# Patient Record
Sex: FEMALE | Race: WHITE | NOT HISPANIC OR LATINO | Employment: UNEMPLOYED | URBAN - METROPOLITAN AREA
[De-identification: names, ages, dates, MRNs, and addresses within clinical notes are randomized per-mention and may not be internally consistent; named-entity substitution may affect disease eponyms.]

---

## 2020-05-26 ENCOUNTER — HOSPITAL ENCOUNTER (INPATIENT)
Facility: HOSPITAL | Age: 65
LOS: 3 days | DRG: 481 | End: 2020-05-29
Attending: EMERGENCY MEDICINE | Admitting: INTERNAL MEDICINE
Payer: MEDICARE

## 2020-05-26 ENCOUNTER — APPOINTMENT (EMERGENCY)
Dept: RADIOLOGY | Facility: HOSPITAL | Age: 65
DRG: 481 | End: 2020-05-26
Payer: MEDICARE

## 2020-05-26 DIAGNOSIS — M25.552 LEFT HIP PAIN: ICD-10-CM

## 2020-05-26 DIAGNOSIS — F17.200 SMOKING: ICD-10-CM

## 2020-05-26 DIAGNOSIS — E11.65 TYPE 2 DIABETES MELLITUS WITH HYPERGLYCEMIA, WITHOUT LONG-TERM CURRENT USE OF INSULIN (HCC): ICD-10-CM

## 2020-05-26 DIAGNOSIS — S72.002A FRACTURE OF LEFT HIP (HCC): ICD-10-CM

## 2020-05-26 DIAGNOSIS — S72.042A CLOSED DISPLACED BASICERVICAL FRACTURE OF LEFT FEMUR, INITIAL ENCOUNTER (HCC): ICD-10-CM

## 2020-05-26 DIAGNOSIS — S72.002A FRACTURE OF FEMORAL NECK, LEFT (HCC): Primary | ICD-10-CM
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
## 2020-05-26 LAB
ALBUMIN SERPL BCP-MCNC: 3.6 G/DL (ref 3.5–5)
ALP SERPL-CCNC: 135 U/L (ref 46–116)
ALT SERPL W P-5'-P-CCNC: 15 U/L (ref 12–78)
ANION GAP SERPL CALCULATED.3IONS-SCNC: 12 MMOL/L (ref 4–13)
APTT PPP: 25 SECONDS (ref 23–37)
AST SERPL W P-5'-P-CCNC: 10 U/L (ref 5–45)
ATRIAL RATE: 85 BPM
ATRIAL RATE: 85 BPM
BASOPHILS # BLD AUTO: 0.07 THOUSANDS/ΜL (ref 0–0.1)
BASOPHILS NFR BLD AUTO: 1 % (ref 0–1)
BILIRUB SERPL-MCNC: 0.32 MG/DL (ref 0.2–1)
BUN SERPL-MCNC: 10 MG/DL (ref 5–25)
CALCIUM SERPL-MCNC: 9.3 MG/DL (ref 8.3–10.1)
CHLORIDE SERPL-SCNC: 100 MMOL/L (ref 100–108)
CO2 SERPL-SCNC: 26 MMOL/L (ref 21–32)
CREAT SERPL-MCNC: 0.96 MG/DL (ref 0.6–1.3)
EOSINOPHIL # BLD AUTO: 0.14 THOUSAND/ΜL (ref 0–0.61)
EOSINOPHIL NFR BLD AUTO: 1 % (ref 0–6)
ERYTHROCYTE [DISTWIDTH] IN BLOOD BY AUTOMATED COUNT: 13.8 % (ref 11.6–15.1)
EST. AVERAGE GLUCOSE BLD GHB EST-MCNC: 326 MG/DL
GFR SERPL CREATININE-BSD FRML MDRD: 63 ML/MIN/1.73SQ M
GLUCOSE SERPL-MCNC: 319 MG/DL (ref 65–140)
GLUCOSE SERPL-MCNC: 392 MG/DL (ref 65–140)
HBA1C MFR BLD: 13 %
HCT VFR BLD AUTO: 41.8 % (ref 34.8–46.1)
HGB BLD-MCNC: 13.9 G/DL (ref 11.5–15.4)
IMM GRANULOCYTES # BLD AUTO: 0.07 THOUSAND/UL (ref 0–0.2)
IMM GRANULOCYTES NFR BLD AUTO: 1 % (ref 0–2)
INR PPP: 0.92 (ref 0.84–1.19)
LYMPHOCYTES # BLD AUTO: 3.53 THOUSANDS/ΜL (ref 0.6–4.47)
LYMPHOCYTES NFR BLD AUTO: 25 % (ref 14–44)
MCH RBC QN AUTO: 28.9 PG (ref 26.8–34.3)
MCHC RBC AUTO-ENTMCNC: 33.3 G/DL (ref 31.4–37.4)
MCV RBC AUTO: 87 FL (ref 82–98)
MONOCYTES # BLD AUTO: 0.67 THOUSAND/ΜL (ref 0.17–1.22)
MONOCYTES NFR BLD AUTO: 5 % (ref 4–12)
NEUTROPHILS # BLD AUTO: 9.5 THOUSANDS/ΜL (ref 1.85–7.62)
NEUTS SEG NFR BLD AUTO: 67 % (ref 43–75)
NRBC BLD AUTO-RTO: 0 /100 WBCS
P AXIS: 65 DEGREES
P AXIS: 70 DEGREES
PLATELET # BLD AUTO: 173 THOUSANDS/UL (ref 149–390)
PMV BLD AUTO: 9.1 FL (ref 8.9–12.7)
POTASSIUM SERPL-SCNC: 4.1 MMOL/L (ref 3.5–5.3)
PR INTERVAL: 182 MS
PR INTERVAL: 184 MS
PROT SERPL-MCNC: 7.1 G/DL (ref 6.4–8.2)
PROTHROMBIN TIME: 12.4 SECONDS (ref 11.6–14.5)
QRS AXIS: 61 DEGREES
QRS AXIS: 62 DEGREES
QRSD INTERVAL: 66 MS
QRSD INTERVAL: 68 MS
QT INTERVAL: 370 MS
QT INTERVAL: 382 MS
QTC INTERVAL: 437 MS
QTC INTERVAL: 454 MS
RBC # BLD AUTO: 4.81 MILLION/UL (ref 3.81–5.12)
SODIUM SERPL-SCNC: 138 MMOL/L (ref 136–145)
T WAVE AXIS: 74 DEGREES
T WAVE AXIS: 77 DEGREES
VENTRICULAR RATE: 84 BPM
VENTRICULAR RATE: 85 BPM
WBC # BLD AUTO: 13.98 THOUSAND/UL (ref 4.31–10.16)

## 2020-05-26 PROCEDURE — 73521 X-RAY EXAM HIPS BI 2 VIEWS: CPT

## 2020-05-26 PROCEDURE — 73564 X-RAY EXAM KNEE 4 OR MORE: CPT

## 2020-05-26 PROCEDURE — 99285 EMERGENCY DEPT VISIT HI MDM: CPT | Performed by: EMERGENCY MEDICINE

## 2020-05-26 PROCEDURE — 85610 PROTHROMBIN TIME: CPT | Performed by: INTERNAL MEDICINE

## 2020-05-26 PROCEDURE — 73552 X-RAY EXAM OF FEMUR 2/>: CPT

## 2020-05-26 PROCEDURE — 99285 EMERGENCY DEPT VISIT HI MDM: CPT

## 2020-05-26 PROCEDURE — 85730 THROMBOPLASTIN TIME PARTIAL: CPT | Performed by: INTERNAL MEDICINE

## 2020-05-26 PROCEDURE — 96374 THER/PROPH/DIAG INJ IV PUSH: CPT

## 2020-05-26 PROCEDURE — 96375 TX/PRO/DX INJ NEW DRUG ADDON: CPT

## 2020-05-26 PROCEDURE — 36415 COLL VENOUS BLD VENIPUNCTURE: CPT | Performed by: INTERNAL MEDICINE

## 2020-05-26 PROCEDURE — 82948 REAGENT STRIP/BLOOD GLUCOSE: CPT

## 2020-05-26 PROCEDURE — 71045 X-RAY EXAM CHEST 1 VIEW: CPT

## 2020-05-26 PROCEDURE — 93005 ELECTROCARDIOGRAM TRACING: CPT

## 2020-05-26 PROCEDURE — 1124F ACP DISCUSS-NO DSCNMKR DOCD: CPT | Performed by: PHYSICIAN ASSISTANT

## 2020-05-26 PROCEDURE — 93010 ELECTROCARDIOGRAM REPORT: CPT | Performed by: INTERNAL MEDICINE

## 2020-05-26 PROCEDURE — 85025 COMPLETE CBC W/AUTO DIFF WBC: CPT | Performed by: INTERNAL MEDICINE

## 2020-05-26 PROCEDURE — 80053 COMPREHEN METABOLIC PANEL: CPT | Performed by: INTERNAL MEDICINE

## 2020-05-26 PROCEDURE — 83036 HEMOGLOBIN GLYCOSYLATED A1C: CPT | Performed by: INTERNAL MEDICINE

## 2020-05-26 PROCEDURE — 99223 1ST HOSP IP/OBS HIGH 75: CPT | Performed by: INTERNAL MEDICINE

## 2020-05-26 RX ORDER — MORPHINE SULFATE 4 MG/ML
4 INJECTION, SOLUTION INTRAMUSCULAR; INTRAVENOUS ONCE
Status: COMPLETED | OUTPATIENT
Start: 2020-05-26 | End: 2020-05-26

## 2020-05-26 RX ORDER — METOCLOPRAMIDE HYDROCHLORIDE 5 MG/ML
10 INJECTION INTRAMUSCULAR; INTRAVENOUS EVERY 6 HOURS PRN
Status: DISCONTINUED | OUTPATIENT
Start: 2020-05-26 | End: 2020-05-29 | Stop reason: HOSPADM

## 2020-05-26 RX ORDER — ONDANSETRON 2 MG/ML
INJECTION INTRAMUSCULAR; INTRAVENOUS
Status: COMPLETED
Start: 2020-05-26 | End: 2020-05-26

## 2020-05-26 RX ORDER — ONDANSETRON 2 MG/ML
4 INJECTION INTRAMUSCULAR; INTRAVENOUS ONCE
Status: COMPLETED | OUTPATIENT
Start: 2020-05-26 | End: 2020-05-26

## 2020-05-26 RX ORDER — ONDANSETRON 2 MG/ML
4 INJECTION INTRAMUSCULAR; INTRAVENOUS EVERY 6 HOURS PRN
Status: DISCONTINUED | OUTPATIENT
Start: 2020-05-26 | End: 2020-05-29 | Stop reason: HOSPADM

## 2020-05-26 RX ORDER — NICOTINE 21 MG/24HR
1 PATCH, TRANSDERMAL 24 HOURS TRANSDERMAL DAILY
Status: DISCONTINUED | OUTPATIENT
Start: 2020-05-27 | End: 2020-05-29 | Stop reason: HOSPADM

## 2020-05-26 RX ORDER — OXYCODONE HYDROCHLORIDE 5 MG/1
2.5 TABLET ORAL EVERY 4 HOURS PRN
Status: DISCONTINUED | OUTPATIENT
Start: 2020-05-26 | End: 2020-05-29 | Stop reason: HOSPADM

## 2020-05-26 RX ORDER — HYDROMORPHONE HCL/PF 1 MG/ML
1 SYRINGE (ML) INJECTION
Status: DISCONTINUED | OUTPATIENT
Start: 2020-05-26 | End: 2020-05-26

## 2020-05-26 RX ORDER — MORPHINE SULFATE 4 MG/ML
6 INJECTION, SOLUTION INTRAMUSCULAR; INTRAVENOUS ONCE
Status: COMPLETED | OUTPATIENT
Start: 2020-05-26 | End: 2020-05-26

## 2020-05-26 RX ORDER — ACETAMINOPHEN 325 MG/1
650 TABLET ORAL EVERY 6 HOURS PRN
Status: DISCONTINUED | OUTPATIENT
Start: 2020-05-26 | End: 2020-05-29 | Stop reason: HOSPADM

## 2020-05-26 RX ORDER — ACETAMINOPHEN 325 MG/1
650 TABLET ORAL EVERY 6 HOURS PRN
Status: DISCONTINUED | OUTPATIENT
Start: 2020-05-26 | End: 2020-05-26

## 2020-05-26 RX ORDER — HYDROMORPHONE HCL/PF 1 MG/ML
0.5 SYRINGE (ML) INJECTION ONCE
Status: COMPLETED | OUTPATIENT
Start: 2020-05-26 | End: 2020-05-26

## 2020-05-26 RX ORDER — SODIUM CHLORIDE 9 MG/ML
100 INJECTION, SOLUTION INTRAVENOUS CONTINUOUS
Status: DISCONTINUED | OUTPATIENT
Start: 2020-05-26 | End: 2020-05-29

## 2020-05-26 RX ADMIN — HYDROMORPHONE HYDROCHLORIDE 0.5 MG: 1 INJECTION, SOLUTION INTRAMUSCULAR; INTRAVENOUS; SUBCUTANEOUS at 16:20

## 2020-05-26 RX ADMIN — HYDROMORPHONE HYDROCHLORIDE 1 MG: 1 INJECTION, SOLUTION INTRAMUSCULAR; INTRAVENOUS; SUBCUTANEOUS at 21:10

## 2020-05-26 RX ADMIN — ONDANSETRON 4 MG: 2 INJECTION INTRAMUSCULAR; INTRAVENOUS at 16:19

## 2020-05-26 RX ADMIN — MORPHINE SULFATE 6 MG: 4 INJECTION INTRAVENOUS at 18:02

## 2020-05-26 RX ADMIN — METOCLOPRAMIDE 10 MG: 5 INJECTION, SOLUTION INTRAMUSCULAR; INTRAVENOUS at 23:29

## 2020-05-26 RX ADMIN — HYDROMORPHONE HYDROCHLORIDE 1 MG: 1 INJECTION, SOLUTION INTRAMUSCULAR; INTRAVENOUS; SUBCUTANEOUS at 18:19

## 2020-05-26 RX ADMIN — MORPHINE SULFATE 4 MG: 4 INJECTION INTRAVENOUS at 17:15

## 2020-05-26 RX ADMIN — SODIUM CHLORIDE 100 ML/HR: 0.9 INJECTION, SOLUTION INTRAVENOUS at 21:14

## 2020-05-26 RX ADMIN — ONDANSETRON 4 MG: 2 INJECTION INTRAMUSCULAR; INTRAVENOUS at 21:11

## 2020-05-26 RX ADMIN — INSULIN LISPRO 5 UNITS: 100 INJECTION, SOLUTION INTRAVENOUS; SUBCUTANEOUS at 21:18

## 2020-05-27 ENCOUNTER — ANESTHESIA EVENT (INPATIENT)
Dept: PERIOP | Facility: HOSPITAL | Age: 65
DRG: 481 | End: 2020-05-27
Payer: MEDICARE

## 2020-05-27 ENCOUNTER — ANESTHESIA (INPATIENT)
Dept: PERIOP | Facility: HOSPITAL | Age: 65
DRG: 481 | End: 2020-05-27
Payer: MEDICARE

## 2020-05-27 ENCOUNTER — APPOINTMENT (INPATIENT)
Dept: RADIOLOGY | Facility: HOSPITAL | Age: 65
DRG: 481 | End: 2020-05-27
Payer: MEDICARE

## 2020-05-27 PROBLEM — E11.9 DIABETES (HCC): Status: ACTIVE | Noted: 2020-05-27

## 2020-05-27 PROBLEM — F17.200 SMOKING: Status: ACTIVE | Noted: 2020-05-27

## 2020-05-27 PROBLEM — E11.65 TYPE 2 DIABETES MELLITUS WITH HYPERGLYCEMIA, WITHOUT LONG-TERM CURRENT USE OF INSULIN (HCC): Status: ACTIVE | Noted: 2020-05-27

## 2020-05-27 PROBLEM — IMO0001 SMOKING: Status: ACTIVE | Noted: 2020-05-27

## 2020-05-27 LAB
ABO GROUP BLD: NORMAL
ABO GROUP BLD: NORMAL
BLD GP AB SCN SERPL QL: NEGATIVE
GLUCOSE SERPL-MCNC: 104 MG/DL (ref 65–140)
GLUCOSE SERPL-MCNC: 204 MG/DL (ref 65–140)
GLUCOSE SERPL-MCNC: 229 MG/DL (ref 65–140)
GLUCOSE SERPL-MCNC: 280 MG/DL (ref 65–140)
RH BLD: POSITIVE
RH BLD: POSITIVE
SARS-COV-2 RNA RESP QL NAA+PROBE: NEGATIVE
SPECIMEN EXPIRATION DATE: NORMAL

## 2020-05-27 PROCEDURE — 82948 REAGENT STRIP/BLOOD GLUCOSE: CPT

## 2020-05-27 PROCEDURE — 0QS706Z REPOSITION LEFT UPPER FEMUR WITH INTRAMEDULLARY INTERNAL FIXATION DEVICE, OPEN APPROACH: ICD-10-PCS | Performed by: ORTHOPAEDIC SURGERY

## 2020-05-27 PROCEDURE — 86850 RBC ANTIBODY SCREEN: CPT | Performed by: PHYSICIAN ASSISTANT

## 2020-05-27 PROCEDURE — G9197 ORDER FOR CEPH: HCPCS | Performed by: ORTHOPAEDIC SURGERY

## 2020-05-27 PROCEDURE — C1713 ANCHOR/SCREW BN/BN,TIS/BN: HCPCS | Performed by: ORTHOPAEDIC SURGERY

## 2020-05-27 PROCEDURE — 86900 BLOOD TYPING SEROLOGIC ABO: CPT | Performed by: PHYSICIAN ASSISTANT

## 2020-05-27 PROCEDURE — 27236 TREAT THIGH FRACTURE: CPT | Performed by: ORTHOPAEDIC SURGERY

## 2020-05-27 PROCEDURE — 73552 X-RAY EXAM OF FEMUR 2/>: CPT

## 2020-05-27 PROCEDURE — 99223 1ST HOSP IP/OBS HIGH 75: CPT | Performed by: PHYSICIAN ASSISTANT

## 2020-05-27 PROCEDURE — 86901 BLOOD TYPING SEROLOGIC RH(D): CPT | Performed by: PHYSICIAN ASSISTANT

## 2020-05-27 PROCEDURE — 99232 SBSQ HOSP IP/OBS MODERATE 35: CPT | Performed by: INTERNAL MEDICINE

## 2020-05-27 PROCEDURE — 87635 SARS-COV-2 COVID-19 AMP PRB: CPT | Performed by: PHYSICIAN ASSISTANT

## 2020-05-27 DEVICE — 11.0MM TI TROCH FIXATION NAIL SCREW/100MM - STERILE: Type: IMPLANTABLE DEVICE | Site: TROCHANTER | Status: FUNCTIONAL

## 2020-05-27 DEVICE — 11MM/130 DEG TI CANN TROCH FIXATION NAIL 400MM/LEFT-STER: Type: IMPLANTABLE DEVICE | Site: TROCHANTER | Status: FUNCTIONAL

## 2020-05-27 DEVICE — 5.0MM TI LOCKING SCREW 40MM- FOR IM NAILS-STERILE: Type: IMPLANTABLE DEVICE | Site: TROCHANTER | Status: FUNCTIONAL

## 2020-05-27 RX ORDER — FENTANYL CITRATE/PF 50 MCG/ML
25 SYRINGE (ML) INJECTION
Status: DISCONTINUED | OUTPATIENT
Start: 2020-05-27 | End: 2020-05-27 | Stop reason: HOSPADM

## 2020-05-27 RX ORDER — CEFAZOLIN SODIUM 1 G/50ML
1000 SOLUTION INTRAVENOUS EVERY 8 HOURS
Status: COMPLETED | OUTPATIENT
Start: 2020-05-27 | End: 2020-05-28

## 2020-05-27 RX ORDER — GLYCOPYRROLATE 0.2 MG/ML
INJECTION INTRAMUSCULAR; INTRAVENOUS AS NEEDED
Status: DISCONTINUED | OUTPATIENT
Start: 2020-05-27 | End: 2020-05-27 | Stop reason: SURG

## 2020-05-27 RX ORDER — ONDANSETRON 2 MG/ML
4 INJECTION INTRAMUSCULAR; INTRAVENOUS ONCE AS NEEDED
Status: COMPLETED | OUTPATIENT
Start: 2020-05-27 | End: 2020-05-27

## 2020-05-27 RX ORDER — MIDAZOLAM HYDROCHLORIDE 2 MG/2ML
INJECTION, SOLUTION INTRAMUSCULAR; INTRAVENOUS AS NEEDED
Status: DISCONTINUED | OUTPATIENT
Start: 2020-05-27 | End: 2020-05-27 | Stop reason: SURG

## 2020-05-27 RX ORDER — CEFAZOLIN SODIUM 2 G/50ML
2000 SOLUTION INTRAVENOUS
Status: COMPLETED | OUTPATIENT
Start: 2020-05-27 | End: 2020-05-27

## 2020-05-27 RX ORDER — NEOSTIGMINE METHYLSULFATE 1 MG/ML
INJECTION INTRAVENOUS AS NEEDED
Status: DISCONTINUED | OUTPATIENT
Start: 2020-05-27 | End: 2020-05-27 | Stop reason: SURG

## 2020-05-27 RX ORDER — SODIUM CHLORIDE 9 MG/ML
125 INJECTION, SOLUTION INTRAVENOUS CONTINUOUS
Status: DISCONTINUED | OUTPATIENT
Start: 2020-05-27 | End: 2020-05-29

## 2020-05-27 RX ORDER — FENTANYL CITRATE 50 UG/ML
INJECTION, SOLUTION INTRAMUSCULAR; INTRAVENOUS AS NEEDED
Status: DISCONTINUED | OUTPATIENT
Start: 2020-05-27 | End: 2020-05-27 | Stop reason: SURG

## 2020-05-27 RX ORDER — MAGNESIUM HYDROXIDE 1200 MG/15ML
LIQUID ORAL AS NEEDED
Status: DISCONTINUED | OUTPATIENT
Start: 2020-05-27 | End: 2020-05-27 | Stop reason: HOSPADM

## 2020-05-27 RX ORDER — PROPOFOL 10 MG/ML
INJECTION, EMULSION INTRAVENOUS AS NEEDED
Status: DISCONTINUED | OUTPATIENT
Start: 2020-05-27 | End: 2020-05-27 | Stop reason: SURG

## 2020-05-27 RX ORDER — HYDROMORPHONE HCL/PF 1 MG/ML
0.5 SYRINGE (ML) INJECTION
Status: DISCONTINUED | OUTPATIENT
Start: 2020-05-27 | End: 2020-05-27 | Stop reason: HOSPADM

## 2020-05-27 RX ORDER — LIDOCAINE HYDROCHLORIDE 20 MG/ML
INJECTION, SOLUTION INFILTRATION; PERINEURAL AS NEEDED
Status: DISCONTINUED | OUTPATIENT
Start: 2020-05-27 | End: 2020-05-27 | Stop reason: SURG

## 2020-05-27 RX ORDER — INSULIN GLARGINE 100 [IU]/ML
20 INJECTION, SOLUTION SUBCUTANEOUS EVERY MORNING
Status: DISCONTINUED | OUTPATIENT
Start: 2020-05-27 | End: 2020-05-29 | Stop reason: HOSPADM

## 2020-05-27 RX ORDER — ROCURONIUM BROMIDE 10 MG/ML
INJECTION, SOLUTION INTRAVENOUS AS NEEDED
Status: DISCONTINUED | OUTPATIENT
Start: 2020-05-27 | End: 2020-05-27 | Stop reason: SURG

## 2020-05-27 RX ADMIN — SODIUM CHLORIDE 100 ML/HR: 0.9 INJECTION, SOLUTION INTRAVENOUS at 05:44

## 2020-05-27 RX ADMIN — MORPHINE SULFATE 2 MG: 2 INJECTION, SOLUTION INTRAMUSCULAR; INTRAVENOUS at 05:47

## 2020-05-27 RX ADMIN — ROCURONIUM BROMIDE 30 MG: 10 INJECTION, SOLUTION INTRAVENOUS at 14:38

## 2020-05-27 RX ADMIN — ONDANSETRON 4 MG: 2 INJECTION INTRAMUSCULAR; INTRAVENOUS at 16:31

## 2020-05-27 RX ADMIN — FENTANYL CITRATE 100 MCG: 50 INJECTION, SOLUTION INTRAMUSCULAR; INTRAVENOUS at 14:38

## 2020-05-27 RX ADMIN — PHENYLEPHRINE HYDROCHLORIDE 200 MCG: 10 INJECTION INTRAVENOUS at 14:43

## 2020-05-27 RX ADMIN — FENTANYL CITRATE 25 MCG: 50 INJECTION, SOLUTION INTRAMUSCULAR; INTRAVENOUS at 15:49

## 2020-05-27 RX ADMIN — SODIUM CHLORIDE 100 ML/HR: 0.9 INJECTION, SOLUTION INTRAVENOUS at 14:32

## 2020-05-27 RX ADMIN — MORPHINE SULFATE 2 MG: 2 INJECTION, SOLUTION INTRAMUSCULAR; INTRAVENOUS at 02:10

## 2020-05-27 RX ADMIN — INSULIN LISPRO 2 UNITS: 100 INJECTION, SOLUTION INTRAVENOUS; SUBCUTANEOUS at 21:33

## 2020-05-27 RX ADMIN — GLYCOPYRROLATE 0.4 MG: 0.2 INJECTION, SOLUTION INTRAMUSCULAR; INTRAVENOUS at 15:25

## 2020-05-27 RX ADMIN — FENTANYL CITRATE 25 MCG: 50 INJECTION, SOLUTION INTRAMUSCULAR; INTRAVENOUS at 15:44

## 2020-05-27 RX ADMIN — PROPOFOL 100 MG: 10 INJECTION, EMULSION INTRAVENOUS at 14:38

## 2020-05-27 RX ADMIN — NEOSTIGMINE METHYLSULFATE 5 MG: 1 INJECTION, SOLUTION INTRAVENOUS at 15:25

## 2020-05-27 RX ADMIN — FENTANYL CITRATE 50 MCG: 50 INJECTION, SOLUTION INTRAMUSCULAR; INTRAVENOUS at 15:27

## 2020-05-27 RX ADMIN — SODIUM CHLORIDE: 0.9 INJECTION, SOLUTION INTRAVENOUS at 15:12

## 2020-05-27 RX ADMIN — LIDOCAINE HYDROCHLORIDE 60 ML: 20 INJECTION, SOLUTION INFILTRATION; PERINEURAL at 14:38

## 2020-05-27 RX ADMIN — SODIUM CHLORIDE 100 ML/HR: 0.9 INJECTION, SOLUTION INTRAVENOUS at 16:33

## 2020-05-27 RX ADMIN — MORPHINE SULFATE 2 MG: 2 INJECTION, SOLUTION INTRAMUSCULAR; INTRAVENOUS at 17:31

## 2020-05-27 RX ADMIN — FENTANYL CITRATE 25 MCG: 50 INJECTION, SOLUTION INTRAMUSCULAR; INTRAVENOUS at 15:54

## 2020-05-27 RX ADMIN — MIDAZOLAM 2 MG: 1 INJECTION INTRAMUSCULAR; INTRAVENOUS at 14:38

## 2020-05-27 RX ADMIN — ONDANSETRON 4 MG: 2 INJECTION INTRAMUSCULAR; INTRAVENOUS at 15:25

## 2020-05-27 RX ADMIN — INSULIN LISPRO 4 UNITS: 100 INJECTION, SOLUTION INTRAVENOUS; SUBCUTANEOUS at 12:25

## 2020-05-27 RX ADMIN — INSULIN LISPRO 4 UNITS: 100 INJECTION, SOLUTION INTRAVENOUS; SUBCUTANEOUS at 09:35

## 2020-05-27 RX ADMIN — OXYCODONE HYDROCHLORIDE 2.5 MG: 5 TABLET ORAL at 18:59

## 2020-05-27 RX ADMIN — MORPHINE SULFATE 2 MG: 2 INJECTION, SOLUTION INTRAMUSCULAR; INTRAVENOUS at 21:09

## 2020-05-27 RX ADMIN — SODIUM CHLORIDE 125 ML/HR: 0.9 INJECTION, SOLUTION INTRAVENOUS at 17:42

## 2020-05-27 RX ADMIN — PHENYLEPHRINE HYDROCHLORIDE 200 MCG: 10 INJECTION INTRAVENOUS at 14:59

## 2020-05-27 RX ADMIN — CEFAZOLIN SODIUM 2000 MG: 2 SOLUTION INTRAVENOUS at 14:44

## 2020-05-27 RX ADMIN — INSULIN GLARGINE 20 UNITS: 100 INJECTION, SOLUTION SUBCUTANEOUS at 09:35

## 2020-05-27 RX ADMIN — HYDROMORPHONE HYDROCHLORIDE 0.5 MG: 1 INJECTION, SOLUTION INTRAMUSCULAR; INTRAVENOUS; SUBCUTANEOUS at 16:06

## 2020-05-27 RX ADMIN — MORPHINE SULFATE 2 MG: 2 INJECTION, SOLUTION INTRAMUSCULAR; INTRAVENOUS at 12:57

## 2020-05-27 RX ADMIN — MORPHINE SULFATE 2 MG: 2 INJECTION, SOLUTION INTRAMUSCULAR; INTRAVENOUS at 09:34

## 2020-05-27 RX ADMIN — PHENYLEPHRINE HYDROCHLORIDE 200 MCG: 10 INJECTION INTRAVENOUS at 14:52

## 2020-05-27 RX ADMIN — OXYCODONE HYDROCHLORIDE 2.5 MG: 5 TABLET ORAL at 04:54

## 2020-05-27 RX ADMIN — FENTANYL CITRATE 50 MCG: 50 INJECTION, SOLUTION INTRAMUSCULAR; INTRAVENOUS at 14:56

## 2020-05-28 LAB
ANION GAP SERPL CALCULATED.3IONS-SCNC: 5 MMOL/L (ref 4–13)
BUN SERPL-MCNC: 8 MG/DL (ref 5–25)
CALCIUM SERPL-MCNC: 8 MG/DL (ref 8.3–10.1)
CHLORIDE SERPL-SCNC: 104 MMOL/L (ref 100–108)
CO2 SERPL-SCNC: 28 MMOL/L (ref 21–32)
CREAT SERPL-MCNC: 0.69 MG/DL (ref 0.6–1.3)
ERYTHROCYTE [DISTWIDTH] IN BLOOD BY AUTOMATED COUNT: 14 % (ref 11.6–15.1)
GFR SERPL CREATININE-BSD FRML MDRD: 92 ML/MIN/1.73SQ M
GLUCOSE SERPL-MCNC: 163 MG/DL (ref 65–140)
GLUCOSE SERPL-MCNC: 164 MG/DL (ref 65–140)
GLUCOSE SERPL-MCNC: 173 MG/DL (ref 65–140)
GLUCOSE SERPL-MCNC: 220 MG/DL (ref 65–140)
GLUCOSE SERPL-MCNC: 237 MG/DL (ref 65–140)
HCT VFR BLD AUTO: 31.2 % (ref 34.8–46.1)
HGB BLD-MCNC: 10.1 G/DL (ref 11.5–15.4)
MCH RBC QN AUTO: 29.1 PG (ref 26.8–34.3)
MCHC RBC AUTO-ENTMCNC: 32.4 G/DL (ref 31.4–37.4)
MCV RBC AUTO: 90 FL (ref 82–98)
PLATELET # BLD AUTO: 125 THOUSANDS/UL (ref 149–390)
PMV BLD AUTO: 8.7 FL (ref 8.9–12.7)
POTASSIUM SERPL-SCNC: 3.5 MMOL/L (ref 3.5–5.3)
RBC # BLD AUTO: 3.47 MILLION/UL (ref 3.81–5.12)
SODIUM SERPL-SCNC: 137 MMOL/L (ref 136–145)
WBC # BLD AUTO: 10.02 THOUSAND/UL (ref 4.31–10.16)

## 2020-05-28 PROCEDURE — 97163 PT EVAL HIGH COMPLEX 45 MIN: CPT | Performed by: PHYSICAL THERAPIST

## 2020-05-28 PROCEDURE — 99024 POSTOP FOLLOW-UP VISIT: CPT | Performed by: PHYSICIAN ASSISTANT

## 2020-05-28 PROCEDURE — 82948 REAGENT STRIP/BLOOD GLUCOSE: CPT

## 2020-05-28 PROCEDURE — 80048 BASIC METABOLIC PNL TOTAL CA: CPT | Performed by: INTERNAL MEDICINE

## 2020-05-28 PROCEDURE — 97167 OT EVAL HIGH COMPLEX 60 MIN: CPT

## 2020-05-28 PROCEDURE — 97112 NEUROMUSCULAR REEDUCATION: CPT | Performed by: PHYSICAL THERAPIST

## 2020-05-28 PROCEDURE — 99232 SBSQ HOSP IP/OBS MODERATE 35: CPT | Performed by: INTERNAL MEDICINE

## 2020-05-28 PROCEDURE — 85027 COMPLETE CBC AUTOMATED: CPT | Performed by: INTERNAL MEDICINE

## 2020-05-28 RX ADMIN — MORPHINE SULFATE 2 MG: 2 INJECTION, SOLUTION INTRAMUSCULAR; INTRAVENOUS at 18:50

## 2020-05-28 RX ADMIN — MORPHINE SULFATE 2 MG: 2 INJECTION, SOLUTION INTRAMUSCULAR; INTRAVENOUS at 09:33

## 2020-05-28 RX ADMIN — INSULIN GLARGINE 20 UNITS: 100 INJECTION, SOLUTION SUBCUTANEOUS at 10:00

## 2020-05-28 RX ADMIN — SODIUM CHLORIDE 125 ML/HR: 0.9 INJECTION, SOLUTION INTRAVENOUS at 16:40

## 2020-05-28 RX ADMIN — OXYCODONE HYDROCHLORIDE 2.5 MG: 5 TABLET ORAL at 03:15

## 2020-05-28 RX ADMIN — INSULIN LISPRO 1 UNITS: 100 INJECTION, SOLUTION INTRAVENOUS; SUBCUTANEOUS at 22:49

## 2020-05-28 RX ADMIN — MORPHINE SULFATE 2 MG: 2 INJECTION, SOLUTION INTRAMUSCULAR; INTRAVENOUS at 23:21

## 2020-05-28 RX ADMIN — MORPHINE SULFATE 2 MG: 2 INJECTION, SOLUTION INTRAMUSCULAR; INTRAVENOUS at 01:19

## 2020-05-28 RX ADMIN — INSULIN LISPRO 2 UNITS: 100 INJECTION, SOLUTION INTRAVENOUS; SUBCUTANEOUS at 16:41

## 2020-05-28 RX ADMIN — INSULIN LISPRO 4 UNITS: 100 INJECTION, SOLUTION INTRAVENOUS; SUBCUTANEOUS at 12:35

## 2020-05-28 RX ADMIN — MORPHINE SULFATE 2 MG: 2 INJECTION, SOLUTION INTRAMUSCULAR; INTRAVENOUS at 15:14

## 2020-05-28 RX ADMIN — OXYCODONE HYDROCHLORIDE 2.5 MG: 5 TABLET ORAL at 12:42

## 2020-05-28 RX ADMIN — ENOXAPARIN SODIUM 40 MG: 40 INJECTION SUBCUTANEOUS at 10:00

## 2020-05-28 RX ADMIN — INSULIN LISPRO 2 UNITS: 100 INJECTION, SOLUTION INTRAVENOUS; SUBCUTANEOUS at 10:02

## 2020-05-28 RX ADMIN — CEFAZOLIN SODIUM 1000 MG: 1 SOLUTION INTRAVENOUS at 06:21

## 2020-05-28 RX ADMIN — CEFAZOLIN SODIUM 1000 MG: 1 SOLUTION INTRAVENOUS at 15:14

## 2020-05-28 RX ADMIN — CEFAZOLIN SODIUM 1000 MG: 1 SOLUTION INTRAVENOUS at 00:30

## 2020-05-28 RX ADMIN — SODIUM CHLORIDE 125 ML/HR: 0.9 INJECTION, SOLUTION INTRAVENOUS at 03:16

## 2020-05-29 VITALS
RESPIRATION RATE: 20 BRPM | HEART RATE: 94 BPM | DIASTOLIC BLOOD PRESSURE: 63 MMHG | OXYGEN SATURATION: 95 % | SYSTOLIC BLOOD PRESSURE: 138 MMHG | TEMPERATURE: 99.3 F | WEIGHT: 139.77 LBS

## 2020-05-29 PROBLEM — D50.0 BLOOD LOSS ANEMIA: Status: ACTIVE | Noted: 2020-05-29

## 2020-05-29 LAB
GLUCOSE SERPL-MCNC: 124 MG/DL (ref 65–140)
GLUCOSE SERPL-MCNC: 219 MG/DL (ref 65–140)

## 2020-05-29 PROCEDURE — 97530 THERAPEUTIC ACTIVITIES: CPT

## 2020-05-29 PROCEDURE — 82948 REAGENT STRIP/BLOOD GLUCOSE: CPT

## 2020-05-29 PROCEDURE — 97110 THERAPEUTIC EXERCISES: CPT

## 2020-05-29 PROCEDURE — 99024 POSTOP FOLLOW-UP VISIT: CPT | Performed by: PHYSICIAN ASSISTANT

## 2020-05-29 PROCEDURE — 97535 SELF CARE MNGMENT TRAINING: CPT

## 2020-05-29 PROCEDURE — 97116 GAIT TRAINING THERAPY: CPT

## 2020-05-29 PROCEDURE — 99239 HOSP IP/OBS DSCHRG MGMT >30: CPT | Performed by: INTERNAL MEDICINE

## 2020-05-29 RX ORDER — ACETAMINOPHEN 325 MG/1
650 TABLET ORAL EVERY 6 HOURS PRN
Qty: 30 TABLET | Refills: 0 | Status: SHIPPED | OUTPATIENT
Start: 2020-05-29

## 2020-05-29 RX ORDER — INSULIN GLARGINE 100 [IU]/ML
15 INJECTION, SOLUTION SUBCUTANEOUS EVERY MORNING
Refills: 0
Start: 2020-05-30

## 2020-05-29 RX ORDER — NICOTINE 21 MG/24HR
1 PATCH, TRANSDERMAL 24 HOURS TRANSDERMAL DAILY
Qty: 28 PATCH | Refills: 0 | Status: SHIPPED | OUTPATIENT
Start: 2020-05-30

## 2020-05-29 RX ORDER — OXYCODONE HYDROCHLORIDE 5 MG/1
5 TABLET ORAL EVERY 4 HOURS PRN
Qty: 30 TABLET | Refills: 0
Start: 2020-05-29 | End: 2020-06-08

## 2020-05-29 RX ADMIN — INSULIN GLARGINE 20 UNITS: 100 INJECTION, SOLUTION SUBCUTANEOUS at 09:36

## 2020-05-29 RX ADMIN — ENOXAPARIN SODIUM 40 MG: 40 INJECTION SUBCUTANEOUS at 09:35

## 2020-05-29 RX ADMIN — INSULIN LISPRO 4 UNITS: 100 INJECTION, SOLUTION INTRAVENOUS; SUBCUTANEOUS at 11:57

## 2020-05-29 RX ADMIN — OXYCODONE HYDROCHLORIDE 2.5 MG: 5 TABLET ORAL at 09:36

## 2020-06-22 ENCOUNTER — TELEPHONE (OUTPATIENT)
Dept: OBGYN CLINIC | Facility: HOSPITAL | Age: 65
End: 2020-06-22

## 2020-06-30 ENCOUNTER — TELEMEDICINE (OUTPATIENT)
Dept: OBGYN CLINIC | Facility: MEDICAL CENTER | Age: 65
End: 2020-06-30

## 2020-06-30 DIAGNOSIS — S72.002A CLOSED FRACTURE OF LEFT HIP, INITIAL ENCOUNTER (HCC): Primary | ICD-10-CM

## 2020-06-30 PROCEDURE — 99024 POSTOP FOLLOW-UP VISIT: CPT | Performed by: ORTHOPAEDIC SURGERY

## 2020-08-10 ENCOUNTER — TELEPHONE (OUTPATIENT)
Dept: OBGYN CLINIC | Facility: HOSPITAL | Age: 65
End: 2020-08-10

## 2020-08-10 NOTE — TELEPHONE ENCOUNTER
Please contact patient for follow-up with xrays  She has not been seen since surgery  We do like seeing the patient at this point

## 2020-08-10 NOTE — TELEPHONE ENCOUNTER
Patient sees Dr Nita Washington  Patient is calling to let Dr Nita Washington know that she's been off of her cane for 2 days now and pain medication for weeks and is feeling very good

## 2020-08-17 NOTE — TELEPHONE ENCOUNTER
Patient cancelled PO appt   Per patient, she has no transportation     Patient was advised she needs to see DR Garnet Goldmann per George Valladares note    Patient states she will call us once she figured out transport

## 2020-09-01 ENCOUNTER — OFFICE VISIT (OUTPATIENT)
Dept: OBGYN CLINIC | Facility: MEDICAL CENTER | Age: 65
End: 2020-09-01
Payer: MEDICARE

## 2020-09-01 ENCOUNTER — APPOINTMENT (OUTPATIENT)
Dept: RADIOLOGY | Facility: MEDICAL CENTER | Age: 65
End: 2020-09-01
Payer: MEDICARE

## 2020-09-01 VITALS
SYSTOLIC BLOOD PRESSURE: 143 MMHG | WEIGHT: 133.4 LBS | HEART RATE: 97 BPM | DIASTOLIC BLOOD PRESSURE: 75 MMHG | TEMPERATURE: 98.7 F

## 2020-09-01 DIAGNOSIS — S72.002A CLOSED FRACTURE OF LEFT HIP, INITIAL ENCOUNTER (HCC): Primary | ICD-10-CM

## 2020-09-01 DIAGNOSIS — S72.002A CLOSED FRACTURE OF LEFT HIP, INITIAL ENCOUNTER (HCC): ICD-10-CM

## 2020-09-01 PROCEDURE — 73552 X-RAY EXAM OF FEMUR 2/>: CPT

## 2020-09-01 PROCEDURE — 99213 OFFICE O/P EST LOW 20 MIN: CPT | Performed by: ORTHOPAEDIC SURGERY

## 2020-09-01 NOTE — PROGRESS NOTES
Orthopaedic Surgery - Office Note  Fallon Simeon (35 y o  female)   : 1955   MRN: [id number]  Encounter Date: 2020    Chief Complaint   Patient presents with    Left Thigh - Follow-up       Assessment / Plan  Status post ORIF left hip on 2020    · Continue with activity as tolerated with progression as tolerated  · Ice and analgesics if needed  Return if symptoms worsen or fail to improve  History of Present Illness  Fallon Simeon is a 59 y o  female who presents status post ORIF left hip on 2020  Patient is doing well at this time occasionally she gets some soreness on the lateral aspect of her hip otherwise no pain  She has been walking normally and doing all activity  She continues with her therapy exercises on her own  She denies any distal numbness or tingling  Review of Systems  Pertinent items are noted in HPI  All other systems were reviewed and are negative  Physical Exam  /75   Pulse 97   Temp 98 7 °F (37 1 °C)   Wt 60 5 kg (133 lb 6 4 oz)   Cons: Appears well  No apparent distress  Psych: Alert  Oriented x3  Mood and affect normal   Eyes: PERRLA, EOMI  Resp: Normal effort  No audible wheezing or stridor  CV: Palpable pulse  No discernable arrhythmia  No LE edema  Lymph:  No palpable cervical, axillary, or inguinal lymphadenopathy  Skin: Warm  No palpable masses  No visible lesions  Neuro: Normal muscle tone  Normal and symmetric DTR's  Left Hip Exam  Alignment / Posture:  Normal resting hip posture  Normal knee alignment  Inspection:  No swelling  No erythema  Incisions healed  Palpation:  No tenderness  ROM:  Hip Flexion 120°  Hip ER 60°   Hip IR 40°  Strength:  5/5 hip flexors and abductors  5/5 quadriceps and hamstrings  Stability:  Not tested  Tests:  No pertinent positive or negative tests  Neurovascular:  Sensation intact in DP/SP/Gonzales/Sa/T nerve distributions  Sensation intact in all digital nerve distributions   Toes warm and perfused  Gait:  Normal     Studies Reviewed  I have personally reviewed pertinent films in PACS  XR of left hip - from 9/1/20 shows well healed fracture with good hardware placement  Procedures  No procedures today  Medical, Surgical, Family, and Social History  The patient's medical history, family history, and social history, were reviewed and updated as appropriate  Past Medical History:   Diagnosis Date    Diabetes [facility])        Past Surgical History:   Procedure Laterality Date    APPENDECTOMY      CHOLECYSTECTOMY      TN OPEN RX FEMUR FX+INTRAMED ALEX Left 5/27/2020    Procedure: INSERTION NAIL IM FEMUR ANTEGRADE (TROCHANTERIC); Surgeon: Rachel Solis MD;  Location: [facility];  Service: Orthopedics    TONSILLECTOMY      TUBAL LIGATION         History reviewed  No pertinent family history      Social History     Occupational History    Not on file   Tobacco Use    Smoking status: Current Every Day Smoker     Packs/day: 0 50    Smokeless tobacco: Never Used   Substance and Sexual Activity    Alcohol use: Not Currently    Drug use: Never    Sexual activity: Not on file       Allergies   Allergen Reactions    Penicillins Hives         Current Outpatient Medications:     acetaminophen (TYLENOL) 325 mg tablet, Take 2 tablets (650 mg total) by mouth every 6 (six) hours as needed for mild pain, headaches or fever, Disp: 30 tablet, Rfl: 0    enoxaparin (LOVENOX) 40 mg/0 4 mL, Inject 0 4 mL (40 mg total) under the skin daily, Disp: , Rfl: 0    insulin glargine (LANTUS) 100 units/mL subcutaneous injection, Inject 15 Units under the skin every morning, Disp: , Rfl: 0    insulin lispro (HumaLOG) 100 units/mL injection, Inject 2-12 Units under the skin 3 (three) times a day before meals, Disp: , Rfl: 0    nicotine (NICODERM CQ) 14 mg/24hr TD 24 hr patch, Place 1 patch on the skin daily, Disp: 28 patch, Rfl: 0      Shravan Ann PA-C    Scribe Attestation    I,:    am acting as a scribe while in the presence of the attending physician :        I,:    personally performed the services described in this documentation    as scribed in my presence :

## 2022-04-01 ENCOUNTER — TELEPHONE (OUTPATIENT)
Dept: OBGYN CLINIC | Facility: OTHER | Age: 67
End: 2022-04-01

## 2022-04-01 NOTE — TELEPHONE ENCOUNTER
Patient called in , her pcp wanted her to call suggested her call toy in regards to possible having an allergic reaction to the paul and what not that as put in  Her reactions are weakness in her leg, numbness in her toes & extreme fatigue   Can you please call patient?     C/b # [phone]

## 2022-04-07 NOTE — TELEPHONE ENCOUNTER
I spoke with the patient in the symptoms that she was having was not consistent with allergic reaction  She is continuing to get worked up for generalized weakness that she is experiencing through her PCP  She will contact us if she starts having increased pain especially around hip/femur area for further evaluation

## 2024-06-28 PROBLEM — F41.1 GENERALIZED ANXIETY DISORDER: Status: ACTIVE | Noted: 2022-04-02

## 2024-06-28 PROBLEM — Z72.0 TOBACCO USER: Status: ACTIVE | Noted: 2020-05-27

## 2024-06-28 PROBLEM — L30.9 ECZEMA: Status: ACTIVE | Noted: 2024-06-28

## 2024-06-28 PROBLEM — H52.6 DISORDER OF REFRACTION AND ACCOMMODATION: Status: ACTIVE | Noted: 2024-06-28

## 2024-06-28 PROBLEM — R20.0 NUMBNESS OF TOES: Status: ACTIVE | Noted: 2022-04-02

## 2024-06-28 PROBLEM — K82.9 DISORDER OF GALLBLADDER: Status: ACTIVE | Noted: 2024-06-28

## 2024-06-28 PROBLEM — M25.519 ARTHRALGIA OF SHOULDER: Status: ACTIVE | Noted: 2024-06-28

## 2024-06-28 PROBLEM — F32.9 MAJOR DEPRESSIVE DISORDER: Status: ACTIVE | Noted: 2022-04-02

## 2024-06-28 PROBLEM — S42.211A: Status: ACTIVE | Noted: 2022-04-02

## 2024-06-28 PROBLEM — Z87.19 HISTORY OF GALLBLADDER DISEASE: Status: ACTIVE | Noted: 2022-04-02

## 2024-09-11 ENCOUNTER — TELEPHONE (OUTPATIENT)
Age: 69
End: 2024-09-11

## 2024-09-11 NOTE — TELEPHONE ENCOUNTER
Received xfer and Spoke with Jim from [facility]- care manager.  Concerned about Patient health and well being   Stated she spoke with patient and Patient has not had care for a year and has several health conditions diabetes, htn etc.   Patient states she is doing a holistic approach regarding her health.   Patient is not taking any prescribed medications at this time.    She states patient told her she checks her blood sugars 2x a day and they are stable. Stated she also checks her BP several times a day and most recent pressure was a sys of 200 dys unknown, she did not ask patient if she was symptomatic and didn't note  exactly when the bp was taken. Patient has had several new patient apts that she has missed she is scheduled to be seen tomorrow.     If Patient is a no show please follow up with a phone call to patient.

## 2024-09-11 NOTE — TELEPHONE ENCOUNTER
Vishal Fernandez  called to confirm pt had a new patient appt tomorrow.  States she is concerned about the Pt's health and she has been non compliant with medication.  Pt has HTN, DM and she stopped all medications a year ago.    States the pt told her Systolic BP has been 200.  No diastolic given.  Jim unsure if she should call pt back to have her go to ER.    Pt has cancelled 3 New Patient appts.    Warm transferred to [facility] in triage to assist.

## 2024-09-12 ENCOUNTER — PATIENT OUTREACH (OUTPATIENT)
Dept: CASE MANAGEMENT | Facility: OTHER | Age: 69
End: 2024-09-12

## 2024-09-12 ENCOUNTER — OFFICE VISIT (OUTPATIENT)
Dept: FAMILY MEDICINE CLINIC | Facility: CLINIC | Age: 69
End: 2024-09-12
Payer: COMMERCIAL

## 2024-09-12 VITALS
SYSTOLIC BLOOD PRESSURE: 150 MMHG | WEIGHT: 113 LBS | OXYGEN SATURATION: 98 % | HEIGHT: 69 IN | DIASTOLIC BLOOD PRESSURE: 80 MMHG | HEART RATE: 101 BPM | BODY MASS INDEX: 16.74 KG/M2

## 2024-09-12 DIAGNOSIS — I73.9 CLAUDICATION OF BOTH LOWER EXTREMITIES (HCC): ICD-10-CM

## 2024-09-12 DIAGNOSIS — R26.9 ABNORMAL GAIT: ICD-10-CM

## 2024-09-12 DIAGNOSIS — Z78.0 POST-MENOPAUSE: ICD-10-CM

## 2024-09-12 DIAGNOSIS — R80.9 MICROALBUMINURIA DUE TO TYPE 2 DIABETES MELLITUS  (HCC): ICD-10-CM

## 2024-09-12 DIAGNOSIS — R20.0 NUMBNESS OF TOES: ICD-10-CM

## 2024-09-12 DIAGNOSIS — S72.002D CLOSED FRACTURE OF LEFT HIP WITH ROUTINE HEALING, SUBSEQUENT ENCOUNTER: ICD-10-CM

## 2024-09-12 DIAGNOSIS — E11.65 TYPE 2 DIABETES MELLITUS WITH HYPERGLYCEMIA, WITHOUT LONG-TERM CURRENT USE OF INSULIN (HCC): ICD-10-CM

## 2024-09-12 DIAGNOSIS — Z72.0 TOBACCO USER: ICD-10-CM

## 2024-09-12 DIAGNOSIS — F33.1 MODERATE EPISODE OF RECURRENT MAJOR DEPRESSIVE DISORDER (HCC): ICD-10-CM

## 2024-09-12 DIAGNOSIS — S42.211A CLOSED DISPLACED FRACTURE OF SURGICAL NECK OF RIGHT HUMERUS, UNSPECIFIED FRACTURE MORPHOLOGY, INITIAL ENCOUNTER: ICD-10-CM

## 2024-09-12 DIAGNOSIS — E11.29 MICROALBUMINURIA DUE TO TYPE 2 DIABETES MELLITUS  (HCC): ICD-10-CM

## 2024-09-12 DIAGNOSIS — R63.4 WEIGHT LOSS, UNINTENTIONAL: ICD-10-CM

## 2024-09-12 DIAGNOSIS — I10 PRIMARY HYPERTENSION: ICD-10-CM

## 2024-09-12 DIAGNOSIS — E78.2 MIXED HYPERLIPIDEMIA: Primary | ICD-10-CM

## 2024-09-12 PROBLEM — F32.A DEPRESSION: Status: ACTIVE | Noted: 2022-04-02

## 2024-09-12 LAB
ALBUMIN/CREAT UR: 320.2
CREAT UR-MCNC: 43.1 MG/DL (ref 50–200)
MICROALBUMIN UR-MCNC: 13.8 MG/DL
SL AMB POCT GLUCOSE BLD: 415
SL AMB POCT HEMOGLOBIN AIC: 15 (ref ?–6.5)

## 2024-09-12 PROCEDURE — G0402 INITIAL PREVENTIVE EXAM: HCPCS | Performed by: FAMILY MEDICINE

## 2024-09-12 PROCEDURE — 82948 REAGENT STRIP/BLOOD GLUCOSE: CPT | Performed by: FAMILY MEDICINE

## 2024-09-12 PROCEDURE — 99205 OFFICE O/P NEW HI 60 MIN: CPT | Performed by: FAMILY MEDICINE

## 2024-09-12 PROCEDURE — 83036 HEMOGLOBIN GLYCOSYLATED A1C: CPT | Performed by: FAMILY MEDICINE

## 2024-09-12 NOTE — ASSESSMENT & PLAN NOTE
"Reports she is \"Fine\" now.  No issues.  Just stopped being depressed.  Could start medications in future.  For now, would not.      Orders:    Comprehensive metabolic panel; Future    CBC and differential; Future    TSH, 3rd generation with Free T4 reflex; Future    Comprehensive metabolic panel    CBC and differential    TSH, 3rd generation with Free T4 reflex    Ambulatory Referral to Social Work Care Management Program; Future    "

## 2024-09-12 NOTE — PROGRESS NOTES
OP CM called to pt in regards to social work referral.  Pt resides in a first floor apartment.  Pt does have one big step to enter her home.  Pt states she has Leighann Moreira from the [facility] and housing is looking for a more handicap accessible apartment.  Pt does have a couple of leads and may be able to move in a month or two.  Pt states she is a  and they will pay with first month, last month, and security.  Pt has a rollator.  PCP also just ordered wheelchair.  Pt is aware a scooter is private pay.  Pt does have some leg weakness and states PCP is ordering tests.      Pt cooks her own food and states she will be making garcia tonight.    Pt has Medicare and medicaid.  Pt states she gets $200 a month in food stamps.      Pts A1C is very high.  Offered OP CM RN outreach but pt states she is not interested.  Pt states she is eating clean now and making better choices.     Pt states that she no longer feels depressed.   Pt is not interested in OP MH services.  Pt states she loves to be outside and loves nature.      Pt has [employer] transport for transportation through her insurance.      Pt states all her meds are free.    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      Pt states her friend Leighann is her emergency contact.  Pt does not have a POA.  Pt states that her mother is in [place] and she is 88 and recently had a stroke.      Pt is interested in food delivery.  Referral made to [employer].      OP CM will continue to follow.

## 2024-09-12 NOTE — ASSESSMENT & PLAN NOTE
Old fracture.  Has not had follow up on this since surgery.  Would recommend x-ray to reevaluate.    Orders:    XR hip/pelv 2-3 vws left if performed; Future    Ambulatory Referral to Social Work Care Management Program; Future

## 2024-09-12 NOTE — ASSESSMENT & PLAN NOTE
Lab Results   Component Value Date    HGBA1C 15 (A) 09/12/2024   Patient's A1c is at least 15 if not higher due to the fingerstick machine not being able to register.  Will check labs, and then follow-up afterwards.  Has been on insulin.  Her fingerstick sugar today was significant elevation at 400.  Previously was taking 40 units of Levemir  Again, has not been on that in quite a while now.  Would recommend start Tresiba at 20 units.  Reviewed that it is possible that the high sugars have also caused her weight loss.      Orders:    POCT hemoglobin A1c    POCT blood glucose    Albumin / creatinine urine ratio; Future    insulin degludec (TRESIBA) 100 units/mL injection pen; Inject 20 Units under the skin daily    Comprehensive metabolic panel; Future    Hemoglobin A1C; Future    Comprehensive metabolic panel    Hemoglobin A1C    Ambulatory Referral to Social Work Care Management Program; Future

## 2024-09-12 NOTE — ASSESSMENT & PLAN NOTE
Patient does continue to smoke.  She reports that she is trying to cut back.  She does, however, still continue.  Understands risks, including cancer, worsening of emphysema and breathing, other issues.    Orders:    Ambulatory Referral to Social Work Care Management Program; Future

## 2024-09-12 NOTE — PATIENT INSTRUCTIONS
1. Mixed hyperlipidemia  Assessment & Plan:  Hyperlipidemia noted before.  Not currently on medications.  Check laboratory studies.      Orders:    Comprehensive metabolic panel; Future    Lipid Panel with Direct LDL reflex; Future    Comprehensive metabolic panel    Lipid Panel with Direct LDL reflex    Ambulatory Referral to Social Work Care Management Program; Future    Orders:  -     Comprehensive metabolic panel; Future; Expected date: 09/12/2024  -     Lipid Panel with Direct LDL reflex; Future; Expected date: 09/12/2024  -     Comprehensive metabolic panel  -     Lipid Panel with Direct LDL reflex  -     Ambulatory Referral to Social Work Care Management Program; Future  2. Type 2 diabetes mellitus with hyperglycemia, without long-term current use of insulin ([facility])  Assessment & Plan:    Lab Results   Component Value Date    HGBA1C 15 (A) 09/12/2024   Patient's A1c is at least 15 if not higher due to the fingerstick machine not being able to register.  Will check labs, and then follow-up afterwards.  Has been on insulin.  Her fingerstick sugar today was significant elevation at 400.  Previously was taking 40 units of Levemir  Again, has not been on that in quite a while now.  Would recommend start Tresiba at 20 units.  Reviewed that it is possible that the high sugars have also caused her weight loss.      Orders:    POCT hemoglobin A1c    POCT blood glucose    Albumin / creatinine urine ratio; Future    insulin degludec (TRESIBA) 100 units/mL injection pen; Inject 20 Units under the skin daily    Comprehensive metabolic panel; Future    Hemoglobin A1C; Future    Comprehensive metabolic panel    Hemoglobin A1C    Ambulatory Referral to Social Work Care Management Program; Future    Orders:  -     POCT hemoglobin A1c  -     POCT blood glucose  -     Albumin / creatinine urine ratio; Future; Expected date: 09/12/2024  -     insulin degludec (TRESIBA) 100 units/mL injection pen; Inject 20 Units under the skin  daily  -     Comprehensive metabolic panel; Future; Expected date: 09/12/2024  -     Hemoglobin A1C; Future; Expected date: 09/12/2024  -     Comprehensive metabolic panel  -     Hemoglobin A1C  -     Ambulatory Referral to Social Work Care Management Program; Future  3. Closed displaced fracture of surgical neck of right humerus, unspecified fracture morphology, initial encounter  Assessment & Plan:    Prior fracture of the right humerus.  Reports no surgery on this.  Follow-up with orthopedics if she has continued problems.    Orders:    Ambulatory Referral to Social Work Care Management Program; Future    Orders:  -     Ambulatory Referral to Social Work Care Management Program; Future  4. Closed fracture of left hip with routine healing, subsequent encounter  Assessment & Plan:  Old fracture.  Has not had follow up on this since surgery.  Would recommend x-ray to reevaluate.    Orders:    XR hip/pelv 2-3 vws left if performed; Future    Ambulatory Referral to Social Work Care Management Program; Future    Orders:  -     XR hip/pelv 2-3 vws left if performed; Future; Expected date: 09/12/2024  -     Ambulatory Referral to Social Work Care Management Program; Future  5. Tobacco user  Assessment & Plan:  Patient does continue to smoke.  She reports that she is trying to cut back.  She does, however, still continue.  Understands risks, including cancer, worsening of emphysema and breathing, other issues.    Orders:    Ambulatory Referral to Social Work Care Management Program; Future    Orders:  -     Ambulatory Referral to Social Work Care Management Program; Future  6. Post-menopause  -     DXA bone density spine hip and pelvis; Future; Expected date: 09/12/2024  -     Ambulatory Referral to Social Work Care Management Program; Future  7. Abnormal gait  Assessment & Plan:  She has problems with mobility. Using rollator.  Asking for wheelchair and scooter.  Would recommend PT and OT eval.    Reports cramping and  "burning in legs, then needs to rest.      Orders:    Ambulatory Referral to Social Work Care Management Program; Future    Orders:  -     Ambulatory Referral to Social Work Care Management Program; Future  8. Moderate episode of recurrent major depressive disorder (HCC)  Assessment & Plan:  Reports she is \"Fine\" now.  No issues.  Just stopped being depressed.  Could start medications in future.  For now, would not.      Orders:    Comprehensive metabolic panel; Future    CBC and differential; Future    TSH, 3rd generation with Free T4 reflex; Future    Comprehensive metabolic panel    CBC and differential    TSH, 3rd generation with Free T4 reflex    Ambulatory Referral to Social Work Care Management Program; Future    Orders:  -     Comprehensive metabolic panel; Future; Expected date: 09/12/2024  -     CBC and differential; Future; Expected date: 09/12/2024  -     TSH, 3rd generation with Free T4 reflex; Future; Expected date: 09/12/2024  -     Comprehensive metabolic panel  -     CBC and differential  -     TSH, 3rd generation with Free T4 reflex  -     Ambulatory Referral to Social Work Care Management Program; Future  9. Weight loss, unintentional  Assessment & Plan:  Has had significant amount of weight loss, and smokes.  Would recommend CT chest/abd/pelvis.  Check labs.    Orders:    Comprehensive metabolic panel; Future    Hemoglobin A1C; Future    CBC and differential; Future    TSH, 3rd generation with Free T4 reflex; Future    Comprehensive metabolic panel    Hemoglobin A1C    CBC and differential    TSH, 3rd generation with Free T4 reflex    CT chest abdomen pelvis w contrast; Future    Ambulatory Referral to Social Work Care Management Program; Future    Orders:  -     Comprehensive metabolic panel; Future; Expected date: 09/12/2024  -     Hemoglobin A1C; Future; Expected date: 09/12/2024  -     CBC and differential; Future; Expected date: 09/12/2024  -     TSH, 3rd generation with Free T4 reflex; " Future; Expected date: 09/12/2024  -     Comprehensive metabolic panel  -     Hemoglobin A1C  -     CBC and differential  -     TSH, 3rd generation with Free T4 reflex  -     CT chest abdomen pelvis w contrast; Future; Expected date: 09/12/2024  -     Ambulatory Referral to Social Work Care Management Program; Future  10. Numbness of toes  Assessment & Plan:  Patient reports numbness, but not pain or stabbing issues.  Just numbness.  Could be related to diabetes, versus other issues.  For now, no specific change.  Consider follow-up in the future.    Orders:    Ambulatory Referral to Social Work Care Management Program; Future    Orders:  -     Ambulatory Referral to Social Work Care Management Program; Future  11. Claudication of both lower extremities (HCC)  Assessment & Plan:  Recommend labs and SALO. Question vascular disease as cause.  Could also be related to spine, but no other spine symptoms.      Orders:    VAS SALO and waveform analysis, multiple levels; Future    Ambulatory Referral to Social Work Care Management Program; Future    Orders:  -     VAS SALO and waveform analysis, multiple levels; Future; Expected date: 09/12/2024  -     Ambulatory Referral to Social Work Care Management Program; Future  12. Primary hypertension  -     Comprehensive metabolic panel; Future; Expected date: 09/12/2024  -     CBC and differential; Future; Expected date: 09/12/2024  -     TSH, 3rd generation with Free T4 reflex; Future; Expected date: 09/12/2024  -     Comprehensive metabolic panel  -     CBC and differential  -     TSH, 3rd generation with Free T4 reflex  -     Ambulatory Referral to Social Work Care Management Program; Future      COVID 19 Instructions    Sabiha Garcia was advised to limit contact with others to essential tasks such as getting food, medications, and medical care.    Proper handwashing reviewed, and Hand sanitzer when washing is not available.    If the patient develops symptoms of COVID 19, the  patient should call the office as soon as possible.    It is strongly recommended that Flu Vaccinations be obtained.      Virtual Visits:  Lanette: This works on smart phones (any phone with Internet browsing capability).  You should get a text message when the provider is ready to see you.  Click on the link in the text message, and the call should start.  You will need to type in your name, and allow camera and microphone access.  This is HIPPA compliant, and secure.      If you have not already done so, get immunized to COVID 19.      We are committed to getting you vaccinated as soon as possible and will be closely following CDC and [facility] guidelines as they are released and revised.  Please refer to our COVID-19 vaccine webpage for the most up to date information on the vaccine and our distribution efforts.    This site will also have the most up to date recommendations for COVID booster vaccine.    [email or web address]    Call [phone] ([phone]), option 7    You can also visit https://www.vaccines.gov/ to find vaccines in your area.    OUR LOCATION:    [facility]  [address] [place] [address], [address]  [place], [place], [address] [phone]  Fax: [phone]    Lab services, Rheumatology, and OB/GYN are at this location as well.  Thank you for your inquiry about the RSV vaccine.  As you can see below, the CDC has recommended that you discuss this with your Primary Care provider and decide if the vaccine is right for you.  This discussion can be accomplished at your next office visit.  The vaccine is currently available at your local pharmacy if you choose to get it prior to your next office visit.     Respiratory syncytial (sin-[facility]) virus, or RSV, is a common respiratory virus that usually causes mild, cold-like symptoms. Most people recover in a week or two, but RSV can be serious. Infants and older adults are more likely to develop severe  RSV and need hospitalization. Vaccines are available to protect older adults from severe RSV. Monoclonal antibody products are available to protect infants and young children from severe RSV.    CDC Recommendations  Adults 60 years old and over  Adults 60 years of age and older may receive a single dose of RSV vaccine using shared clinical decision-making.    Infants and young children  1 dose of nirsevimab for all infants younger than 8 months born during or entering their first RSV season.  1 dose of nirsevimab for infants and children 8-19 months old who are at increased risk for severe RSV disease and entering their second RSV season.  Note: A different monoclonal antibody, palivizumab, is limited to children under 24 months of age with certain conditions that place them at high risk for severe RSV disease. It must be given once a month during RSV season. Please see AAP guidelines for palivizumab.    Pregnant people  1 dose of maternal RSV vaccine during weeks 32 through 36 of pregnancy, administered immediately before or during RSV season. Abrysvo is the only RSV vaccine recommended during pregnancy.    If you have any questions about RSV or the products above, talk to your healthcare provider.

## 2024-09-12 NOTE — ASSESSMENT & PLAN NOTE
She has problems with mobility. Using rollator.  Asking for wheelchair and scooter.  Would recommend PT and OT eval.    Reports cramping and burning in legs, then needs to rest.      Orders:    Ambulatory Referral to Social Work Care Management Program; Future

## 2024-09-12 NOTE — PROGRESS NOTES
Ambulatory Visit  Name: Sabiha Garcia      : 1955      MRN: [id number]  Encounter Provider: Alexys Blunt MD  Encounter Date: 2024   Encounter department: [facility]    Assessment & Plan  Mixed hyperlipidemia  Hyperlipidemia noted before.  Not currently on medications.  Check laboratory studies.      Orders:    Comprehensive metabolic panel; Future    Lipid Panel with Direct LDL reflex; Future    Comprehensive metabolic panel    Lipid Panel with Direct LDL reflex    Ambulatory Referral to Social Work Care Management Program; Future    Type 2 diabetes mellitus with hyperglycemia, without long-term current use of insulin ([facility])    Lab Results   Component Value Date    HGBA1C 15 (A) 2024   Patient's A1c is at least 15 if not higher due to the fingerstick machine not being able to register.  Will check labs, and then follow-up afterwards.  Has been on insulin.  Her fingerstick sugar today was significant elevation at 400.  Previously was taking 40 units of Levemir  Again, has not been on that in quite a while now.  Would recommend start Tresiba at 20 units.  Reviewed that it is possible that the high sugars have also caused her weight loss.      Orders:    POCT hemoglobin A1c    POCT blood glucose    Albumin / creatinine urine ratio; Future    insulin degludec (TRESIBA) 100 units/mL injection pen; Inject 20 Units under the skin daily    Comprehensive metabolic panel; Future    Hemoglobin A1C; Future    Comprehensive metabolic panel    Hemoglobin A1C    Ambulatory Referral to Social Work Care Management Program; Future    Closed displaced fracture of surgical neck of right humerus, unspecified fracture morphology, initial encounter    Prior fracture of the right humerus.  Reports no surgery on this.  Follow-up with orthopedics if she has continued problems.    Orders:    Ambulatory Referral to Social Work Care Management Program; Future    Closed fracture of left hip with  "routine healing, subsequent encounter  Old fracture.  Has not had follow up on this since surgery.  Would recommend x-ray to reevaluate.    Orders:    XR hip/pelv 2-3 vws left if performed; Future    Ambulatory Referral to Social Work Care Management Program; Future    Tobacco user  Patient does continue to smoke.  She reports that she is trying to cut back.  She does, however, still continue.  Understands risks, including cancer, worsening of emphysema and breathing, other issues.    Orders:    Ambulatory Referral to Social Work Care Management Program; Future    Post-menopause    Orders:    DXA bone density spine hip and pelvis; Future    Ambulatory Referral to Social Work Care Management Program; Future    Abnormal gait  She has problems with mobility. Using rollator.  Asking for wheelchair and scooter.  Would recommend PT and OT eval.    Reports cramping and burning in legs, then needs to rest.      Orders:    Ambulatory Referral to Social Work Care Management Program; Future    Moderate episode of recurrent major depressive disorder (HCC)  Reports she is \"Fine\" now.  No issues.  Just stopped being depressed.  Could start medications in future.  For now, would not.      Orders:    Comprehensive metabolic panel; Future    CBC and differential; Future    TSH, 3rd generation with Free T4 reflex; Future    Comprehensive metabolic panel    CBC and differential    TSH, 3rd generation with Free T4 reflex    Ambulatory Referral to Social Work Care Management Program; Future    Weight loss, unintentional  Has had significant amount of weight loss, and smokes.  Would recommend CT chest/abd/pelvis.  Check labs.    Orders:    Comprehensive metabolic panel; Future    Hemoglobin A1C; Future    CBC and differential; Future    TSH, 3rd generation with Free T4 reflex; Future    Comprehensive metabolic panel    Hemoglobin A1C    CBC and differential    TSH, 3rd generation with Free T4 reflex    CT chest abdomen pelvis w contrast; " Future    Ambulatory Referral to Social Work Care Management Program; Future    Numbness of toes  Patient reports numbness, but not pain or stabbing issues.  Just numbness.  Could be related to diabetes, versus other issues.  For now, no specific change.  Consider follow-up in the future.    Orders:    Ambulatory Referral to Social Work Care Management Program; Future    Claudication of both lower extremities (HCC)  Recommend labs and SALO. Question vascular disease as cause.  Could also be related to spine, but no other spine symptoms.      Orders:    VAS SALO and waveform analysis, multiple levels; Future    Ambulatory Referral to Social Work Care Management Program; Future    Primary hypertension    Orders:    Comprehensive metabolic panel; Future    CBC and differential; Future    TSH, 3rd generation with Free T4 reflex; Future    Comprehensive metabolic panel    CBC and differential    TSH, 3rd generation with Free T4 reflex    Ambulatory Referral to Social Work Care Management Program; Future       Preventive health issues were discussed with patient, and age appropriate screening tests were ordered as noted in patient's After Visit Summary. Personalized health advice and appropriate referrals for health education or preventive services given if needed, as noted in patient's After Visit Summary.    History of Present Illness     Patient is here to establish care.    She has been seen by prior physicians at [facility].  Reviewed chart from before.    Diabetes: Patient has been off medications for a while now.    She had stopped her medications so that she could try to do things more naturally.  She has done much care lately.    Depression: Patient did have severe depression recently.  Significant withdrawal from other people, stopped taking medications.  Had significant issues that she was trying to deal with at that point.  Currently she reports she is feeling okay.    Stress: Reports increased stress lately.  Mother  was just sent to the hospital with CVA.    Hypertension: Has had issues with this before, but not on medications.    Gait: Has problems she has had lately.  Can't move well around the home.  Would like a scooter to move out of the home.    Reviewed past history.  Patient did have humerus fracture.  Patient also had left hip fracture.  That was repaired previously.      Patient reports she prefers to try to do things naturally for her medical conditions, so has not been taking her medications.  This is the same with her depression and anxiety issues.           Patient Care Team:  Alexys Blunt MD as PCP - General (Family Medicine)    Review of Systems   Constitutional:  Positive for fatigue.   HENT: Negative.     Eyes: Negative.    Respiratory: Negative.     Cardiovascular: Negative.    Gastrointestinal: Negative.    Endocrine: Negative.    Genitourinary: Negative.    Musculoskeletal:  Positive for gait problem.   Skin: Negative.    Allergic/Immunologic: Negative.    Hematological: Negative.    Psychiatric/Behavioral:  Negative for dysphoric mood, sleep disturbance and suicidal ideas. The patient is not nervous/anxious.      Medical History Reviewed by provider this encounter:       Annual Wellness Visit Questionnaire   Sabiha is here for her Initial Wellness visit.     Health Risk Assessment:   Patient rates overall health as poor. Patient feels that their physical health rating is much worse. Patient is satisfied with their life. Eyesight was rated as slightly worse. Hearing was rated as same. Patient feels that their emotional and mental health rating is same. Patients states they are never, rarely angry. Patient states they are never, rarely unusually tired/fatigued. Pain experienced in the last 7 days has been none. Patient states that she has experienced weight loss or gain in last 6 months.     Depression Screening:   PHQ-9 Score: 0      Fall Risk Screening:   In the past year, patient has experienced:  history of falling in past year    Number of falls: 2 or more  Injured during fall?: Yes    Feels unsteady when standing or walking?: Yes    Worried about falling?: Yes      Urinary Incontinence Screening:   Patient has not leaked urine accidently in the last six months.     Home Safety:  Patient does not have trouble with stairs inside or outside of their home. Patient has working smoke alarms and has working carbon monoxide detector. Home safety hazards include: none.     Nutrition:   Current diet is Regular.     Medications:   Patient is not currently taking any over-the-counter supplements. Patient is not able to manage medications.     Activities of Daily Living (ADLs)/Instrumental Activities of Daily Living (IADLs):   Walk and transfer into and out of bed and chair?: Yes  Dress and groom yourself?: Yes    Bathe or shower yourself?: Yes    Feed yourself? Yes  Do your laundry/housekeeping?: Yes  Manage your money, pay your bills and track your expenses?: Yes  Make your own meals?: Yes    Do your own shopping?: Yes    Previous Hospitalizations:   Any hospitalizations or ED visits within the last 12 months?: No      Advance Care Planning:   Living will: No    Durable POA for healthcare: No    Advanced directive: No    Advanced directive counseling given: No    Five wishes given: No    Patient declined ACP directive: No      Cognitive Screening:   Provider or family/friend/caregiver concerned regarding cognition?: No    PREVENTIVE SCREENINGS      Cardiovascular Screening:    General: Screening Not Indicated and History Lipid Disorder      Diabetes Screening:     General: Screening Not Indicated and History Diabetes      Cervical Cancer Screening:    General: Screening Not Indicated      Osteoporosis Screening:      Due for: DXA Axial      Lung Cancer Screening:     General: Screening Not Indicated      Preventive Screening Comments: Deferred discussion of colon cancer screening and mammogram for breast cancer  "screening.  CT chest abdomen and pelvis ordered, which would cover AAA screening.  Will need to discuss hep C at next visit.    Screening, Brief Intervention, and Referral to Treatment (SBIRT)    Screening  Typical number of drinks in a day: 0  Typical number of drinks in a week: 0  Interpretation: Low risk drinking behavior.    Single Item Drug Screening:  How often have you used an illegal drug (including marijuana) or a prescription medication for non-medical reasons in the past year? never    Single Item Drug Screen Score: 0  Interpretation: Negative screen for possible drug use disorder    Social Determinants of Health     Financial Resource Strain: Low Risk  (4/3/2022)    Received from [facility]    Overall Financial Resource Strain (CARDIA)     Difficulty of Paying Living Expenses: Not very hard   Food Insecurity: No Food Insecurity (9/12/2024)    Hunger Vital Sign     Worried About Running Out of Food in the Last Year: Never true     Ran Out of Food in the Last Year: Never true   Transportation Needs: No Transportation Needs (9/12/2024)    PRAPARE - Transportation     Lack of Transportation (Medical): No     Lack of Transportation (Non-Medical): No   Housing Stability: Low Risk  (9/12/2024)    Housing Stability Vital Sign     Unable to Pay for Housing in the Last Year: No     Number of Times Moved in the Last Year: 0     Homeless in the Last Year: No   Utilities: Not At Risk (9/12/2024)    [facility] Utilities     Threatened with loss of utilities: No     No results found.    Objective     /80 (BP Location: Left arm, Patient Position: Sitting, Cuff Size: Standard)   Pulse 101   Ht 5' 9\" (1.753 m)   Wt 51.3 kg (113 lb)   SpO2 98%   BMI 16.69 kg/m²     Physical Exam  Vitals and nursing note reviewed.   Constitutional:       Appearance: Normal appearance. She is well-developed.   HENT:      Head: Normocephalic and atraumatic.   Cardiovascular:      Rate and Rhythm: Normal rate and regular " rhythm.      Pulses:           Carotid pulses are 2+ on the right side and 2+ on the left side.       Posterior tibial pulses are 1+ on the right side and 1+ on the left side.      Heart sounds: Normal heart sounds.   Pulmonary:      Effort: Pulmonary effort is normal.      Breath sounds: Normal breath sounds. No wheezing or rales.   Chest:      Chest wall: No tenderness.   Neurological:      Mental Status: She is alert.     I have spent a total time of 60 minutes in caring for this patient on the day of the visit/encounter including Diagnostic results, Prognosis, Risks and benefits of tx options, Instructions for management, Patient and family education, Importance of tx compliance, Risk factor reductions, Impressions, Counseling / Coordination of care, Documenting in the medical record, Reviewing / ordering tests, medicine, procedures  , and Obtaining or reviewing history  .

## 2024-09-12 NOTE — ASSESSMENT & PLAN NOTE
Has had significant amount of weight loss, and smokes.  Would recommend CT chest/abd/pelvis.  Check labs.    Orders:    Comprehensive metabolic panel; Future    Hemoglobin A1C; Future    CBC and differential; Future    TSH, 3rd generation with Free T4 reflex; Future    Comprehensive metabolic panel    Hemoglobin A1C    CBC and differential    TSH, 3rd generation with Free T4 reflex    CT chest abdomen pelvis w contrast; Future    Ambulatory Referral to Social Work Care Management Program; Future

## 2024-09-12 NOTE — ASSESSMENT & PLAN NOTE
Prior fracture of the right humerus.  Reports no surgery on this.  Follow-up with orthopedics if she has continued problems.    Orders:    Ambulatory Referral to Social Work Care Management Program; Future

## 2024-09-12 NOTE — ASSESSMENT & PLAN NOTE
Patient reports numbness, but not pain or stabbing issues.  Just numbness.  Could be related to diabetes, versus other issues.  For now, no specific change.  Consider follow-up in the future.    Orders:    Ambulatory Referral to Social Work Care Management Program; Future

## 2024-09-12 NOTE — ASSESSMENT & PLAN NOTE
Hyperlipidemia noted before.  Not currently on medications.  Check laboratory studies.      Orders:    Comprehensive metabolic panel; Future    Lipid Panel with Direct LDL reflex; Future    Comprehensive metabolic panel    Lipid Panel with Direct LDL reflex    Ambulatory Referral to Social Work Care Management Program; Future

## 2024-09-12 NOTE — ASSESSMENT & PLAN NOTE
Recommend labs and SALO. Question vascular disease as cause.  Could also be related to spine, but no other spine symptoms.      Orders:    VAS SALO and waveform analysis, multiple levels; Future    Ambulatory Referral to Social Work Care Management Program; Future

## 2024-09-13 ENCOUNTER — TELEPHONE (OUTPATIENT)
Age: 69
End: 2024-09-13

## 2024-09-13 NOTE — TELEPHONE ENCOUNTER
Pt called in and was read providers recommendation verbatim.     Pt stated that a manual wheelchair would do her no good. Pt added that she has to be able to get to her dumpster and her laundry room. Pt stated that she has been house bound for a long time. Pt is tired of just being able to get to her porch.    Pt stated that she understands providers plan, but that is not helping her right now.    Pt requesting to speak with Dr. Blunt at his earliest convenience regarding this.    Please advise - [phone]

## 2024-09-13 NOTE — TELEPHONE ENCOUNTER
I did not feel a motorized wheelchair was appropriate first.  We discussed that at the office.  I would not order a motorized wheelchair for her.  This would only be a regular wheelchair initially.  I discussed with her that she is having progressive weakness and other problems, we needed to figure out exactly what those were before discussing the possibility of a power chair.  Plus, I would want her to follow with physical therapy before any evaluation of power chair would be considered.

## 2024-09-13 NOTE — TELEPHONE ENCOUNTER
Patient called and stated that the referral for the wheelchair needs to be a motorized wheelchair.  Pls adjust.  If we can not adjust pls call her back. [phone]

## 2024-09-13 NOTE — TELEPHONE ENCOUNTER
Again, I was very concerned about her significant amount of weight loss that she has had, which she mentioned herself.  She has had more problems with mobility, leading to the potential for this to be related to vascular problems.  That needs to be addressed before we would even consider a power chair.  And again, I do not order the power chair until we have an evaluation with PT to state that that would make sense for her.

## 2024-09-16 ENCOUNTER — PATIENT MESSAGE (OUTPATIENT)
Dept: FAMILY MEDICINE CLINIC | Facility: CLINIC | Age: 69
End: 2024-09-16

## 2024-09-16 DIAGNOSIS — R26.9 ABNORMAL GAIT: ICD-10-CM

## 2024-09-16 DIAGNOSIS — S42.211A CLOSED DISPLACED FRACTURE OF SURGICAL NECK OF RIGHT HUMERUS, UNSPECIFIED FRACTURE MORPHOLOGY, INITIAL ENCOUNTER: Primary | ICD-10-CM

## 2024-09-16 DIAGNOSIS — S72.002D CLOSED FRACTURE OF LEFT HIP WITH ROUTINE HEALING, SUBSEQUENT ENCOUNTER: ICD-10-CM

## 2024-09-16 NOTE — TELEPHONE ENCOUNTER
Pt called back. Read doctor message. Pt was dissatisfied with doctor message. Encouraged to call back if she has any further concerns

## 2024-09-16 NOTE — RESULT ENCOUNTER NOTE
Tests were not normal, and should follow at  your scheduled Office visit. Please call about this, if [employer] message is not available or not read by patient.

## 2024-09-17 LAB

## 2024-09-18 ENCOUNTER — TELEPHONE (OUTPATIENT)
Age: 69
End: 2024-09-18

## 2024-09-18 ENCOUNTER — TELEPHONE (OUTPATIENT)
Dept: FAMILY MEDICINE CLINIC | Facility: CLINIC | Age: 69
End: 2024-09-18

## 2024-09-18 DIAGNOSIS — E11.65 TYPE 2 DIABETES MELLITUS WITH HYPERGLYCEMIA, WITH LONG-TERM CURRENT USE OF INSULIN (HCC): Primary | ICD-10-CM

## 2024-09-18 DIAGNOSIS — Z79.4 TYPE 2 DIABETES MELLITUS WITH HYPERGLYCEMIA, WITH LONG-TERM CURRENT USE OF INSULIN (HCC): Primary | ICD-10-CM

## 2024-09-18 LAB
ALBUMIN SERPL-MCNC: 4 G/DL (ref 3.5–5.7)
ALP SERPL-CCNC: 120 U/L (ref 35–120)
ALT SERPL-CCNC: 8 U/L
ANION GAP SERPL CALCULATED.3IONS-SCNC: 10 MMOL/L (ref 3–11)
AST SERPL-CCNC: 11 U/L
BASOPHILS # BLD AUTO: 0.1 THOU/CMM (ref 0–0.1)
BASOPHILS NFR BLD AUTO: 1 %
BILIRUB SERPL-MCNC: 0.5 MG/DL (ref 0.2–1)
BUN SERPL-MCNC: 14 MG/DL (ref 7–25)
CALCIUM SERPL-MCNC: 9.6 MG/DL (ref 8.5–10.1)
CHLORIDE SERPL-SCNC: 95 MMOL/L (ref 100–109)
CHOLEST SERPL-MCNC: 240 MG/DL
CHOLEST/HDLC SERPL: 6.2 {RATIO}
CO2 SERPL-SCNC: 33 MMOL/L (ref 21–31)
CREAT SERPL-MCNC: 0.72 MG/DL (ref 0.4–1.1)
CYTOLOGY CMNT CVX/VAG CYTO-IMP: ABNORMAL
DIFFERENTIAL METHOD BLD: ABNORMAL
EOSINOPHIL # BLD AUTO: 0.2 THOU/CMM (ref 0–0.5)
EOSINOPHIL NFR BLD AUTO: 2 %
ERYTHROCYTE [DISTWIDTH] IN BLOOD BY AUTOMATED COUNT: 15.5 % (ref 12–16)
EST. AVERAGE GLUCOSE BLD GHB EST-MCNC: 378 MG/DL
GFR/BSA.PRED SERPLBLD CYS-BASED-ARV: 91 ML/MIN/{1.73_M2}
GLUCOSE SERPL-MCNC: 405 MG/DL (ref 65–99)
HBA1C MFR BLD: 14.8 %
HCT VFR BLD AUTO: 39.8 % (ref 35–43)
HDLC SERPL-MCNC: 39 MG/DL (ref 23–92)
HGB BLD-MCNC: 14.2 G/DL (ref 11.5–14.5)
LDLC SERPL CALC-MCNC: 156 MG/DL
LYMPHOCYTES # BLD AUTO: 3.6 THOU/CMM (ref 1–3)
LYMPHOCYTES NFR BLD AUTO: 40 %
MCH RBC QN AUTO: 33.4 PG (ref 26–34)
MCHC RBC AUTO-ENTMCNC: 35.7 G/DL (ref 32–37)
MCV RBC AUTO: 94 FL (ref 80–100)
MONOCYTES # BLD AUTO: 0.4 THOU/CMM (ref 0.3–1)
MONOCYTES NFR BLD AUTO: 4 %
NEUTROPHILS # BLD AUTO: 4.6 THOU/CMM (ref 1.8–7.8)
NEUTROPHILS NFR BLD AUTO: 53 %
NONHDLC SERPL-MCNC: 201 MG/DL
PLATELET # BLD AUTO: 255 THOU/CMM (ref 140–350)
PMV BLD REES-ECKER: 8.9 FL (ref 7.5–11.3)
POTASSIUM SERPL-SCNC: 4.6 MMOL/L (ref 3.5–5.2)
PROT SERPL-MCNC: 6.6 G/DL (ref 6.3–8.3)
RBC # BLD AUTO: 4.25 MILL/CMM (ref 3.7–4.7)
SODIUM SERPL-SCNC: 138 MMOL/L (ref 135–145)
TRIGL SERPL-MCNC: 226 MG/DL
TSH SERPL-ACNC: 1.39 UIU/ML (ref 0.45–5.33)
WBC # BLD AUTO: 8.9 THOU/CMM (ref 4–10)

## 2024-09-18 NOTE — TELEPHONE ENCOUNTER
Patient called and stated she needs the needles for the insulin degludec (TRESIBA) injection pen sent to the pharmacy below:    [facility]/pharmacy #[id number]   [address] [address]  Phone: [phone]    Fax: [phone]

## 2024-09-18 NOTE — TELEPHONE ENCOUNTER
LMOM FOR PATIENT TO CALL BACK AND RELAY MESSAGE BELOW FROM DR. LAXMI Blunt MD        9/17/24 12:52 PM  Please call patient.  I believe there is a misunderstanding with this.  She needs a physical therapy evaluation for motorized scooter if that is exactly what she is looking for.  Again, I am concerned about what the underlying problems are with her.  She has not had any other lab work done other than the fingersticks that were done in the office.  Given her significant medical issues, as well as significant weight loss, I am concerned about other underlying concerns, not just the decreased mobility that she is fixating on with this.

## 2024-09-19 RX ORDER — PEN NEEDLE, DIABETIC 32GX 5/32"
NEEDLE, DISPOSABLE MISCELLANEOUS
Qty: 100 EACH | Refills: 3 | Status: SHIPPED | OUTPATIENT
Start: 2024-09-19

## 2024-09-19 NOTE — TELEPHONE ENCOUNTER
"Patient returned call. Read message from Dr Blunt that she needs to get blood work done; patient advised she did and I saw that she got them done yesterday. Before I could advise that Dr Blunt requested she schedule an office visit, she stated she will wait for a call from office and said, \"Goodbye\" .  "

## 2024-09-19 NOTE — RESULT ENCOUNTER NOTE
Tests were not normal, and should follow at  your scheduled Office visit. Please call about this, if [employer] message is not available or not read by patient.

## 2024-09-25 ENCOUNTER — PATIENT OUTREACH (OUTPATIENT)
Dept: CASE MANAGEMENT | Facility: OTHER | Age: 69
End: 2024-09-25

## 2024-09-25 NOTE — PROGRESS NOTES
OP CM called to pt to follow up on referral for [employer] for pt to get healthy meals delivered to her home.  Pt did not answer so left message and email sent to pt as well.  OP CM to remain available.      UPDATE:  Pt called back and left message she has not started with [employer].  Called back to pt and gave her the number for [employer] [phone].  Pt will keep in touch with OP CM.

## 2024-10-11 ENCOUNTER — APPOINTMENT (EMERGENCY)
Dept: RADIOLOGY | Facility: HOSPITAL | Age: 69
DRG: 271 | End: 2024-10-11
Payer: COMMERCIAL

## 2024-10-11 ENCOUNTER — APPOINTMENT (INPATIENT)
Dept: NON INVASIVE DIAGNOSTICS | Facility: HOSPITAL | Age: 69
DRG: 271 | End: 2024-10-11
Payer: COMMERCIAL

## 2024-10-11 ENCOUNTER — APPOINTMENT (EMERGENCY)
Dept: RADIOLOGY | Facility: HOSPITAL | Age: 69
DRG: 271 | End: 2024-10-11
Attending: EMERGENCY MEDICINE
Payer: COMMERCIAL

## 2024-10-11 ENCOUNTER — HOSPITAL ENCOUNTER (INPATIENT)
Facility: HOSPITAL | Age: 69
LOS: 18 days | Discharge: NURSING, REHAB OR LONG-TERM CARE | DRG: 271 | End: 2024-10-29
Attending: EMERGENCY MEDICINE | Admitting: STUDENT IN AN ORGANIZED HEALTH CARE EDUCATION/TRAINING PROGRAM
Payer: COMMERCIAL
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
## 2024-10-11 DIAGNOSIS — E55.9 VITAMIN D DEFICIENCY: ICD-10-CM

## 2024-10-11 DIAGNOSIS — I74.09 AORTOILIAC OCCLUSIVE DISEASE (HCC): ICD-10-CM

## 2024-10-11 DIAGNOSIS — I73.9 CLAUDICATION OF BOTH LOWER EXTREMITIES (HCC): ICD-10-CM

## 2024-10-11 DIAGNOSIS — E11.628 DIABETIC FOOT INFECTION  (HCC): ICD-10-CM

## 2024-10-11 DIAGNOSIS — E11.65 TYPE 2 DIABETES MELLITUS WITH HYPERGLYCEMIA, WITH LONG-TERM CURRENT USE OF INSULIN (HCC): ICD-10-CM

## 2024-10-11 DIAGNOSIS — L08.9 DIABETIC FOOT INFECTION  (HCC): ICD-10-CM

## 2024-10-11 DIAGNOSIS — R05.9 COUGH: ICD-10-CM

## 2024-10-11 DIAGNOSIS — E11.10 DKA (DIABETIC KETOACIDOSIS) (HCC): ICD-10-CM

## 2024-10-11 DIAGNOSIS — I96 DRY GANGRENE (HCC): ICD-10-CM

## 2024-10-11 DIAGNOSIS — Z79.4 TYPE 2 DIABETES MELLITUS WITH HYPERGLYCEMIA, WITH LONG-TERM CURRENT USE OF INSULIN (HCC): ICD-10-CM

## 2024-10-11 DIAGNOSIS — I96 GANGRENE (HCC): ICD-10-CM

## 2024-10-11 DIAGNOSIS — I73.9 PAD (PERIPHERAL ARTERY DISEASE) (HCC): Primary | ICD-10-CM

## 2024-10-11 DIAGNOSIS — K59.00 CONSTIPATED: ICD-10-CM

## 2024-10-11 DIAGNOSIS — R64 CACHEXIA (HCC): ICD-10-CM

## 2024-10-11 DIAGNOSIS — E11.65 TYPE 2 DIABETES MELLITUS WITH HYPERGLYCEMIA, WITHOUT LONG-TERM CURRENT USE OF INSULIN (HCC): ICD-10-CM

## 2024-10-11 LAB
25(OH)D3 SERPL-MCNC: 22.2 NG/ML (ref 30–100)
2HR DELTA HS TROPONIN: -14 NG/L
4HR DELTA HS TROPONIN: -21 NG/L
ALBUMIN SERPL BCG-MCNC: 3.1 G/DL (ref 3.5–5)
ALBUMIN SERPL BCG-MCNC: 3.4 G/DL (ref 3.5–5)
ALP SERPL-CCNC: 116 U/L (ref 34–104)
ALP SERPL-CCNC: 98 U/L (ref 34–104)
ALT SERPL W P-5'-P-CCNC: 10 U/L (ref 7–52)
ALT SERPL W P-5'-P-CCNC: 9 U/L (ref 7–52)
ANION GAP SERPL CALCULATED.3IONS-SCNC: 10 MMOL/L (ref 4–13)
ANION GAP SERPL CALCULATED.3IONS-SCNC: 10 MMOL/L (ref 4–13)
ANION GAP SERPL CALCULATED.3IONS-SCNC: 11 MMOL/L (ref 4–13)
ANION GAP SERPL CALCULATED.3IONS-SCNC: 16 MMOL/L (ref 4–13)
ANION GAP SERPL CALCULATED.3IONS-SCNC: 23 MMOL/L (ref 4–13)
ANION GAP SERPL CALCULATED.3IONS-SCNC: 6 MMOL/L (ref 4–13)
ANION GAP SERPL CALCULATED.3IONS-SCNC: 7 MMOL/L (ref 4–13)
APTT PPP: 27 SECONDS (ref 23–34)
AST SERPL W P-5'-P-CCNC: 10 U/L (ref 13–39)
AST SERPL W P-5'-P-CCNC: 8 U/L (ref 13–39)
B-OH-BUTYR SERPL-MCNC: 5.2 MMOL/L (ref 0.02–0.27)
BACTERIA UR QL AUTO: ABNORMAL /HPF
BASE EX.OXY STD BLDV CALC-SCNC: 49.8 % (ref 60–80)
BASE EX.OXY STD BLDV CALC-SCNC: 61.9 % (ref 60–80)
BASE EXCESS BLDV CALC-SCNC: -5.8 MMOL/L
BASE EXCESS BLDV CALC-SCNC: 4.8 MMOL/L
BASOPHILS # BLD MANUAL: 0.18 THOUSAND/UL (ref 0–0.1)
BASOPHILS NFR MAR MANUAL: 1 % (ref 0–1)
BILIRUB DIRECT SERPL-MCNC: 0.03 MG/DL (ref 0–0.2)
BILIRUB SERPL-MCNC: 0.35 MG/DL (ref 0.2–1)
BILIRUB SERPL-MCNC: 0.36 MG/DL (ref 0.2–1)
BILIRUB UR QL STRIP: NEGATIVE
BUN SERPL-MCNC: 31 MG/DL (ref 5–25)
BUN SERPL-MCNC: 36 MG/DL (ref 5–25)
BUN SERPL-MCNC: 47 MG/DL (ref 5–25)
BUN SERPL-MCNC: 49 MG/DL (ref 5–25)
BUN SERPL-MCNC: 54 MG/DL (ref 5–25)
BUN SERPL-MCNC: 59 MG/DL (ref 5–25)
BUN SERPL-MCNC: 71 MG/DL (ref 5–25)
CALCIUM ALBUM COR SERPL-MCNC: 9.5 MG/DL (ref 8.3–10.1)
CALCIUM SERPL-MCNC: 8.1 MG/DL (ref 8.4–10.2)
CALCIUM SERPL-MCNC: 8.5 MG/DL (ref 8.4–10.2)
CALCIUM SERPL-MCNC: 8.6 MG/DL (ref 8.4–10.2)
CALCIUM SERPL-MCNC: 8.7 MG/DL (ref 8.4–10.2)
CALCIUM SERPL-MCNC: 9 MG/DL (ref 8.4–10.2)
CARDIAC TROPONIN I PNL SERPL HS: 110 NG/L
CARDIAC TROPONIN I PNL SERPL HS: 89 NG/L
CARDIAC TROPONIN I PNL SERPL HS: 96 NG/L
CHLORIDE SERPL-SCNC: 100 MMOL/L (ref 96–108)
CHLORIDE SERPL-SCNC: 105 MMOL/L (ref 96–108)
CHLORIDE SERPL-SCNC: 105 MMOL/L (ref 96–108)
CHLORIDE SERPL-SCNC: 106 MMOL/L (ref 96–108)
CHLORIDE SERPL-SCNC: 107 MMOL/L (ref 96–108)
CHLORIDE SERPL-SCNC: 108 MMOL/L (ref 96–108)
CHLORIDE SERPL-SCNC: 109 MMOL/L (ref 96–108)
CK SERPL-CCNC: 81 U/L (ref 26–192)
CLARITY UR: CLEAR
CO2 SERPL-SCNC: 21 MMOL/L (ref 21–32)
CO2 SERPL-SCNC: 22 MMOL/L (ref 21–32)
CO2 SERPL-SCNC: 27 MMOL/L (ref 21–32)
CO2 SERPL-SCNC: 28 MMOL/L (ref 21–32)
CO2 SERPL-SCNC: 31 MMOL/L (ref 21–32)
COLOR UR: ABNORMAL
CREAT SERPL-MCNC: 0.75 MG/DL (ref 0.6–1.3)
CREAT SERPL-MCNC: 0.76 MG/DL (ref 0.6–1.3)
CREAT SERPL-MCNC: 0.87 MG/DL (ref 0.6–1.3)
CREAT SERPL-MCNC: 0.89 MG/DL (ref 0.6–1.3)
CREAT SERPL-MCNC: 1.03 MG/DL (ref 0.6–1.3)
CREAT SERPL-MCNC: 1.08 MG/DL (ref 0.6–1.3)
CREAT SERPL-MCNC: 1.46 MG/DL (ref 0.6–1.3)
EOSINOPHIL # BLD MANUAL: 0.54 THOUSAND/UL (ref 0–0.4)
EOSINOPHIL NFR BLD MANUAL: 3 % (ref 0–6)
ERYTHROCYTE [DISTWIDTH] IN BLOOD BY AUTOMATED COUNT: 15.2 % (ref 11.6–15.1)
FLUAV RNA RESP QL NAA+PROBE: NEGATIVE
FLUBV RNA RESP QL NAA+PROBE: NEGATIVE
GFR SERPL CREATININE-BSD FRML MDRD: 36 ML/MIN/1.73SQ M
GFR SERPL CREATININE-BSD FRML MDRD: 52 ML/MIN/1.73SQ M
GFR SERPL CREATININE-BSD FRML MDRD: 55 ML/MIN/1.73SQ M
GFR SERPL CREATININE-BSD FRML MDRD: 66 ML/MIN/1.73SQ M
GFR SERPL CREATININE-BSD FRML MDRD: 68 ML/MIN/1.73SQ M
GFR SERPL CREATININE-BSD FRML MDRD: 80 ML/MIN/1.73SQ M
GFR SERPL CREATININE-BSD FRML MDRD: 82 ML/MIN/1.73SQ M
GLUCOSE SERPL-MCNC: 124 MG/DL (ref 65–140)
GLUCOSE SERPL-MCNC: 158 MG/DL (ref 65–140)
GLUCOSE SERPL-MCNC: 163 MG/DL (ref 65–140)
GLUCOSE SERPL-MCNC: 176 MG/DL (ref 65–140)
GLUCOSE SERPL-MCNC: 179 MG/DL (ref 65–140)
GLUCOSE SERPL-MCNC: 185 MG/DL (ref 65–140)
GLUCOSE SERPL-MCNC: 190 MG/DL (ref 65–140)
GLUCOSE SERPL-MCNC: 206 MG/DL (ref 65–140)
GLUCOSE SERPL-MCNC: 206 MG/DL (ref 65–140)
GLUCOSE SERPL-MCNC: 207 MG/DL (ref 65–140)
GLUCOSE SERPL-MCNC: 275 MG/DL (ref 65–140)
GLUCOSE SERPL-MCNC: 336 MG/DL (ref 65–140)
GLUCOSE SERPL-MCNC: 358 MG/DL (ref 65–140)
GLUCOSE SERPL-MCNC: 403 MG/DL (ref 65–140)
GLUCOSE SERPL-MCNC: 518 MG/DL (ref 65–140)
GLUCOSE SERPL-MCNC: 56 MG/DL (ref 65–140)
GLUCOSE SERPL-MCNC: 567 MG/DL (ref 65–140)
GLUCOSE SERPL-MCNC: 579 MG/DL (ref 65–140)
GLUCOSE SERPL-MCNC: 597 MG/DL (ref 65–140)
GLUCOSE SERPL-MCNC: 681 MG/DL (ref 65–140)
GLUCOSE SERPL-MCNC: 71 MG/DL (ref 65–140)
GLUCOSE SERPL-MCNC: >600 MG/DL (ref 65–140)
GLUCOSE UR STRIP-MCNC: ABNORMAL MG/DL
HCO3 BLDV-SCNC: 20.6 MMOL/L (ref 24–30)
HCO3 BLDV-SCNC: 30.6 MMOL/L (ref 24–30)
HCT VFR BLD AUTO: 42.2 % (ref 34.8–46.1)
HGB BLD-MCNC: 12.8 G/DL (ref 11.5–15.4)
HGB UR QL STRIP.AUTO: NEGATIVE
HYALINE CASTS #/AREA URNS LPF: ABNORMAL /LPF
INR PPP: 1.08 (ref 0.85–1.19)
KETONES UR STRIP-MCNC: ABNORMAL MG/DL
LACTATE SERPL-SCNC: 1.5 MMOL/L (ref 0.5–2)
LEUKOCYTE ESTERASE UR QL STRIP: ABNORMAL
LYMPHOCYTES # BLD AUTO: 14 % (ref 14–44)
LYMPHOCYTES # BLD AUTO: 3.45 THOUSAND/UL (ref 0.6–4.47)
MAGNESIUM SERPL-MCNC: 1.9 MG/DL (ref 1.9–2.7)
MAGNESIUM SERPL-MCNC: 1.9 MG/DL (ref 1.9–2.7)
MAGNESIUM SERPL-MCNC: 2.1 MG/DL (ref 1.9–2.7)
MAGNESIUM SERPL-MCNC: 2.2 MG/DL (ref 1.9–2.7)
MAGNESIUM SERPL-MCNC: 2.4 MG/DL (ref 1.9–2.7)
MCH RBC QN AUTO: 28 PG (ref 26.8–34.3)
MCHC RBC AUTO-ENTMCNC: 30.3 G/DL (ref 31.4–37.4)
MCV RBC AUTO: 92 FL (ref 82–98)
MONOCYTES # BLD AUTO: 1.09 THOUSAND/UL (ref 0–1.22)
MONOCYTES NFR BLD: 6 % (ref 4–12)
NEUTROPHILS # BLD MANUAL: 12.88 THOUSAND/UL (ref 1.85–7.62)
NEUTS BAND NFR BLD MANUAL: 6 % (ref 0–8)
NEUTS SEG NFR BLD AUTO: 65 % (ref 43–75)
NITRITE UR QL STRIP: NEGATIVE
NON-SQ EPI CELLS URNS QL MICRO: ABNORMAL /HPF
O2 CT BLDV-SCNC: 11.8 ML/DL
O2 CT BLDV-SCNC: 9.2 ML/DL
OSMOLALITY UR/SERPL-RTO: 365 MMOL/KG (ref 282–298)
OSMOLALITY UR: 570 MMOL/KG
PCO2 BLDV: 43.8 MM HG (ref 42–50)
PCO2 BLDV: 50.6 MM HG (ref 42–50)
PH BLDV: 7.29 [PH] (ref 7.3–7.4)
PH BLDV: 7.4 [PH] (ref 7.3–7.4)
PH UR STRIP.AUTO: 5.5 [PH]
PHOSPHATE SERPL-MCNC: 1.9 MG/DL (ref 2.3–4.1)
PHOSPHATE SERPL-MCNC: 2 MG/DL (ref 2.3–4.1)
PHOSPHATE SERPL-MCNC: 2 MG/DL (ref 2.3–4.1)
PHOSPHATE SERPL-MCNC: 2.1 MG/DL (ref 2.3–4.1)
PHOSPHATE SERPL-MCNC: 2.6 MG/DL (ref 2.3–4.1)
PHOSPHATE SERPL-MCNC: 3.2 MG/DL (ref 2.3–4.1)
PLATELET # BLD AUTO: 357 THOUSANDS/UL (ref 149–390)
PLATELET # BLD AUTO: 505 THOUSANDS/UL (ref 149–390)
PLATELET BLD QL SMEAR: ABNORMAL
PMV BLD AUTO: 9 FL (ref 8.9–12.7)
PMV BLD AUTO: 9.1 FL (ref 8.9–12.7)
PO2 BLDV: 26.1 MM HG (ref 35–45)
PO2 BLDV: 35 MM HG (ref 35–45)
POTASSIUM SERPL-SCNC: 3.4 MMOL/L (ref 3.5–5.3)
POTASSIUM SERPL-SCNC: 3.5 MMOL/L (ref 3.5–5.3)
POTASSIUM SERPL-SCNC: 3.5 MMOL/L (ref 3.5–5.3)
POTASSIUM SERPL-SCNC: 3.9 MMOL/L (ref 3.5–5.3)
POTASSIUM SERPL-SCNC: 4 MMOL/L (ref 3.5–5.3)
POTASSIUM SERPL-SCNC: 4.1 MMOL/L (ref 3.5–5.3)
POTASSIUM SERPL-SCNC: 4.3 MMOL/L (ref 3.5–5.3)
PROCALCITONIN SERPL-MCNC: 0.21 NG/ML
PROT SERPL-MCNC: 5.8 G/DL (ref 6.4–8.4)
PROT SERPL-MCNC: 6.6 G/DL (ref 6.4–8.4)
PROT UR STRIP-MCNC: ABNORMAL MG/DL
PROTHROMBIN TIME: 14.3 SECONDS (ref 12.3–15)
RBC # BLD AUTO: 4.57 MILLION/UL (ref 3.81–5.12)
RBC #/AREA URNS AUTO: ABNORMAL /HPF
RSV RNA RESP QL NAA+PROBE: NEGATIVE
SARS-COV-2 RNA RESP QL NAA+PROBE: NEGATIVE
SODIUM 24H UR-SCNC: 14 MOL/L
SODIUM SERPL-SCNC: 138 MMOL/L (ref 135–147)
SODIUM SERPL-SCNC: 142 MMOL/L (ref 135–147)
SODIUM SERPL-SCNC: 144 MMOL/L (ref 135–147)
SODIUM SERPL-SCNC: 144 MMOL/L (ref 135–147)
SODIUM SERPL-SCNC: 145 MMOL/L (ref 135–147)
SODIUM SERPL-SCNC: 146 MMOL/L (ref 135–147)
SODIUM SERPL-SCNC: 149 MMOL/L (ref 135–147)
SP GR UR STRIP.AUTO: 1.02 (ref 1–1.03)
T4 FREE SERPL-MCNC: 0.83 NG/DL (ref 0.61–1.12)
TSH SERPL DL<=0.05 MIU/L-ACNC: 0.42 UIU/ML (ref 0.45–4.5)
UROBILINOGEN UR STRIP-ACNC: <2 MG/DL
VARIANT LYMPHS # BLD AUTO: 5 %
WBC # BLD AUTO: 18.14 THOUSAND/UL (ref 4.31–10.16)
WBC #/AREA URNS AUTO: ABNORMAL /HPF

## 2024-10-11 PROCEDURE — 84443 ASSAY THYROID STIM HORMONE: CPT | Performed by: EMERGENCY MEDICINE

## 2024-10-11 PROCEDURE — 84439 ASSAY OF FREE THYROXINE: CPT | Performed by: EMERGENCY MEDICINE

## 2024-10-11 PROCEDURE — 87040 BLOOD CULTURE FOR BACTERIA: CPT | Performed by: STUDENT IN AN ORGANIZED HEALTH CARE EDUCATION/TRAINING PROGRAM

## 2024-10-11 PROCEDURE — NC001 PR NO CHARGE: Performed by: EMERGENCY MEDICINE

## 2024-10-11 PROCEDURE — 83735 ASSAY OF MAGNESIUM: CPT | Performed by: STUDENT IN AN ORGANIZED HEALTH CARE EDUCATION/TRAINING PROGRAM

## 2024-10-11 PROCEDURE — 80048 BASIC METABOLIC PNL TOTAL CA: CPT

## 2024-10-11 PROCEDURE — 82805 BLOOD GASES W/O2 SATURATION: CPT

## 2024-10-11 PROCEDURE — 84100 ASSAY OF PHOSPHORUS: CPT

## 2024-10-11 PROCEDURE — 83935 ASSAY OF URINE OSMOLALITY: CPT | Performed by: STUDENT IN AN ORGANIZED HEALTH CARE EDUCATION/TRAINING PROGRAM

## 2024-10-11 PROCEDURE — 99223 1ST HOSP IP/OBS HIGH 75: CPT | Performed by: PODIATRIST

## 2024-10-11 PROCEDURE — 82010 KETONE BODYS QUAN: CPT | Performed by: STUDENT IN AN ORGANIZED HEALTH CARE EDUCATION/TRAINING PROGRAM

## 2024-10-11 PROCEDURE — 99285 EMERGENCY DEPT VISIT HI MDM: CPT

## 2024-10-11 PROCEDURE — 82550 ASSAY OF CK (CPK): CPT | Performed by: EMERGENCY MEDICINE

## 2024-10-11 PROCEDURE — 80076 HEPATIC FUNCTION PANEL: CPT

## 2024-10-11 PROCEDURE — 81001 URINALYSIS AUTO W/SCOPE: CPT | Performed by: STUDENT IN AN ORGANIZED HEALTH CARE EDUCATION/TRAINING PROGRAM

## 2024-10-11 PROCEDURE — 70450 CT HEAD/BRAIN W/O DYE: CPT

## 2024-10-11 PROCEDURE — 93005 ELECTROCARDIOGRAM TRACING: CPT

## 2024-10-11 PROCEDURE — 83605 ASSAY OF LACTIC ACID: CPT | Performed by: STUDENT IN AN ORGANIZED HEALTH CARE EDUCATION/TRAINING PROGRAM

## 2024-10-11 PROCEDURE — 82948 REAGENT STRIP/BLOOD GLUCOSE: CPT

## 2024-10-11 PROCEDURE — 80053 COMPREHEN METABOLIC PANEL: CPT | Performed by: EMERGENCY MEDICINE

## 2024-10-11 PROCEDURE — 0241U HB NFCT DS VIR RESP RNA 4 TRGT: CPT

## 2024-10-11 PROCEDURE — 85730 THROMBOPLASTIN TIME PARTIAL: CPT | Performed by: EMERGENCY MEDICINE

## 2024-10-11 PROCEDURE — 85049 AUTOMATED PLATELET COUNT: CPT

## 2024-10-11 PROCEDURE — 83735 ASSAY OF MAGNESIUM: CPT

## 2024-10-11 PROCEDURE — 85007 BL SMEAR W/DIFF WBC COUNT: CPT | Performed by: EMERGENCY MEDICINE

## 2024-10-11 PROCEDURE — 85027 COMPLETE CBC AUTOMATED: CPT | Performed by: EMERGENCY MEDICINE

## 2024-10-11 PROCEDURE — 73630 X-RAY EXAM OF FOOT: CPT

## 2024-10-11 PROCEDURE — 84300 ASSAY OF URINE SODIUM: CPT | Performed by: STUDENT IN AN ORGANIZED HEALTH CARE EDUCATION/TRAINING PROGRAM

## 2024-10-11 PROCEDURE — 82306 VITAMIN D 25 HYDROXY: CPT

## 2024-10-11 PROCEDURE — 99223 1ST HOSP IP/OBS HIGH 75: CPT | Performed by: INTERNAL MEDICINE

## 2024-10-11 PROCEDURE — 71250 CT THORAX DX C-: CPT

## 2024-10-11 PROCEDURE — 85610 PROTHROMBIN TIME: CPT | Performed by: EMERGENCY MEDICINE

## 2024-10-11 PROCEDURE — 36415 COLL VENOUS BLD VENIPUNCTURE: CPT | Performed by: EMERGENCY MEDICINE

## 2024-10-11 PROCEDURE — 83930 ASSAY OF BLOOD OSMOLALITY: CPT | Performed by: STUDENT IN AN ORGANIZED HEALTH CARE EDUCATION/TRAINING PROGRAM

## 2024-10-11 PROCEDURE — 99285 EMERGENCY DEPT VISIT HI MDM: CPT | Performed by: EMERGENCY MEDICINE

## 2024-10-11 PROCEDURE — 84145 PROCALCITONIN (PCT): CPT | Performed by: STUDENT IN AN ORGANIZED HEALTH CARE EDUCATION/TRAINING PROGRAM

## 2024-10-11 PROCEDURE — 84484 ASSAY OF TROPONIN QUANT: CPT

## 2024-10-11 PROCEDURE — 96365 THER/PROPH/DIAG IV INF INIT: CPT

## 2024-10-11 PROCEDURE — 82805 BLOOD GASES W/O2 SATURATION: CPT | Performed by: STUDENT IN AN ORGANIZED HEALTH CARE EDUCATION/TRAINING PROGRAM

## 2024-10-11 RX ORDER — DEXTROSE, SODIUM CHLORIDE, SODIUM LACTATE, POTASSIUM CHLORIDE, AND CALCIUM CHLORIDE 5; .6; .31; .03; .02 G/100ML; G/100ML; G/100ML; G/100ML; G/100ML
250 INJECTION, SOLUTION INTRAVENOUS CONTINUOUS
Status: DISCONTINUED | OUTPATIENT
Start: 2024-10-11 | End: 2024-10-11

## 2024-10-11 RX ORDER — ENOXAPARIN SODIUM 100 MG/ML
40 INJECTION SUBCUTANEOUS DAILY
Status: DISCONTINUED | OUTPATIENT
Start: 2024-10-11 | End: 2024-10-11

## 2024-10-11 RX ORDER — POTASSIUM CHLORIDE 29.8 MG/ML
40 INJECTION INTRAVENOUS ONCE
Status: DISCONTINUED | OUTPATIENT
Start: 2024-10-11 | End: 2024-10-11

## 2024-10-11 RX ORDER — POTASSIUM CHLORIDE 14.9 MG/ML
20 INJECTION INTRAVENOUS
Status: COMPLETED | OUTPATIENT
Start: 2024-10-11 | End: 2024-10-11

## 2024-10-11 RX ORDER — CHLORHEXIDINE GLUCONATE ORAL RINSE 1.2 MG/ML
15 SOLUTION DENTAL EVERY 12 HOURS SCHEDULED
Status: DISCONTINUED | OUTPATIENT
Start: 2024-10-11 | End: 2024-10-29 | Stop reason: HOSPADM

## 2024-10-11 RX ORDER — HEPARIN SODIUM 5000 [USP'U]/ML
5000 INJECTION, SOLUTION INTRAVENOUS; SUBCUTANEOUS EVERY 8 HOURS SCHEDULED
Status: DISCONTINUED | OUTPATIENT
Start: 2024-10-11 | End: 2024-10-19

## 2024-10-11 RX ORDER — HEPARIN SODIUM 5000 [USP'U]/ML
5000 INJECTION, SOLUTION INTRAVENOUS; SUBCUTANEOUS EVERY 8 HOURS SCHEDULED
Status: CANCELLED | OUTPATIENT
Start: 2024-10-11

## 2024-10-11 RX ADMIN — CHLORHEXIDINE GLUCONATE 0.12% ORAL RINSE 15 ML: 1.2 LIQUID ORAL at 09:30

## 2024-10-11 RX ADMIN — CHLORHEXIDINE GLUCONATE 0.12% ORAL RINSE 15 ML: 1.2 LIQUID ORAL at 21:01

## 2024-10-11 RX ADMIN — HEPARIN SODIUM 5000 UNITS: 5000 INJECTION INTRAVENOUS; SUBCUTANEOUS at 21:01

## 2024-10-11 RX ADMIN — INSULIN HUMAN 5 UNITS: 100 INJECTION, SOLUTION PARENTERAL at 09:30

## 2024-10-11 RX ADMIN — POTASSIUM CHLORIDE 20 MEQ: 14.9 INJECTION, SOLUTION INTRAVENOUS at 17:03

## 2024-10-11 RX ADMIN — DEXTROSE, SODIUM CHLORIDE, SODIUM LACTATE, POTASSIUM CHLORIDE, AND CALCIUM CHLORIDE 250 ML/HR: 5; .6; .31; .03; .02 INJECTION, SOLUTION INTRAVENOUS at 14:37

## 2024-10-11 RX ADMIN — POTASSIUM CHLORIDE 20 MEQ: 14.9 INJECTION, SOLUTION INTRAVENOUS at 13:25

## 2024-10-11 RX ADMIN — HEPARIN SODIUM 5000 UNITS: 5000 INJECTION INTRAVENOUS; SUBCUTANEOUS at 14:19

## 2024-10-11 RX ADMIN — SODIUM CHLORIDE, SODIUM LACTATE, POTASSIUM CHLORIDE, AND CALCIUM CHLORIDE 1000 ML: .6; .31; .03; .02 INJECTION, SOLUTION INTRAVENOUS at 09:25

## 2024-10-11 RX ADMIN — SODIUM CHLORIDE, SODIUM LACTATE, POTASSIUM CHLORIDE, AND CALCIUM CHLORIDE 1000 ML: .6; .31; .03; .02 INJECTION, SOLUTION INTRAVENOUS at 12:18

## 2024-10-11 RX ADMIN — SODIUM CHLORIDE 5.1 UNITS/HR: 9 INJECTION, SOLUTION INTRAVENOUS at 09:34

## 2024-10-11 RX ADMIN — POTASSIUM PHOSPHATE, MONOBASIC POTASSIUM PHOSPHATE, DIBASIC 21 MMOL: 224; 236 INJECTION, SOLUTION, CONCENTRATE INTRAVENOUS at 17:02

## 2024-10-11 RX ADMIN — SODIUM CHLORIDE, SODIUM LACTATE, POTASSIUM CHLORIDE, AND CALCIUM CHLORIDE 1000 ML: .6; .31; .03; .02 INJECTION, SOLUTION INTRAVENOUS at 05:53

## 2024-10-11 NOTE — ED PROVIDER NOTES
Emergency Department Sign Out Note        Sign out and transfer of care from [facility]. See Separate Emergency Department note.     The patient, Sabiha Garcia, was evaluated by the previous provider for fall with unclear/unknown cause.    Workup Completed:  CT head/CT chest  X-ray right foot    CK/PTT/INR/CMP  CK not elevated, PTT/INR within normal limits, CMP with evidence of hypernatremia, elevated glucose of 681, anion gap of 23, creatinine 1.46      ED Course / Workup Pending (followup):  Laboratory evaluation including CBC/VBG/beta hydroxybutyrate, TSH, blood cultures, lactic acid, procalcitonin, urinalysis,    Volume resuscitation with 1 L LR, additional fluid as needed                                  ED Course as of 10/11/24 1820   Fri Oct 11, 2024   0702 SO: DM, fall unclear etio, unsteady, prolonged down time?, cough, diminished left side, R toe gangrene, chest wall tender, scanning chest + head, confused, perforated TM, septic workup, no rhabdo, glucose, gap of 23, dka labs pending, 1 L LR ongoing     Procedures  Medical Decision Making  Patient evaluation ongoing for evidence of possible DKA in the setting of fall, additionally patient will require likely inpatient admission for failure to thrive, multiple/chronic complaints    Laboratory evaluation returning, demonstrating CBC with elevation of white blood cell count at 18.14, urinalysis with nitrates negative, leukocytes unclear, urine micro without evidence of bacteria or elevation white blood    TSH 0.425, free T4 within normal limits, blood cultures pending, lactic acid not elevated 1.5, beta hydroxybutyrate elevated at 5.2, serum osm elevated, VBG demonstrates pH 7.29    DKA protocol initiated, critical care consult placed    Further volume resuscitation with LR, insulin 5 units administered prior to initiation of DKA protocol    CT chest:  IMPRESSION:     No acute intrathoracic process.     No acute fracture. Old healed right anterior third  and fourth rib fractures.     Fluid throughout most of the esophagus presumably due to gastroesophageal reflux or esophageal dysmotility.     Mild pulmonary emphysema.     Scattered punctate noncalcified pulmonary nodules with unknown long-term stability and doubtful clinical significance. Based on current Fleischner Society 2017 Guidelines on incidental pulmonary nodule, optional follow-up CT at 12 months can be   considered.     Additional chronic findings and negatives as above.    CT head:  IMPRESSION:     No acute intracranial process.     No skull fracture.     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     Chronic right maxillary sinusitis.     Anterior mandibular dental disease. Nonemergent dental evaluation is suggested.    Patient continues to remain alert, oriented, hemodynamically stable with no new symptom/complaint, she remains without confusion, but does not feel safe at home/has unclear etiology for why she has developed these metabolic derangements, additionally the patient is being treated for evidence of DKA, patient has been admitted to critical care, disposition pending reversal of acidosis and further/repeat laboratory evaluation.      Amount and/or Complexity of Data Reviewed  Labs: ordered.  Radiology: ordered.    Risk  OTC drugs.  Decision regarding hospitalization.            Disposition  Final diagnoses:   Fall, initial encounter   Cachexia (HCC)   Dry gangrene (HCC)   Cough     Time reflects when diagnosis was documented in both MDM as applicable and the Disposition within this note       Time User Action Codes Description Comment    10/11/2024  6:36 AM ZomorodGalina dale Add [W19.XXXA] Fall, initial encounter     10/11/2024  6:36 AM ZomorodGalina dale Add [R64] Cachexia (HCC)     10/11/2024  6:37 AM ZomorodGalina dale Add [I96] Dry gangrene (HCC)     10/11/2024  6:37 AM SobeidamorodGalina dale Add [R05.9] Cough     10/11/2024  9:14 AM Geri Justice Add [E11.10] DKA (diabetic  ketoacidosis) ([facility])     10/11/2024 12:54 PM Kandi Octaviano Haroon Add [E11.65,  Z79.4] Type 2 diabetes mellitus with hyperglycemia, with long-term current use of insulin ([facility])           ED Disposition       ED Disposition   Admit    Condition   Stable    Date/Time   Fri Oct 11, 2024  6:36 AM    Comment   --             Follow-up Information    None       Current Discharge Medication List        CONTINUE these medications which have NOT CHANGED    Details   insulin degludec (TRESIBA) 100 units/mL injection pen Inject 20 Units under the skin daily  Qty: 15 mL, Refills: 0    Comments: May substitute with any brand insulin degludec pen  Associated Diagnoses: Type 2 diabetes mellitus with hyperglycemia, without long-term current use of insulin ([facility])      Insulin Pen Needle (BD Pen Needle Shira U/F) 32G X 4 MM MISC Use daily as directed with insulin pen  Qty: 100 each, Refills: 3    Comments: May substitute with any brand insulin pen needle  Associated Diagnoses: Type 2 diabetes mellitus with hyperglycemia, with long-term current use of insulin ([facility])           No discharge procedures on file.       ED Provider  Electronically Signed by     Michael Méndez DO  10/11/24 7075

## 2024-10-11 NOTE — ED PROVIDER NOTES
Time reflects when diagnosis was documented in both MDM as applicable and the Disposition within this note       Time User Action Codes Description Comment    10/11/2024  6:36 AM Galina Marvin Add [W19.XXXA] Fall, initial encounter     10/11/2024  6:36 AM ZomorodBong dalela Add [R64] Cachexia (HCC)     10/11/2024  6:37 AM Zomorodsuyapa Galina Add [I96] Dry gangrene (HCC)     10/11/2024  6:37 AM ZomoBong apontela Add [R05.9] Cough           ED Disposition       ED Disposition   Admit    Condition   Stable    Date/Time   Fri Oct 11, 2024  6:36 AM    Comment   --             Assessment & Plan       Medical Decision Making  68F with cachexia/failure to thrive/wt loss, v poorly controlled DM, ambulatory dysfunction and multiple recent falls now here with fall and reported 5d downtime in addition to cough and subjective fevers, tachycardic on arrival with leukocytosis, KOURTNEY, gluc ~700. No evidence of traumatic head injury and c/o so far includes R chest wall TTP and shallow DTI over mid back, also has dry gangrene of R little toe following a recent cut injury in setting of severe DM, no other digits c/f wet gangrene or active soft tissue infection. Given cough, report of fevers, WBC elevation could have PNA, though likely chronic cough component 2/2 smoking also present. Given also having chest wall pain and fall would like to eval for rib fx so will get CT chest. Is somewhat confused on exam and given sig frailty and unclear recent hx will get CT head as well, though initial labs show marked hyperglycemia which could also underlie AMS. Given glucose and AG will start IVF and have ordered BHB and VBG to further evaluate acidosis.    CT head   CT chest  Fu labs    Update  CT head without acute findings, chronic lesions as noted  BHB 5.2 and VBG metabolic acidosis and LA WNL and not contributing to acidosis so continue IVF to close gap, will need insulin  Will need admission to hospital  [facility] M[facility]dez who will continue w patient care, see sign out note for further details           V poorly controlled DM, last A1c 15; sig HLD; osteoporosis, 40lb wt loss /1yr.    Amount and/or Complexity of Data Reviewed  Labs: ordered. Decision-making details documented in ED Course.  Radiology: ordered. Decision-making details documented in ED Course.    Risk  OTC drugs.  Decision regarding hospitalization.        ED Course as of 10/11/24 0913   Fri Oct 11, 2024   0642 WBC(!): 18.14   0642 Platelet Count(!): 505   0642 Total CK: 81   0642 Protime-INR  WNL   0643 Fall, cough, fevers, prolonged down time, tachy on arrival, cachectic, scattered ronchi, R TM perforation, decreased muted L breath sounds w rubs, R chest wall TTP, R 5th toe dry gangrene   0649 V poorly controlled DM, last A1c 15; sig HLD; osteoporosis, 40lb wt loss /1yr.    RUE fx s/p repair, L hip ITF s/p ORIF 2020     0651 ANION GAP(!): 23   0651 GLUCOSE(!!): 681  Getting BHB and VBG  AG 23   0651 Creatinine(!): 1.46  KOURTNEY   0651 BUN(!): 71   0738 Sodium(!): 149  Suspect low given gluc 681, corrects to ~160   0746 CT head without contrast  No acute findings. Partially empty sella turcica, sm R posterolateral cerebellar encephalomalacia, old infarction; chronic microangiopathy. BICA calcified.  Mod R max sinus thickening, chronic sinusitis, poss fungal. Dental disease.   0854 LACTIC ACID: 1.5   0854 Beta- Hydroxybutyrate(!): 5.20   0854 pH, Sanchez(!): 7.290   0854 HCO3, Sanchez(!): 20.6   0855 OSMOLALITY, SERUM(!): 365   0855 Pulse: 96       Medications   insulin regular (HumuLIN R,NovoLIN R) injection 5 Units (has no administration in time range)   insulin regular (HumuLIN R,NovoLIN R) 1 Units/mL in sodium chloride 0.9 % 100 mL infusion (has no administration in time range)   lactated ringers bolus 1,000 mL (1,000 mL Intravenous New Bag 10/11/24 0553)       ED Risk Strat Scores                           SBIRT 20yo+      Flowsheet Row Most Recent Value   Initial  Alcohol Screen: US AUDIT-C     1. How often do you have a drink containing alcohol? 0 Filed at: 10/11/2024 0534   2. How many drinks containing alcohol do you have on a typical day you are drinking?  0 Filed at: 10/11/2024 0534   3b. FEMALE Any Age, or MALE 65+: How often do you have 4 or more drinks on one occassion? 0 Filed at: 10/11/2024 0534   Audit-C Score 0 Filed at: 10/11/2024 0534   SANKET: How many times in the past year have you...    Used an illegal drug or used a prescription medication for non-medical reasons? Never Filed at: 10/11/2024 0534                            History of Present Illness       Chief Complaint   Patient presents with    Fall     Pt reports falling 5 days ago and unable to stand up and was crawling around but was knocking on the wall and a neighbor called 911. Pt doesn't feel safe at home. Right pinky toe is black and hard       Past Medical History:   Diagnosis Date    Allergic [id number]    Depression [id number]    Diabetes (HCC)     Diabetes mellitus (HCC) [id number]      Past Surgical History:   Procedure Laterality Date    APPENDECTOMY      CHOLECYSTECTOMY      FRACTURE SURGERY      MN OPTX FEM SHFT FX W/INSJ IMED IMPLT W/WO SCREW Left 05/27/2020    Procedure: INSERTION NAIL IM FEMUR ANTEGRADE (TROCHANTERIC);  Surgeon: Jose Lyons MD;  Location: [facility];  Service: Orthopedics    TONSILLECTOMY      TUBAL LIGATION        Family History   Problem Relation Age of Onset    Stroke Mother     No Known Problems Father         left her at 18 months old.    Cholelithiasis Sister         Vascular disease    No Known Problems Half-Brother       Social History     Tobacco Use    Smoking status: Every Day     Current packs/day: 0.50     Average packs/day: 0.5 packs/day for 25.0 years (12.5 ttl pk-yrs)     Types: Cigarettes    Smokeless tobacco: Never   Vaping Use    Vaping status: Never Used   Substance Use Topics    Alcohol use: Not Currently    Drug use: Never      E-Cigarette/Vaping     "E-Cigarette Use Never User       E-Cigarette/Vaping Substances    Nicotine No     THC No     CBD No     Flavoring No     Other No     Unknown No       I have reviewed and agree with the history as documented.     Pt is a 68F smoker with uncontrolled DM, osteoporosis, HLD, multiple recent falls here after fall and 5 days downtime. Pt says she has been feeling unsteady recently, has to hold onto furniture or rollator to avoid falling. Chronic BL toe/foot numbness, hx c/w neuropathy. Reports fall about 1.5 weeks ago, 1d downtime, then no sig changes until 5d ago when she fell again, and was unable to get up for 5 days. Was able to crawl around the apt and have sips of fluids from bottles laying around but had nothing to eat.  Made noise against the wall until neighbor heard and called EMS this AM. Has been voiding and BM in place, no blood, no diarrhea, abd pain, CP, or SOB but does have cough that became more significant \"since being in the hospital,\" and thinks she might be having fevers. Still smoking. Reports wt loss, per chart comparison, 40lbs in last year or so (152->113). Last A1c was 15. RUE fx s/p repair, L hip ITF s/p ORIF 2020. PMH also notable for gangrenous cholecystitis s/p cholecystectomy and jejunostomy circa 1999.          Review of Systems   Constitutional:  Positive for activity change, fatigue, fever and unexpected weight change.   HENT:  Negative for congestion, facial swelling, postnasal drip, rhinorrhea, sinus pressure, sinus pain and sore throat.    Eyes:  Negative for pain, discharge, redness, itching and visual disturbance.   Respiratory:  Positive for cough. Negative for choking, chest tightness, shortness of breath, wheezing and stridor.    Cardiovascular:  Negative for chest pain, palpitations and leg swelling.   Gastrointestinal:  Positive for vomiting. Negative for abdominal distention, abdominal pain, blood in stool, constipation, diarrhea and nausea.   Genitourinary:  Negative for " difficulty urinating, dysuria, flank pain, frequency, hematuria, pelvic pain and urgency.   Musculoskeletal:  Positive for arthralgias and gait problem. Negative for back pain, joint swelling, myalgias, neck pain and neck stiffness.   Skin:  Positive for color change and wound. Negative for rash.   Neurological:  Positive for weakness and numbness. Negative for dizziness, tremors, seizures, syncope, speech difficulty and headaches.           Objective       ED Triage Vitals [10/11/24 0531]   Temperature Pulse Blood Pressure Respirations SpO2 Patient Position - Orthostatic VS   98.3 °F (36.8 °C) (!) 111 134/67 18 97 % Sitting      Temp Source Heart Rate Source BP Location FiO2 (%) Pain Score    Oral Monitor Left arm -- 5      Vitals      Date and Time Temp Pulse SpO2 Resp BP Pain Score FACES Pain Rating User   10/11/24 0800 -- 96 95 % 19 131/61 No Pain -- MO   10/11/24 0531 98.3 °F (36.8 °C) 111 97 % 18 134/67 5 -- KR            Physical Exam  Constitutional:       General: She is awake.      Appearance: She is cachectic. She is ill-appearing.   HENT:      Head: Hair is abnormal.      Jaw: There is normal jaw occlusion.      Right Ear: No drainage, swelling or tenderness. No middle ear effusion. There is no impacted cerumen. Tympanic membrane is perforated. Tympanic membrane is not injected or erythematous.      Left Ear: Tympanic membrane normal.      Nose: Nose normal.      Mouth/Throat:      Mouth: Mucous membranes are dry.      Dentition: Dental caries and gum lesions present.      Tongue: Tongue does not deviate from midline.      Pharynx: Posterior oropharyngeal erythema present.   Eyes:      General: Vision grossly intact. No visual field deficit.     Extraocular Movements:      Right eye: Normal extraocular motion and no nystagmus.      Left eye: Normal extraocular motion and no nystagmus.      Conjunctiva/sclera: Conjunctivae normal.   Neck:      Trachea: Phonation normal.   Cardiovascular:      Rate and  Rhythm: Regular rhythm. Tachycardia present. Occasional Extrasystoles are present.     Pulses:           Dorsalis pedis pulses are 0 on the right side and 0 on the left side.        Posterior tibial pulses are 0 on the right side and 0 on the left side.      Heart sounds: Murmur heard.      Arteriovenous access: Left arteriovenous access is present.  Pulmonary:      Effort: Pulmonary effort is normal.      Breath sounds: No stridor. Examination of the right-middle field reveals rhonchi. Examination of the left-middle field reveals decreased breath sounds and rhonchi. Examination of the left-lower field reveals decreased breath sounds. Decreased breath sounds and rhonchi present. No wheezing or rales.   Chest:      Chest wall: Tenderness present. No deformity, swelling, crepitus or edema.      Comments: Lower lateral R chest wall TTP  Abdominal:      General: Abdomen is scaphoid. A surgical scar is present.      Palpations: Abdomen is soft. There is no mass.      Tenderness: There is no abdominal tenderness.   Musculoskeletal:      Cervical back: Normal range of motion. Tenderness present. No deformity, bony tenderness or crepitus.      Thoracic back: No deformity, spasms or tenderness. No scoliosis.      Lumbar back: No deformity, spasms, tenderness or bony tenderness.      Right lower leg: No edema.      Left lower leg: No edema.        Feet:    Skin:     Coloration: Skin is not sallow.      Findings: Signs of injury present.      Comments: Shallow DTI thoracic back, no open wounds   Neurological:      Mental Status: She is alert. She is confused.      GCS: GCS eye subscore is 4. GCS verbal subscore is 4. GCS motor subscore is 6.      Cranial Nerves: No cranial nerve deficit, dysarthria or facial asymmetry.      Sensory: Sensory deficit present.      Motor: Weakness present.      Comments: Decreased BLE  Cold distal BLE   Psychiatric:         Attention and Perception: Attention and perception normal.         Mood  "and Affect: Mood and affect normal.         Speech: Speech normal.         Behavior: Behavior is cooperative.         Thought Content: Thought content normal.         Results Reviewed       Procedure Component Value Units Date/Time    Phosphorus [[id number]]     Lab Status: No result Specimen: Blood     Phosphorus [[id number]]     Lab Status: No result Specimen: Blood     Basic metabolic panel [[id number]]     Lab Status: No result Specimen: Blood     Basic metabolic panel [[id number]]     Lab Status: No result Specimen: Blood     Magnesium [[id number]]     Lab Status: No result Specimen: Blood     Magnesium [[id number]]     Lab Status: No result Specimen: Blood     T4, free [[id number]]  (Normal) Collected: 10/11/24 0550    Lab Status: Final result Specimen: Blood from Arm, Left Updated: 10/11/24 0843     Free T4 0.83 ng/dL     Narrative:        \"Therapeutic range for patients medicated with thyroid disorders: 0.61-1.24 ng/dL.\"    TSH, 3rd generation with Free T4 reflex [[id number]]  (Abnormal) Collected: 10/11/24 0550    Lab Status: Final result Specimen: Blood from Arm, Left Updated: 10/11/24 0839     TSH 3RD GENERATON 0.425 uIU/mL     Procalcitonin [[id number]]  (Normal) Collected: 10/11/24 0747    Lab Status: Final result Specimen: Blood from Arm, Right Updated: 10/11/24 0831     Procalcitonin 0.21 ng/ml     Osmolality-\"If this is regarding a toxic alcohol, please STOP and consult medical  for further guidance.\" [[id number]]  (Abnormal) Collected: 10/11/24 0747    Lab Status: Final result Specimen: Blood from Arm, Right Updated: 10/11/24 0823     Osmolality Serum 365 mmol/KG     Magnesium [[id number]]  (Normal) Collected: 10/11/24 0747    Lab Status: Final result Specimen: Blood from Arm, Right Updated: 10/11/24 0822     Magnesium 2.4 mg/dL     Beta Hydroxybutyrate [[id number]]  (Abnormal) Collected: 10/11/24 0747    Lab Status: Final result Specimen: Blood from Arm, Right Updated: 10/11/24 0822     Beta- " Hydroxybutyrate 5.20 mmol/L     Lactic acid, plasma (w/reflex if result > 2.0) [[id number]]  (Normal) Collected: 10/11/24 0747    Lab Status: Final result Specimen: Blood from Arm, Right Updated: 10/11/24 0821     LACTIC ACID 1.5 mmol/L     Narrative:      Result may be elevated if tourniquet was used during collection.    Blood gas, venous [[id number]]  (Abnormal) Collected: 10/11/24 0747    Lab Status: Final result Specimen: Blood from Arm, Right Updated: 10/11/24 0800     pH, Sanchez 7.290     pCO2, Sanchez 43.8 mm Hg      pO2, Sanchez 35.0 mm Hg      HCO3, Sanchez 20.6 mmol/L      Base Excess, Sanchez -5.8 mmol/L      O2 Content, Sanchez 11.8 ml/dL      O2 HGB, VENOUS 61.9 %     Fingerstick Glucose (POCT) [[id number]]  (Abnormal) Collected: 10/11/24 0750    Lab Status: Final result Specimen: Blood Updated: 10/11/24 0751     POC Glucose 597 mg/dl     Osmolality, urine [[id number]]     Lab Status: No result Specimen: Urine     Sodium, urine, random [[id number]]     Lab Status: No result Specimen: Urine     CBC and differential [[id number]]  (Abnormal) Collected: 10/11/24 0550    Lab Status: Final result Specimen: Blood from Arm, Left Updated: 10/11/24 0722     WBC 18.14 Thousand/uL      RBC 4.57 Million/uL      Hemoglobin 12.8 g/dL      Hematocrit 42.2 %      MCV 92 fL      MCH 28.0 pg      MCHC 30.3 g/dL      RDW 15.2 %      MPV 9.1 fL      Platelets 505 Thousands/uL     Narrative:      This is an appended report.  These results have been appended to a previously verified report.    Manual Differential(PHLEBS Do Not Order) [[id number]]  (Abnormal) Collected: 10/11/24 0550    Lab Status: Final result Specimen: Blood from Arm, Left Updated: 10/11/24 0722     Segmented % 65 %      Bands % 6 %      Lymphocytes % 14 %      Monocytes % 6 %      Eosinophils % 3 %      Basophils % 1 %      Atypical Lymphocytes % 5 %      Absolute Neutrophils 12.88 Thousand/uL      Absolute Lymphocytes 3.45 Thousand/uL      Absolute Monocytes 1.09 Thousand/uL       Absolute Eosinophils 0.54 Thousand/uL      Absolute Basophils 0.18 Thousand/uL      Total Counted --     Platelet Estimate Increased    Blood culture #2 [[id number]] Collected: 10/11/24 0707    Lab Status: In process Specimen: Blood from Arm, Right Updated: 10/11/24 0714    Blood culture #1 [[id number]] Collected: 10/11/24 0707    Lab Status: In process Specimen: Blood from Arm, Left Updated: 10/11/24 0714    Comprehensive metabolic panel [[id number]]  (Abnormal) Collected: 10/11/24 0550    Lab Status: Final result Specimen: Blood from Arm, Left Updated: 10/11/24 0649     Sodium 149 mmol/L      Potassium 4.3 mmol/L      Chloride 105 mmol/L      CO2 21 mmol/L      ANION GAP 23 mmol/L      BUN 71 mg/dL      Creatinine 1.46 mg/dL      Glucose 681 mg/dL      Calcium 9.0 mg/dL      Corrected Calcium 9.5 mg/dL      AST 8 U/L      ALT 10 U/L      Alkaline Phosphatase 116 U/L      Total Protein 6.6 g/dL      Albumin 3.4 g/dL      Total Bilirubin 0.36 mg/dL      eGFR 36 ml/min/1.73sq m     Narrative:      National Kidney Disease Foundation guidelines for Chronic Kidney Disease (CKD):     Stage 1 with normal or high GFR (GFR > 90 mL/min/1.73 square meters)    Stage 2 Mild CKD (GFR = 60-89 mL/min/1.73 square meters)    Stage 3A Moderate CKD (GFR = 45-59 mL/min/1.73 square meters)    Stage 3B Moderate CKD (GFR = 30-44 mL/min/1.73 square meters)    Stage 4 Severe CKD (GFR = 15-29 mL/min/1.73 square meters)    Stage 5 End Stage CKD (GFR <15 mL/min/1.73 square meters)  Note: GFR calculation is accurate only with a steady state creatinine    UA w Reflex to Microscopic w Reflex to Culture [[id number]]     Lab Status: No result Specimen: Urine     CK [[id number]]  (Normal) Collected: 10/11/24 0550    Lab Status: Final result Specimen: Blood from Arm, Left Updated: 10/11/24 0641     Total CK 81 U/L     Protime-INR [[id number]]  (Normal) Collected: 10/11/24 0550    Lab Status: Final result Specimen: Blood from Arm, Left Updated: 10/11/24  0611     Protime 14.3 seconds      INR 1.08    Narrative:      INR Therapeutic Range    Indication                                             INR Range      Atrial Fibrillation                                               2.0-3.0  Hypercoagulable State                                    2.0.2.3  Left Ventricular Asist Device                            2.0-3.0  Mechanical Heart Valve                                  -    Aortic(with afib, MI, embolism, HF, LA enlargement,    and/or coagulopathy)                                     2.0-3.0 (2.5-3.5)     Mitral                                                             2.5-3.5  Prosthetic/Bioprosthetic Heart Valve               2.0-3.0  Venous thromboembolism (VTE: VT, PE        2.0-3.0    APTT [[id number]]  (Normal) Collected: 10/11/24 0550    Lab Status: Final result Specimen: Blood from Arm, Left Updated: 10/11/24 0611     PTT 27 seconds             CT chest without contrast   Final Interpretation by Kenyon Gale MD (10/11 5255)      No acute intrathoracic process.      No acute fracture. Old healed right anterior third and fourth rib fractures.      Fluid throughout most of the esophagus presumably due to gastroesophageal reflux or esophageal dysmotility.      Mild pulmonary emphysema.      Scattered punctate noncalcified pulmonary nodules with unknown long-term stability and doubtful clinical significance. Based on current Fleischner Society 2017 Guidelines on incidental pulmonary nodule, optional follow-up CT at 12 months can be    considered.      Additional chronic findings and negatives as above.         Workstation performed: [id number]         CT head without contrast   Final Interpretation by Kenyon Gale MD (10/11 7590)      No acute intracranial process.      No skull fracture.      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      Chronic right maxillary sinusitis.      Anterior mandibular dental disease.  Nonemergent dental evaluation is suggested.               Workstation performed: [id number]         XR foot 3+ views RIGHT    (Results Pending)       Procedures    ED Medication and Procedure Management   Prior to Admission Medications   Prescriptions Last Dose Informant Patient Reported? Taking?   Insulin Pen Needle (BD Pen Needle Shira U/F) 32G X 4 MM MISC   No No   Sig: Use daily as directed with insulin pen   insulin degludec (TRESIBA) 100 units/mL injection pen   No No   Sig: Inject 20 Units under the skin daily      Facility-Administered Medications: None     Patient's Medications   Discharge Prescriptions    No medications on file     No discharge procedures on file.  ED SEPSIS DOCUMENTATION   Time reflects when diagnosis was documented in both MDM as applicable and the Disposition within this note       Time User Action Codes Description Comment    10/11/2024  6:36 AM Galina Marvin [W19.XXXA] Fall, initial encounter     10/11/2024  6:36 AM Galina Marvin [R64] Cachexia ([facility])     10/11/2024  6:37 AM Galina Marvin [I96] Dry gangrene ([facility])     10/11/2024  6:37 AM Galina Marvin [R05.9] Cough                  Galina Marvin MD  10/11/24 0913

## 2024-10-11 NOTE — H&P
Progress Note - Critical Care/ICU   Name: Sabiha Garcia 68 y.o. female I MRN: [id number]  Unit/Bed#: ED 23 I Date of Admission: 10/11/2024   Date of Service: 10/11/2024 I Hospital Day: 0       Assessment & Plan     Diabetic ketoacidosis/ Starvation ketosis   Dry gangrene of right fifth digit with possible osteomyelitis  Multiple falls  Type 2 diabetes on insulin  Leukocytosis  Elevated troponin  Mild emphysema  Right upper lobe non calcified pulmonary nodule   Osteoporosis   S/p ORIf left hip in 5/2020  Depression  Smoker    Neuro:   Diagnosis: Depression.   Currently not on any medication.   Follow up OP with PCP  ICU delirium precautions.     CV:   Diagnosis: Elevated troponin.  Likely non-MI. Patient asymptomatic. Denies CP, SOB, palpitation, lightheadedness/dizziness.  Will obtain EKG.     Pulm:  Diagnosis: Mild emphysema. Not on any inhaler.  Smoker. Encourage smoking cessation.  Follow up OP with Pulmonology.    Diagnosis: RUL nodule  Smoker.   Follow up OP at 12 months with CT    GI:   No active issues    :   Diagnosis: KOURTNEY POA - improving.   Likely prerenal.   S/p IV hydration.  Monitor urine output and renal indices.       F/E/N:   F - D5W  cc/hr  E - Replete as needed per DKA protocol  N - NPO for now.     Heme/Onc:   Diagnosis: Leukocytosis.  Likely reactive in the setting of DKA/starvation ketosis vs dry gangrene with possible osteomyelitis   Denies any systemic symptoms. Afebrile with vitals within normal range.   CT chest no acute findings.   X-ray right foot - possible osteomyelitis of fifth digital proximal phalanx.  See MSK plan    Endo:   Diagnosis: DKA vs Starvation ketoacidosis  Presented with no oral intake for about a week or more because she was not feeling well. Denies n/v, abdominal pain, fever, chills, SOB, chest pain.   POA Glu 681; AG 23; beta-hydroxybutyrate 5.20; VBG 7.29/44/35/21; UA 2+ ketones, glucose  On DKA protocol.   NPO. Resume diet when appropriate.     Diagnosis: Type 2  diabetes on insulin  A1c 14.8 on 9/18/24  At home on Tresiba 20 units daily  On DKA protocol.      ID:   No active issues    MSK/Skin:   Diagnosis: Dry gangrene of right fifth digit with possible osteomyelitis  X-ray right foot - possible osteomyelitis of fifth digital proximal phalanx  Podiatry consulted. Appreciate recs.  Frequent fall. Fall precautions.  PT/OT    Diagnosis: s/p ORIF left hip in 5/2020  Diagnosis: Osteoporosis  Diagnosis: Fall  Dexa on 5/13/2022 - osteoporosis of lumbar spine and total right hip and distal third left forearm  Check Vit D level  Frequent fall. Fall precautions.  PT/OT      Disposition: Stepdown Level 1    ICU Core Measures     A: Assess, Prevent, and Manage Pain Has pain been assessed? Yes  Need for changes to pain regimen? No   B: Both SAT/SAT  N/A   C: Choice of Sedation RASS Goal: 0 Alert and Calm  Need for changes to sedation or analgesia regimen? No   D: Delirium CAM-ICU: Negative   E: Early Mobility  Plan for early mobility? Yes   F: Family Engagement Plan for family engagement today? Yes         Prophylaxis:  VTE VTE covered by:  heparin (porcine), Subcutaneous       Stress Ulcer  not ordered         HPI:    68 year old female with history of poorly controlled Type 2 diabetes on insulin, osteoporosis, s/p ORIF left hip, frequent falls who presents today with generalized weakness and falls. She reports 2 falls without head strike, LOC, any prodrome, likely due to feeling unwell with subjective fever. She mentions she has very minimal oral intake for at least 6-7 days or more. Otherwise denies any other associated systemic symptoms. On arrival in the ED, she was afebrile, tachycardic. Labs suggestive of DKA vs starvation ketosis. X-ray of right foot concerning for osteomyelitis of right fifth digit. CT chest with mild emphysema, 3 mm non calcified RUL nodule. CT head no acute intracranial findings. She is being admitted to MICU under SD1 for further management of DKA vs  starvation ketosis.     Review of Systems: See HPI for Review of Systems    Objective :                   Vitals I/O      Most Recent Min/Max in 24hrs   Temp 98.3 °F (36.8 °C) Temp  Min: 98.3 °F (36.8 °C)  Max: 98.3 °F (36.8 °C)   Pulse 80 Pulse  Min: 80  Max: 111   Resp 17 Resp  Min: 15  Max: 19   /58 BP  Min: 124/58  Max: 134/67   O2 Sat 95 % SpO2  Min: 95 %  Max: 98 %      Intake/Output Summary (Last 24 hours) at 10/11/2024 1248  Last data filed at 10/11/2024 1025  Gross per 24 hour   Intake 2000 ml   Output --   Net 2000 ml       Diet NPO    Invasive Monitoring           Physical Exam   Critical Care Physical Exam Physical Exam  Vitals and nursing note reviewed.   Constitutional:       General: She is not in acute distress.     Appearance: Normal appearance.      Comments: cachectic   HENT:      Head: Normocephalic and atraumatic.   Cardiovascular:      Rate and Rhythm: Normal rate and regular rhythm.      Pulses: Normal pulses.      Heart sounds: Normal heart sounds.   Pulmonary:      Effort: Pulmonary effort is normal.   Abdominal:      General: Bowel sounds are normal.      Palpations: Abdomen is soft.   Neurological:      Mental Status: She is alert and oriented to person, place, and time. Mental status is at baseline.        (fill out SmartBlock)     Diagnostic Studies        Lab Results: I have reviewed the following results:     Medications:  Scheduled PRN   chlorhexidine, 15 mL, Q12H JUAN  heparin (porcine), 5,000 Units, Q8H JUAN  lactated ringers, 1,000 mL, Once  potassium chloride, 40 mEq, Once  potassium phosphate, 21 mmol, Once          Continuous    insulin regular (HumuLIN R,NovoLIN R) 1 Units/mL in sodium chloride 0.9 % 100 mL infusion, 0.1-30 Units/hr, Last Rate: 5.2 Units/hr (10/11/24 1215)         Labs:   CBC    Recent Labs     10/11/24  0550 10/11/24  1205   WBC 18.14*  --    HGB 12.8  --    HCT 42.2  --    * 357   BANDSPCT 6  --      BMP    Recent Labs     10/11/24  1003  10/11/24  1205   SODIUM 138 142   K 4.0 3.5    105   CO2 22 27   AGAP 16* 10   BUN 59* 54*   CREATININE 1.08 1.03   CALCIUM 8.1* 8.1*       Coags    Recent Labs     10/11/24  0550   INR 1.08   PTT 27        Additional Electrolytes  Recent Labs     10/11/24  1003 10/11/24  1205   MG 2.1 2.1   PHOS 3.2 2.6          Blood Gas    No recent results  Recent Labs     10/11/24  0747   PHVEN 7.290*   JEX7TNR 43.8   PO2VEN 35.0   KES7NOY 20.6*   BEVEN -5.8   Q7OCXOO 61.9    LFTs  Recent Labs     10/11/24  0550   ALT 10   AST 8*   ALKPHOS 116*   ALB 3.4*   TBILI 0.36       Infectious  Recent Labs     10/11/24  0747   PROCALCITONI 0.21     Glucose  Recent Labs     10/11/24  0550 10/11/24  1003 10/11/24  1205   GLUC 681* 567* 336*

## 2024-10-11 NOTE — PLAN OF CARE
Problem: Potential for Falls  Goal: Patient will remain free of falls  Description: INTERVENTIONS:  - Educate patient/family on patient safety including physical limitations  - Instruct patient to call for assistance with activity   - Consult OT/PT to assist with strengthening/mobility   - Keep Call bell within reach  - Keep bed low and locked with side rails adjusted as appropriate  - Keep care items and personal belongings within reach  - Initiate and maintain comfort rounds  - Make Fall Risk Sign visible to staff  - Offer Toileting every   Hours, in advance of need  - Initiate/Maintain  alarm  - Obtain necessary fall risk management equipment:            - Apply yellow socks and bracelet for high fall risk patients  - Consider moving patient to room near nurses station  Outcome: Progressing     Problem: CARDIOVASCULAR - ADULT  Goal: Maintains optimal cardiac output and hemodynamic stability  Description: INTERVENTIONS:  - Monitor I/O, vital signs and rhythm  - Monitor for S/S and trends of decreased cardiac output  - Administer and titrate ordered vasoactive medications to optimize hemodynamic stability  - Assess quality of pulses, skin color and temperature  - Assess for signs of decreased coronary artery perfusion  - Instruct patient to report change in severity of symptoms  Outcome: Progressing  Goal: Absence of cardiac dysrhythmias or at baseline rhythm  Description: INTERVENTIONS:  - Continuous cardiac monitoring, vital signs, obtain 12 lead EKG if ordered  - Administer antiarrhythmic and heart rate control medications as ordered  - Monitor electrolytes and administer replacement therapy as ordered  Outcome: Progressing     Problem: METABOLIC, FLUID AND ELECTROLYTES - ADULT  Goal: Electrolytes maintained within normal limits  Description: INTERVENTIONS:  - Monitor labs and assess patient for signs and symptoms of electrolyte imbalances  - Administer electrolyte replacement as ordered  - Monitor response to  electrolyte replacements, including repeat lab results as appropriate  - Instruct patient on fluid and nutrition as appropriate  Outcome: Progressing  Goal: Fluid balance maintained  Description: INTERVENTIONS:  - Monitor labs   - Monitor I/O and WT  - Instruct patient on fluid and nutrition as appropriate  - Assess for signs & symptoms of volume excess or deficit  Outcome: Progressing  Goal: Glucose maintained within target range  Description: INTERVENTIONS:  - Monitor Blood Glucose as ordered  - Assess for signs and symptoms of hyperglycemia and hypoglycemia  - Administer ordered medications to maintain glucose within target range  - Assess nutritional intake and initiate nutrition service referral as needed  Outcome: Progressing

## 2024-10-11 NOTE — ED ATTENDING ATTESTATION
10/11/2024  I, Dawit Diaz MD, saw and evaluated the patient. I have discussed the patient with the resident/non-physician practitioner and agree with the resident's/non-physician practitioner's findings, Plan of Care, and MDM as documented in the resident's/non-physician practitioner's note, except where noted. All available labs and Radiology studies were reviewed.  I was present for key portions of any procedure(s) performed by the resident/non-physician practitioner and I was immediately available to provide assistance.       At this point I agree with the current assessment done in the Emergency Department.  I have conducted an independent evaluation of this patient a history and physical is as follows:      Final Diagnosis:  1. Fall, initial encounter    2. Cachexia (HCC)    3. 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 Cough    5. DKA (diabetic ketoacidosis) ([facility])    6. Type 2 diabetes mellitus with hyperglycemia, with long-term current use of insulin ([facility])      Chief Complaint   Patient presents with    Fall     Pt reports falling 5 days ago and unable to stand up and was crawling around but was knocking on the wall and a neighbor called 911. Pt doesn't feel safe at home. Right pinky toe is black and hard           A:  -60-year-old female presents with multiple falls and weakness.      P:  -CBC to evaluate for evidence of marked leukocytosis or anemia.  -CMP to evaluate renal function and electrolytes.  -CK to evaluate for rhabdomyolysis.  -TSH to evaluate for thyroid abnormality.  -EKG to evaluate for arrhythmia and QRS/QT intervals.  -IV fluids for likely dehydration.  -Lactic acid to evaluate for endorgan ischemia.  -Blood cultures x 2 to evaluate for sepsis.  -Urinalysis to evaluate for UTI.  -CT head, chest to evaluate for traumatic injury.  -X-ray right foot to further evaluate the dry gangrene and to evaluate for fracture.    -Patient will require admission.      H:    68-year-old female who presents with weakness  "and multiple falls.  Last fall occurred approximately 5 days ago.  States that she has been crawling on the ground for the past 5 days trying to get help.  Neighbor heard her banging on the wall and EMS was called.  Admits to weight loss and worsening weakness.  Has been experiencing more frequent falls.      PMH:  Past Medical History:   Diagnosis Date    Allergic [id number]    Depression [id number]    Diabetes (HCC)     Diabetes mellitus (HCC) 268202    Smokes 1974    1/2 ppd smoker       PSH:  Past Surgical History:   Procedure Laterality Date    APPENDECTOMY      CHOLECYSTECTOMY      FRACTURE SURGERY      IA OPTX FEM SHFT FX W/INSJ IMED IMPLT W/WO SCREW Left 05/27/2020    Procedure: INSERTION NAIL IM FEMUR ANTEGRADE (TROCHANTERIC);  Surgeon: Jose Lyons MD;  Location: [facility];  Service: Orthopedics    TONSILLECTOMY      TUBAL LIGATION           PE:   Vitals:    10/11/24 0933 10/11/24 1215 10/11/24 1420 10/11/24 1900   BP:  124/58 133/54 147/60   BP Location:  Left arm Left arm Left arm   Pulse:  80 82 74   Resp:  17 20    Temp:   98.5 °F (36.9 °C) 98.5 °F (36.9 °C)   TempSrc:   Oral Oral   SpO2:  95% 96% 97%   Weight: 51.3 kg (113 lb)      Height: 5' 9\" (1.753 m)            Constitutional: Chronically ill-appearing.  Cachectic.  HENT:   Mouth/Throat: Dry mucous membranes.   Eyes: Pupils are equal, round, and reactive to light. Conjunctivae and EOM are normal.   Neck: Trachea normal. No thyroid mass and no thyromegaly present.   Cardiovascular: Tachycardic, regular rhythm, normal heart sounds.   No murmur heard.  Pulmonary/Chest: Effort normal and breath sounds normal.   Abdominal: Soft. Normal appearance and bowel sounds are normal. There is no tenderness. There is no rebound, no guarding.   MSK: Abrasions to bilateral lower extremities.  Dry gangrene to fifth toe on right foot.  Neurological: She is alert.   Skin: Skin is warm, dry and intact.   Psychiatric: She has a normal mood and affect. Her speech is normal " and behavior is normal. Thought content normal.          - 13 point ROS was performed and all are normal unless stated in the history above.   - Nursing note reviewed. Vitals reviewed.   - Orders placed by myself and/or advanced practitioner / resident.    - Previous chart was reviewed  - No language barrier.   - History obtained from patient.   - There are no limitations to the history obtained. Reasons ROS could not be obtained:  N/A         Medications   chlorhexidine (PERIDEX) 0.12 % oral rinse 15 mL (15 mL Mouth/Throat Given 10/11/24 2101)   heparin (porcine) subcutaneous injection 5,000 Units (5,000 Units Subcutaneous Given 10/11/24 2101)   insulin regular (HumuLIN R,NovoLIN R) 1 Units/mL in sodium chloride 0.9 % 100 mL infusion (0 Units/hr Intravenous Hold 10/11/24 2108)   lactated ringers bolus 1,000 mL (0 mL Intravenous Stopped 10/11/24 0653)   insulin regular (HumuLIN R,NovoLIN R) injection 5 Units (5 Units Intravenous Given 10/11/24 0930)   lactated ringers bolus 1,000 mL (0 mL Intravenous Stopped 10/11/24 1025)   lactated ringers bolus 1,000 mL (0 mL Intravenous Stopped 10/11/24 1938)   potassium phosphates 21 mmol in sodium chloride 0.9 % 250 mL infusion (21 mmol Intravenous New Bag 10/11/24 1702)   potassium chloride 20 mEq IVPB (premix) (0 mEq Intravenous Stopped 10/11/24 2102)     XR foot 3+ views RIGHT   Final Result      Subtle periosteal reaction of the fifth digit proximal phalanx with adjacent soft tissue swelling. Findings raise suspicion for osteomyelitis and further evaluation with MRI could be performed for confirmation as clinically warranted.         Computerized Assisted Algorithm (CAA) may have been used to analyze all applicable images.         Resident: Uriel Campbell I, the attending radiologist, have reviewed the images and agree with the final report above.      Workstation performed: [id number]         CT chest without contrast   Final Result      No acute intrathoracic  "process.      No acute fracture. Old healed right anterior third and fourth rib fractures.      Fluid throughout most of the esophagus presumably due to gastroesophageal reflux or esophageal dysmotility.      Mild pulmonary emphysema.      Scattered punctate noncalcified pulmonary nodules with unknown long-term stability and doubtful clinical significance. Based on current Fleischner Society 2017 Guidelines on incidental pulmonary nodule, optional follow-up CT at 12 months can be    considered.      Additional chronic findings and negatives as above.         Workstation performed: [id number]         CT head without contrast   Final Result      No acute intracranial process.      No skull fracture.      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      Chronic right maxillary sinusitis.      Anterior mandibular dental disease. Nonemergent dental evaluation is suggested.               Workstation performed: [id number]         VAS ARTERIAL DUPLEX- LOWER LIMB BILATERAL    (Results Pending)     Orders Placed This Encounter   Procedures    Blood culture #1    Blood culture #2    COVID/FLU/RSV    CT chest without contrast    CT head without contrast    XR foot 3+ views RIGHT    CBC and differential    Comprehensive metabolic panel    Protime-INR    APTT    TSH, 3rd generation with Free T4 reflex    CK    UA w Reflex to Microscopic w Reflex to Culture    Magnesium    Procalcitonin    Blood gas, venous    Beta Hydroxybutyrate    Lactic acid, plasma (w/reflex if result > 2.0)    Osmolality-\"If this is regarding a toxic alcohol, please STOP and consult medical  for further guidance.\"    Osmolality, urine    Sodium, urine, random    T4, free    Basic metabolic panel    Magnesium    Phosphorus    Platelet count    Urine Microscopic    Blood gas, venous    HS Troponin 0hr (reflex protocol)    HS Troponin I 2hr    HS Troponin I 4hr    Hepatic function panel    Vitamin D 25 hydroxy    CBC and differential "    Diet [place]/CHO Controlled; Consistent Carbohydrate Diet Level 3 (6 carb servings/90 grams CHO/meal)    Insert peripheral IV    Continuous cardiac monitoring    Continuous pulse oximetry    Hypoglycemia Protocol    Height and weight    Order Random Glucose if Accucheck > 500    Fingerstick Glucose (POCT)    Cardio-Pulmonary Monitoring (Critical Care & Step Down Only)    Vital Signs    Out of bed to chair    Nasal Antiseptic Swab    Turn patient    Daily weights    I/O    CAM (ICU) Assessment    Nursing dysphagia Screen    Neuro checks    Insert peripheral IV    Maintain IV access    Apply SCD or Foot pumps    Wound care    Treatment of hypoglycemia( Blood Glucose less than 70 mg/dL):    Insulin Infusion Notify Physician    Hypoglycemia Protocol    Nursing communication Diabetes Type 2    Level 1-Full Code: all life saving measures are indicated    Consult to Case Management    Inpatient consult to Podiatry    ECG 12 lead    ECG 12 lead    ECG 12 lead    Inpatient Admission    Fall precautions     Labs Reviewed   CBC AND DIFFERENTIAL - Abnormal       Result Value Ref Range Status    WBC 18.14 (*) 4.31 - 10.16 Thousand/uL Final    RBC 4.57  3.81 - 5.12 Million/uL Final    Hemoglobin 12.8  11.5 - 15.4 g/dL Final    Hematocrit 42.2  34.8 - 46.1 % Final    MCV 92  82 - 98 fL Final    MCH 28.0  26.8 - 34.3 pg Final    MCHC 30.3 (*) 31.4 - 37.4 g/dL Final    RDW 15.2 (*) 11.6 - 15.1 % Final    MPV 9.1  8.9 - 12.7 fL Final    Platelets 505 (*) 149 - 390 Thousands/uL Final    Narrative:     This is an appended report.  These results have been appended to a previously verified report.   COMPREHENSIVE METABOLIC PANEL - Abnormal    Sodium 149 (*) 135 - 147 mmol/L Final    Potassium 4.3  3.5 - 5.3 mmol/L Final    Chloride 105  96 - 108 mmol/L Final    CO2 21  21 - 32 mmol/L Final    ANION GAP 23 (*) 4 - 13 mmol/L Final    BUN 71 (*) 5 - 25 mg/dL Final    Creatinine 1.46 (*) 0.60 - 1.30 mg/dL Final    Comment: Standardized to  IDMS reference method    Glucose 681 (*) 65 - 140 mg/dL Final    Comment: If the patient is fasting, the ADA then defines impaired fasting glucose as > 100 mg/dL and diabetes as > or equal to 123 mg/dL.    Calcium 9.0  8.4 - 10.2 mg/dL Final    Corrected Calcium 9.5  8.3 - 10.1 mg/dL Final    AST 8 (*) 13 - 39 U/L Final    ALT 10  7 - 52 U/L Final    Comment: Specimen collection should occur prior to Sulfasalazine administration due to the potential for falsely depressed results.     Alkaline Phosphatase 116 (*) 34 - 104 U/L Final    Total Protein 6.6  6.4 - 8.4 g/dL Final    Albumin 3.4 (*) 3.5 - 5.0 g/dL Final    Total Bilirubin 0.36  0.20 - 1.00 mg/dL Final    Comment: Use of this assay is not recommended for patients undergoing treatment with eltrombopag due to the potential for falsely elevated results.  N-acetyl-p-benzoquinone imine (metabolite of Acetaminophen) will generate erroneously low results in samples for patients that have taken an overdose of Acetaminophen.    eGFR 36  ml/min/1.73sq m Final    Narrative:     National Kidney Disease Foundation guidelines for Chronic Kidney Disease (CKD):     Stage 1 with normal or high GFR (GFR > 90 mL/min/1.73 square meters)    Stage 2 Mild CKD (GFR = 60-89 mL/min/1.73 square meters)    Stage 3A Moderate CKD (GFR = 45-59 mL/min/1.73 square meters)    Stage 3B Moderate CKD (GFR = 30-44 mL/min/1.73 square meters)    Stage 4 Severe CKD (GFR = 15-29 mL/min/1.73 square meters)    Stage 5 End Stage CKD (GFR <15 mL/min/1.73 square meters)  Note: GFR calculation is accurate only with a steady state creatinine   TSH, 3RD GENERATION WITH FREE T4 REFLEX - Abnormal    TSH 3RD GENERATON 0.425 (*) 0.450 - 4.500 uIU/mL Final    Comment: The recommended reference ranges for TSH during pregnancy are as follows:   First trimester 0.100 to 2.500 uIU/mL   Second trimester  0.200 to 3.000 uIU/mL   Third trimester 0.300 to 3.000 uIU/m    Note: Normal ranges may not apply to patients who  are transgender, non-binary, or whose legal sex, sex at birth, and gender identity differ.  Adult TSH (3rd generation) reference range follows the recommended guidelines of the American Thyroid Association, January, 2020.   UA W REFLEX TO MICROSCOPIC WITH REFLEX TO CULTURE - Abnormal    Color, UA Light Yellow   Final    Clarity, UA Clear   Final    Specific Gravity, UA 1.021  1.003 - 1.030 Final    Comment: High concentrations of glucose or protein may affect the specific gravity    pH, UA 5.5  4.5, 5.0, 5.5, 6.0, 6.5, 7.0, 7.5, 8.0 Final    Leukocytes, UA   (*) Negative Final    Value: Elevated glucose may cause decreased leukocyte values. See urine microscopic for UWBC result    Nitrite, UA Negative  Negative Final    Protein, UA Trace (*) Negative mg/dl Final    Glucose, UA >=1000 (1%) (*) Negative mg/dl Final    Comment: Elevated glucose may cause false negative leukocyte esterase    Ketones, UA 60 (2+) (*) Negative mg/dl Final    Urobilinogen, UA <2.0  <2.0 mg/dl mg/dl Final    Bilirubin, UA Negative  Negative Final    Occult Blood, UA Negative  Negative Final   BLOOD GAS, VENOUS - Abnormal    pH, Sanchez 7.290 (*) 7.300 - 7.400 Final    pCO2, Sanchez 43.8  42.0 - 50.0 mm Hg Final    pO2, Sanchez 35.0  35.0 - 45.0 mm Hg Final    HCO3, Sanchez 20.6 (*) 24 - 30 mmol/L Final    Base Excess, Sanchez -5.8  mmol/L Final    O2 Content, Sanchez 11.8  ml/dL Final    O2 HGB, VENOUS 61.9  60.0 - 80.0 % Final   BETA HYDROXYBUTYRATE - Abnormal    Beta- Hydroxybutyrate 5.20 (*) 0.02 - 0.27 mmol/L Final   OSMOLALITY - Abnormal    Osmolality Serum 365 (*) 282 - 298 mmol/KG Final   BASIC METABOLIC PANEL - Abnormal    Sodium 138  135 - 147 mmol/L Final    Potassium 4.0  3.5 - 5.3 mmol/L Final    Chloride 100  96 - 108 mmol/L Final    CO2 22  21 - 32 mmol/L Final    ANION GAP 16 (*) 4 - 13 mmol/L Final    BUN 59 (*) 5 - 25 mg/dL Final    Creatinine 1.08  0.60 - 1.30 mg/dL Final    Comment: Standardized to IDMS reference method    Glucose 567 (*) 65 -  140 mg/dL Final    Comment: If the patient is fasting, the ADA then defines impaired fasting glucose as > 100 mg/dL and diabetes as > or equal to 123 mg/dL.    Calcium 8.1 (*) 8.4 - 10.2 mg/dL Final    eGFR 52  ml/min/1.73sq m Final    Narrative:     National Kidney Disease Foundation guidelines for Chronic Kidney Disease (CKD):     Stage 1 with normal or high GFR (GFR > 90 mL/min/1.73 square meters)    Stage 2 Mild CKD (GFR = 60-89 mL/min/1.73 square meters)    Stage 3A Moderate CKD (GFR = 45-59 mL/min/1.73 square meters)    Stage 3B Moderate CKD (GFR = 30-44 mL/min/1.73 square meters)    Stage 4 Severe CKD (GFR = 15-29 mL/min/1.73 square meters)    Stage 5 End Stage CKD (GFR <15 mL/min/1.73 square meters)  Note: GFR calculation is accurate only with a steady state creatinine   BASIC METABOLIC PANEL - Abnormal    Sodium 142  135 - 147 mmol/L Final    Potassium 3.5  3.5 - 5.3 mmol/L Final    Chloride 105  96 - 108 mmol/L Final    CO2 27  21 - 32 mmol/L Final    ANION GAP 10  4 - 13 mmol/L Final    BUN 54 (*) 5 - 25 mg/dL Final    Creatinine 1.03  0.60 - 1.30 mg/dL Final    Comment: Standardized to IDMS reference method    Glucose 336 (*) 65 - 140 mg/dL Final    Comment: If the patient is fasting, the ADA then defines impaired fasting glucose as > 100 mg/dL and diabetes as > or equal to 123 mg/dL.    Calcium 8.1 (*) 8.4 - 10.2 mg/dL Final    eGFR 55  ml/min/1.73sq m Final    Narrative:     National Kidney Disease Foundation guidelines for Chronic Kidney Disease (CKD):     Stage 1 with normal or high GFR (GFR > 90 mL/min/1.73 square meters)    Stage 2 Mild CKD (GFR = 60-89 mL/min/1.73 square meters)    Stage 3A Moderate CKD (GFR = 45-59 mL/min/1.73 square meters)    Stage 3B Moderate CKD (GFR = 30-44 mL/min/1.73 square meters)    Stage 4 Severe CKD (GFR = 15-29 mL/min/1.73 square meters)    Stage 5 End Stage CKD (GFR <15 mL/min/1.73 square meters)  Note: GFR calculation is accurate only with a steady state creatinine    URINE MICROSCOPIC - Abnormal    RBC, UA 2-4 (*) None Seen, 1-2 /hpf Final    WBC, UA 1-2  None Seen, 1-2 /hpf Final    Epithelial Cells Occasional  None Seen, Occasional /hpf Final    Bacteria, UA None Seen  None Seen, Occasional /hpf Final    Hyaline Casts, UA 3-5 (*) None Seen /lpf Final   POCT GLUCOSE - Abnormal    POC Glucose 597 (*) 65 - 140 mg/dl Final    Comment: CRITICAL VALUE NOTED-   POCT GLUCOSE - Abnormal    POC Glucose 579 (*) 65 - 140 mg/dl Final    Comment: CRITICAL VALUE NOTED-   POCT GLUCOSE - Abnormal    POC Glucose >600 (*) 65 - 140 mg/dl Final    Comment: CRITICAL VALUE NOTED-   POCT GLUCOSE - Abnormal    POC Glucose 518 (*) 65 - 140 mg/dl Final    Comment: CRITICAL VALUE NOTED-   POCT GLUCOSE - Abnormal    POC Glucose 403 (*) 65 - 140 mg/dl Final    Comment: CRITICAL VALUE NOTED-   POCT GLUCOSE - Abnormal    POC Glucose 358 (*) 65 - 140 mg/dl Final   POCT GLUCOSE - Abnormal    POC Glucose 275 (*) 65 - 140 mg/dl Final   MANUAL DIFFERENTIAL(PHLEBS DO NOT ORDER) - Abnormal    Segmented % 65  43 - 75 % Final    Bands % 6  0 - 8 % Final    Lymphocytes % 14  14 - 44 % Final    Monocytes % 6  4 - 12 % Final    Eosinophils % 3  0 - 6 % Final    Basophils % 1  0 - 1 % Final    Atypical Lymphocytes % 5 (*) <=0 % Final    Absolute Neutrophils 12.88 (*) 1.85 - 7.62 Thousand/uL Final    Absolute Lymphocytes 3.45  0.60 - 4.47 Thousand/uL Final    Absolute Monocytes 1.09  0.00 - 1.22 Thousand/uL Final    Absolute Eosinophils 0.54 (*) 0.00 - 0.40 Thousand/uL Final    Absolute Basophils 0.18 (*) 0.00 - 0.10 Thousand/uL Final    Total Counted     Final    Platelet Estimate Increased (*) Adequate Final   COVID19, INFLUENZA A/B, RSV PCR, [facility] - Normal    SARS-CoV-2 Negative  Negative Final    Comment:      INFLUENZA A PCR Negative  Negative Final    Comment:      INFLUENZA B PCR Negative  Negative Final    Comment:      RSV PCR Negative  Negative Final    Comment:      Narrative:     This test has been performed  using the CoV-2/Flu/RSV plus assay on the [employer] Gene[employer]pert platform. This test has been validated by the  and verified by the performing laboratory.     This test is designed to amplify and detect the following: nucleocapsid (N), envelope (E), and RNA-dependent RNA polymerase (RdRP) genes of the SARS-CoV-2 genome; matrix (M), basic polymerase (PB2), and acidic protein (PA) segments of the influenza A genome; matrix (M) and non-structural protein (NS) segments of the influenza B genome, and the nucleocapsid genes of RSV A and RSV B.     Positive results are indicative of the presence of Flu A, Flu B, RSV, and/or SARS-CoV-2 RNA. Positive results for SARS-CoV-2 or suspected novel influenza should be reported to state, local, or federal health departments according to local reporting requirements.      All results should be assessed in conjunction with clinical presentation and other laboratory markers for clinical management.     FOR PEDIATRIC PATIENTS - copy/paste COVID Guidelines URL to browser: [email or web address]      PROTIME-INR - Normal    Protime 14.3  12.3 - 15.0 seconds Final    INR 1.08  0.85 - 1.19 Final    Narrative:     INR Therapeutic Range    Indication                                             INR Range      Atrial Fibrillation                                               2.0-3.0  Hypercoagulable State                                    2.0.2.3  Left Ventricular Asist Device                            2.0-3.0  Mechanical Heart Valve                                  -    Aortic(with afib, MI, embolism, HF, LA enlargement,    and/or coagulopathy)                                     2.0-3.0 (2.5-3.5)     Mitral                                                             2.5-3.5  Prosthetic/Bioprosthetic Heart Valve               2.0-3.0  Venous thromboembolism (VTE: VT, PE        2.0-3.0   APTT - Normal    PTT 27  23 - 34 seconds Final     Comment: Therapeutic Heparin Range =  60-90 seconds   CK - Normal    Total CK 81  26 - 192 U/L Final   MAGNESIUM - Normal    Magnesium 2.4  1.9 - 2.7 mg/dL Final   PROCALCITONIN TEST - Normal    Procalcitonin 0.21  <=0.25 ng/ml Final    Comment: Suspected Lower Respiratory Tract Infection (LRTI):  - LESS than or EQUAL to 0.25 ng/mL:   low likelihood for bacterial LRTI; antibiotics DISCOURAGED.  - GREATER than 0.25 ng/mL:   increased likelihood for bacterial LRTI; antibiotics ENCOURAGED.    Suspected Sepsis:  - Strongly consider initiating antibiotics in ALL UNSTABLE patients.  - LESS than or EQUAL to 0.5 ng/mL:   low likelihood for bacterial sepsis; antibiotics DISCOURAGED.  - GREATER than 0.5 ng/mL:   increased likelihood for bacterial sepsis; antibiotics ENCOURAGED.  - GREATER than 2 ng/mL:   high risk for severe sepsis / septic shock; antibiotics strongly ENCOURAGED.    Decisions on antibiotic use should not be based solely on Procalcitonin (PCT) levels. If PCT is low but uncertainty exists with stopping antibiotics, repeat PCT in 6-24 hours to confirm the low level. If antibiotics are administered (regardless if initial PCT was high or low), repeat PCT every 1-2 days to consider early antibiotic cessation (when GREATER than 80% decrease from the peak OR when PCT drops below designated cutoffs, whichever comes first), so long as the infection is NOT one that typically requires prolonged treatment durations (e.g., bone/joint infections, endocarditis, Staph. aureus bacteremia).    Situations of FALSE-POSITIVE Procalcitonin values:  1) Newborns < 72 hours old  2) Massive stress from severe trauma / burns, major surgery, acute pancreatitis, cardiogenic / hemorrhagic shock, sickle cell crisis, or other organ perfusion abnormalities  3) Malaria and some Candidal infections  4) Treatment with agents that stimulate cytokines (e.g., OKT3, anti-lymphocyte globulins, alemtuzumab, IL-2, granulocyte transfusion [NOT  "GCSFs])  5) Chronic renal disease causes elevated baseline levels (consider GREATER than 0.75 ng/mL as an abnormal cut-off); initiating HD/CRRT may cause transient decreases  6) Paraneoplastic syndromes from medullary thyroid or SCLC, some forms of vasculitis, and acute [id number] disease    Situations of FALSE-NEGATIVE Procalcitonin values:  1) Too early in clinical course for PCT to have reached its peak (may repeat in 6-24 hours to confirm low level)  2) Localized infection WITHOUT systemic (SIRS / sepsis) response (e.g., an abscess, osteomyelitis, cystitis)  3) Mycobacteria (e.g., Tuberculosis, MAC)  4) Cystic fibrosis exacerbations     LACTIC ACID, PLASMA (W/REFLEX IF RESULT > 2.0) - Normal    LACTIC ACID 1.5  0.5 - 2.0 mmol/L Final    Narrative:     Result may be elevated if tourniquet was used during collection.   OSMOLALITY, URINE - Normal    Osmolality, Ur 570  250 - 900 mmol/KG Final   T4, FREE - Normal    Free T4 0.83  0.61 - 1.12 ng/dL Final    Comment: Specimens with biotin concentrations > 10 ng/mL can lead to significant (> 10%) positive bias in result.    Narrative:     \"Therapeutic range for patients medicated with thyroid disorders: 0.61-1.24 ng/dL.\"   MAGNESIUM - Normal    Magnesium 2.1  1.9 - 2.7 mg/dL Final   MAGNESIUM - Normal    Magnesium 2.1  1.9 - 2.7 mg/dL Final   PHOSPHORUS - Normal    Phosphorus 3.2  2.3 - 4.1 mg/dL Final   PHOSPHORUS - Normal    Phosphorus 2.6  2.3 - 4.1 mg/dL Final   PLATELET COUNT - Normal    Platelets 357  149 - 390 Thousands/uL Final    MPV 9.0  8.9 - 12.7 fL Final   BLOOD CULTURE    Blood Culture Received in Microbiology Lab. Culture in Progress.   Preliminary   BLOOD CULTURE    Blood Culture Received in Microbiology Lab. Culture in Progress.   Preliminary   SODIUM, URINE, RANDOM    Sodium, Ur 14  Reference range not established. 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[facility])     10/11/2024  6:37 AM Galina Marvin Add [R05.9] Cough     10/11/2024  9:14 AM Geri Justice Add [E11.10] DKA (diabetic ketoacidosis) ([facility])     10/11/2024 12:54 PM Octaviano Chau Add [E11.65,  Z79.4] Type 2 diabetes mellitus with hyperglycemia, with long-term current use of insulin ([facility])           ED Disposition       ED Disposition   Admit    Condition   Stable    Date/Time   Fri Oct 11, 2024  6:36 AM    Comment   --             Follow-up Information    None       Current Discharge Medication List        CONTINUE these medications which have NOT CHANGED    Details   insulin degludec (TRESIBA) 100 units/mL injection pen Inject 20 Units under the skin daily  Qty: 15 mL, Refills: 0    Comments: May substitute with any brand insulin degludec pen  Associated Diagnoses: Type 2 diabetes mellitus with hyperglycemia, without long-term current use of insulin ([facility])      Insulin Pen Needle (BD Pen Needle Shira U/F) 32G X 4 MM MISC Use daily as directed with insulin pen  Qty: 100 each, Refills: 3    Comments: May substitute with any brand insulin pen needle  Associated Diagnoses: Type 2 diabetes mellitus with hyperglycemia, with long-term current use of insulin ([facility])           No discharge procedures on file.  Prior to Admission Medications   Prescriptions Last Dose Informant Patient Reported? Taking?   Insulin Pen Needle (BD Pen Needle Shira U/F) 32G X 4 MM MISC   No No   Sig: Use daily as directed with insulin pen   insulin degludec (TRESIBA) 100 units/mL injection pen   No No   Sig: Inject 20 Units under the skin daily      Facility-Administered Medications: None       Portions of the record may have been created with voice recognition software. Occasional wrong word or  "\"sound a like\" substitutions may have occurred due to the inherent limitations of voice recognition software. Read the chart carefully and recognize, using context, where substitutions have occurred.       ED Course         Critical Care Time  Procedures      "

## 2024-10-11 NOTE — CONSULTS
Podiatry - Consultation    Patient Information:   Sabiha Garcia 68 y.o. female MRN: [id number]  Unit/Bed#: [facility] 209-01 Encounter: [id number]  PCP: Alexys Blunt MD  Date of Admission:  10/11/2024  Date of Consultation: 10/11/24  Requesting Physician: Carlos Elias MD      ASSESSMENT:    Sabiha Garcia is a 68 y.o. female with:    [place] 4 DFU Right fifth digit  Uncontrolled type 2 diabetes mellitus with last A1c of 14.8% September 2024  PAD  Smoking history    PLAN:    Patient seen and evaluated in emergency department with attending present.  Of note is dry, stable gangrene of right fifth digit with no acute clinical signs of infection.  Plan for local wound care consisting of Betadine paint open to air.  LEADs ordered for evaluation of healing potential.  Possible podiatric surgical intervention pending further workup of vascular status  Labs and vitals reviewed.  Patient is afebrile.  Noted leukocytosis of 18 this a.m.  Antibiotics per primary team  Bilateral lower extremity arterial duplex studies ordered for evaluation of healing potential.  Will follow-up results and consult vascular surgery as needed  Follow-up blood cultures  Local wound care consisting of Betadine paint open to air. Wound care instructions placed.  Appreciate nursing assistance with this  Reviewed right foot x-rays: Per my personal read there is mild cortical disruption of 5th distal and middle phalanx synostosis.  Evaluation of bones of fifth digit limited by contracture.  Negative for soft tissue emphysema.  Elevation and offloading on green foam wedges or pillows when non-ambulatory.  Rest of care per primary team.  Plan discussed with Dr. Gonzalez    Weightbearing status: Weightbearing as tolerated in surgical shoe    SUBJECTIVE:    History of Present Illness:    Sabiha Garcia is a 68 y.o. female who is originally admitted 10/11/2024 due to. Patient has a past medical history of  uncontrolled diabetes mellitus, cachexia/failure  to thrive, weight loss, ambulatory dysfunction, multiple recent falls, PAD.  Patient reports she fell 5 days ago and since then had been crawling around on the ground for the past 5 days trying to get help.  Neighbor heard her banging on the wall and called EMS.  Patient arrived to ED with blood glucose of approximately 700 mg/dl. Patient reports recent weight loss and worsening weakness.  Patient reports experiencing more frequent falls.  Patient reports she does not remember when her right fifth digit began to turn black but reports she had a cut there recently.  Patient reports recent episodes of fever, cough.  Patient denies any nausea, vomiting, chills, chest pain, shortness of breath.  Patient is a poor historian.  Patient denies any other podiatric concerns at this time.    We are consulted for right fifth digit gangrene    Review of Systems:    Constitutional: Negative.    HENT: Negative.    Eyes: Negative.    Respiratory: Negative.    Cardiovascular: Negative.    Gastrointestinal: Negative.    Musculoskeletal: Negative  Skin: Right fifth digit dry gangrene  Neurological: Peripheral neuropathy  Psych: Negative.     Past Medical and Surgical History:     Past Medical History:   Diagnosis Date    Allergic [id number]    Depression [id number]    Diabetes (HCC)     Diabetes mellitus (HCC) [id number]       Past Surgical History:   Procedure Laterality Date    APPENDECTOMY      CHOLECYSTECTOMY      FRACTURE SURGERY      SD OPTX FEM SHFT FX W/INSJ IMED IMPLT W/WO SCREW Left 05/27/2020    Procedure: INSERTION NAIL IM FEMUR ANTEGRADE (TROCHANTERIC);  Surgeon: Jose Lyons MD;  Location: [facility] OR;  Service: Orthopedics    TONSILLECTOMY      TUBAL LIGATION         Meds/Allergies:      Medications Prior to Admission:     insulin degludec (TRESIBA) 100 units/mL injection pen    Insulin Pen Needle (BD Pen Needle Shira U/F) 32G X 4 MM MISC    Allergies   Allergen Reactions    Penicillins Hives    Prednisone Other (See Comments)      "personality change       Social History:     Marital Status: Single    Substance Use History:   Social History     Substance and Sexual Activity   Alcohol Use Not Currently     Social History     Tobacco Use   Smoking Status Every Day    Current packs/day: 0.50    Average packs/day: 0.5 packs/day for 25.0 years (12.5 ttl pk-yrs)    Types: Cigarettes   Smokeless Tobacco Never     Social History     Substance and Sexual Activity   Drug Use Never       Family History:    Family History   Problem Relation Age of Onset    Stroke Mother     No Known Problems Father         left her at 18 months old.    Cholelithiasis Sister         Vascular disease    No Known Problems Half-Brother          OBJECTIVE:    Vitals:   Blood Pressure: 124/58 (10/11/24 1215)  Pulse: 80 (10/11/24 1215)  Temperature: 98.3 °F (36.8 °C) (10/11/24 0531)  Temp Source: Oral (10/11/24 0531)  Respirations: 17 (10/11/24 1215)  Height: 5' 9\" (175.3 cm) (10/11/24 0933)  Weight - Scale: 51.3 kg (113 lb) (10/11/24 0933)  SpO2: 95 % (10/11/24 1215)    Physical Exam:    General Appearance: Alert, cooperative, no distress.  HEENT: Head normocephalic, atraumatic, without obvious abnormality.  Heart: Normal rate and rhythm.  Lungs: Non-labored breathing. No respiratory distress.  Abdomen: Without distension.  Psychiatric: AAOx3  Lower Extremity:    Vascular:   DP: Right: Nonpalpable left: Nonpalpable  PT: Right: Nonpalpable left: Nonpalpable  CRT < 3 seconds at the digits. + 0/4 edema noted at bilateral lower extremities.   Pedal hair is absent.   Skin temperature is cool bilaterally.    Musculoskeletal:  MMT is 5/5 in all muscle compartments bilaterally.   ROM at the 1st MPJ and ankle joint are diminished bilaterally with the leg extended.   No pain on palpation of right fifth digit.   No gross deformities noted.     Dermatological:  Dry, stable gangrene of right fifth digit.  Mild localized erythema just proximal to gangrenous changes.  No purulence or drainage " "appreciated.  No ascending erythema, crepitus, fluctuance appreciated.    Neurological:  Gross sensation is intact diminished.   Light touch is diminished.   Protective sensation is diminished.    Clinical Images 10/11/24:            Additional data:     Lab Results: I have personally reviewed pertinent labs including:    Results from last 7 days   Lab Units 10/11/24  1205 10/11/24  0550   WBC Thousand/uL  --  18.14*   HEMOGLOBIN g/dL  --  12.8   HEMATOCRIT %  --  42.2   PLATELETS Thousands/uL 357 505*   LYMPHO PCT %  --  14   MONO PCT %  --  6   EOS PCT %  --  3     Results from last 7 days   Lab Units 10/11/24  1205 10/11/24  1003 10/11/24  0550   POTASSIUM mmol/L 3.5   < > 4.3   CHLORIDE mmol/L 105   < > 105   CO2 mmol/L 27   < > 21   BUN mg/dL 54*   < > 71*   CREATININE mg/dL 1.03   < > 1.46*   CALCIUM mg/dL 8.1*   < > 9.0   ALK PHOS U/L  --   --  116*   ALT U/L  --   --  10   AST U/L  --   --  8*    < > = values in this interval not displayed.     Results from last 7 days   Lab Units 10/11/24  0550   INR  1.08       Cultures: I have personally reviewed pertinent cultures including:    Results from last 7 days   Lab Units 10/11/24  0707   BLOOD CULTURE  Received in Microbiology Lab. Culture in Progress.  Received in Microbiology Lab. Culture in Progress.           Imaging: I have personally reviewed pertinent reports in PACS.  EKG, Pathology, and Other Studies: I have personally reviewed pertinent reports.    Time Spent for Care: 30 minutes.  More than 50% of total time spent on counseling and coordination of care as described above.      ** Please Note: Portions of the record may have been created with voice recognition software. Occasional wrong word or \"sound a like\" substitutions may have occurred due to the inherent limitations of voice recognition software. Read the chart carefully and recognize, using context, where substitutions have occurred. **    "

## 2024-10-12 LAB
ANION GAP SERPL CALCULATED.3IONS-SCNC: 4 MMOL/L (ref 4–13)
ANION GAP SERPL CALCULATED.3IONS-SCNC: 6 MMOL/L (ref 4–13)
ANION GAP SERPL CALCULATED.3IONS-SCNC: 6 MMOL/L (ref 4–13)
BASOPHILS # BLD AUTO: 0.06 THOUSANDS/ΜL (ref 0–0.1)
BASOPHILS NFR BLD AUTO: 0 % (ref 0–1)
BUN SERPL-MCNC: 21 MG/DL (ref 5–25)
BUN SERPL-MCNC: 24 MG/DL (ref 5–25)
BUN SERPL-MCNC: 26 MG/DL (ref 5–25)
CALCIUM SERPL-MCNC: 8 MG/DL (ref 8.4–10.2)
CALCIUM SERPL-MCNC: 8.2 MG/DL (ref 8.4–10.2)
CALCIUM SERPL-MCNC: 8.3 MG/DL (ref 8.4–10.2)
CHLORIDE SERPL-SCNC: 107 MMOL/L (ref 96–108)
CHLORIDE SERPL-SCNC: 108 MMOL/L (ref 96–108)
CHLORIDE SERPL-SCNC: 108 MMOL/L (ref 96–108)
CO2 SERPL-SCNC: 29 MMOL/L (ref 21–32)
CREAT SERPL-MCNC: 0.61 MG/DL (ref 0.6–1.3)
CREAT SERPL-MCNC: 0.63 MG/DL (ref 0.6–1.3)
CREAT SERPL-MCNC: 0.66 MG/DL (ref 0.6–1.3)
EOSINOPHIL # BLD AUTO: 0.23 THOUSAND/ΜL (ref 0–0.61)
EOSINOPHIL NFR BLD AUTO: 2 % (ref 0–6)
ERYTHROCYTE [DISTWIDTH] IN BLOOD BY AUTOMATED COUNT: 14.7 % (ref 11.6–15.1)
GFR SERPL CREATININE-BSD FRML MDRD: 91 ML/MIN/1.73SQ M
GFR SERPL CREATININE-BSD FRML MDRD: 92 ML/MIN/1.73SQ M
GFR SERPL CREATININE-BSD FRML MDRD: 93 ML/MIN/1.73SQ M
GLUCOSE SERPL-MCNC: 141 MG/DL (ref 65–140)
GLUCOSE SERPL-MCNC: 142 MG/DL (ref 65–140)
GLUCOSE SERPL-MCNC: 177 MG/DL (ref 65–140)
GLUCOSE SERPL-MCNC: 179 MG/DL (ref 65–140)
GLUCOSE SERPL-MCNC: 198 MG/DL (ref 65–140)
GLUCOSE SERPL-MCNC: 200 MG/DL (ref 65–140)
GLUCOSE SERPL-MCNC: 225 MG/DL (ref 65–140)
GLUCOSE SERPL-MCNC: 238 MG/DL (ref 65–140)
GLUCOSE SERPL-MCNC: 75 MG/DL (ref 65–140)
GLUCOSE SERPL-MCNC: 92 MG/DL (ref 65–140)
HCT VFR BLD AUTO: 35 % (ref 34.8–46.1)
HGB BLD-MCNC: 11 G/DL (ref 11.5–15.4)
IMM GRANULOCYTES # BLD AUTO: 0.08 THOUSAND/UL (ref 0–0.2)
IMM GRANULOCYTES NFR BLD AUTO: 1 % (ref 0–2)
LYMPHOCYTES # BLD AUTO: 4.71 THOUSANDS/ΜL (ref 0.6–4.47)
LYMPHOCYTES NFR BLD AUTO: 35 % (ref 14–44)
MAGNESIUM SERPL-MCNC: 1.9 MG/DL (ref 1.9–2.7)
MAGNESIUM SERPL-MCNC: 1.9 MG/DL (ref 1.9–2.7)
MAGNESIUM SERPL-MCNC: 2 MG/DL (ref 1.9–2.7)
MCH RBC QN AUTO: 28.4 PG (ref 26.8–34.3)
MCHC RBC AUTO-ENTMCNC: 31.4 G/DL (ref 31.4–37.4)
MCV RBC AUTO: 90 FL (ref 82–98)
MONOCYTES # BLD AUTO: 0.81 THOUSAND/ΜL (ref 0.17–1.22)
MONOCYTES NFR BLD AUTO: 6 % (ref 4–12)
NEUTROPHILS # BLD AUTO: 7.51 THOUSANDS/ΜL (ref 1.85–7.62)
NEUTS SEG NFR BLD AUTO: 56 % (ref 43–75)
NRBC BLD AUTO-RTO: 0 /100 WBCS
PHOSPHATE SERPL-MCNC: 2 MG/DL (ref 2.3–4.1)
PHOSPHATE SERPL-MCNC: 2.2 MG/DL (ref 2.3–4.1)
PHOSPHATE SERPL-MCNC: 2.2 MG/DL (ref 2.3–4.1)
PLATELET # BLD AUTO: 321 THOUSANDS/UL (ref 149–390)
PMV BLD AUTO: 8.7 FL (ref 8.9–12.7)
POTASSIUM SERPL-SCNC: 3.7 MMOL/L (ref 3.5–5.3)
POTASSIUM SERPL-SCNC: 3.8 MMOL/L (ref 3.5–5.3)
POTASSIUM SERPL-SCNC: 4 MMOL/L (ref 3.5–5.3)
RBC # BLD AUTO: 3.88 MILLION/UL (ref 3.81–5.12)
SODIUM SERPL-SCNC: 141 MMOL/L (ref 135–147)
SODIUM SERPL-SCNC: 142 MMOL/L (ref 135–147)
SODIUM SERPL-SCNC: 143 MMOL/L (ref 135–147)
WBC # BLD AUTO: 13.4 THOUSAND/UL (ref 4.31–10.16)

## 2024-10-12 PROCEDURE — 83735 ASSAY OF MAGNESIUM: CPT

## 2024-10-12 PROCEDURE — 80048 BASIC METABOLIC PNL TOTAL CA: CPT

## 2024-10-12 PROCEDURE — 84100 ASSAY OF PHOSPHORUS: CPT

## 2024-10-12 PROCEDURE — NC001 PR NO CHARGE: Performed by: INTERNAL MEDICINE

## 2024-10-12 PROCEDURE — 82948 REAGENT STRIP/BLOOD GLUCOSE: CPT

## 2024-10-12 PROCEDURE — 85025 COMPLETE CBC W/AUTO DIFF WBC: CPT

## 2024-10-12 PROCEDURE — 99232 SBSQ HOSP IP/OBS MODERATE 35: CPT | Performed by: INTERNAL MEDICINE

## 2024-10-12 RX ORDER — VANCOMYCIN HYDROCHLORIDE 750 MG/150ML
15 INJECTION, SOLUTION INTRAVENOUS EVERY 12 HOURS
Status: DISCONTINUED | OUTPATIENT
Start: 2024-10-12 | End: 2024-10-12

## 2024-10-12 RX ORDER — INSULIN GLARGINE 100 [IU]/ML
10 INJECTION, SOLUTION SUBCUTANEOUS EVERY MORNING
Status: DISCONTINUED | OUTPATIENT
Start: 2024-10-12 | End: 2024-10-13

## 2024-10-12 RX ORDER — INSULIN LISPRO 100 [IU]/ML
1-5 INJECTION, SOLUTION INTRAVENOUS; SUBCUTANEOUS
Status: DISCONTINUED | OUTPATIENT
Start: 2024-10-12 | End: 2024-10-16

## 2024-10-12 RX ORDER — INSULIN LISPRO 100 [IU]/ML
5 INJECTION, SOLUTION INTRAVENOUS; SUBCUTANEOUS
Status: DISCONTINUED | OUTPATIENT
Start: 2024-10-12 | End: 2024-10-14

## 2024-10-12 RX ORDER — ACETAMINOPHEN 325 MG/1
975 TABLET ORAL EVERY 8 HOURS PRN
Status: DISCONTINUED | OUTPATIENT
Start: 2024-10-12 | End: 2024-10-23

## 2024-10-12 RX ORDER — INSULIN LISPRO 100 [IU]/ML
5 INJECTION, SOLUTION INTRAVENOUS; SUBCUTANEOUS
Status: DISCONTINUED | OUTPATIENT
Start: 2024-10-12 | End: 2024-10-12

## 2024-10-12 RX ORDER — VANCOMYCIN HYDROCHLORIDE 750 MG/150ML
750 INJECTION, SOLUTION INTRAVENOUS EVERY 12 HOURS
Status: DISCONTINUED | OUTPATIENT
Start: 2024-10-13 | End: 2024-10-13

## 2024-10-12 RX ORDER — POTASSIUM CHLORIDE 1500 MG/1
40 TABLET, EXTENDED RELEASE ORAL ONCE
Status: COMPLETED | OUTPATIENT
Start: 2024-10-12 | End: 2024-10-12

## 2024-10-12 RX ORDER — LIDOCAINE 50 MG/G
1 PATCH TOPICAL DAILY
Status: DISCONTINUED | OUTPATIENT
Start: 2024-10-12 | End: 2024-10-29 | Stop reason: HOSPADM

## 2024-10-12 RX ORDER — NICOTINE 21 MG/24HR
21 PATCH, TRANSDERMAL 24 HOURS TRANSDERMAL DAILY
Status: DISCONTINUED | OUTPATIENT
Start: 2024-10-12 | End: 2024-10-29 | Stop reason: HOSPADM

## 2024-10-12 RX ORDER — INSULIN LISPRO 100 [IU]/ML
5 INJECTION, SOLUTION INTRAVENOUS; SUBCUTANEOUS ONCE
Status: COMPLETED | OUTPATIENT
Start: 2024-10-12 | End: 2024-10-12

## 2024-10-12 RX ORDER — GUAIFENESIN/DEXTROMETHORPHAN 100-10MG/5
10 SYRUP ORAL EVERY 6 HOURS PRN
Status: DISCONTINUED | OUTPATIENT
Start: 2024-10-12 | End: 2024-10-29 | Stop reason: HOSPADM

## 2024-10-12 RX ADMIN — HEPARIN SODIUM 5000 UNITS: 5000 INJECTION INTRAVENOUS; SUBCUTANEOUS at 15:04

## 2024-10-12 RX ADMIN — POTASSIUM CHLORIDE 40 MEQ: 1500 TABLET, EXTENDED RELEASE ORAL at 10:48

## 2024-10-12 RX ADMIN — INSULIN LISPRO 2 UNITS: 100 INJECTION, SOLUTION INTRAVENOUS; SUBCUTANEOUS at 11:39

## 2024-10-12 RX ADMIN — ACETAMINOPHEN 975 MG: 325 TABLET, FILM COATED ORAL at 22:48

## 2024-10-12 RX ADMIN — LIDOCAINE 1 PATCH: 50 PATCH TOPICAL at 08:21

## 2024-10-12 RX ADMIN — HEPARIN SODIUM 5000 UNITS: 5000 INJECTION INTRAVENOUS; SUBCUTANEOUS at 21:17

## 2024-10-12 RX ADMIN — GUAIFENESIN AND DEXTROMETHORPHAN 10 ML: 100; 10 SYRUP ORAL at 00:52

## 2024-10-12 RX ADMIN — CHLORHEXIDINE GLUCONATE 0.12% ORAL RINSE 15 ML: 1.2 LIQUID ORAL at 21:18

## 2024-10-12 RX ADMIN — ACETAMINOPHEN 975 MG: 325 TABLET, FILM COATED ORAL at 15:04

## 2024-10-12 RX ADMIN — POTASSIUM PHOSPHATE, MONOBASIC POTASSIUM PHOSPHATE, DIBASIC 21 MMOL: 224; 236 INJECTION, SOLUTION, CONCENTRATE INTRAVENOUS at 08:21

## 2024-10-12 RX ADMIN — Medication 2000 UNITS: at 15:04

## 2024-10-12 RX ADMIN — NICOTINE 21 MG: 21 PATCH, EXTENDED RELEASE TRANSDERMAL at 15:36

## 2024-10-12 RX ADMIN — INSULIN LISPRO 5 UNITS: 100 INJECTION, SOLUTION INTRAVENOUS; SUBCUTANEOUS at 15:37

## 2024-10-12 RX ADMIN — VANCOMYCIN HYDROCHLORIDE 1250 MG: 10 INJECTION, POWDER, LYOPHILIZED, FOR SOLUTION INTRAVENOUS at 19:22

## 2024-10-12 RX ADMIN — CHLORHEXIDINE GLUCONATE 0.12% ORAL RINSE 15 ML: 1.2 LIQUID ORAL at 08:21

## 2024-10-12 RX ADMIN — INSULIN LISPRO 5 UNITS: 100 INJECTION, SOLUTION INTRAVENOUS; SUBCUTANEOUS at 15:36

## 2024-10-12 RX ADMIN — INSULIN LISPRO 1 UNITS: 100 INJECTION, SOLUTION INTRAVENOUS; SUBCUTANEOUS at 15:36

## 2024-10-12 RX ADMIN — ACETAMINOPHEN 975 MG: 325 TABLET, FILM COATED ORAL at 00:51

## 2024-10-12 RX ADMIN — CEFTRIAXONE SODIUM 2000 MG: 10 INJECTION, POWDER, FOR SOLUTION INTRAVENOUS at 18:03

## 2024-10-12 RX ADMIN — HEPARIN SODIUM 5000 UNITS: 5000 INJECTION INTRAVENOUS; SUBCUTANEOUS at 05:16

## 2024-10-12 RX ADMIN — INSULIN GLARGINE 10 UNITS: 100 INJECTION, SOLUTION SUBCUTANEOUS at 08:20

## 2024-10-12 NOTE — PROGRESS NOTES
Progress Note - Critical Care/ICU   Name: Sabiha Garcia 68 y.o. female I MRN: [id number]  Unit/Bed#: [facility] 209-01 I Date of Admission: 10/11/2024   Date of Service: 10/12/2024 I Hospital Day: 1       Critical Care Interval Transfer Note:    Brief Hospital Summary:     68 year old female with history of poorly controlled Type 2 diabetes on insulin, osteoporosis, s/p ORIF left hip, frequent falls who presents today with generalized weakness and falls. She reports 2 falls without head strike, LOC, any prodrome, likely due to feeling unwell with subjective fever. She mentions she has very minimal oral intake for at least 6-7 days or more. Otherwise denies any other associated systemic symptoms. On arrival in the ED, she was afebrile, tachycardic. Labs suggestive of DKA vs starvation ketosis. X-ray of right foot concerning for osteomyelitis of right fifth digit. CT chest with mild emphysema, 3 mm non calcified RUL nodule. CT head no acute intracranial findings. She was admitted to MICU under SD1 for further management of DKA vs starvation ketosis.     She was treated per DKA protocol with complete resolution. She is tolerating diet. Podiatry is following for dry gangrene of right fifth digit with possible osteomyelitis. She is alert and oriented without any physical complaints. She will require PT/OT evaluation for frequent falls with osteoporosis. She is otherwise hemodynamically stable to be downgraded to medicine floor as Med Surg.     Barriers to discharge:   Dry gangrene of right fifth digit with possible osteomyelitis  PT/OT evaluation for frequent fall     Consults: IP CONSULT TO CASE MANAGEMENT  IP CONSULT TO PODIATRY    Recommended to review admission imaging for incidental findings and document in discharge navigator: Incidental findings have been documented in discharge navigator. Patient and/or family was informed and they expressed understanding.      Discharge Plan: Anticipate discharge in 24-48 hrs to  discharge location to be determined pending rehab evaluations.       Patient seen and evaluated by Critical Care today and deemed to be appropriate for transfer to Med Surg. Spoke to Dr. Spaulding from [facility] to accept transfer. Critical care can be contacted via SecureChat with any questions or concerns.

## 2024-10-12 NOTE — PLAN OF CARE
Problem: Potential for Falls  Goal: Patient will remain free of falls  Description: INTERVENTIONS:  - Educate patient/family on patient safety including physical limitations  - Instruct patient to call for assistance with activity   - Consult OT/PT to assist with strengthening/mobility   - Keep Call bell within reach  - Keep bed low and locked with side rails adjusted as appropriate  - Keep care items and personal belongings within reach  - Initiate and maintain comfort rounds  - Make Fall Risk Sign visible to staff  - Offer Toileting every   Hours, in advance of need  - Initiate/Maintain  alarm  - Obtain necessary fall risk management equipment:     - Apply yellow socks and bracelet for high fall risk patients  - Consider moving patient to room near nurses station  Outcome: Progressing     Problem: CARDIOVASCULAR - ADULT  Goal: Maintains optimal cardiac output and hemodynamic stability  Description: INTERVENTIONS:  - Monitor I/O, vital signs and rhythm  - Monitor for S/S and trends of decreased cardiac output  - Administer and titrate ordered vasoactive medications to optimize hemodynamic stability  - Assess quality of pulses, skin color and temperature  - Assess for signs of decreased coronary artery perfusion  - Instruct patient to report change in severity of symptoms  Outcome: Progressing  Goal: Absence of cardiac dysrhythmias or at baseline rhythm  Description: INTERVENTIONS:  - Continuous cardiac monitoring, vital signs, obtain 12 lead EKG if ordered  - Administer antiarrhythmic and heart rate control medications as ordered  - Monitor electrolytes and administer replacement therapy as ordered  Outcome: Progressing     Problem: METABOLIC, FLUID AND ELECTROLYTES - ADULT  Goal: Electrolytes maintained within normal limits  Description: INTERVENTIONS:  - Monitor labs and assess patient for signs and symptoms of electrolyte imbalances  - Administer electrolyte replacement as ordered  - Monitor response to  electrolyte replacements, including repeat lab results as appropriate  - Instruct patient on fluid and nutrition as appropriate  Outcome: Progressing  Goal: Fluid balance maintained  Description: INTERVENTIONS:  - Monitor labs   - Monitor I/O and WT  - Instruct patient on fluid and nutrition as appropriate  - Assess for signs & symptoms of volume excess or deficit  Outcome: Progressing  Goal: Glucose maintained within target range  Description: INTERVENTIONS:  - Monitor Blood Glucose as ordered  - Assess for signs and symptoms of hyperglycemia and hypoglycemia  - Administer ordered medications to maintain glucose within target range  - Assess nutritional intake and initiate nutrition service referral as needed  Outcome: Progressing     Problem: Prexisting or High Potential for Compromised Skin Integrity  Goal: Skin integrity is maintained or improved  Description: INTERVENTIONS:  - Identify patients at risk for skin breakdown  - Assess and monitor skin integrity  - Assess and monitor nutrition and hydration status  - Monitor labs   - Assess for incontinence   - Turn and reposition patient  - Assist with mobility/ambulation  - Relieve pressure over bony prominences  - Avoid friction and shearing  - Provide appropriate hygiene as needed including keeping skin clean and dry  - Evaluate need for skin moisturizer/barrier cream  - Collaborate with interdisciplinary team   - Patient/family teaching  - Consider wound care consult   Outcome: Progressing

## 2024-10-12 NOTE — PROGRESS NOTES
Progress Note - Critical Care/ICU   Name: Sabiha Garcia 68 y.o. female I MRN: [id number]  Unit/Bed#: [facility] 209-01 I Date of Admission: 10/11/2024   Date of Service: 10/12/2024 I Hospital Day: 1       Assessment & Plan     Diabetic ketoacidosis/ Starvation ketosis - resolved  Dry gangrene of right fifth digit with possible osteomyelitis  Multiple falls  Type 2 diabetes on insulin  Leukocytosis  Elevated troponin  Mild emphysema  Right upper lobe non calcified pulmonary nodule   Osteoporosis   S/p ORIf left hip in 5/2020  Depression  Smoker     Neuro:   Diagnosis: Depression.   Currently not on any medication.   Follow up OP with PCP  ICU delirium precautions.      CV:   Diagnosis: Elevated troponin.  Likely non-MI. Patient asymptomatic. Denies CP, SOB, palpitation, lightheadedness/dizziness.  EKG no ischemic changes     Pulm:  Diagnosis: Mild emphysema. Not on any inhaler.  Smoker. Encourage smoking cessation.  Follow up OP with Pulmonology.     Diagnosis: RUL nodule  Smoker.   Follow up OP at 12 months with CT     GI:   No active issues.      :   Diagnosis: KOURTNEY POA - improving.   Likely prerenal.   S/p IV hydration.  Monitor urine output and renal indices.        F/E/N:   F - D5W  cc/hr  E - Replete as needed per DKA protocol  N - Diabetic diet.      Heme/Onc:   Diagnosis: Leukocytosis - improving  Likely reactive in the setting of DKA/starvation ketosis vs dry gangrene with possible osteomyelitis   Denies any systemic symptoms. Afebrile with vitals within normal range.   CT chest no acute findings.   X-ray right foot - possible osteomyelitis of fifth digital proximal phalanx.  See MSK plan     Endo:   Diagnosis: DKA vs Starvation ketoacidosis - resolved  Presented with no oral intake for about a week or more because she was not feeling well. Denies n/v, abdominal pain, fever, chills, SOB, chest pain.   POA Glu 681; AG 23; beta-hydroxybutyrate 5.20; VBG 7.29/44/35/21; UA 2+ ketones, glucose  On DKA protocol.    NPO. Resume diet when appropriate.      Diagnosis: Type 2 diabetes on insulin  A1c 14.8 on 9/18/24  At home on Tresiba 20 units daily  Transition to SSI. Given Lantus 20 units prior to stopping insulin drip.  Monitor daily BG with goal 140-180 while inpatient.        ID:   No active issues     MSK/Skin:   Diagnosis: Dry gangrene of right fifth digit with possible osteomyelitis  X-ray right foot - possible osteomyelitis of fifth digital proximal phalanx  Podiatry consulted. Appreciate recs.  Frequent fall. Fall precautions.  PT/OT     Diagnosis: s/p ORIF left hip in 5/2020  Diagnosis: Osteoporosis  Diagnosis: Fall  Dexa on 5/13/2022 - osteoporosis of lumbar spine and total right hip and distal third left forearm  Vit D 2000 units daily  Follow up with Rheum or PCP OP for osteoporosis   Frequent fall. Fall precautions.  PT/OT     Disposition: Med Surg    ICU Core Measures     A: Assess, Prevent, and Manage Pain Has pain been assessed? Yes  Need for changes to pain regimen? No   B: Both SAT/SAT  N/A   C: Choice of Sedation RASS Goal: 0 Alert and Calm  Need for changes to sedation or analgesia regimen? No   D: Delirium CAM-ICU: Negative   E: Early Mobility  Plan for early mobility? Yes   F: Family Engagement Plan for family engagement today? Yes         Prophylaxis:  VTE VTE covered by:  heparin (porcine), Subcutaneous, 5,000 Units at 10/12/24 0516       Stress Ulcer  not ordered         24 Hour Events :     No significant events.     Subjective     She reports she feels much better since being in the hospital. She is tolerating diet. Denies any fever, chills, chest pain, SOB, palpitation, abdominal pain, n/v, lightheadedness/dizziness.       Review of Systems: See HPI for Review of Systems    Objective :                   Vitals I/O      Most Recent Min/Max in 24hrs   Temp 98 °F (36.7 °C) Temp  Min: 98 °F (36.7 °C)  Max: 99.8 °F (37.7 °C)   Pulse 68 Pulse  Min: 68  Max: 82   Resp 20 Resp  Min: 17  Max: 20   /68  BP  Min: 107/50  Max: 147/60   O2 Sat 97 % SpO2  Min: 95 %  Max: 97 %    No intake or output data in the 24 hours ending 10/12/24 1205    Diet [place]/CHO Controlled; Consistent Carbohydrate Diet Level 3 (6 carb servings/90 grams CHO/meal)    Invasive Monitoring           Physical Exam   Physical Exam  Vitals and nursing note reviewed.   Cardiovascular:      Rate and Rhythm: Normal rate and regular rhythm.      Pulses: Normal pulses.      Heart sounds: Normal heart sounds.   Abdominal: General: Bowel sounds are normal.      Palpations: Abdomen is soft.   Constitutional:       Comments: Cachectic    Pulmonary:      Effort: Pulmonary effort is normal.      Breath sounds: Normal breath sounds.   Neurological:      Mental Status: She is alert and oriented to person, place and time. Mental status is at baseline. She is calm.          Diagnostic Studies        Lab Results: I have reviewed the following results:     Medications:  Scheduled PRN   chlorhexidine, 15 mL, Q12H JUAN  heparin (porcine), 5,000 Units, Q8H JUAN  insulin glargine, 10 Units, QAM  insulin lispro, 1-5 Units, TID AC  lidocaine, 1 patch, Daily  potassium phosphate, 21 mmol, Once      acetaminophen, 975 mg, Q8H PRN  dextromethorphan-guaiFENesin, 10 mL, Q6H PRN       Continuous    insulin regular (HumuLIN R,NovoLIN R) 1 Units/mL in sodium chloride 0.9 % 100 mL infusion, 0.3-21 Units/hr, Last Rate: Stopped (10/12/24 0829)         Labs:   CBC    Recent Labs     10/11/24  0550 10/11/24  1205 10/12/24  0452   WBC 18.14*  --  13.40*   HGB 12.8  --  11.0*   HCT 42.2  --  35.0   * 357 321   BANDSPCT 6  --   --      BMP    Recent Labs     10/12/24  0452 10/12/24  0847   SODIUM 141 142   K 3.8 3.7    107   CO2 29 29   AGAP 4 6   BUN 24 21   CREATININE 0.63 0.61   CALCIUM 8.0* 8.3*       Coags    Recent Labs     10/11/24  0550   INR 1.08   PTT 27        Additional Electrolytes  Recent Labs     10/12/24  0452 10/12/24  0847   MG 1.9 2.0   PHOS 2.0* 2.2*           Blood Gas    No recent results  Recent Labs     10/11/24  1438   PHVEN 7.400   FOO1EIA 50.6*   PO2VEN 26.1*   XLE4TLI 30.6*   BEVEN 4.8   H6TTMSO 49.8*    LFTs  Recent Labs     10/11/24  0550 10/11/24  1438   ALT 10 9   AST 8* 10*   ALKPHOS 116* 98   ALB 3.4* 3.1*   TBILI 0.36 0.35       Infectious  Recent Labs     10/11/24  0747   PROCALCITONI 0.21     Glucose  Recent Labs     10/11/24  2120 10/12/24  0044 10/12/24  0452 10/12/24  0847   GLUC 56* 177* 141* 92

## 2024-10-12 NOTE — INCIDENTAL FINDINGS
The following findings require follow up:  Radiographic finding   Finding: 3 mm RUL non calcified nodule   Follow up required: Yes   Follow up should be done within 12 month(s)    Please notify the following clinician to assist with the follow up:   PCP    Incidental finding results were discussed with the Patient by Octaviano Chau DO on 10/12/24.   They expressed understanding and all questions answered.

## 2024-10-12 NOTE — PROGRESS NOTES
Sabiha Garcia is a 68 y.o. female who is currently ordered Vancomycin IV with management by the Pharmacy Consult service.  Relevant clinical data and objective / subjective history reviewed.  Vancomycin Assessment:  Indication and Goal AUC/Trough: Bone/joint infection (goal -600, trough >10)  Clinical Status: stable  Micro:   Pending  Renal Function:  SCr: 0.61 mg/dL  CrCl: 676.1 mL/min  Renal replacement: Not on dialysis  Days of Therapy: 1  Current Dose: 1250 mg IV once   Vancomycin Plan:  New Dosin mg IV q12h   Estimated AUC: 485 mcg*hr/mL  Estimated Trough: 12.6 mcg/mL  Next Level: 10/14/2024 @ 0600  Renal Function Monitoring: Daily BMP and UOP  Pharmacy will continue to follow closely for s/sx of nephrotoxicity, infusion reactions and appropriateness of therapy.  BMP and CBC will be ordered per protocol. We will continue to follow the patient’s culture results and clinical progress daily.    Aneesh Tejeda, Pharmacist

## 2024-10-13 PROBLEM — E88.89 KETOSIS (HCC): Status: ACTIVE | Noted: 2024-10-13

## 2024-10-13 PROBLEM — R26.2 AMBULATORY DYSFUNCTION: Status: ACTIVE | Noted: 2024-10-13

## 2024-10-13 PROBLEM — R91.1 LUNG NODULE: Status: ACTIVE | Noted: 2024-10-13

## 2024-10-13 PROBLEM — E55.9 VITAMIN D DEFICIENCY: Status: ACTIVE | Noted: 2024-10-13

## 2024-10-13 PROBLEM — E87.8 ELECTROLYTE ABNORMALITY: Status: ACTIVE | Noted: 2024-10-13

## 2024-10-13 PROBLEM — I96 GANGRENE OF TOE OF RIGHT FOOT (HCC): Status: ACTIVE | Noted: 2024-10-13

## 2024-10-13 LAB
ANION GAP SERPL CALCULATED.3IONS-SCNC: 7 MMOL/L (ref 4–13)
ATRIAL RATE: 84 BPM
BASOPHILS # BLD AUTO: 0.04 THOUSANDS/ΜL (ref 0–0.1)
BASOPHILS NFR BLD AUTO: 0 % (ref 0–1)
BUN SERPL-MCNC: 11 MG/DL (ref 5–25)
CALCIUM SERPL-MCNC: 8.3 MG/DL (ref 8.4–10.2)
CHLORIDE SERPL-SCNC: 101 MMOL/L (ref 96–108)
CO2 SERPL-SCNC: 30 MMOL/L (ref 21–32)
CREAT SERPL-MCNC: 0.52 MG/DL (ref 0.6–1.3)
EOSINOPHIL # BLD AUTO: 0.15 THOUSAND/ΜL (ref 0–0.61)
EOSINOPHIL NFR BLD AUTO: 2 % (ref 0–6)
ERYTHROCYTE [DISTWIDTH] IN BLOOD BY AUTOMATED COUNT: 14.5 % (ref 11.6–15.1)
FLUAV RNA RESP QL NAA+PROBE: NEGATIVE
FLUBV RNA RESP QL NAA+PROBE: NEGATIVE
GFR SERPL CREATININE-BSD FRML MDRD: 98 ML/MIN/1.73SQ M
GLUCOSE SERPL-MCNC: 223 MG/DL (ref 65–140)
GLUCOSE SERPL-MCNC: 226 MG/DL (ref 65–140)
GLUCOSE SERPL-MCNC: 234 MG/DL (ref 65–140)
GLUCOSE SERPL-MCNC: 288 MG/DL (ref 65–140)
GLUCOSE SERPL-MCNC: 368 MG/DL (ref 65–140)
HCT VFR BLD AUTO: 35.5 % (ref 34.8–46.1)
HGB BLD-MCNC: 11.1 G/DL (ref 11.5–15.4)
IMM GRANULOCYTES # BLD AUTO: 0.06 THOUSAND/UL (ref 0–0.2)
IMM GRANULOCYTES NFR BLD AUTO: 1 % (ref 0–2)
LYMPHOCYTES # BLD AUTO: 3.15 THOUSANDS/ΜL (ref 0.6–4.47)
LYMPHOCYTES NFR BLD AUTO: 31 % (ref 14–44)
MAGNESIUM SERPL-MCNC: 1.8 MG/DL (ref 1.9–2.7)
MCH RBC QN AUTO: 27.7 PG (ref 26.8–34.3)
MCHC RBC AUTO-ENTMCNC: 31.3 G/DL (ref 31.4–37.4)
MCV RBC AUTO: 89 FL (ref 82–98)
MONOCYTES # BLD AUTO: 0.61 THOUSAND/ΜL (ref 0.17–1.22)
MONOCYTES NFR BLD AUTO: 6 % (ref 4–12)
NEUTROPHILS # BLD AUTO: 6.29 THOUSANDS/ΜL (ref 1.85–7.62)
NEUTS SEG NFR BLD AUTO: 60 % (ref 43–75)
NRBC BLD AUTO-RTO: 0 /100 WBCS
P AXIS: 81 DEGREES
PHOSPHATE SERPL-MCNC: 2.1 MG/DL (ref 2.3–4.1)
PLATELET # BLD AUTO: 263 THOUSANDS/UL (ref 149–390)
PMV BLD AUTO: 9.6 FL (ref 8.9–12.7)
POTASSIUM SERPL-SCNC: 4 MMOL/L (ref 3.5–5.3)
PR INTERVAL: 142 MS
PROCALCITONIN SERPL-MCNC: 0.79 NG/ML
QRS AXIS: 74 DEGREES
QRSD INTERVAL: 83 MS
QT INTERVAL: 383 MS
QTC INTERVAL: 453 MS
RBC # BLD AUTO: 4.01 MILLION/UL (ref 3.81–5.12)
RSV RNA RESP QL NAA+PROBE: NEGATIVE
SARS-COV-2 RNA RESP QL NAA+PROBE: NEGATIVE
SODIUM SERPL-SCNC: 138 MMOL/L (ref 135–147)
T WAVE AXIS: 89 DEGREES
VENTRICULAR RATE: 84 BPM
WBC # BLD AUTO: 10.3 THOUSAND/UL (ref 4.31–10.16)

## 2024-10-13 PROCEDURE — 0241U HB NFCT DS VIR RESP RNA 4 TRGT: CPT | Performed by: INTERNAL MEDICINE

## 2024-10-13 PROCEDURE — 93010 ELECTROCARDIOGRAM REPORT: CPT | Performed by: INTERNAL MEDICINE

## 2024-10-13 PROCEDURE — 80048 BASIC METABOLIC PNL TOTAL CA: CPT

## 2024-10-13 PROCEDURE — 85025 COMPLETE CBC W/AUTO DIFF WBC: CPT

## 2024-10-13 PROCEDURE — 84145 PROCALCITONIN (PCT): CPT | Performed by: INTERNAL MEDICINE

## 2024-10-13 PROCEDURE — 84100 ASSAY OF PHOSPHORUS: CPT

## 2024-10-13 PROCEDURE — 99232 SBSQ HOSP IP/OBS MODERATE 35: CPT | Performed by: INTERNAL MEDICINE

## 2024-10-13 PROCEDURE — 83735 ASSAY OF MAGNESIUM: CPT

## 2024-10-13 PROCEDURE — 82948 REAGENT STRIP/BLOOD GLUCOSE: CPT

## 2024-10-13 RX ORDER — VANCOMYCIN HYDROCHLORIDE 1 G/200ML
1000 INJECTION, SOLUTION INTRAVENOUS EVERY 12 HOURS
Status: DISCONTINUED | OUTPATIENT
Start: 2024-10-13 | End: 2024-10-14 | Stop reason: DRUGHIGH

## 2024-10-13 RX ORDER — INSULIN GLARGINE 100 [IU]/ML
14 INJECTION, SOLUTION SUBCUTANEOUS EVERY MORNING
Status: DISCONTINUED | OUTPATIENT
Start: 2024-10-14 | End: 2024-10-14

## 2024-10-13 RX ORDER — MAGNESIUM SULFATE HEPTAHYDRATE 40 MG/ML
2 INJECTION, SOLUTION INTRAVENOUS ONCE
Status: COMPLETED | OUTPATIENT
Start: 2024-10-13 | End: 2024-10-13

## 2024-10-13 RX ORDER — IBUPROFEN 400 MG/1
400 TABLET, FILM COATED ORAL ONCE
Status: COMPLETED | OUTPATIENT
Start: 2024-10-13 | End: 2024-10-13

## 2024-10-13 RX ADMIN — DIBASIC SODIUM PHOSPHATE, MONOBASIC POTASSIUM PHOSPHATE AND MONOBASIC SODIUM PHOSPHATE 2 TABLET: 852; 155; 130 TABLET ORAL at 09:18

## 2024-10-13 RX ADMIN — INSULIN LISPRO 5 UNITS: 100 INJECTION, SOLUTION INTRAVENOUS; SUBCUTANEOUS at 11:26

## 2024-10-13 RX ADMIN — CHLORHEXIDINE GLUCONATE 0.12% ORAL RINSE 15 ML: 1.2 LIQUID ORAL at 23:08

## 2024-10-13 RX ADMIN — CHLORHEXIDINE GLUCONATE 0.12% ORAL RINSE 15 ML: 1.2 LIQUID ORAL at 09:19

## 2024-10-13 RX ADMIN — NICOTINE 21 MG: 21 PATCH, EXTENDED RELEASE TRANSDERMAL at 09:18

## 2024-10-13 RX ADMIN — INSULIN LISPRO 1 UNITS: 100 INJECTION, SOLUTION INTRAVENOUS; SUBCUTANEOUS at 09:35

## 2024-10-13 RX ADMIN — HEPARIN SODIUM 5000 UNITS: 5000 INJECTION INTRAVENOUS; SUBCUTANEOUS at 23:08

## 2024-10-13 RX ADMIN — VANCOMYCIN HYDROCHLORIDE 750 MG: 750 INJECTION, SOLUTION INTRAVENOUS at 09:18

## 2024-10-13 RX ADMIN — INSULIN LISPRO 3 UNITS: 100 INJECTION, SOLUTION INTRAVENOUS; SUBCUTANEOUS at 11:25

## 2024-10-13 RX ADMIN — DIBASIC SODIUM PHOSPHATE, MONOBASIC POTASSIUM PHOSPHATE AND MONOBASIC SODIUM PHOSPHATE 2 TABLET: 852; 155; 130 TABLET ORAL at 17:10

## 2024-10-13 RX ADMIN — HEPARIN SODIUM 5000 UNITS: 5000 INJECTION INTRAVENOUS; SUBCUTANEOUS at 05:00

## 2024-10-13 RX ADMIN — INSULIN LISPRO 2 UNITS: 100 INJECTION, SOLUTION INTRAVENOUS; SUBCUTANEOUS at 17:11

## 2024-10-13 RX ADMIN — MAGNESIUM SULFATE HEPTAHYDRATE 2 G: 40 INJECTION, SOLUTION INTRAVENOUS at 11:11

## 2024-10-13 RX ADMIN — Medication 1 SPRAY: at 04:45

## 2024-10-13 RX ADMIN — HEPARIN SODIUM 5000 UNITS: 5000 INJECTION INTRAVENOUS; SUBCUTANEOUS at 14:53

## 2024-10-13 RX ADMIN — VANCOMYCIN HYDROCHLORIDE 1000 MG: 1 INJECTION, SOLUTION INTRAVENOUS at 23:10

## 2024-10-13 RX ADMIN — INSULIN LISPRO 5 UNITS: 100 INJECTION, SOLUTION INTRAVENOUS; SUBCUTANEOUS at 17:11

## 2024-10-13 RX ADMIN — CEFTRIAXONE SODIUM 2000 MG: 10 INJECTION, POWDER, FOR SOLUTION INTRAVENOUS at 17:14

## 2024-10-13 RX ADMIN — Medication 2000 UNITS: at 09:14

## 2024-10-13 RX ADMIN — INSULIN LISPRO 5 UNITS: 100 INJECTION, SOLUTION INTRAVENOUS; SUBCUTANEOUS at 09:35

## 2024-10-13 RX ADMIN — IBUPROFEN 400 MG: 400 TABLET, FILM COATED ORAL at 09:22

## 2024-10-13 RX ADMIN — INSULIN GLARGINE 10 UNITS: 100 INJECTION, SOLUTION SUBCUTANEOUS at 09:20

## 2024-10-13 RX ADMIN — LIDOCAINE 1 PATCH: 50 PATCH TOPICAL at 09:18

## 2024-10-13 NOTE — UTILIZATION REVIEW
Initial Clinical Review    Admission: Date/Time/Statement:   Admission Orders (From admission, onward)       Ordered        10/11/24 0914  Inpatient Admission  Once                          Orders Placed This Encounter   Procedures    Inpatient Admission     Standing Status:   Standing     Number of Occurrences:   1     Order Specific Question:   Level of Care     Answer:   Level 1 Stepdown [13]     Order Specific Question:   Estimated length of stay     Answer:   More than 2 Midnights     Order Specific Question:   Certification     Answer:   I certify that inpatient services are medically necessary for this patient for a duration of greater than two midnights. See H&P and MD Progress Notes for additional information about the patient's course of treatment.     ED Arrival Information       Expected   10/11/2024     Arrival   10/11/2024 05:25    Acuity   Urgent              Means of arrival   Ambulance    Escorted by   [facility]    Service   Hospitalist    Admission type   Emergency              Arrival complaint   Fall             Chief Complaint   Patient presents with    Fall     Pt reports falling 5 days ago and unable to stand up and was crawling around but was knocking on the wall and a neighbor called 911. Pt doesn't feel safe at home. Right pinky toe is black and hard       Initial Presentation: 68 y.o. female presents to the ED via EMS after falling on to the floor 5 days ago, unable to get up and crawling on the floor and a neighbor called 911. Pt reports poor po intake for the past 6-7 days, has had a cough and subjective fevers and tachycardia. On arrival to the ED she is tachycardic, gluc ~700, has DTI over mid back and dry gangrene of right little toe following a recent cut injury, cachexia/failure to thrive and labs suggestive for DKA vs Starvation Ketosis. In the ED pt started on insulin drip, given IVF's bolus of lactated Ringers solution x3 Liters, and IV KCL. X-ray of right foot  concerning for osteomyelitis of right fifth digit .CT chest with mild emphysema, 3 mm non calcified RUL nodule. WBC 18.14, plts 505, total CK 81, BUN 71, Creatinine 1.46, , lactic acid 1.5, Beta- hydroxybutyrate 5.20, PH Sanchez 7.290, HCO3 20.6 . PMH: poorly controlled DM, ambulatory dysfunction and multiple recent falls.osteoporosis, s/p ORIF left hip. Plan : admit to ICU for DKA, insulin drip, Telemetry monitoring, neuro checks, trend labs, IV electrolyte repletion, podiatry consult.       Podiatry Consult: Uncontrolled type 2 diabetes mellitus with last A1c of 14.8% September 2024. She has gangrene on the right 5th toe and questionable LE perfusion. Extensive smoking history. XR shows probale erosion of the distal phalanx 5th toe. Clinically the entire toe is gangrenous. No soft tissue gas or necrotizing infection beyond the 5th toe. Treatment will involved 5th ray amputation however she has poor pedal pulses, delayed CRT. Given the situation, hold amputation until perfusion can be investigated, I suspect her dopplers will show significant arterial disease to the forefoot.     Date: 10/12/24   Day 2: Critical Care/ICU: Patient complains of odynophagia.She reports she feels much better since being in the hospital. She is tolerating diet.  Awake and alert. Elevated troponin. Diabetic ketoacidosis/ Starvation ketosis - resolved, Restart basal and bolus insulin.    BMI 15.85 kg/m² . WBC improved to 13.40. She will require PT/OT evaluation for frequent falls with osteoporosis. IP consult to CM. Rec 3mm RUL non calcified nodule have f/u as outpatient.     Date: 10/13/24  Day 3: Has surpassed a 2nd midnight with active treatments and services.    Hospitalist: In the evening of 10/12/24 noted to have discharge, erythema, foul smell and begun on Vancomycin/Ceftriaxone. ID consult. Continues to have pain in right foot. IV nag sulfate given x 1 dose today for MG 1.8. Follow up MG level tomorrow ordered. BS remain  uncontrolled and elevated with High of 368 today and low 223. Increase Lantus to 14 units in am, ct Humalog 5 TID, sliding scale insulin . Continue accu checks. PT/Ot eval ordered and pending. CM consult placed for assistance with DC planning as pt has had multiple falls at home and states difficulty caring for herself at home, would like possible [facility] referrals, community resources to assist with LTC.            ED Treatment-Medication Administration from 10/11/2024 0525 to 10/11/2024 1404         Date/Time Order Dose Route Action     10/11/2024 0553 lactated ringers bolus 1,000 mL 1,000 mL Intravenous New Bag     10/11/2024 0930 insulin regular (HumuLIN R,NovoLIN R) injection 5 Units 5 Units Intravenous Given     10/11/2024 0934 insulin regular (HumuLIN R,NovoLIN R) 1 Units/mL in sodium chloride 0.9 % 100 mL infusion 5.1 Units/hr Intravenous New Bag     10/11/2024 1114 insulin regular (HumuLIN R,NovoLIN R) 1 Units/mL in sodium chloride 0.9 % 100 mL infusion 2.55 Units/hr Intravenous Rate/Dose Change     10/11/2024 1215 insulin regular (HumuLIN R,NovoLIN R) 1 Units/mL in sodium chloride 0.9 % 100 mL infusion 5.2 Units/hr Intravenous Rate/Dose Change     10/11/2024 0930 chlorhexidine (PERIDEX) 0.12 % oral rinse 15 mL 15 mL Mouth/Throat Given     10/11/2024 0925 lactated ringers bolus 1,000 mL 1,000 mL Intravenous New Bag     10/11/2024 1218 lactated ringers bolus 1,000 mL 1,000 mL Intravenous New Bag     10/11/2024 1325 potassium chloride 20 mEq IVPB (premix) 20 mEq Intravenous New Bag            Scheduled Medications:  cefTRIAXone, 2,000 mg, Intravenous, Q24H  chlorhexidine, 15 mL, Mouth/Throat, Q12H JUAN  cholecalciferol, 2,000 Units, Oral, Daily  heparin (porcine), 5,000 Units, Subcutaneous, Q8H JUAN  [START ON 10/14/2024] insulin glargine, 14 Units, Subcutaneous, QAM  insulin lispro, 1-5 Units, Subcutaneous, TID AC  insulin lispro, 5 Units, Subcutaneous, TID With Meals  lidocaine, 1 patch, Topical, Daily  magnesium  sulfate, 2 g, Intravenous, Once x 1 dose 10/13   nicotine, 21 mg, Transdermal, Daily  potassium-sodium phosphateS, 2 tablet, Oral, BID With Meals  vancomycin, 1,000 mg, Intravenous, Q12H      Continuous IV Infusions:     PRN Meds:  acetaminophen, 975 mg, Oral, Q8H PRN  dextromethorphan-guaiFENesin, 10 mL, Oral, Q6H PRN  phenol, 1 spray, Mouth/Throat, Q2H PRN      ED Triage Vitals [10/11/24 0531]   Temperature Pulse Respirations Blood Pressure SpO2 Pain Score   98.3 °F (36.8 °C) (!) 111 18 134/67 97 % 5     Weight (last 2 days)       Date/Time Weight    10/13/24 0600 50.4 (111.11)    10/12/24 0600 48.7 (107.36)    10/11/24 0933 51.3 (113)            Vital Signs (last 3 days)       Date/Time Temp Pulse Resp BP MAP (mmHg) SpO2 O2 Device Patient Position - Orthostatic VS Jony Coma Scale Score Pain    10/13/24 15:31:04 98.3 °F (36.8 °C) 81 16 128/68 88 97 % -- -- -- --    10/13/24 0922 -- -- -- -- -- 93 % None (Room air) -- 15 8    10/13/24 08:08:58 98.1 °F (36.7 °C) -- 12 156/66 96 -- -- -- -- --    10/13/24 03:24:26 98.3 °F (36.8 °C) 77 -- -- -- 96 % -- -- -- --    10/12/24 2248 -- -- -- -- -- -- -- -- -- 4    10/12/24 22:31:16 98.9 °F (37.2 °C) 75 20 133/62 86 96 % -- -- -- --    10/12/24 1922 -- -- -- -- -- -- -- -- 15 --    10/12/24 1715 -- -- -- -- -- 94 % None (Room air) -- 15 No Pain    10/12/24 17:12:18 98.2 °F (36.8 °C) 78 16 130/63 85 98 % -- -- -- --    10/12/24 1600 -- -- -- -- -- -- -- -- 15 --    10/12/24 1519 97.6 °F (36.4 °C) -- -- -- -- -- -- -- -- --    10/12/24 1319 98.8 °F (37.1 °C) -- 20 -- -- -- Nasal cannula Lying -- --    10/12/24 1200 -- -- -- -- -- -- -- -- 15 --    10/12/24 0816 98 °F (36.7 °C) -- 20 146/68 108 -- None (Room air) Lying -- --    10/12/24 0800 -- -- -- -- -- -- -- -- 15 No Pain    10/12/24 0444 -- -- -- -- -- -- -- -- 15 --    10/12/24 0330 98.2 °F (36.8 °C) 68 -- 107/50 74 97 % -- Lying -- --    10/12/24 0051 -- -- -- -- -- -- -- -- -- 8    10/12/24 0000 -- -- -- -- -- -- --  -- 15 --    10/11/24 2310 99.8 °F (37.7 °C) 80 -- 133/61 91 95 % -- Lying -- --    10/11/24 2000 -- -- -- -- -- -- -- -- 15 No Pain    10/11/24 1900 98.5 °F (36.9 °C) 74 -- 147/60 95 97 % -- Lying -- --    10/11/24 1420 98.5 °F (36.9 °C) 82 20 133/54 89 96 % None (Room air) Lying 15 No Pain    10/11/24 1215 -- 80 17 124/58 84 95 % None (Room air) Lying -- --    10/11/24 0932 -- 90 15 126/78 97 98 % None (Room air) -- -- --    10/11/24 0800 -- 96 19 131/61 88 95 % None (Room air) -- -- No Pain    10/11/24 0608 -- -- -- -- -- -- None (Room air) -- -- --    10/11/24 0531 98.3 °F (36.8 °C) 111 18 134/67 89 97 % None (Room air) Sitting -- 5              Pertinent Labs/Diagnostic Test Results:   Radiology:  XR foot 3+ views RIGHT   Final Interpretation by Bradley Landon Kocher, MD (10/11 9260)      Subtle periosteal reaction of the fifth digit proximal phalanx with adjacent soft tissue swelling. Findings raise suspicion for osteomyelitis and further evaluation with MRI could be performed for confirmation as clinically warranted.         Computerized Assisted Algorithm (CAA) may have been used to analyze all applicable images.         Resident: Uriel Campbell I, the attending radiologist, have reviewed the images and agree with the final report above.      Workstation performed: [id number]         CT chest without contrast   Final Interpretation by Kenyon Gale MD (10/11 4953)      No acute intrathoracic process.      No acute fracture. Old healed right anterior third and fourth rib fractures.      Fluid throughout most of the esophagus presumably due to gastroesophageal reflux or esophageal dysmotility.      Mild pulmonary emphysema.      Scattered punctate noncalcified pulmonary nodules with unknown long-term stability and doubtful clinical significance. Based on current Fleischner Society 2017 Guidelines on incidental pulmonary nodule, optional follow-up CT at 12 months can be    considered.       Additional chronic findings and negatives as above.         Workstation performed: [id number]         CT head without contrast   Final Interpretation by Kenyon Gale MD (10/11 0742)      No acute intracranial process.      No skull fracture.      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      Chronic right maxillary sinusitis.      Anterior mandibular dental disease. Nonemergent dental evaluation is suggested.               Workstation performed: [id number]         VAS ARTERIAL DUPLEX- LOWER LIMB BILATERAL    (Results Pending)       Results from last 7 days   Lab Units 10/13/24  1119 10/11/24  1205   SARS-COV-2  Negative Negative     Results from last 7 days   Lab Units 10/13/24  0436 10/12/24  0452 10/11/24  1205 10/11/24  0550   WBC Thousand/uL 10.30* 13.40*  --  18.14*   HEMOGLOBIN g/dL 11.1* 11.0*  --  12.8   HEMATOCRIT % 35.5 35.0  --  42.2   PLATELETS Thousands/uL 263 321 357 505*   TOTAL NEUT ABS Thousands/µL 6.29 7.51  --   --    BANDS PCT %  --   --   --  6         Results from last 7 days   Lab Units 10/13/24  0436 10/12/24  0847 10/12/24  0452 10/12/24  0044 10/11/24  2120   SODIUM mmol/L 138 142 141 143 145   POTASSIUM mmol/L 4.0 3.7 3.8 4.0 4.1   CHLORIDE mmol/L 101 107 108 108 108   CO2 mmol/L 30 29 29 29 31   ANION GAP mmol/L 7 6 4 6 6   BUN mg/dL 11 21 24 26* 31*   CREATININE mg/dL 0.52* 0.61 0.63 0.66 0.75   EGFR ml/min/1.73sq m 98 93 92 91 82   CALCIUM mg/dL 8.3* 8.3* 8.0* 8.2* 8.5   MAGNESIUM mg/dL 1.8* 2.0 1.9 1.9 1.9   PHOSPHORUS mg/dL 2.1* 2.2* 2.0* 2.2* 2.1*     Results from last 7 days   Lab Units 10/11/24  1438 10/11/24  0550   AST U/L 10* 8*   ALT U/L 9 10   ALK PHOS U/L 98 116*   TOTAL PROTEIN g/dL 5.8* 6.6   ALBUMIN g/dL 3.1* 3.4*   TOTAL BILIRUBIN mg/dL 0.35 0.36   BILIRUBIN DIRECT mg/dL 0.03  --      Results from last 7 days   Lab Units 10/13/24  2049 10/13/24  1613 10/13/24  1108 10/13/24  0734 10/12/24  2046 10/12/24  1531 10/12/24  1108  10/12/24  0919 10/12/24  0815 10/12/24  0454 10/12/24  0207 10/11/24  2337   POC GLUCOSE mg/dl 288* 226* 368* 223* 179* 238* 225* 198* 75 142* 200* 179*     Results from last 7 days   Lab Units 10/13/24  0436 10/12/24  0847 10/12/24  0452 10/12/24  0044 10/11/24  2120 10/11/24  1926 10/11/24  1438 10/11/24  1205 10/11/24  1003 10/11/24  0550   GLUCOSE RANDOM mg/dL 234* 92 141* 177* 56* 124 206*  206* 336* 567* 681*     Results from last 7 days   Lab Units 10/11/24  0747   OSMOLALITY, SERUM mmol/*         Beta- Hydroxybutyrate   Date Value Ref Range Status   10/11/2024 5.20 (H) 0.02 - 0.27 mmol/L Final          Results from last 7 days   Lab Units 10/11/24  1438 10/11/24  0747   PH NAYA  7.400 7.290*   PCO2 NAYA mm Hg 50.6* 43.8   PO2 NAYA mm Hg 26.1* 35.0   HCO3 NAYA mmol/L 30.6* 20.6*   BASE EXC NAYA mmol/L 4.8 -5.8   O2 CONTENT NAYA ml/dL 9.2 11.8   O2 HGB, VENOUS % 49.8* 61.9         Results from last 7 days   Lab Units 10/11/24  0550   CK TOTAL U/L 81     Results from last 7 days   Lab Units 10/11/24  1926 10/11/24  1630 10/11/24  1438   HS TNI 0HR ng/L  --   --  110*   HS TNI 2HR ng/L  --  96*  --    HSTNI D2 ng/L  --  -14  --    HS TNI 4HR ng/L 89*  --   --    HSTNI D4 ng/L -21  --   --          Results from last 7 days   Lab Units 10/11/24  0550   PROTIME seconds 14.3   INR  1.08   PTT seconds 27     Results from last 7 days   Lab Units 10/11/24  0550   TSH 3RD GENERATON uIU/mL 0.425*     Results from last 7 days   Lab Units 10/13/24  0436 10/11/24  0747   PROCALCITONIN ng/ml 0.79* 0.21     Results from last 7 days   Lab Units 10/11/24  0747   LACTIC ACID mmol/L 1.5                 Results from last 7 days   Lab Units 10/11/24  0923 10/11/24  0747   OSMOLALITY, SERUM mmol/KG  --  365*   OSMO UR mmol/  --      Results from last 7 days   Lab Units 10/11/24  0923   CLARITY UA  Clear   COLOR UA  Light Yellow   SPEC GRAV UA  1.021   PH UA  5.5   GLUCOSE UA mg/dl >=1000 (1%)*   KETONES UA mg/dl 60 (2+)*    BLOOD UA  Negative   PROTEIN UA mg/dl Trace*   NITRITE UA  Negative   BILIRUBIN UA  Negative   UROBILINOGEN UA (BE) mg/dl <2.0   LEUKOCYTES UA  Elevated glucose may cause decreased leukocyte values. See urine microscopic for UWBC result*   WBC UA /hpf 1-2   RBC UA /hpf 2-4*   BACTERIA UA /hpf None Seen   EPITHELIAL CELLS WET PREP /hpf Occasional   SODIUM UR  14     Results from last 7 days   Lab Units 10/13/24  1119 10/11/24  1205   INFLUENZA A PCR  Negative Negative   INFLUENZA B PCR  Negative Negative   RSV PCR  Negative Negative                             Results from last 7 days   Lab Units 10/11/24  0707   BLOOD CULTURE  No Growth at 48 hrs.  No Growth at 48 hrs.                   Past Medical History:   Diagnosis Date    Allergic 480666    Depression 668003    Diabetes (HCC)     Diabetes mellitus (HCC) 857125    Smokes 1974 1/2 ppd smoker     Present on Admission:   Type 2 diabetes mellitus (HCC)   Tobacco user      Admitting Diagnosis: Cough [R05.9]  Cachexia (HCC) [R64]  DKA (diabetic ketoacidosis) (HCC) [E11.10]  Dry gangrene (HCC) [I96]  Fall, initial encounter [W19.XXXA]  Type 2 diabetes mellitus with hyperglycemia, with long-term current use of insulin (HCC) [E11.65, Z79.4]  Unspecified multiple injuries, initial encounter [T07.XXXA]  Age/Sex: 68 y.o. female    Network Utilization Review Department  ATTENTION: Please call with any questions or concerns to [phone] and carefully listen to the prompts so that you are directed to the right person. All voicemails are confidential.   For Discharge needs, contact Care Management DC Support Team at [phone] opt. 2  Send all requests for admission clinical reviews, approved or denied determinations and any other requests to dedicated fax number below belonging to the campus where the patient is receiving treatment. List of dedicated fax numbers for the Facilities:  FACILITY NAME UR FAX NUMBER   ADMISSION DENIALS (Administrative/Medical Necessity)  [phone]   DISCHARGE SUPPORT TEAM (NETWORK) [phone]   PARENT CHILD HEALTH (Maternity/NICU/Pediatrics) [phone]   [facility] [phone]   [facility] [phone]   [facility] [phone]   [facility] [phone]   [facility] [phone]   [facility] [phone]   [facility] [phone]   [facility] [phone]   [facility] [phone]   [facility] [phone]   [facility] [phone]   [facility] [phone]

## 2024-10-13 NOTE — PROGRESS NOTES
Progress Note - Hospitalist   Name: Sabiha Garcia 68 y.o. female I MRN: [id number]  Unit/Bed#: [facility] 814-01 I Date of Admission: 10/11/2024   Date of Service: 10/13/2024 I Hospital Day: 2    Assessment & Plan  Ketosis (HCC)  Minimal oral intake for 6-7 days  Starvation ketosis vs DKA on presentation - resolved  Oral intake improved  Insulin as below  Ambulatory dysfunction  Presented with generalized weakness, 2 falls with no head strike  States had been down for prolonged period  CT head - no acute pathology  TSH low with normal free T4, CK normal  PT/OT  Gangrene of toe of right foot (HCC)  Right fifth digit dry gangrene  Xray - Subtle periosteal reaction of the fifth digit proximal phalanx with adjacent soft tissue swelling. Findings raise suspicion for osteomyelitis   Podiatry following - LEADS ordered  Wound care with Betadine paint open to air  In the evening of 10/12/24 noted to have discharge, erythema, foul smell and begun on Vancomycin/Ceftrixaone  ID consult  Type 2 diabetes mellitus ([facility])  Lab Results   Component Value Date    HGBA1C 14.8 (H) 09/18/2024       Recent Labs     10/12/24  2046 10/13/24  0734 10/13/24  1108 10/13/24  1613   POCGLU 179* 223* 368* 226*       Blood Sugar Average: Last 72 hrs:  (P) 245.32    Severely uncontrolled as evidenced by HbA1c  On Levemir at home  Possible DKA on arrival - IV insulin was transitioned to Lantus 10 units, Humalog 5 TID on 10/12  Increase Lantus to 14 units in am, ct Humalog 5 TID, sliding scale insulin  Monitor blood sugars and adjust insulin accordingly  Endocrine consult    Tobacco user  Nicotine patch  Smoking cessation advised  Electrolyte abnormality  Hypomagnesemia, hypophosphatemia replete  Vitamin D deficiency  Continue supplementation  Lung nodule  CT chest with 3 mm right upper lobe noncalcified nodule  CT chest without contrast in 12 months    VTE Pharmacologic Prophylaxis: VTE Score: 3 Moderate Risk (Score 3-4) - Pharmacological DVT Prophylaxis  Ordered: heparin.    Mobility:   Basic Mobility Inpatient Raw Score: 18  JH-HLM Goal: 6: Walk 10 steps or more  JH-HLM Achieved: 3: Sit at edge of bed  JH-HLM Goal NOT achieved. Continue with multidisciplinary rounding and encourage appropriate mobility to improve upon JH-HLM goals.    Patient Centered Rounds: I performed bedside rounds with nursing staff today.   Discussions with Specialists or Other Care Team Provider: Discussed with podiatry    Education and Discussions with Family / Patient: Patient declined call to .     Current Length of Stay: 2 day(s)  Current Patient Status: Inpatient   Certification Statement: The patient will continue to require additional inpatient hospital stay due to as above    Code Status: Level 1 - Full Code    Subjective   Oral intake improved.  No fever.  No pain over right foot.     Objective :  Temp:  [98.1 °F (36.7 °C)-98.9 °F (37.2 °C)] 98.3 °F (36.8 °C)  HR:  [75-81] 81  BP: (128-156)/(62-68) 128/68  Resp:  [12-20] 16  SpO2:  [93 %-98 %] 97 %  O2 Device: None (Room air)    Body mass index is 16.41 kg/m².     Physical Exam  Vitals reviewed.   Constitutional:       Appearance: She is underweight.   HENT:      Nose: Nose normal.      Mouth/Throat:      Mouth: Mucous membranes are moist.   Eyes:      Extraocular Movements: Extraocular movements intact.   Cardiovascular:      Rate and Rhythm: Normal rate and regular rhythm.   Pulmonary:      Effort: Pulmonary effort is normal. No respiratory distress.      Breath sounds: Normal breath sounds. No wheezing.   Abdominal:      General: Bowel sounds are normal. There is no distension.      Palpations: Abdomen is soft.      Tenderness: There is no abdominal tenderness.   Musculoskeletal:         General: No swelling.      Cervical back: Neck supple.   Skin:     Comments: Right fifth digit dry gangrene   Neurological:      General: No focal deficit present.      Mental Status: She is alert and oriented to person, place, and  time.   Psychiatric:         Mood and Affect: Mood normal.         Behavior: Behavior normal.             Lab Results: I have reviewed the following results:   Results from last 7 days   Lab Units 10/13/24  0436 10/11/24  1205 10/11/24  0550   WBC Thousand/uL 10.30*   < > 18.14*   HEMOGLOBIN g/dL 11.1*   < > 12.8   HEMATOCRIT % 35.5   < > 42.2   PLATELETS Thousands/uL 263   < > 505*   BANDS PCT %  --   --  6   SEGS PCT % 60   < >  --    LYMPHO PCT % 31   < > 14   MONO PCT % 6   < > 6   EOS PCT % 2   < > 3    < > = values in this interval not displayed.     Results from last 7 days   Lab Units 10/13/24  0436 10/11/24  1926 10/11/24  1438   SODIUM mmol/L 138   < > 146  144   POTASSIUM mmol/L 4.0   < > 3.5  3.4*   CHLORIDE mmol/L 101   < > 107  106   CO2 mmol/L 30   < > 28  28   BUN mg/dL 11   < > 49*  47*   CREATININE mg/dL 0.52*   < > 0.89  0.87   ANION GAP mmol/L 7   < > 11  10   CALCIUM mg/dL 8.3*   < > 8.6  8.7   ALBUMIN g/dL  --   --  3.1*   TOTAL BILIRUBIN mg/dL  --   --  0.35   ALK PHOS U/L  --   --  98   ALT U/L  --   --  9   AST U/L  --   --  10*   GLUCOSE RANDOM mg/dL 234*   < > 206*  206*    < > = values in this interval not displayed.     Results from last 7 days   Lab Units 10/11/24  0550   INR  1.08     Results from last 7 days   Lab Units 10/13/24  1613 10/13/24  1108 10/13/24  0734 10/12/24  2046 10/12/24  1531 10/12/24  1108 10/12/24  0919 10/12/24  0815 10/12/24  0454 10/12/24  0207 10/11/24  2337 10/11/24  2225   POC GLUCOSE mg/dl 226* 368* 223* 179* 238* 225* 198* 75 142* 200* 179* 163*         Results from last 7 days   Lab Units 10/13/24  0436 10/11/24  0747   LACTIC ACID mmol/L  --  1.5   PROCALCITONIN ng/ml 0.79* 0.21       Recent Cultures (last 7 days):   Results from last 7 days   Lab Units 10/11/24  0707   BLOOD CULTURE  No Growth at 48 hrs.  No Growth at 48 hrs.     Last 24 Hours Medication List:     Current Facility-Administered Medications:     acetaminophen (TYLENOL) tablet 650  mg, Q8H PRN    cefTRIAXone (ROCEPHIN) 2,000 mg in dextrose 5 % 50 mL IVPB, Q24H, Last Rate: 2,000 mg (10/12/24 1803)    chlorhexidine (PERIDEX) 0.12 % oral rinse 15 mL, Q12H JUAN    Cholecalciferol (VITAMIN D3) tablet 2,000 Units, Daily    dextromethorphan-guaiFENesin (ROBITUSSIN DM) oral syrup 10 mL, Q6H PRN    heparin (porcine) subcutaneous injection 5,000 Units, Q8H JUAN    [START ON 10/14/2024] insulin glargine (LANTUS) subcutaneous injection 14 Units 0.14 mL, QAM    insulin lispro (HumALOG/ADMELOG) 100 units/mL subcutaneous injection 1-5 Units, TID AC **AND** Fingerstick Glucose (POCT), TID AC    insulin lispro (HumALOG/ADMELOG) 100 units/mL subcutaneous injection 5 Units, TID With Meals    lidocaine (LIDODERM) 5 % patch 1 patch, Daily    nicotine (NICODERM CQ) 21 mg/24 hr TD 24 hr patch 21 mg, Daily    phenol (CHLORASEPTIC) 1.4 % mucosal liquid 1 spray, Q2H PRN    vancomycin (VANCOCIN) IVPB (premix in dextrose) 1,000 mg 200 mL, Q12H    Administrative Statements   Today, Patient Was Seen By: Arpita Singh MD      **Please Note: This note may have been constructed using a voice recognition system.**

## 2024-10-13 NOTE — CASE MANAGEMENT
Case Management Assessment & Discharge Planning Note    Patient name Sabiha Garcia  Location [facility] 814/[facility] 814-01 MRN [id number]  : 1955 Date 10/13/2024       Current Admission Date: 10/11/2024  Current Admission Diagnosis:Type 2 diabetes mellitus (HCC)   Patient Active Problem List    Diagnosis Date Noted Date Diagnosed    Abnormal gait 2024     Weight loss, unintentional 2024     Claudication of both lower extremities (HCC) 2024     Disorder of refraction and accommodation 2024     Disorder of gallbladder 2024     Arthralgia of shoulder 2024     Eczema 2024     Generalized anxiety disorder 2022     Depression 2022     Fracture of surgical neck of right humerus 2022     History of gallbladder disease 2022     Numbness of toes 2022     Blood loss anemia 2020     Type 2 diabetes mellitus (HCC) 2020     Tobacco user 2020     Fracture of left hip (HCC) 2020     Hyperlipidemia 2013       LOS (days): 2  Geometric Mean LOS (GMLOS) (days):   Days to GMLOS:     OBJECTIVE:    Risk of Unplanned Readmission Score: 9.36         Current admission status: Inpatient       Preferred Pharmacy:   [id number] - [place] - [address]  [address]  [place] [address]  Phone: [phone] Fax: [phone]    Primary Care Provider: Alexys Blunt MD    Primary Insurance: [facility]  Secondary Insurance: [facility]    ASSESSMENT:  Active Health Care Proxies    There are no active Health Care Proxies on file.             Readmission Root Cause  30 Day Readmission: No    Patient Information  Admitted from:: Home  Mental Status: Alert  During Assessment patient was accompanied by: Not accompanied during assessment  Assessment information provided by:: Patient  Primary Caregiver: Self  Support Systems: Friends/neighbors  What city do you live in?: [place]  Home entry access  options. Select all that apply.: Stairs  Number of steps to enter home.: 2  Type of Current Residence: Apartment  Floor Level: 1  Upon entering residence, is there a bedroom on the main floor (no further steps)?: Yes  Upon entering residence, is there a bathroom on the main floor (no further steps)?: Yes  Living Arrangements: Lives Alone  Is patient a ?: Yes (6 years [employer]/)  Is patient active with [facility] ( Affairs)?: No    Activities of Daily Living Prior to Admission  Functional Status: Independent  Completes ADLs independently?: Yes  Ambulates independently?: Yes  Does patient use assisted devices?: Yes  Assisted Devices (DME) used: Straight Cane, Rollator  Does patient have a history of Outpatient Therapy (PT/OT)?: Yes  Does the patient have a history of Short-Term Rehab?: No  Does patient have a history of HHC?: No  Does patient currently have HHC?: No         Patient Information Continued  Income Source: Pension/USP  Does patient have prescription coverage?: Yes  Does patient receive dialysis treatments?: No  Does patient have a history of substance abuse?: No  Does patient have a history of Mental Health Diagnosis?: Yes (Anxiety/depression)  Is patient receiving treatment for mental health?: No. Patient declined treatment information.  Has patient received inpatient treatment related to mental health in the last 2 years?: No         Means of Transportation  Means of Transport to Appts:: Other (Comment) (Vishal Jimenez)      Social Determinants of Health (SDOH)      Flowsheet Row Most Recent Value   Housing Stability    In the last 12 months, was there a time when you were not able to pay the mortgage or rent on time? N   In the past 12 months, how many times have you moved where you were living? 0   At any time in the past 12 months, were you homeless or living in a shelter (including now)? N   Transportation Needs    In the past 12 months, has lack of transportation kept you  from medical appointments or from getting medications? no   In the past 12 months, has lack of transportation kept you from meetings, work, or from getting things needed for daily living? No   Food Insecurity    Within the past 12 months, you worried that your food would run out before you got the money to buy more. Never true   Within the past 12 months, the food you bought just didn't last and you didn't have money to get more. Never true   Utilities    In the past 12 months has the electric, gas, oil, or water company threatened to shut off services in your home? No            DISCHARGE DETAILS:    Discharge planning discussed with:: Patient  Freedom of Choice: Yes  Comments - Freedom of Choice: Discussed FOC  CM contacted family/caregiver?: No- see comments (Declined)         CM reviewed d/c planning process including the following: identifying help at home, patient preference for d/c planning needs, availability of treatment team to discuss questions or concerns patient and/or family may have regarding understanding medications and recognizing signs and symptoms once discharged.  CM also encouraged patient to follow up with all recommended appointments after discharge. Patient advised of importance for patient and family to participate in managing patient’s medical well being.      CM met w/pt at bedside to introduce self & CM role. Pt lives alone in 1st floor apartment w/2 DAVEY.  Pt has no one to help her at home. States she has been IADL's PTH, using a SPC and rollator. Most recently pt stated she has been having difficulty taking care of herself & she is interested in Assisted Living options.  Explained AL is private pay - discussed w/pt sending referral to agency that can assist w/helping pt find an AL. Pt agreeable to referral. Referral sent to [facility] via [employer].  Pt denies any prior STR but would be agreeable to blanket referral for review if recommended.  Per chart review - no PT/OT orders noted. CM  sent message to provider requesting orders.  Therapy orders placed by provider.  Confirmed pt's emergency contact - per chart review - [facility]  Lakshmi Moreira.  Pt being followed by OP CM.  CM to follow for dcp needs pending medical course

## 2024-10-13 NOTE — ASSESSMENT & PLAN NOTE
Right fifth digit dry gangrene  Xray - Subtle periosteal reaction of the fifth digit proximal phalanx with adjacent soft tissue swelling. Findings raise suspicion for osteomyelitis   Podiatry following - LEADS ordered  Wound care with Betadine paint open to air  In the evening of 10/12/24 noted to have discharge, erythema, foul smell and begun on Vancomycin/Ceftrixaone  ID consult

## 2024-10-13 NOTE — ASSESSMENT & PLAN NOTE
Minimal oral intake for 6-7 days  Starvation ketosis vs DKA on presentation - resolved  Oral intake improved  Insulin as below

## 2024-10-13 NOTE — ASSESSMENT & PLAN NOTE
Presented with generalized weakness, 2 falls with no head strike  States had been down for prolonged period  CT head - no acute pathology  TSH low with normal free T4, CK normal  PT/OT

## 2024-10-13 NOTE — ASSESSMENT & PLAN NOTE
Lab Results   Component Value Date    HGBA1C 14.8 (H) 09/18/2024       Recent Labs     10/12/24  2046 10/13/24  0734 10/13/24  1108 10/13/24  1613   POCGLU 179* 223* 368* 226*       Blood Sugar Average: Last 72 hrs:  (P) 245.32    Severely uncontrolled as evidenced by HbA1c  On Levemir at home  Possible DKA on arrival - IV insulin was transitioned to Lantus 10 units, Humalog 5 TID on 10/12  Increase Lantus to 14 units in am, ct Humalog 5 TID, sliding scale insulin  Monitor blood sugars and adjust insulin accordingly  Endocrine consult

## 2024-10-13 NOTE — PROGRESS NOTES
Sabiha Garcia is a 68 y.o. female who is currently ordered Vancomycin IV with management by the Pharmacy Consult service.  Relevant clinical data and objective / subjective history reviewed.  Vancomycin Assessment:  Indication and Goal AUC/Trough: Bone/joint infection (goal -600, trough >10)  Clinical Status: stable  Micro:   COVID/RSV/Influenza negative; Blood culture no growth at 48 hours  Renal Function:  SCr: 0.52 mg/dL  CrCl: 82.4 mL/min  Renal replacement: Not on dialysis  Days of Therapy: 2  Current Dose: 1000 mg IV Q12H  Vancomycin Plan:  New Dosin mg IV q12h  Estimated AUC: 534 mcg*hr/mL  Estimated Trough: 12.8 mcg/mL  Next Level: 10/15/2024 @ 0600  Renal Function Monitoring: Daily BMP and UOP  Pharmacy will continue to follow closely for s/sx of nephrotoxicity, infusion reactions and appropriateness of therapy.  BMP and CBC will be ordered per protocol. We will continue to follow the patient’s culture results and clinical progress daily.

## 2024-10-14 ENCOUNTER — APPOINTMENT (INPATIENT)
Dept: NON INVASIVE DIAGNOSTICS | Facility: HOSPITAL | Age: 69
DRG: 271 | End: 2024-10-14
Payer: COMMERCIAL

## 2024-10-14 PROBLEM — L03.115 CELLULITIS OF RIGHT FOOT: Status: ACTIVE | Noted: 2024-10-14

## 2024-10-14 LAB
ANION GAP SERPL CALCULATED.3IONS-SCNC: 8 MMOL/L (ref 4–13)
BASOPHILS # BLD AUTO: 0.07 THOUSANDS/ΜL (ref 0–0.1)
BASOPHILS NFR BLD AUTO: 1 % (ref 0–1)
BUN SERPL-MCNC: 9 MG/DL (ref 5–25)
CALCIUM SERPL-MCNC: 8.6 MG/DL (ref 8.4–10.2)
CHLORIDE SERPL-SCNC: 100 MMOL/L (ref 96–108)
CO2 SERPL-SCNC: 32 MMOL/L (ref 21–32)
CREAT SERPL-MCNC: 0.61 MG/DL (ref 0.6–1.3)
EOSINOPHIL # BLD AUTO: 0.2 THOUSAND/ΜL (ref 0–0.61)
EOSINOPHIL NFR BLD AUTO: 2 % (ref 0–6)
ERYTHROCYTE [DISTWIDTH] IN BLOOD BY AUTOMATED COUNT: 14.5 % (ref 11.6–15.1)
GFR SERPL CREATININE-BSD FRML MDRD: 93 ML/MIN/1.73SQ M
GLUCOSE SERPL-MCNC: 212 MG/DL (ref 65–140)
GLUCOSE SERPL-MCNC: 231 MG/DL (ref 65–140)
GLUCOSE SERPL-MCNC: 340 MG/DL (ref 65–140)
GLUCOSE SERPL-MCNC: 400 MG/DL (ref 65–140)
GLUCOSE SERPL-MCNC: 409 MG/DL (ref 65–140)
HCT VFR BLD AUTO: 35.6 % (ref 34.8–46.1)
HGB BLD-MCNC: 11.2 G/DL (ref 11.5–15.4)
IMM GRANULOCYTES # BLD AUTO: 0.09 THOUSAND/UL (ref 0–0.2)
IMM GRANULOCYTES NFR BLD AUTO: 1 % (ref 0–2)
LYMPHOCYTES # BLD AUTO: 3.32 THOUSANDS/ΜL (ref 0.6–4.47)
LYMPHOCYTES NFR BLD AUTO: 34 % (ref 14–44)
MAGNESIUM SERPL-MCNC: 2.2 MG/DL (ref 1.9–2.7)
MCH RBC QN AUTO: 27.9 PG (ref 26.8–34.3)
MCHC RBC AUTO-ENTMCNC: 31.5 G/DL (ref 31.4–37.4)
MCV RBC AUTO: 89 FL (ref 82–98)
MONOCYTES # BLD AUTO: 0.61 THOUSAND/ΜL (ref 0.17–1.22)
MONOCYTES NFR BLD AUTO: 6 % (ref 4–12)
NEUTROPHILS # BLD AUTO: 5.48 THOUSANDS/ΜL (ref 1.85–7.62)
NEUTS SEG NFR BLD AUTO: 56 % (ref 43–75)
NRBC BLD AUTO-RTO: 0 /100 WBCS
PHOSPHATE SERPL-MCNC: 3.7 MG/DL (ref 2.3–4.1)
PLATELET # BLD AUTO: 269 THOUSANDS/UL (ref 149–390)
PMV BLD AUTO: 9.4 FL (ref 8.9–12.7)
POTASSIUM SERPL-SCNC: 4.5 MMOL/L (ref 3.5–5.3)
PROCALCITONIN SERPL-MCNC: 4.29 NG/ML
RBC # BLD AUTO: 4.01 MILLION/UL (ref 3.81–5.12)
SODIUM SERPL-SCNC: 140 MMOL/L (ref 135–147)
WBC # BLD AUTO: 9.77 THOUSAND/UL (ref 4.31–10.16)

## 2024-10-14 PROCEDURE — 85025 COMPLETE CBC W/AUTO DIFF WBC: CPT | Performed by: INTERNAL MEDICINE

## 2024-10-14 PROCEDURE — 80048 BASIC METABOLIC PNL TOTAL CA: CPT | Performed by: INTERNAL MEDICINE

## 2024-10-14 PROCEDURE — 93925 LOWER EXTREMITY STUDY: CPT

## 2024-10-14 PROCEDURE — 99222 1ST HOSP IP/OBS MODERATE 55: CPT | Performed by: INTERNAL MEDICINE

## 2024-10-14 PROCEDURE — 82948 REAGENT STRIP/BLOOD GLUCOSE: CPT

## 2024-10-14 PROCEDURE — 83735 ASSAY OF MAGNESIUM: CPT | Performed by: INTERNAL MEDICINE

## 2024-10-14 PROCEDURE — 84100 ASSAY OF PHOSPHORUS: CPT | Performed by: INTERNAL MEDICINE

## 2024-10-14 PROCEDURE — 99223 1ST HOSP IP/OBS HIGH 75: CPT | Performed by: SURGERY

## 2024-10-14 PROCEDURE — 99406 BEHAV CHNG SMOKING 3-10 MIN: CPT | Performed by: SURGERY

## 2024-10-14 PROCEDURE — 99232 SBSQ HOSP IP/OBS MODERATE 35: CPT | Performed by: INTERNAL MEDICINE

## 2024-10-14 PROCEDURE — 93923 UPR/LXTR ART STDY 3+ LVLS: CPT

## 2024-10-14 PROCEDURE — 92610 EVALUATE SWALLOWING FUNCTION: CPT

## 2024-10-14 PROCEDURE — 99223 1ST HOSP IP/OBS HIGH 75: CPT | Performed by: INTERNAL MEDICINE

## 2024-10-14 PROCEDURE — NC001 PR NO CHARGE: Performed by: PODIATRIST

## 2024-10-14 PROCEDURE — 84145 PROCALCITONIN (PCT): CPT | Performed by: INTERNAL MEDICINE

## 2024-10-14 RX ORDER — IBUPROFEN 400 MG/1
400 TABLET, FILM COATED ORAL ONCE
Status: COMPLETED | OUTPATIENT
Start: 2024-10-14 | End: 2024-10-14

## 2024-10-14 RX ORDER — INSULIN GLARGINE 100 [IU]/ML
25 INJECTION, SOLUTION SUBCUTANEOUS EVERY MORNING
Status: DISCONTINUED | OUTPATIENT
Start: 2024-10-15 | End: 2024-10-16

## 2024-10-14 RX ORDER — METRONIDAZOLE 500 MG/1
500 TABLET ORAL EVERY 12 HOURS SCHEDULED
Status: DISCONTINUED | OUTPATIENT
Start: 2024-10-14 | End: 2024-10-28

## 2024-10-14 RX ORDER — INSULIN GLARGINE 100 [IU]/ML
6 INJECTION, SOLUTION SUBCUTANEOUS ONCE
Status: COMPLETED | OUTPATIENT
Start: 2024-10-14 | End: 2024-10-14

## 2024-10-14 RX ORDER — INSULIN GLARGINE 100 [IU]/ML
6 INJECTION, SOLUTION SUBCUTANEOUS ONCE
Status: DISCONTINUED | OUTPATIENT
Start: 2024-10-14 | End: 2024-10-14

## 2024-10-14 RX ORDER — INSULIN LISPRO 100 [IU]/ML
8 INJECTION, SOLUTION INTRAVENOUS; SUBCUTANEOUS
Status: DISCONTINUED | OUTPATIENT
Start: 2024-10-14 | End: 2024-10-14

## 2024-10-14 RX ORDER — IBUPROFEN 400 MG/1
400 TABLET, FILM COATED ORAL ONCE
Status: DISCONTINUED | OUTPATIENT
Start: 2024-10-14 | End: 2024-10-14

## 2024-10-14 RX ORDER — INSULIN LISPRO 100 [IU]/ML
10 INJECTION, SOLUTION INTRAVENOUS; SUBCUTANEOUS
Status: DISCONTINUED | OUTPATIENT
Start: 2024-10-14 | End: 2024-10-16

## 2024-10-14 RX ORDER — VANCOMYCIN HYDROCHLORIDE 750 MG/150ML
750 INJECTION, SOLUTION INTRAVENOUS EVERY 12 HOURS
Status: DISCONTINUED | OUTPATIENT
Start: 2024-10-14 | End: 2024-10-14

## 2024-10-14 RX ORDER — CEFAZOLIN SODIUM 2 G/50ML
2000 SOLUTION INTRAVENOUS EVERY 8 HOURS
Status: DISCONTINUED | OUTPATIENT
Start: 2024-10-14 | End: 2024-10-28

## 2024-10-14 RX ORDER — INSULIN GLARGINE 100 [IU]/ML
20 INJECTION, SOLUTION SUBCUTANEOUS EVERY MORNING
Status: DISCONTINUED | OUTPATIENT
Start: 2024-10-15 | End: 2024-10-14

## 2024-10-14 RX ADMIN — INSULIN GLARGINE 14 UNITS: 100 INJECTION, SOLUTION SUBCUTANEOUS at 08:19

## 2024-10-14 RX ADMIN — CEFAZOLIN SODIUM 2000 MG: 2 SOLUTION INTRAVENOUS at 16:00

## 2024-10-14 RX ADMIN — ACETAMINOPHEN 975 MG: 325 TABLET, FILM COATED ORAL at 22:34

## 2024-10-14 RX ADMIN — VANCOMYCIN HYDROCHLORIDE 750 MG: 750 INJECTION, SOLUTION INTRAVENOUS at 11:03

## 2024-10-14 RX ADMIN — HEPARIN SODIUM 5000 UNITS: 5000 INJECTION INTRAVENOUS; SUBCUTANEOUS at 22:34

## 2024-10-14 RX ADMIN — CHLORHEXIDINE GLUCONATE 0.12% ORAL RINSE 15 ML: 1.2 LIQUID ORAL at 08:21

## 2024-10-14 RX ADMIN — NICOTINE 21 MG: 21 PATCH, EXTENDED RELEASE TRANSDERMAL at 08:23

## 2024-10-14 RX ADMIN — ACETAMINOPHEN 975 MG: 325 TABLET, FILM COATED ORAL at 03:56

## 2024-10-14 RX ADMIN — CEFAZOLIN SODIUM 2000 MG: 2 SOLUTION INTRAVENOUS at 22:38

## 2024-10-14 RX ADMIN — LIDOCAINE 1 PATCH: 50 PATCH TOPICAL at 08:22

## 2024-10-14 RX ADMIN — INSULIN LISPRO 8 UNITS: 100 INJECTION, SOLUTION INTRAVENOUS; SUBCUTANEOUS at 11:57

## 2024-10-14 RX ADMIN — INSULIN LISPRO 1 UNITS: 100 INJECTION, SOLUTION INTRAVENOUS; SUBCUTANEOUS at 17:22

## 2024-10-14 RX ADMIN — INSULIN LISPRO 10 UNITS: 100 INJECTION, SOLUTION INTRAVENOUS; SUBCUTANEOUS at 17:23

## 2024-10-14 RX ADMIN — Medication 2000 UNITS: at 08:21

## 2024-10-14 RX ADMIN — INSULIN LISPRO 5 UNITS: 100 INJECTION, SOLUTION INTRAVENOUS; SUBCUTANEOUS at 08:13

## 2024-10-14 RX ADMIN — INSULIN LISPRO 4 UNITS: 100 INJECTION, SOLUTION INTRAVENOUS; SUBCUTANEOUS at 08:11

## 2024-10-14 RX ADMIN — HEPARIN SODIUM 5000 UNITS: 5000 INJECTION INTRAVENOUS; SUBCUTANEOUS at 14:40

## 2024-10-14 RX ADMIN — IBUPROFEN 400 MG: 400 TABLET, FILM COATED ORAL at 10:21

## 2024-10-14 RX ADMIN — CHLORHEXIDINE GLUCONATE 0.12% ORAL RINSE 15 ML: 1.2 LIQUID ORAL at 22:33

## 2024-10-14 RX ADMIN — INSULIN LISPRO 3 UNITS: 100 INJECTION, SOLUTION INTRAVENOUS; SUBCUTANEOUS at 11:57

## 2024-10-14 RX ADMIN — HEPARIN SODIUM 5000 UNITS: 5000 INJECTION INTRAVENOUS; SUBCUTANEOUS at 05:56

## 2024-10-14 RX ADMIN — METRONIDAZOLE 500 MG: 500 TABLET ORAL at 22:34

## 2024-10-14 RX ADMIN — INSULIN GLARGINE 6 UNITS: 100 INJECTION, SOLUTION SUBCUTANEOUS at 10:23

## 2024-10-14 NOTE — OCCUPATIONAL THERAPY NOTE
Occupational Therapy Cancel Note         Patient Name: Sabiha Garcia  Today's Date: 10/14/2024         10/14/24 1123   OT Last Visit   OT Visit Date 10/14/24   Note Type   Note type Cancelled Session   Cancel Reasons Patient to operating room     OT orders received. Chart reviewed. Per notes, plan for partial 5th ray amputation with podiatry. Will continue to follow and see pt as appropriate and able post-op.    Yovana Quinonez MS, OTR/L

## 2024-10-14 NOTE — PROGRESS NOTES
"[facility] Podiatry - Progress Note  Patient: Sabiha Garcia 68 y.o. female   MRN: [id number]  PCP: Alexys Blunt MD  Unit/Bed#: [facility] 814-01 Encounter: [id number]  Date Of Visit: 10/14/24    ASSESSMENT:    Sabiha Garcia is a 68 y.o. female with:    [place] 4 DFU Right fifth digit  Cellulitis right foot  Uncontrolled type 2 diabetes mellitus with last A1c of 14.8% September 2024  PAD  Smoking history      PLAN:      Patient seen and evaluated in emergency department with attending present.  Of note dry gangrene to distal aspect of right 5th digit with minor wet changes to base of the toe with proximal cellulitis. Plan to continue local wound care consisting of Betadine paint open to air.  LEADs ordered for evaluation of healing potential.  Possible podiatric surgical intervention pending further workup of vascular status.  Labs and vitals reviewed.  Patient is afebrile.  No leukocytosis, continually trending down.  Antibiotics per primary team.  Follow-up blood cultures negative.  Local wound care consisting of Betadine paint open to air. Wound care instructions placed.  Appreciate nursing assistance with this.  Reviewed right foot x-rays: Subtle periosteal reaction of the fifth digit proximal phalanx with adjacent soft tissue swelling. Findings raise suspicion for osteomyelitis. Negative for soft tissue emphysema.  Elevation and offloading on green foam wedges or pillows when non-ambulatory.  Rest of care per primary team.  Plan discussed with Dr. Gonzalez       Weight bearing status: WBAT in surgical shoe      SUBJECTIVE:     The patient was seen, evaluated, and assessed at bedside today. The patient was awake, alert, and in no acute distress. No acute events overnight. The patient reports she is starting to eat and drink much more. No pain to the toe. Patient denies N/V/F/chills/SOB/CP.      OBJECTIVE:     Vitals:   /80   Pulse 80   Temp 98.4 °F (36.9 °C)   Resp 15   Ht 5' 9\" (1.753 m)   Wt " "51.4 kg (113 lb 3.7 oz)   SpO2 97%   BMI 16.72 kg/m²     Temp (24hrs), Av.5 °F (36.9 °C), Min:98.3 °F (36.8 °C), Max:98.7 °F (37.1 °C)      Physical Exam:     General:  Alert, cooperative, and in no distress.  Lower extremity exam:  Cardiovascular status at baseline.  Neurological status at baseline.  Musculoskeletal status at baseline. No calf tenderness noted.     Lower extremity wound(s) as noted below:  Wound #: 1  Location: 5th fifth digit  Circumferential wet changes to the base of the 5th digit proximal to the dry gangrene  Deepest Tissue Noted in Base: Dermis  Probe to Bone: No  Peripheral Skin Description: Gangrenous distally  Granulation: 0% Fibrotic Tissue: 0% Necrotic Tissue: 100%   Drainage Amount: minimal  Signs of Infection: Yes, cellulitis    Clinical Images 10/14/24:      Additional Data:     Labs:    Results from last 7 days   Lab Units 10/14/24  0546   WBC Thousand/uL 9.77   HEMOGLOBIN g/dL 11.2*   HEMATOCRIT % 35.6   PLATELETS Thousands/uL 269   SEGS PCT % 56   LYMPHO PCT % 34   MONO PCT % 6   EOS PCT % 2     Results from last 7 days   Lab Units 10/14/24  0546 10/11/24  1926 10/11/24  1438   POTASSIUM mmol/L 4.5   < > 3.5  3.4*   CHLORIDE mmol/L 100   < > 107  106   CO2 mmol/L 32   < > 28  28   BUN mg/dL 9   < > 49*  47*   CREATININE mg/dL 0.61   < > 0.89  0.87   CALCIUM mg/dL 8.6   < > 8.6  8.7   ALK PHOS U/L  --   --  98   ALT U/L  --   --  9   AST U/L  --   --  10*    < > = values in this interval not displayed.     Results from last 7 days   Lab Units 10/11/24  0550   INR  1.08       * I Have Reviewed All Lab Data Listed Above.    Recent Cultures (last 7 days):     Results from last 7 days   Lab Units 10/11/24  0707   BLOOD CULTURE  No Growth at 48 hrs.  No Growth at 48 hrs.               ** Please Note: Portions of the record may have been created with voice recognition software. Occasional wrong word or \"sound a like\" substitutions may have occurred due to the inherent " limitations of voice recognition software. Read the chart carefully and recognize, using context, where substitutions have occurred. **

## 2024-10-14 NOTE — ASSESSMENT & PLAN NOTE
Lab Results   Component Value Date    HGBA1C 14.8 (H) 09/18/2024       Recent Labs     10/13/24  1613 10/13/24  2049 10/14/24  0753 10/14/24  1110   POCGLU 226* 288* 400* 340*       Blood Sugar Average: Last 72 hrs:  (P) 255.75   -Continue management per primary team

## 2024-10-14 NOTE — ASSESSMENT & PLAN NOTE
Dry gangrene of right fifth digit. Right foot xray positive for periosteal reaction of the 5th digit proximal phalanx with suspicion for osteomyelitis. Podiatry consulted, given gangrenous changes to the entire 5th digit, podiatry to plan for partial 5th ray amputation pending improvement in vascular status. No indication for emergent source control at this time, likely ischemic changes vs. localized cellulitis to the right extremity. Non invasive arterial studies ordered.     -Continue antibiotics as stated below   -Recommend resection of all infected bone pending perfusion optimization     -Follow up non-invasive studies with possible vascular consult    -Continue to trend WBC/vitals   -Wound care per podiatry, appreciate additional recommendations

## 2024-10-14 NOTE — CONSULTS
Consultation - Sabiha Garcia 68 y.o. female MRN: [id number]    [facility] Encounter: [id number]      Assessment & Plan     Assessment:  This is a 68 y.o.-year-old female with diabetes with hyperglycemia, nicotine dependence presenting with presumtpive DKA s/p insulin drip with dry gangrene of right 5th toe.    #1 diabetes Type 2:  -HBA1C 14.8  -Patient takes insulin Tresiba 10-20 units at home.  Has not been taking it for almost weeks.  -She also added she is not able to see the insulin pen because she cannot see from left eye most likely 2/2 cataract and right eye is blurry  -Has not been eating and drinking enough for past couple of weeks.  -Her B sugars at home range between 180-230. One of PCPs note mention as high as 400. Also, her fingertsicks were not giving any reading probably 2/2 high B sugars  -She has never been hypoglycemic in the past.  -She was initially started on insulin drip 2/2 POA glucose 681; beta hydroxy butyrate 5.20  VBG 7.29/44/35/21  UA 2+ ketones, glucose  -Initially on Insulin drip on 10/11 but now off since 10/12 as the anion gap closed.  -States her appetite has improved significantly.  -Currently on Insulin Lantus 20 units and Lispro 8 units TID with meals.  -Will increase Lantus to 25 units and Lispro to 10 units TID with meals.  -Correctional scale    #2 diabetic ketoacidosis presumptive 2/2 DKA  -As above  #3 dry gangrene right fifth toe  #4 nicotine dependence    Plan:  -Increase Lantus insulin to 25 units and Lispro 10 units TID with meals.  -Correctional insulin as needed.      Inpatient consult to Endocrinology     Date/Time  10/14/2024 1:00 PM     Performed by  Daryn Parrish MD   Authorized by  Arpita Singh MD             CC: Fall    History of Present Illness     HPI: Sabiha Garcia is a 68 y.o. year old female with type 2 DME, osteoporosis presenting after Generalized weakness and falls.  Patient reports that she has been feeling weak for weeks and was not able  to get out of the bed.  She is not eating and drinking enough at home because of the inability to get out of her bed.  She also states that she had her right fifth toe and tried to clean it up herself leading to worsening infection of the right.    Patient states she was diagnosed with DM2 6-7 years ago and was initially on metformin, levemir 40 units. Her PCP also tried to add Semaglutide as per chart review in 2023. But, she switched her PCP and was changed to Tresiba as her Hba1c was 15. As per chart review, her fingerticks were quite high  was also mentioned as high as 400s. She was not feeling well recently and has not been very compliant with her insulin at home.    Review of Systems   Constitutional:  Positive for fatigue. Negative for chills.   HENT:  Positive for sore throat.    Eyes:  Positive for visual disturbance.   Respiratory:  Negative for chest tightness and shortness of breath.    Cardiovascular:  Negative for chest pain, palpitations and leg swelling.   Gastrointestinal:  Negative for abdominal pain.   Genitourinary:  Negative for dysuria.   Neurological:  Positive for weakness.       Historical Information   Past Medical History:   Diagnosis Date    Allergic 382153    Depression 517170    Diabetes (HCC)     Diabetes mellitus (HCC) 434572    Smokes 1974    1/2 ppd smoker     Past Surgical History:   Procedure Laterality Date    APPENDECTOMY      CHOLECYSTECTOMY      FRACTURE SURGERY      NC OPTX FEM SHFT FX W/INSJ IMED IMPLT W/WO SCREW Left 05/27/2020    Procedure: INSERTION NAIL IM FEMUR ANTEGRADE (TROCHANTERIC);  Surgeon: Jose Lyons MD;  Location: [facility] OR;  Service: Orthopedics    TONSILLECTOMY      TUBAL LIGATION       Social History   Social History     Substance and Sexual Activity   Alcohol Use Never     Social History     Substance and Sexual Activity   Drug Use Never     Social History     Tobacco Use   Smoking Status Every Day    Current packs/day: 0.50    Average packs/day: 0.5  packs/day for 25.0 years (12.5 ttl pk-yrs)    Types: Cigarettes   Smokeless Tobacco Never     Family History:   Family History   Problem Relation Age of Onset    Stroke Mother     No Known Problems Father         left her at 18 months old.    Cholelithiasis Sister         Vascular disease    No Known Problems Half-Brother        Meds/Allergies   Current Facility-Administered Medications   Medication Dose Route Frequency Provider Last Rate Last Admin    acetaminophen (TYLENOL) tablet 975 mg  975 mg Oral Q8H PRN Nino Dowling MD   975 mg at 10/14/24 0356    ceFAZolin (ANCEF) IVPB (premix in dextrose) 2,000 mg 50 mL  2,000 mg Intravenous Q8H Sander Madera  mL/hr at 10/14/24 1600 2,000 mg at 10/14/24 1600    chlorhexidine (PERIDEX) 0.12 % oral rinse 15 mL  15 mL Mouth/Throat Q12H [facility] Geri Justice MD   15 mL at 10/14/24 0821    Cholecalciferol (VITAMIN D3) tablet 2,000 Units  2,000 Units Oral Daily Octaviano Chau DO   2,000 Units at 10/14/24 0821    dextromethorphan-guaiFENesin (ROBITUSSIN DM) oral syrup 10 mL  10 mL Oral Q6H PRN Nino Dowling MD   10 mL at 10/12/24 0052    heparin (porcine) subcutaneous injection 5,000 Units  5,000 Units Subcutaneous Q8H [facility] Nick Bailey MD   5,000 Units at 10/14/24 1440    [START ON 10/15/2024] insulin glargine (LANTUS) subcutaneous injection 25 Units 0.25 mL  25 Units Subcutaneous QAM Gala Jones MD        insulin lispro (HumALOG/ADMELOG) 100 units/mL subcutaneous injection 1-5 Units  1-5 Units Subcutaneous TID AC Geri Justice MD   3 Units at 10/14/24 1157    insulin lispro (HumALOG/ADMELOG) 100 units/mL subcutaneous injection 10 Units  10 Units Subcutaneous TID With Meals Gala Jones MD        lidocaine (LIDODERM) 5 % patch 1 patch  1 patch Topical Daily Nino Dowling MD   1 patch at 10/14/24 0822    metroNIDAZOLE (FLAGYL) tablet 500 mg  500 mg Oral Q12H [facility] Sander Madera MD        nicotine (NICODERM CQ) 21 mg/24 hr TD 24 hr patch 21 mg  21  "mg Transdermal Daily Octaviano Chau DO   21 mg at 10/14/24 0823    phenol (CHLORASEPTIC) 1.4 % mucosal liquid 1 spray  1 spray Mouth/Throat Q2H PRN Octaviano Chau DO   1 spray at 10/13/24 0445     Allergies   Allergen Reactions    Penicillins Hives    Prednisone Other (See Comments)     personality change       Objective   Vitals: Blood pressure 141/64, pulse 85, temperature 98.2 °F (36.8 °C), resp. rate 19, height 5' 9\" (1.753 m), weight 51.4 kg (113 lb 3.7 oz), SpO2 96%.    Intake/Output Summary (Last 24 hours) at 10/14/2024 1627  Last data filed at 10/14/2024 1559  Gross per 24 hour   Intake 952 ml   Output 1750 ml   Net -798 ml     Invasive Devices       Peripheral Intravenous Line  Duration             Peripheral IV 10/14/24 Left;Ventral (anterior) Forearm <1 day                    Physical Exam  Vitals reviewed.   Constitutional:       Comments: cachectic   HENT:      Mouth/Throat:      Mouth: Mucous membranes are moist.   Cardiovascular:      Rate and Rhythm: Normal rate and regular rhythm.      Pulses: Normal pulses.      Heart sounds: Normal heart sounds.   Pulmonary:      Effort: Pulmonary effort is normal.      Breath sounds: Normal breath sounds.   Abdominal:      Palpations: Abdomen is soft.   Musculoskeletal:      Right lower leg: No edema.      Left lower leg: No edema.   Neurological:      Mental Status: She is alert and oriented to person, place, and time.         The history was obtained from the review of the chart, patient.    Lab Results:       Lab Results   Component Value Date    WBC 9.77 10/14/2024    HGB 11.2 (L) 10/14/2024    HCT 35.6 10/14/2024    MCV 89 10/14/2024     10/14/2024     Lab Results   Component Value Date/Time    BUN 9 10/14/2024 05:46 AM    BUN 14 09/18/2024 09:29 AM    BUN 13 04/06/2022 04:08 PM    K 4.5 10/14/2024 05:46 AM    K 4.6 09/18/2024 09:29 AM    K 4.5 04/06/2022 04:08 PM     10/14/2024 05:46 AM    CL 95 (L) 09/18/2024 09:29 AM    CL 98 (L) " "04/06/2022 04:08 PM    CO2 32 10/14/2024 05:46 AM    CO2 33 (H) 09/18/2024 09:29 AM    CO2 28 04/06/2022 04:08 PM    CREATININE 0.61 10/14/2024 05:46 AM    CREATININE 0.86 04/06/2022 04:08 PM    AST 10 (L) 10/11/2024 02:38 PM    AST 11 09/18/2024 09:29 AM    AST 35 04/06/2022 04:08 PM    ALT 9 10/11/2024 02:38 PM    ALT 8 09/18/2024 09:29 AM    ALT 23 04/06/2022 04:08 PM    TP 5.8 (L) 10/11/2024 02:38 PM    TP 6.6 09/18/2024 09:29 AM    TP 7.0 04/06/2022 04:08 PM    ALB 3.1 (L) 10/11/2024 02:38 PM    ALB 3.5 04/06/2022 04:08 PM     No results for input(s): \"CHOL\", \"HDL\", \"LDL\", \"TRIG\", \"VLDL\" in the last 72 hours.  No results found for: \"MICROALBUR\", \"[id number]\"  POC Glucose (mg/dl)   Date Value   10/14/2024 212 (H)   10/14/2024 340 (H)       Imaging Studies:     Portions of the record may have been created with voice recognition software.    "

## 2024-10-14 NOTE — CONSULTS
Consultation - Infectious Disease   Name: Sabiha Garcia 68 y.o. female I MRN: [id number]  [facility] I Date of Admission: 10/11/2024   Date of Service: 10/14/2024 I Hospital Day: 3   Inpatient consult to Infectious Diseases  Consult performed by: Amy Stanton DPM  Consult ordered by: Arpita Singh MD        Physician Requesting Evaluation: Arpita Singh MD   Reason for Evaluation / Principal Problem: 1. Right 5th digit gangrene      Assessment & Plan  Gangrene of toe of right foot (HCC)  Dry gangrene of right fifth digit. Right foot xray positive for periosteal reaction of the 5th digit proximal phalanx with suspicion for osteomyelitis. Podiatry consulted, given gangrenous changes to the entire 5th digit, podiatry to plan for partial 5th ray amputation pending improvement in vascular status. No indication for emergent source control at this time, likely ischemic changes vs. localized cellulitis to the right extremity. Non invasive arterial studies ordered.     -Continue antibiotics as stated below   -Recommend resection of all infected bone pending perfusion optimization     -Follow up non-invasive studies with possible vascular consult    -Continue to trend WBC/vitals   -Wound care per podiatry, appreciate additional recommendations   Cellulitis of right foot  Gangrene of the right fifth digit with surrounding ischemic changes and concomitant localized cellulitis. Patient started on IV vancomycin/ceftriaxone on 10/12. Blood cultures negative at 72 hours. Patient is afebrile and hemodynamically stably with no leukocytosis noted.   -Ceftriaxone and vancomycin discontinued  -Transitioned patient to IV ancef 2g Q8H and oral flagyl 500mg QH12 for anerobic coverage    -Continue to trend WBC/vitals    -Appreciate additional podiatry recommendations  Ketosis (HCC)  Starvation ketosis vs. DKA on admission that has since resolved.    -Continue management per primary team   Type 2 diabetes mellitus  ([facility])  Lab Results   Component Value Date    HGBA1C 14.8 (H) 09/18/2024       Recent Labs     10/13/24  1613 10/13/24  2049 10/14/24  0753 10/14/24  1110   POCGLU 226* 288* 400* 340*       Blood Sugar Average: Last 72 hrs:  (P) 255.75   -Continue management per primary team   Tobacco user   -Nicotine patch   -Smoking cessation advised   Ambulatory dysfunction  Presented with generalized weakness and endorses frequent falls without loss of consciousness. Head CT negative for acute pathology.    -Continue PT/OT  Electrolyte abnormality   -Repletion per primary team   Vitamin D deficiency   -Continue supplementation per primary team   Lung nodule      Antibiotics:  Vancomycin #3   Ceftriaxone #3     History of Present Illness   Sabiha Garcia is a 68 y.o. year old female who presented to the ED on 10/11 with concern for DKA in the setting of a fall and failure to thrive. Patient has a past medical history of type II diabetes mellitus on insulin, osteoporosis, frequent falls.     Patient reports she fell last week and was crawling on the ground for multiple days trying to get help. Patient states her neighbor heard banging on the wall and EMS was called and she thus presented to the ED. Patient endorses significant weakness and falling often. Upon evaluation, patient is unsure the chronicity of the gangrene to the right 5th digit. She reports she does think there was a cut there at one point but that was all she could recall.     Patient denies nausea, vomiting, fever, chills, shortness of breath, chest pain since being admitted into the hospital.     A complete review of systems is negative other than that noted in the HPI.    I have reviewed the patient's PMH, PSH, Social History, Family History, Meds, and Allergies    Objective :  Temp:  [98.4 °F (36.9 °C)-98.7 °F (37.1 °C)] 98.4 °F (36.9 °C)  HR:  [79-80] 80  BP: (152-160)/(59-80) 160/80  Resp:  [15] 15  SpO2:  [94 %-97 %] 96 %  O2 Device: None (Room air)    General:   No acute distress  Psychiatric:  Awake and alert  Pulmonary:  Normal respiratory excursion without accessory muscle use  Abdomen:  Soft, nontender  Extremities: Right 5th digit gangrene with surrounding ischemic and cellulitic changes. Malodor noted to toe. Right foot temperature noted to be cool.   Skin:  No rashes          Lab Results: I have reviewed the following results:  Results from last 7 days   Lab Units 10/14/24  0546 10/13/24  0436 10/12/24  0452   WBC Thousand/uL 9.77 10.30* 13.40*   HEMOGLOBIN g/dL 11.2* 11.1* 11.0*   PLATELETS Thousands/uL 269 263 321     Results from last 7 days   Lab Units 10/14/24  0546 10/13/24  0436 10/12/24  0847 10/11/24  1926 10/11/24  1438 10/11/24  1003 10/11/24  0550   SODIUM mmol/L 140 138 142   < > 146  144   < > 149*   POTASSIUM mmol/L 4.5 4.0 3.7   < > 3.5  3.4*   < > 4.3   CHLORIDE mmol/L 100 101 107   < > 107  106   < > 105   CO2 mmol/L 32 30 29   < > 28  28   < > 21   BUN mg/dL 9 11 21   < > 49*  47*   < > 71*   CREATININE mg/dL 0.61 0.52* 0.61   < > 0.89  0.87   < > 1.46*   EGFR ml/min/1.73sq m 93 98 93   < > 66  68   < > 36   CALCIUM mg/dL 8.6 8.3* 8.3*   < > 8.6  8.7   < > 9.0   AST U/L  --   --   --   --  10*  --  8*   ALT U/L  --   --   --   --  9  --  10   ALK PHOS U/L  --   --   --   --  98  --  116*   ALBUMIN g/dL  --   --   --   --  3.1*  --  3.4*    < > = values in this interval not displayed.     Results from last 7 days   Lab Units 10/11/24  0707   BLOOD CULTURE  No Growth at 72 hrs.  No Growth at 72 hrs.     Results from last 7 days   Lab Units 10/14/24  0546 10/13/24  0436 10/11/24  0747   PROCALCITONIN ng/ml 4.29* 0.79* 0.21                   Imaging Results Review: I personally reviewed the following image studies/reports in PACS: Right foot xray   Other Study Results Review: Pathology reports reviewed.

## 2024-10-14 NOTE — ASSESSMENT & PLAN NOTE
Gangrene of the right fifth digit with surrounding ischemic changes and concomitant localized cellulitis. Patient started on IV vancomycin/ceftriaxone on 10/12. Blood cultures negative at 72 hours. Patient is afebrile and hemodynamically stably with no leukocytosis noted.   -Ceftriaxone and vancomycin discontinued  -Transitioned patient to IV ancef 2g Q8H and oral flagyl 500mg QH12 for anerobic coverage    -Continue to trend WBC/vitals    -Appreciate additional podiatry recommendations

## 2024-10-14 NOTE — ASSESSMENT & PLAN NOTE
Starvation ketosis vs. DKA on admission that has since resolved.    -Continue management per primary team

## 2024-10-14 NOTE — ASSESSMENT & PLAN NOTE
Presented with generalized weakness and endorses frequent falls without loss of consciousness. Head CT negative for acute pathology.    -Continue PT/OT

## 2024-10-14 NOTE — SPEECH THERAPY NOTE
Speech Language/Pathology  Speech-Language Pathology Bedside Swallow Evaluation      Patient Name: Sabiha Garcia    Today's Date: 10/14/2024     Problem List  Principal Problem:    Ketosis (HCC)  Active Problems:    Type 2 diabetes mellitus (HCC)    Tobacco user    Gangrene of toe of right foot (HCC)    Ambulatory dysfunction    Electrolyte abnormality    Vitamin D deficiency    Lung nodule      Past Medical History  Past Medical History:   Diagnosis Date    Allergic [id number]    Depression [id number]    Diabetes (HCC)     Diabetes mellitus (HCC) 324528    Smokes 1974    1/2 ppd smoker       Past Surgical History  Past Surgical History:   Procedure Laterality Date    APPENDECTOMY      CHOLECYSTECTOMY      FRACTURE SURGERY      AL OPTX FEM SHFT FX W/INSJ IMED IMPLT W/WO SCREW Left 05/27/2020    Procedure: INSERTION NAIL IM FEMUR ANTEGRADE (TROCHANTERIC);  Surgeon: Jose Lyons MD;  Location: [facility] OR;  Service: Orthopedics    TONSILLECTOMY      TUBAL LIGATION         Summary   Pt presented with functional appearing oral and pharyngeal stage swallowing skills with materials administered today.  She reports feeling no appetite at home, and currently feels as if she can eat more.  She states she does not have her dentures here but normally eats softer regular foods.  Diet choices were discussed, the pt states she will remain on regular and choose material she is able to handle.        Recommended Diet: regular diet and thin liquids   Recommended Form of Meds: whole with liquid   Aspiration precautions and swallowing strategies: upright posture, only feed when fully alert, slow rate of feeding, small bites/sips, and alternating bites and sips  Other Recommendations: Continue frequent oral care, no f/u needed at this time.        Current Medical Status  Pt is a 68 y.o. female who presented to [facility] with poorly controlled DM, ambulatory dysfunction and multiple recent falls now here with fall and reported 5d  downtime in addition to cough and subjective fevers, tachycardic on arrival with leukocytosis, KOURTNEY, gluc ~700. No evidence of traumatic head injury and c/o so far includes R chest wall TTP and shallow DTI over mid back, also has dry gangrene of R little toe following a recent cut injury in setting of severe DM, no other digits c/f wet gangrene or active soft tissue infection.       Current Precautions:  Fall  Aspiration  Contact    Allergies:  No known food allergies    Past medical history:  Please see H&P for details    Special Studies:  CT chest-No acute intracranial process.     No skull fracture.     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     Chronic right maxillary sinusitis.     Anterior mandibular dental disease. Nonemergent dental evaluation is suggested.    Social/Education/Vocational Hx:  Pt lives alone    Swallow Information   Current Risks for Dysphagia & Aspiration:  poor intake  Current Symptoms/Concerns:  weight loss, FTT  Current Diet: regular diet and thin liquids   Baseline Diet: regular diet and thin liquids      Baseline Assessment   Behavior/Cognition: alert  Speech/Language Status: able to participate in conversation  Patient Positioning: upright in chair  Pain Status/Interventions/Response to Interventions:   No report of or nonverbal indications of pain.       Swallow Mechanism Exam  Facial: symmetrical  Labial: WFL  Lingual: WFL  Velum: unable to visualize  Mandible: adequate ROM  Dentition: limited dentition and poor oral hygiene-dentures are at home  Vocal quality:clear/adequate   Volitional Cough: strong/productive   Respiratory Status: on RA       Consistencies Assessed and Performance   Consistencies Administered: thin liquids, puree, mechanical soft solids, and hard solids    Oral Stage: WFL  Mastication was slow but adequate with the materials administered today.  Bolus formation and transfer were functional with no significant oral residue noted.  No overt s/s  reduced oral control.    Pharyngeal Stage: WFL  Swallow Mechanics:  Swallowing initiation appeared prompt.  Laryngeal rise was palpated and judged to be within functional limits.  No coughing, throat clearing, change in vocal quality or respiratory status noted today.     Esophageal Concerns: none reported    Strategies and Efficacy: -    Summary and Recommendations (see above)    Results Reviewed with: patient and RN     Treatment Recommended: not at this time

## 2024-10-14 NOTE — PROGRESS NOTES
Sabiha Garcia is a 68 y.o. female who is currently ordered Vancomycin IV with management by the Pharmacy Consult service.  Relevant clinical data and objective / subjective history reviewed.  Vancomycin Assessment:  Indication and Goal AUC/Trough: Bone/joint infection (goal -600, trough >10)  Clinical Status: stable  Micro:   COVID/RSV/Influenza negative; Blood culture no growth at 48 hours  Renal Function:  SCr: 0.61 mg/dl  CrCl: 70.8 mL/min  Renal replacement: Not on dialysis  Days of Therapy: 3  Current Dose: 1000 mg IV Q12H  Vancomycin Plan:  New Dosin mg IV q12h  Estimated AUC: 464 mcg/hr/mL  Estimated Trough: 12 mcg/mL  Next Level: 10/15/2024 @ 0600  Renal Function Monitoring: Daily BMP and UOP  Pharmacy will continue to follow closely for s/sx of nephrotoxicity, infusion reactions and appropriateness of therapy.  BMP and CBC will be ordered per protocol. We will continue to follow the patient’s culture results and clinical progress daily.    Esthela Bustamante, Pharmacist

## 2024-10-15 ENCOUNTER — APPOINTMENT (INPATIENT)
Dept: RADIOLOGY | Facility: HOSPITAL | Age: 69
DRG: 271 | End: 2024-10-15
Payer: COMMERCIAL

## 2024-10-15 ENCOUNTER — APPOINTMENT (INPATIENT)
Dept: NON INVASIVE DIAGNOSTICS | Facility: HOSPITAL | Age: 69
DRG: 271 | End: 2024-10-15
Payer: COMMERCIAL

## 2024-10-15 LAB
ANION GAP SERPL CALCULATED.3IONS-SCNC: 7 MMOL/L (ref 4–13)
BUN SERPL-MCNC: 8 MG/DL (ref 5–25)
CALCIUM SERPL-MCNC: 8.9 MG/DL (ref 8.4–10.2)
CHLORIDE SERPL-SCNC: 103 MMOL/L (ref 96–108)
CO2 SERPL-SCNC: 30 MMOL/L (ref 21–32)
CREAT SERPL-MCNC: 0.51 MG/DL (ref 0.6–1.3)
GFR SERPL CREATININE-BSD FRML MDRD: 99 ML/MIN/1.73SQ M
GLUCOSE SERPL-MCNC: 134 MG/DL (ref 65–140)
GLUCOSE SERPL-MCNC: 270 MG/DL (ref 65–140)
GLUCOSE SERPL-MCNC: 283 MG/DL (ref 65–140)
GLUCOSE SERPL-MCNC: 297 MG/DL (ref 65–140)
POTASSIUM SERPL-SCNC: 3.9 MMOL/L (ref 3.5–5.3)
SODIUM SERPL-SCNC: 140 MMOL/L (ref 135–147)
VANCOMYCIN SERPL-MCNC: 7.1 UG/ML (ref 10–20)

## 2024-10-15 PROCEDURE — 82948 REAGENT STRIP/BLOOD GLUCOSE: CPT

## 2024-10-15 PROCEDURE — 97167 OT EVAL HIGH COMPLEX 60 MIN: CPT

## 2024-10-15 PROCEDURE — 99233 SBSQ HOSP IP/OBS HIGH 50: CPT | Performed by: INTERNAL MEDICINE

## 2024-10-15 PROCEDURE — 80048 BASIC METABOLIC PNL TOTAL CA: CPT | Performed by: FAMILY MEDICINE

## 2024-10-15 PROCEDURE — 93922 UPR/L XTREMITY ART 2 LEVELS: CPT | Performed by: SURGERY

## 2024-10-15 PROCEDURE — 75635 CT ANGIO ABDOMINAL ARTERIES: CPT

## 2024-10-15 PROCEDURE — 80202 ASSAY OF VANCOMYCIN: CPT | Performed by: FAMILY MEDICINE

## 2024-10-15 PROCEDURE — B41D1ZZ FLUOROSCOPY OF AORTA AND BILATERAL LOWER EXTREMITY ARTERIES USING LOW OSMOLAR CONTRAST: ICD-10-PCS | Performed by: STUDENT IN AN ORGANIZED HEALTH CARE EDUCATION/TRAINING PROGRAM

## 2024-10-15 PROCEDURE — 93925 LOWER EXTREMITY STUDY: CPT | Performed by: SURGERY

## 2024-10-15 PROCEDURE — 97163 PT EVAL HIGH COMPLEX 45 MIN: CPT

## 2024-10-15 PROCEDURE — NC001 PR NO CHARGE: Performed by: NURSE PRACTITIONER

## 2024-10-15 PROCEDURE — 99233 SBSQ HOSP IP/OBS HIGH 50: CPT | Performed by: PODIATRIST

## 2024-10-15 RX ORDER — ASPIRIN 81 MG/1
81 TABLET ORAL DAILY
Status: DISCONTINUED | OUTPATIENT
Start: 2024-10-15 | End: 2024-10-29 | Stop reason: HOSPADM

## 2024-10-15 RX ORDER — ATORVASTATIN CALCIUM 10 MG/1
10 TABLET, FILM COATED ORAL
Status: DISCONTINUED | OUTPATIENT
Start: 2024-10-15 | End: 2024-10-16

## 2024-10-15 RX ORDER — ATORVASTATIN CALCIUM 40 MG/1
40 TABLET, FILM COATED ORAL
Status: DISCONTINUED | OUTPATIENT
Start: 2024-10-15 | End: 2024-10-15

## 2024-10-15 RX ADMIN — ATORVASTATIN CALCIUM 10 MG: 10 TABLET, FILM COATED ORAL at 15:30

## 2024-10-15 RX ADMIN — Medication 2000 UNITS: at 08:51

## 2024-10-15 RX ADMIN — Medication 1 SPRAY: at 17:44

## 2024-10-15 RX ADMIN — METRONIDAZOLE 500 MG: 500 TABLET ORAL at 08:51

## 2024-10-15 RX ADMIN — HEPARIN SODIUM 5000 UNITS: 5000 INJECTION INTRAVENOUS; SUBCUTANEOUS at 15:29

## 2024-10-15 RX ADMIN — INSULIN LISPRO 10 UNITS: 100 INJECTION, SOLUTION INTRAVENOUS; SUBCUTANEOUS at 11:58

## 2024-10-15 RX ADMIN — INSULIN LISPRO 2 UNITS: 100 INJECTION, SOLUTION INTRAVENOUS; SUBCUTANEOUS at 11:58

## 2024-10-15 RX ADMIN — NICOTINE 21 MG: 21 PATCH, EXTENDED RELEASE TRANSDERMAL at 08:53

## 2024-10-15 RX ADMIN — INSULIN LISPRO 2 UNITS: 100 INJECTION, SOLUTION INTRAVENOUS; SUBCUTANEOUS at 08:55

## 2024-10-15 RX ADMIN — INSULIN LISPRO 10 UNITS: 100 INJECTION, SOLUTION INTRAVENOUS; SUBCUTANEOUS at 17:48

## 2024-10-15 RX ADMIN — Medication 1 SPRAY: at 08:56

## 2024-10-15 RX ADMIN — CEFAZOLIN SODIUM 2000 MG: 2 SOLUTION INTRAVENOUS at 17:47

## 2024-10-15 RX ADMIN — HEPARIN SODIUM 5000 UNITS: 5000 INJECTION INTRAVENOUS; SUBCUTANEOUS at 05:16

## 2024-10-15 RX ADMIN — INSULIN LISPRO 10 UNITS: 100 INJECTION, SOLUTION INTRAVENOUS; SUBCUTANEOUS at 08:55

## 2024-10-15 RX ADMIN — IOHEXOL 100 ML: 350 INJECTION, SOLUTION INTRAVENOUS at 12:55

## 2024-10-15 RX ADMIN — CHLORHEXIDINE GLUCONATE 0.12% ORAL RINSE 15 ML: 1.2 LIQUID ORAL at 08:51

## 2024-10-15 RX ADMIN — INSULIN GLARGINE 25 UNITS: 100 INJECTION, SOLUTION SUBCUTANEOUS at 08:52

## 2024-10-15 RX ADMIN — HEPARIN SODIUM 5000 UNITS: 5000 INJECTION INTRAVENOUS; SUBCUTANEOUS at 23:20

## 2024-10-15 RX ADMIN — LIDOCAINE 1 PATCH: 50 PATCH TOPICAL at 08:52

## 2024-10-15 RX ADMIN — ACETAMINOPHEN 975 MG: 325 TABLET, FILM COATED ORAL at 17:48

## 2024-10-15 RX ADMIN — ACETAMINOPHEN 975 MG: 325 TABLET, FILM COATED ORAL at 08:50

## 2024-10-15 RX ADMIN — ACETAMINOPHEN 975 MG: 325 TABLET, FILM COATED ORAL at 23:24

## 2024-10-15 RX ADMIN — METRONIDAZOLE 500 MG: 500 TABLET ORAL at 23:20

## 2024-10-15 RX ADMIN — CEFAZOLIN SODIUM 2000 MG: 2 SOLUTION INTRAVENOUS at 09:06

## 2024-10-15 RX ADMIN — ASPIRIN 81 MG: 81 TABLET, COATED ORAL at 08:51

## 2024-10-15 RX ADMIN — CHLORHEXIDINE GLUCONATE 0.12% ORAL RINSE 15 ML: 1.2 LIQUID ORAL at 23:20

## 2024-10-15 NOTE — ASSESSMENT & PLAN NOTE
Dry gangrene of right fifth digit but with surrounding cellulitis. Right foot xray positive for periosteal reaction of the 5th digit proximal phalanx with suspicion for osteomyelitis. Not systemically ill. Some malodorous drainage suggesting the possibility of anaerobes. No history of MRSA.    -Continue intravenous cefazolin and oral Flagyl  -Ongoing vascular workup to optimize flow before intervention  -Anticipate ray amputation for eventual surgical cure  -Close podiatry and vascular follow-up  -Recheck CBC with differential and BMP to make sure no developing toxicities  -Local wound care

## 2024-10-15 NOTE — PROGRESS NOTES
Progress Note - Hospitalist   Name: Sabiha Garcia 68 y.o. female I MRN: [id number]  Unit/Bed#: [facility] 814-01 I Date of Admission: 10/11/2024   Date of Service: 10/15/2024 I Hospital Day: 4     Assessment & Plan  Gangrene of toe of right foot (HCC)  Right fifth digit dry gangrene with surround cellulitis  Xray - Subtle periosteal reaction of the fifth digit proximal phalanx with adjacent soft tissue swelling. Findings raise suspicion for osteomyelitis   LEADS abnormal - vascular consulted  Podiatry following - plan after vascular optimization  Wound care with Betadine paint open to air  Antibiotics changed to Cefazolin and Flagyl by ID  Cellulitis of right foot  Antibiotics as above  Ketosis (HCC)  Minimal oral intake for 6-7 days  Starvation ketosis vs DKA on presentation - resolved  Oral intake improved  Insulin as below  Type 2 diabetes mellitus (HCC)  Lab Results   Component Value Date    HGBA1C 14.8 (H) 09/18/2024       Recent Labs     10/14/24  1110 10/14/24  1602 10/14/24  2120 10/15/24  0734   POCGLU 340* 212* 231* 297*       Blood Sugar Average: Last 72 hrs:  (P) 240.125    Severely uncontrolled as evidenced by HbA1c  On Levemir at home  Possible DKA on arrival   Insulin dose increased to Lantus 25 in am, Humalog 10 TID,   Ct sliding scale insulin  Endocrine input appreciated     Ambulatory dysfunction  Presented with generalized weakness, 2 falls with no head strike  States had been down for prolonged period  CT head - no acute pathology  TSH low with normal free T4, CK normal  PT/OT  Tobacco user  Nicotine patch  Smoking cessation advised  Vitamin D deficiency  Continue supplementation  Lung nodule  CT chest with 3 mm right upper lobe noncalcified nodule  CT chest without contrast in 12 months  VTE Pharmacologic Prophylaxis:   Pharmacologic: Heparin  Mechanical VTE Prophylaxis in Place: No    Patient Centered Rounds: I have performed bedside rounds with nursing staff today.        Time Spent for Care: 1 hour.   More than 50% of total time spent on counseling and coordination of care as described above.    Current Length of Stay: 4 day(s)    Current Patient Status: Inpatient       Code Status: Level 1 - Full Code      Subjective:   Patient resting comfortably.    Objective:     Vitals:   Temp (24hrs), Av.2 °F (36.8 °C), Min:98.1 °F (36.7 °C), Max:98.4 °F (36.9 °C)    Temp:  [98.1 °F (36.7 °C)-98.4 °F (36.9 °C)] 98.2 °F (36.8 °C)  HR:  [83-85] 83  Resp:  [16-19] 17  BP: (138-141)/(63-71) 140/71  SpO2:  [96 %-98 %] 97 %  Body mass index is 16.93 kg/m².     Input and Output Summary (last 24 hours):       Intake/Output Summary (Last 24 hours) at 10/15/2024 0937  Last data filed at 10/15/2024 0900  Gross per 24 hour   Intake 682 ml   Output 1150 ml   Net -468 ml       Physical Exam:     Physical Exam  HENT:      Head: Normocephalic and atraumatic.   Cardiovascular:      Rate and Rhythm: Normal rate and regular rhythm.   Musculoskeletal:         General: No swelling or tenderness.         Additional Data:     Labs:    Results from last 7 days   Lab Units 10/14/24  0546   WBC Thousand/uL 9.77   HEMOGLOBIN g/dL 11.2*   HEMATOCRIT % 35.6   PLATELETS Thousands/uL 269   SEGS PCT % 56   LYMPHO PCT % 34   MONO PCT % 6   EOS PCT % 2     Results from last 7 days   Lab Units 10/15/24  0523 10/11/24  1926 10/11/24  1438   POTASSIUM mmol/L 3.9   < > 3.5  3.4*   CHLORIDE mmol/L 103   < > 107  106   CO2 mmol/L 30   < > 28  28   BUN mg/dL 8   < > 49*  47*   CREATININE mg/dL 0.51*   < > 0.89  0.87   CALCIUM mg/dL 8.9   < > 8.6  8.7   ALK PHOS U/L  --   --  98   ALT U/L  --   --  9   AST U/L  --   --  10*    < > = values in this interval not displayed.     Results from last 7 days   Lab Units 10/11/24  0550   INR  1.08       Recent Cultures (last 7 days):     Results from last 7 days   Lab Units 10/11/24  0707   BLOOD CULTURE  No Growth After 4 Days.  No Growth After 4 Days.       Last 24 Hours Medication List:   Current  Facility-Administered Medications   Medication Dose Route Frequency Provider Last Rate    acetaminophen  975 mg Oral Q8H PRN Nino Dowling MD      aspirin  81 mg Oral Daily Arpita Cisneros PA-C      atorvastatin  10 mg Oral Daily With Dinner Arpita Cisneros PA-C      cefazolin  2,000 mg Intravenous Q8H Sander Madera MD 2,000 mg (10/15/24 0906)    chlorhexidine  15 mL Mouth/Throat Q12H [facility] Geri Justice MD      cholecalciferol  2,000 Units Oral Daily Octaviano Chau DO      dextromethorphan-guaiFENesin  10 mL Oral Q6H PRN Nino Dowling MD      heparin (porcine)  5,000 Units Subcutaneous Q8H [facility] Nick Bailey MD      insulin glargine  25 Units Subcutaneous QAM Gala Jones MD      insulin lispro  1-5 Units Subcutaneous TID  Geri Justice MD      insulin lispro  10 Units Subcutaneous TID With Meals Gala Jones MD      lidocaine  1 patch Topical Daily Nino Dowling MD      metroNIDAZOLE  500 mg Oral Q12H [facility] Sander Madera MD      nicotine  21 mg Transdermal Daily Octaviano Chau DO      phenol  1 spray Mouth/Throat Q2H PRN Octaviano Chau DO          Today, Patient Was Seen By: Guy Elias DO    ** Please Note: Dictation voice to text software may have been used in the creation of this document. **

## 2024-10-15 NOTE — PROGRESS NOTES
Progress Note - Infectious Disease   Name: Sabiha Garcia 68 y.o. female I MRN: [id number]  Unit/Bed#: [facility] 814-01 I Date of Admission: 10/11/2024   Date of Service: 10/15/2024 I Hospital Day: 4    Assessment & Plan  Gangrene of toe of right foot (HCC)  Dry gangrene of right fifth digit but with surrounding cellulitis. Right foot xray positive for periosteal reaction of the 5th digit proximal phalanx with suspicion for osteomyelitis. Not systemically ill. Some malodorous drainage suggesting the possibility of anaerobes. No history of MRSA.    -Continue intravenous cefazolin and oral Flagyl  -Ongoing vascular workup to optimize flow before intervention  -Anticipate ray amputation for eventual surgical cure  -Close podiatry and vascular follow-up  -Recheck CBC with differential and BMP to make sure no developing toxicities  -Local wound care  Cellulitis of right foot  Gangrene of the right fifth digit with surrounding ischemic changes and concomitant localized cellulitis.  Stabilized to decreased erythema with the IV antibiotics  -Antibiotics as above   -Serial exams   -Await vascular and podiatry interventio  Ketosis (HCC)  Starvation ketosis vs. DKA on admission that has since resolved.    -Continue management per primary team   Type 2 diabetes mellitus (HCC)  Lab Results   Component Value Date    HGBA1C 14.8 (H) 09/18/2024       Recent Labs     10/14/24  1110 10/14/24  1602 10/14/24  2120 10/15/24  0734   POCGLU 340* 212* 231* 297*     Blood Sugar Average: Last 72 hrs:  (P) 240.125    With likely diabetic ketoacidosis.  Uncontrolled with hemoglobin A1c of 14.8.  Certainly a risk factor for recurrent infection.  -Tighten diabetic control  Tobacco user  Risk factor for recurrent infection.   -Nicotine patch   -Smoking cessation advised   Ambulatory dysfunction  Presented with generalized weakness and endorses frequent falls without loss of consciousness. Head CT negative for acute pathology.    -Continue PT/OT    I have  discussed with the primary service the above plan to continue the cefazolin and Flagyl.  The primary service agrees with the plan.    Antibiotics:  Cefazolin 2  Flagyl 2    Subjective   Patient has no fever, chills, sweats; no nausea, vomiting, diarrhea; no cough, shortness of breath; no pain. No new symptoms.    Objective :  Temp:  [98.1 °F (36.7 °C)-98.4 °F (36.9 °C)] 98.2 °F (36.8 °C)  HR:  [83-85] 83  BP: (138-141)/(63-71) 140/71  Resp:  [16-19] 17  SpO2:  [96 %-98 %] 97 %  O2 Device: None (Room air)    General:  No acute distress  Psychiatric:  Awake and alert  Pulmonary:  Normal respiratory excursion without accessory muscle use  Abdomen:  Soft, nontender  Extremities: Right foot dressed with some malodorous drainage.  Decreased edema and erythema surrounding areas of toe gangrene.  Skin:  No rashes      Lab Results: I have reviewed the following results:  Results from last 7 days   Lab Units 10/14/24  0546 10/13/24  0436 10/12/24  0452   WBC Thousand/uL 9.77 10.30* 13.40*   HEMOGLOBIN g/dL 11.2* 11.1* 11.0*   PLATELETS Thousands/uL 269 263 321     Results from last 7 days   Lab Units 10/15/24  0523 10/14/24  0546 10/13/24  0436 10/11/24  1926 10/11/24  1438 10/11/24  1003 10/11/24  0550   SODIUM mmol/L 140 140 138   < > 146  144   < > 149*   POTASSIUM mmol/L 3.9 4.5 4.0   < > 3.5  3.4*   < > 4.3   CHLORIDE mmol/L 103 100 101   < > 107  106   < > 105   CO2 mmol/L 30 32 30   < > 28  28   < > 21   BUN mg/dL 8 9 11   < > 49*  47*   < > 71*   CREATININE mg/dL 0.51* 0.61 0.52*   < > 0.89  0.87   < > 1.46*   EGFR ml/min/1.73sq m 99 93 98   < > 66  68   < > 36   CALCIUM mg/dL 8.9 8.6 8.3*   < > 8.6  8.7   < > 9.0   AST U/L  --   --   --   --  10*  --  8*   ALT U/L  --   --   --   --  9  --  10   ALK PHOS U/L  --   --   --   --  98  --  116*   ALBUMIN g/dL  --   --   --   --  3.1*  --  3.4*    < > = values in this interval not displayed.     Results from last 7 days   Lab Units 10/11/24  0707   BLOOD CULTURE   No Growth After 4 Days.  No Growth After 4 Days.     Results from last 7 days   Lab Units 10/14/24  0546 10/13/24  0436 10/11/24  0747   PROCALCITONIN ng/ml 4.29* 0.79* 0.21

## 2024-10-15 NOTE — ASSESSMENT & PLAN NOTE
Right fifth digit dry gangrene with surround cellulitis  Xray - Subtle periosteal reaction of the fifth digit proximal phalanx with adjacent soft tissue swelling. Findings raise suspicion for osteomyelitis   LEADS abnormal - vascular consulted  Podiatry following - plan after vascular optimization  Wound care with Betadine paint open to air  Antibiotics changed to Cefazolin and Flagyl by ID

## 2024-10-15 NOTE — PLAN OF CARE
Problem: Potential for Falls  Goal: Patient will remain free of falls  Description: INTERVENTIONS:  - Educate patient/family on patient safety including physical limitations  - Instruct patient to call for assistance with activity   - Consult OT/PT to assist with strengthening/mobility   - Keep Call bell within reach  - Keep bed low and locked with side rails adjusted as appropriate  - Keep care items and personal belongings within reach  - Initiate and maintain comfort rounds  - Make Fall Risk Sign visible to staff  - Apply yellow socks and bracelet for high fall risk patients  - Consider moving patient to room near nurses station  Outcome: Progressing     Problem: CARDIOVASCULAR - ADULT  Goal: Maintains optimal cardiac output and hemodynamic stability  Description: INTERVENTIONS:  - Monitor I/O, vital signs and rhythm  - Monitor for S/S and trends of decreased cardiac output  - Administer and titrate ordered vasoactive medications to optimize hemodynamic stability  - Assess quality of pulses, skin color and temperature  - Assess for signs of decreased coronary artery perfusion  - Instruct patient to report change in severity of symptoms  Outcome: Progressing  Goal: Absence of cardiac dysrhythmias or at baseline rhythm  Description: INTERVENTIONS:  - Continuous cardiac monitoring, vital signs, obtain 12 lead EKG if ordered  - Administer antiarrhythmic and heart rate control medications as ordered  - Monitor electrolytes and administer replacement therapy as ordered  Outcome: Progressing     Problem: METABOLIC, FLUID AND ELECTROLYTES - ADULT  Goal: Electrolytes maintained within normal limits  Description: INTERVENTIONS:  - Monitor labs and assess patient for signs and symptoms of electrolyte imbalances  - Administer electrolyte replacement as ordered  - Monitor response to electrolyte replacements, including repeat lab results as appropriate  - Instruct patient on fluid and nutrition as appropriate  Outcome:  Progressing  Goal: Fluid balance maintained  Description: INTERVENTIONS:  - Monitor labs   - Monitor I/O and WT  - Instruct patient on fluid and nutrition as appropriate  - Assess for signs & symptoms of volume excess or deficit  Outcome: Progressing  Goal: Glucose maintained within target range  Description: INTERVENTIONS:  - Monitor Blood Glucose as ordered  - Assess for signs and symptoms of hyperglycemia and hypoglycemia  - Administer ordered medications to maintain glucose within target range  - Assess nutritional intake and initiate nutrition service referral as needed  Outcome: Progressing     Problem: Prexisting or High Potential for Compromised Skin Integrity  Goal: Skin integrity is maintained or improved  Description: INTERVENTIONS:  - Identify patients at risk for skin breakdown  - Assess and monitor skin integrity  - Assess and monitor nutrition and hydration status  - Monitor labs   - Assess for incontinence   - Turn and reposition patient  - Assist with mobility/ambulation  - Relieve pressure over bony prominences  - Avoid friction and shearing  - Provide appropriate hygiene as needed including keeping skin clean and dry  - Evaluate need for skin moisturizer/barrier cream  - Collaborate with interdisciplinary team   - Patient/family teaching  - Consider wound care consult   Outcome: Progressing     Problem: Nutrition/Hydration-ADULT  Goal: Nutrient/Hydration intake appropriate for improving, restoring or maintaining nutritional needs  Description: Monitor and assess patient's nutrition/hydration status for malnutrition. Collaborate with interdisciplinary team and initiate plan and interventions as ordered.  Monitor patient's weight and dietary intake as ordered or per policy. Utilize nutrition screening tool and intervene as necessary. Determine patient's food preferences and provide high-protein, high-caloric foods as appropriate.     INTERVENTIONS:  - Monitor oral intake, urinary output, labs, and  treatment plans  - Assess nutrition and hydration status and recommend course of action  - Evaluate amount of meals eaten  - Assist patient with eating if necessary   - Allow adequate time for meals  - Recommend/ encourage appropriate diets, oral nutritional supplements, and vitamin/mineral supplements  - Order, calculate, and assess calorie counts as needed  - Recommend, monitor, and adjust tube feedings and TPN/PPN based on assessed needs  - Assess need for intravenous fluids  - Provide specific nutrition/hydration education as appropriate  - Include patient/family/caregiver in decisions related to nutrition  Outcome: Progressing

## 2024-10-15 NOTE — PLAN OF CARE
Problem: PHYSICAL THERAPY ADULT  Goal: Performs mobility at highest level of function for planned discharge setting.  See evaluation for individualized goals.  Description: Treatment/Interventions: Functional transfer training, Therapeutic exercise, Gait training, Bed mobility, Spoke to nursing, Spoke to case management, OT  Equipment Recommended: Walker       See flowsheet documentation for full assessment, interventions and recommendations.  Outcome: Progressing  Note: Prognosis: Fair  Problem List: Decreased strength, Decreased endurance, Impaired balance, Decreased mobility, Decreased safety awareness, Impaired judgement, Decreased cognition, Pain, Decreased skin integrity  Assessment: Pt is a 68 y.o. female seen for PT evaluation s/p admit to [facility] on 10/11/2024. Pt was admitted with a primary dx of: Ketosis, Cellulitis of R foot, Lung nodule, Vitamin deficiency, Ambulatory dysfunction, Gangrene of Right toe. Pt has a past medical history of Allergic (481990), Depression (526323), Diabetes (HCC), Diabetes mellitus (HCC) (603585), and Smokes (1974).  PT now consulted for assessment of mobility and d/c needs. Pt with OOB to chair orders.  Pts current clinical presentation is Unstable/ Unpredictable (high complexity) due to Ongoing medical management for primary dx, Increased reliance on more restrictive AD compared to baseline, Decreased activity tolerance compared to baseline, Fall risk, Increased assistance needed from caregiver at current time, Cog status. Prior to admission, pt was Ind for mobility and ADLs, was using Rollator walker for most mobility .Pt lives alone, and reports multiple falls recently.  Upon evaluation, pt currently is requiring supervision for bed mobility , transfers and ambulates with Min AX 1 and RW, unsteady gait.  Pt presents at PT eval functioning below baseline and currently w/ overall mobility deficits 2* to: impaired balance, decreased endurance, gait deviations,  pain, decreased activity tolerance compared to baseline, decreased functional mobility tolerance compared to baseline, fall risk. Pt currently at a fall risk 2* to impairments listed above.  Pt will continue to benefit from skilled acute PT interventions to address stated impairments; to maximize functional mobility; for ongoing pt/ family training; and DME needs. At conclusion of PT session pt returned back in chair, chair alarm engaged, all needs in reach, and RN notified of session findings/recommendations with phone and call bell within reach. Pt denies any further questions at this time. The patient's AM-PAC Basic Mobility Inpatient Short Form Raw Score is 15. A Raw score of less than or equal to 16 suggests the patient may benefit from discharge to post-acute rehabilitation services. Please also refer to the recommendation of the Physical Therapist for safe discharge planning. Recommend Level 2 upon hospital D/C.  Barriers to Discharge: Decreased caregiver support     Rehab Resource Intensity Level, PT: II (Moderate Resource Intensity)    See flowsheet documentation for full assessment.

## 2024-10-15 NOTE — MALNUTRITION/BMI
This medical record reflects one or more clinical indicators suggestive of malnutrition and/or morbid obesity.    Malnutrition Findings:   Adult Malnutrition type: Chronic illness  Adult Degree of Malnutrition: Malnutrition of moderate degree  Malnutrition Characteristics: Fat loss, Muscle loss, Inadequate energy                360 Statement: Moderate malnutrition r/t inadequate intake as evidenced by fat/musle wasting of orbital/temple areas, BMI 16.9, consuming < 75% energy intake compared to estimated energy needs > 1 month. Treated with DM diet and Glucerna bid    BMI Findings:  Adult BMI Classifications: Underweight < 18.5        Body mass index is 16.93 kg/m².     See Nutrition note dated 10/15/24 for additional details.  Completed nutrition assessment is viewable in the nutrition documentation.

## 2024-10-15 NOTE — PLAN OF CARE
Problem: OCCUPATIONAL THERAPY ADULT  Goal: Performs self-care activities at highest level of function for planned discharge setting.  See evaluation for individualized goals.  Description: Treatment Interventions: ADL retraining, Functional transfer training, UE strengthening/ROM, Endurance training, Patient/family training, Equipment evaluation/education, Compensatory technique education, Continued evaluation, Energy conservation, Activityengagement          See flowsheet documentation for full assessment, interventions and recommendations.   Note: Limitation: Decreased ADL status, Decreased Safe judgement during ADL, Decreased endurance, Decreased self-care trans, Decreased high-level ADLs  Prognosis: Fair  Assessment: Pt is a 69 yo female who presented to [facility] with generalized weakness, falls, and gangrene of toe of right foot. Per podiatry, pt is WBAT in RLE in surgical shoe. Pt  has a past medical history of Allergic (797264), Depression (102125), Diabetes (HCC), Diabetes mellitus (HCC) (099095), and Smokes (1974). Pt with active OT orders in which OT consulted to assess pt's functional status and occupational performance to determine safe d/c needs. Pt seen with PT to increase safety, decrease fall risk, and maximize functional/occupational performance 2* medical complexity which is a regression from pt's functional baseline. Pt lives alone in a 1 story apartment with 2 DAVEY. PTA, pt was independent in ADLs, has some assistance with IADLs, and uses rollator vs SPC for functional mobility. (-) driving. Currently, pt performing bed mobility w/ supervision, functional transfers/mobility w/ Min A x1 w/ RW, UB ADLs w/ Min A, and LB ADLs w/ Mod A. Pt demonstrates the following limitations/impairments which impact the pt's ability to engage in valued occupations: balance, endurance/activity tolerance, standing tolerance, functional reach, postural/trunk control, strength, safety awareness, and pain. From an OT  standpoint, recommend discharge to post-acute rehab once medically stable. The patient's raw score on the AM-PAC Daily Activity Inpatient Short Form is 16. A raw score of less than 19 suggests the patient may benefit from discharge to post-acute rehabilitation services. Please refer to the recommendation of the Occupational Therapist for safe discharge planning.  Pt would benefit from skilled OT services 2-3x/wk to address acute care needs and underlying performance skills to promote safety, decrease fall risk, and enhance occupational performance to return to OF. Goals to be met within the next 10-14 days.     Rehab Resource Intensity Level, OT: II (Moderate Resource Intensity)

## 2024-10-15 NOTE — CASE MANAGEMENT
Case Management Discharge Planning Note    Patient name Sabiha Garcia  Location [facility] 814/[facility] 814-01 MRN [id number]  : 1955 Date 10/15/2024       Current Admission Date: 10/11/2024  Current Admission Diagnosis:Ketosis (HCC)   Patient Active Problem List    Diagnosis Date Noted Date Diagnosed    Cellulitis of right foot 10/14/2024     Gangrene of toe of right foot (HCC) 10/13/2024     Ambulatory dysfunction 10/13/2024     Ketosis (HCC) 10/13/2024     Vitamin D deficiency 10/13/2024     Lung nodule 10/13/2024     Abnormal gait 2024     Weight loss, unintentional 2024     Claudication of both lower extremities (HCC) 2024     Disorder of refraction and accommodation 2024     Disorder of gallbladder 2024     Arthralgia of shoulder 2024     Eczema 2024     Generalized anxiety disorder 2022     Depression 2022     Fracture of surgical neck of right humerus 2022     History of gallbladder disease 2022     Numbness of toes 2022     Blood loss anemia 2020     Type 2 diabetes mellitus (HCC) 2020     Tobacco user 2020     Hyperlipidemia 2013       LOS (days): 4  Geometric Mean LOS (GMLOS) (days):   Days to GMLOS:     OBJECTIVE:  Risk of Unplanned Readmission Score: 8.09         Current admission status: Inpatient   Preferred Pharmacy:   [id number] - [place] - [address]  [address]  [place] [address]  Phone: [phone] Fax: [phone]    Primary Care Provider: Alexys Blnut MD    Primary Insurance: [facility]  Secondary Insurance: [facility]    DISCHARGE DETAILS:    Discharge planning discussed with:: pt at bedside  [place] of Choice: Yes     CM contacted family/caregiver?: Yes    Contacts  Patient Contacts: Leighann Moreira [facility]   Relationship to Patient:: Other (Comment) ()  Contact Method: Phone  Phone Number:  [phone]  Reason/Outcome: Continuity of Care, Discharge Planning, Emergency Contact    Requested Home Health Care         Is the patient interested in HHC at discharge?: No    DME Referral Provided  Referral made for DME?: No    Additional Comments: CM met with pt to discuss d/c plan.  Pt states she does not feel that she will need rehab at d/c.  Pt states that she cannot return to her past living arrangements due to not feeling safe living alone in her apartment.  Pt states that she was interested in an retirement but cannot afford it.  CM reviewed different resources with pt including [facility] and [facility].  Pt is interested in CM placing a referral with [facility], CM provided brochure to pt.  CM sent message to Yas Lozano with referral information.  CM following.  CM left a VM for Leighann Moreira ([facility] ) per pt's request.

## 2024-10-15 NOTE — ASSESSMENT & PLAN NOTE
Lab Results   Component Value Date    HGBA1C 14.8 (H) 09/18/2024       Recent Labs     10/14/24  1110 10/14/24  1602 10/14/24  2120 10/15/24  0734   POCGLU 340* 212* 231* 297*     Blood Sugar Average: Last 72 hrs:  (P) 240.125    With likely diabetic ketoacidosis.  Uncontrolled with hemoglobin A1c of 14.8.  Certainly a risk factor for recurrent infection.  -Tighten diabetic control

## 2024-10-15 NOTE — CONSULTS
e-Consult (IPC)  - Interventional Radiology  Sabiha Garcia 68 y.o. female MRN: [id number]  Unit/Bed#: [facility] 814-01 Encounter: [id number]          Interventional Radiology has been consulted to evaluate Sabiha Garcia    We were consulted by Vascular Surgery concerning this patient with non-healing right 5th toe gangrene.    Inpatient Consult to IR  Consult performed by: DANY Stokes  Consult ordered by: DANY Trejo        10/15/24    Assessment/Recommendation:   67 yo female with poorly controlled DM type 2, HLD, tobacco use, recurrent falls s/p ORIF left hip and PAD who presented to ED on 10/11/24 after a fall, found to hyperosmolar hyperglycemia with associated metabolic acidosis.     Patient noted to have non-healing Right 5th toe gangrenous wound.     LEADs demonstrating:  -RLE: high grade stenosis vs occlusion of the proximal SFA with reconstitution of mid SFA, monophasic distal EIA suggestive of more proximal disease SALO 0.34  -LLE: with monophasic distal EIA waveforms, SALO 0.35.    Vascular surgery following. Given non-healing right foot wound, IR consulted for evaluation and possible RLE arteriogram with intervention.    Discussed case with Dr Haider and Dr Brown  -Plan for IR RLE arteriogram with possible intervention tentatively Thursday pending IR schedule  -NPO at mdnight prior to procedure  -Hold am heparin  -remainder of care per Primary team     21-30 minutes, >50% of the total time devoted to medical consultative verbal/EMR discussion between providers. Written report will be generated in the EMR.     Thank you for allowing Interventional Radiology to participate in the care of Sabiha Garcia. Please don't hesitate to call or Message us with any questions.     DANY Stokes

## 2024-10-15 NOTE — PHYSICAL THERAPY NOTE
Physical Therapy Evaluation     Patient's Name: Sabiha Garcia    Admitting Diagnosis  Cough [R05.9]  Cachexia (HCC) [R64]  DKA (diabetic ketoacidosis) (Formerly [facility]) [E11.10]  Dry gangrene (Formerly [facility]) [I96]  Fall, initial encounter [W19.XXXA]  Type 2 diabetes mellitus with hyperglycemia, with long-term current use of insulin (HCC) [E11.65, Z79.4]  Unspecified multiple injuries, initial encounter [T07.XXXA]    Problem List  Patient Active Problem List   Diagnosis    Type 2 diabetes mellitus (HCC)    Tobacco user    Blood loss anemia    Generalized anxiety disorder    Disorder of refraction and accommodation    Disorder of gallbladder    Arthralgia of shoulder    Eczema    Depression    Fracture of surgical neck of right humerus    Hyperlipidemia    History of gallbladder disease    Numbness of toes    Abnormal gait    Weight loss, unintentional    Claudication of both lower extremities (HCC)    Gangrene of toe of right foot (HCC)    Ambulatory dysfunction    Ketosis (HCC)    Vitamin D deficiency    Lung nodule    Cellulitis of right foot       Past Medical History  Past Medical History:   Diagnosis Date    Allergic [id number]    Depression [id number]    Diabetes (HCC)     Diabetes mellitus (HCC) 123930    Smokes 1974    1/2 ppd smoker       Past Surgical History  Past Surgical History:   Procedure Laterality Date    APPENDECTOMY      CHOLECYSTECTOMY      FRACTURE SURGERY      [place] OPTX FEM SHFT FX W/INSJ IMED IMPLT W/WO SCREW Left 05/27/2020    Procedure: INSERTION NAIL IM FEMUR ANTEGRADE (TROCHANTERIC);  Surgeon: Jose Lyons MD;  Location: [facility];  Service: Orthopedics    TONSILLECTOMY      TUBAL LIGATION            10/15/24 1025   PT Last Visit   PT Visit Date 10/15/24   Note Type   Note type Evaluation   Pain Assessment   Pain Assessment Tool 0-10   Pain Score 7   Pain Location/Orientation Location: Generalized  (rIGHT LEG)   Pain Onset/Description Onset: Ongoing   Effect of Pain on Daily Activities Limited activity tolerance    Patient's Stated Pain Goal No pain   Hospital Pain Intervention(s) Repositioned;Ambulation/increased activity   Restrictions/Precautions   Weight Bearing Precautions Per Order Yes   RLE Weight Bearing Per Order WBAT  (Per Podiatry)   LLE Weight Bearing Per Order WBAT   Braces or Orthoses (S)  Other (Comment)  (Sx shoe R LE)   Other Precautions Cognitive;Chair Alarm;Bed Alarm   Home Living   Type of Home Apartment  (1 [address]asvir apartment with 2 DAVEY)   Home Layout One level;Performs ADLs on one level;Able to live on main level with bedroom/bathroom;Access   Bathroom Shower/Tub Tub/shower unit   Bathroom Toilet Raised   Bathroom Equipment Grab bars in shower;Toilet raiser  (Shower stool)   Bathroom Accessibility Accessible   Home Equipment Cane;Walker;Wheelchair-manual  (Rollator walker)   Additional Comments Pt lives alone in apartment, states neighbors assist prn . Uses Rollator walker for mobility but SPC for completing standing tasks. Does not like to use W/C   Prior Function   Level of [place] Independent with ADLs;Independent with functional mobility;Independent with IADLS;Needs assistance with IADLS   Lives With (S)  Alone   Receives Help From Neighbor   IADLs Independent with meal prep;Independent with medication management;Family/Friend/Other provides transportation   Falls in the last 6 months (S)  1 to 4  (Reports 3-4 recent falls, states was on the floor for extended time ~ 1 week, needed to bang on walls for help)   Vocational Retired   Comments does not drive, has groceries delivered, and insurance covered rrides for medical appointments   Cognition   Overall Cognitive Status Impaired   Arousal/Participation Responsive   Attention Attends with cues to redirect   Orientation Level Oriented X4   Following Commands Follows one step commands without difficulty   Comments Pt cooperative and pleasant. VCs for safety with decreased insight into deficits   Subjective   Subjective Agreeable to mobilize   RLE  Assessment   RLE Assessment   (grossly 3+-4/5)   LLE Assessment   LLE Assessment   (grossly 3+-4/5)   Bed Mobility   Supine to Sit 5  Supervision   Additional items HOB elevated;Increased time required;Verbal cues   Sit to Supine Unable to assess   Additional Comments Pt in bed upon arrival Gets to EOB with supervision .   Transfers   Sit to Stand 4  Minimal assistance   Additional items Assist x 1;Increased time required;Verbal cues   Stand to Sit 4  Minimal assistance   Additional items Assist x 1;Increased time required;Verbal cues;Armrests   Additional Comments w/RW- VCs for hand placement during  transfers   Ambulation/Elevation   Gait pattern Forward Flexion;Step to;Short stride;Excessively slow   Gait Assistance 4  Minimal assist   Additional items Assist x 1;Verbal cues;Tactile cues   Assistive Device Rolling walker   Distance 10 ft   Ambulation/Elevation Additional Comments Limited ambulation 2* fatigue and weakness, required assist from therapist for balance , TC for posture cirrection and use of AD   Balance   Static Sitting Fair   Dynamic Sitting Fair -   Static Standing Fair -   Dynamic Standing Poor +   Ambulatory Poor +   Endurance Deficit   Endurance Deficit Yes   Activity Tolerance   Activity Tolerance Patient limited by fatigue;Patient limited by pain   Medical Staff Made Aware Co-eval with OT 2* medical complexity and multiple co morbidities, [facility]   Nurse Made Aware RN cleared   Assessment   Prognosis Fair   Problem List Decreased strength;Decreased endurance;Impaired balance;Decreased mobility;Decreased safety awareness;Impaired judgement;Decreased cognition;Pain;Decreased skin integrity   Assessment Pt is a 68 y.o. female seen for PT evaluation s/p admit to [facility] on 10/11/2024. Pt was admitted with a primary dx of: Ketosis, Cellulitis of R foot, Lung nodule, Vitamin deficiency, Ambulatory dysfunction, Gangrene of Right toe. Pt has a past medical history of Allergic ([id number]),  Depression (846893), Diabetes (HCC), Diabetes mellitus (HCC) (239488), and Smokes (1974).  PT now consulted for assessment of mobility and d/c needs. Pt with OOB to chair orders.  Pts current clinical presentation is Unstable/ Unpredictable (high complexity) due to Ongoing medical management for primary dx, Increased reliance on more restrictive AD compared to baseline, Decreased activity tolerance compared to baseline, Fall risk, Increased assistance needed from caregiver at current time, Cog status. Prior to admission, pt was Ind for mobility and ADLs, was using Rollator walker for most mobility .Pt lives alone, and reports multiple falls recently.  Upon evaluation, pt currently is requiring supervision for bed mobility , transfers and ambulates with Min AX 1 and RW, unsteady gait.  Pt presents at PT eval functioning below baseline and currently w/ overall mobility deficits 2* to: impaired balance, decreased endurance, gait deviations, pain, decreased activity tolerance compared to baseline, decreased functional mobility tolerance compared to baseline, fall risk. Pt currently at a fall risk 2* to impairments listed above.  Pt will continue to benefit from skilled acute PT interventions to address stated impairments; to maximize functional mobility; for ongoing pt/ family training; and DME needs. At conclusion of PT session pt returned back in chair, chair alarm engaged, all needs in reach, and RN notified of session findings/recommendations with phone and call bell within reach. Pt denies any further questions at this time. The patient's AM-PAC Basic Mobility Inpatient Short Form Raw Score is 15. A Raw score of less than or equal to 16 suggests the patient may benefit from discharge to post-acute rehabilitation services. Please also refer to the recommendation of the Physical Therapist for safe discharge planning. Recommend Level 2 upon hospital D/C.   Barriers to Discharge Decreased caregiver support   Goals    Patient Goals to get stronger   STG Expiration Date 10/29/24   Short Term Goal #1 STG 1. Pt will be able to perform bed mobility tasks with Ind in order to improve overall functional mobility and assist in safe d/c. STG 2. Pt with sit EOB for at least 25 minutes at Sup level in order to strengthen abdominal musculature and assist in future transfers/ ambulation. STG 3. Pt will be able to perform functional transfer with Sup in order to improve overall functional mobility and assist in safe d/c. STG 4. Pt will be able to ambulate at least 100 feet with least restrictive device with sup A in order to improve overall functional mobility and assist in safe d/c. STG 5. Pt will improve sitting/standing static/dynamic balance 1/2 grade in order to improve functional mobility and assist in safe d/c. STG 6. Pt will improve LE strength by 1/2 grade in order to improve functional mobility and assist in safe d/c.   PT Treatment Day 0   Plan   Treatment/Interventions Functional transfer training;Therapeutic exercise;Gait training;Bed mobility;Spoke to nursing;Spoke to case management;OT   PT Frequency 2-3x/wk   Discharge Recommendation   Rehab Resource Intensity Level, PT II (Moderate Resource Intensity)   Equipment Recommended Walker   AM-PAC Basic Mobility Inpatient   Turning in Flat Bed Without Bedrails 3   Lying on Back to Sitting on Edge of Flat Bed Without Bedrails 2   Moving Bed to Chair 3   Standing Up From Chair Using Arms 3   Walk in Room 3   Climb 3-5 Stairs With Railing 1   Basic Mobility Inpatient Raw Score 15   Basic Mobility Standardized Score 36.97   [facility] Highest Level Of Mobility   -[facility] Goal 4: Move to chair/commode   -[facility] Achieved 6: Walk 10 steps or more   Modified Veena Scale   Modified Morgan Scale 4   End of Consult   Patient Position at End of Consult All needs within reach;Bed/Chair alarm activated;Bedside chair         Savanna Mcbride, PT DPT

## 2024-10-15 NOTE — OCCUPATIONAL THERAPY NOTE
Occupational Therapy Evaluation     Patient Name: Sabiha Garcia  Today's Date: 10/15/2024  Problem List  Principal Problem:    Ketosis (HCC)  Active Problems:    Type 2 diabetes mellitus (HCC)    Tobacco user    Gangrene of toe of right foot (HCC)    Ambulatory dysfunction    Vitamin D deficiency    Lung nodule    Cellulitis of right foot    Past Medical History  Past Medical History:   Diagnosis Date    Allergic [id number]    Depression [id number]    Diabetes (HCC)     Diabetes mellitus (HCC) 354977    Smokes 1974    1/2 ppd smoker     Past Surgical History  Past Surgical History:   Procedure Laterality Date    APPENDECTOMY      CHOLECYSTECTOMY      FRACTURE SURGERY      [place] OPTX FEM SHFT FX W/INSJ IMED IMPLT W/WO SCREW Left 05/27/2020    Procedure: INSERTION NAIL IM FEMUR ANTEGRADE (TROCHANTERIC);  Surgeon: Jose Lyons MD;  Location: [facility];  Service: Orthopedics    TONSILLECTOMY      TUBAL LIGATION             10/15/24 1026   OT Last Visit   OT Visit Date 10/15/24   Note Type   Note type Evaluation   Additional Comments Pt seen w/ PT to increase safety, decrease fall risk, and maximize functional/occuaptional performance 2* medical complexity which is a regression from pt's functional baseline.   Pain Assessment   Pain Assessment Tool 0-10   Pain Score 7   Pain Location/Orientation Location: Generalized;Orientation: Right;Location: Leg;Location: Foot   Effect of Pain on Daily Activities Impacts ability to engage in valued occupations   Hospital Pain Intervention(s) Repositioned;Ambulation/increased activity;Emotional support   Restrictions/Precautions   Weight Bearing Precautions Per Order Yes   RLE Weight Bearing Per Order WBAT   LLE Weight Bearing Per Order WBAT   Braces or Orthoses (S)  Other (Comment)  (Surgical shoe RLE)   Other Precautions Cognitive;Chair Alarm;Bed Alarm;WBS;Multiple lines;Telemetry;Fall Risk;Pain   Home Living   Type of Home Apartment  (1 story apartment with 2 DAVEY)   Home Layout One  "level;Performs ADLs on one level;Able to live on main level with bedroom/bathroom;Access   Bathroom Shower/Tub Tub/shower unit   Bathroom Toilet Raised   Bathroom Equipment Grab bars in shower;Toilet raiser  (Shower stool)   Bathroom Accessibility Accessible   Home Equipment Cane;Wheelchair-manual;Other (Comment)  (Rollator)   Additional Comments Pt reports living alone in a 1 story apartment with 2 DAVEY, reporting that she has neighbors that can assist with functional needs prn; reports that she typically uses rollator for functional mobility however she uses SPC for steadying/support when completing standing tasks/@ sink; reports that she does not like to use w/c   Prior Function   Level of [place] Independent with ADLs;Independent with functional mobility;Independent with IADLS;Needs assistance with IADLS   Lives With (S)  Alone   Receives Help From Neighbor   IADLs Independent with meal prep;Independent with medication management;Family/Friend/Other provides transportation   Falls in the last 6 months (S)  1 to 4  (Pt reporting 3-4 recent falls in which she has been on the floor for extended period of time, reporting she has to bang on walls for her neighbor to hear)   Vocational Retired   Comments (-) driving, reporting that through her insurance, she is able to have transportation covered to medical appointsments; reports that she has her groceries delivered from [employer]   [employer] PTA, pt was independent in ADLs, has some assistance with IADLs, and uses rollator vs SPC for functional mobility; (-) driving   Reciprocal Relationships Lives alone; neighbors can assist with functional needs prn   Service to Others Retired; reports previously working in the [employer] and doing other side jobs   Intrinsic Gratification Enjoys painting, gardening, and [employer]   Subjective   Subjective \"I was on the floor for awhile, I got so weak.\"   ADL   Where Assessed Chair   Eating Assistance 5  Supervision/Setup "   Grooming Assistance 4  Minimal Assistance   UB Bathing Assistance 4  Minimal Assistance   LB Bathing Assistance 3  Moderate Assistance   UB Dressing Assistance 4  Minimal Assistance   LB Dressing Assistance 3  Moderate Assistance   Toileting Assistance  3  Moderate Assistance   Functional Assistance 3  Moderate Assistance   Bed Mobility   Supine to Sit 5  Supervision   Additional items HOB elevated;Bedrails;Increased time required;Verbal cues   Sit to Supine Unable to assess   Additional Comments At end of session, pt left sitting upright in recliner with all functional needs in reach with chair alarm activated   Transfers   Sit to Stand 4  Minimal assistance   Additional items Assist x 1;Increased time required;Verbal cues   Stand to Sit 4  Minimal assistance   Additional items Assist x 1;Increased time required;Verbal cues   Additional Comments w/ RW for safety and support   Functional Mobility   Functional Mobility 4  Minimal assistance   Additional Comments Pt completed short household functional mobility within room with Min A x1 w/ RW for safety and support; increased time with verbal cues for safety and RW management   Additional items Rolling walker   Balance   Static Sitting Fair   Dynamic Sitting Fair -   Static Standing Fair -   Dynamic Standing Poor +   Ambulatory Poor +   Activity Tolerance   Activity Tolerance Patient tolerated treatment well;Patient limited by fatigue;Patient limited by pain   Medical Staff Made Aware MARILUZ Corrales; JUANITO Good   Nurse Made Aware RN cleared   RUE Assessment   RUE Assessment WFL   LUE Assessment   LUE Assessment WFL   Hand Function   Gross Motor Coordination Functional   Fine Motor Coordination Functional   Cognition   Arousal/Participation Alert;Responsive;Arousable;Cooperative   Attention Attends with cues to redirect   Orientation Level Oriented X4   Memory Decreased recall of precautions   Following Commands Follows one step commands without difficulty   Comments Pt  pleasant and cooperative; decreased safety awareness/insight, requires verbal cues for safety   Assessment   Limitation Decreased ADL status;Decreased Safe judgement during ADL;Decreased endurance;Decreased self-care trans;Decreased high-level ADLs   Prognosis Fair   Assessment Pt is a 67 yo female who presented to [facility] with generalized weakness, falls, and gangrene of toe of right foot. Per podiatry, pt is WBAT in RLE in surgical shoe. Pt  has a past medical history of Allergic ([id number]), Depression (326973), Diabetes (HCC), Diabetes mellitus (HCC) (649522), and Smokes (1974). Pt with active OT orders in which OT consulted to assess pt's functional status and occupational performance to determine safe d/c needs. Pt seen with PT to increase safety, decrease fall risk, and maximize functional/occupational performance 2* medical complexity which is a regression from pt's functional baseline. Pt lives alone in a 1 story apartment with 2 DAVEY. PTA, pt was independent in ADLs, has some assistance with IADLs, and uses rollator vs SPC for functional mobility. (-) driving. Currently, pt performing bed mobility w/ supervision, functional transfers/mobility w/ Min A x1 w/ RW, UB ADLs w/ Min A, and LB ADLs w/ Mod A. Pt demonstrates the following limitations/impairments which impact the pt's ability to engage in valued occupations: balance, endurance/activity tolerance, standing tolerance, functional reach, postural/trunk control, strength, safety awareness, and pain. From an OT standpoint, recommend discharge to post-acute rehab once medically stable. The patient's raw score on the -PAC Daily Activity Inpatient Short Form is 16. A raw score of less than 19 suggests the patient may benefit from discharge to post-acute rehabilitation services. Please refer to the recommendation of the Occupational Therapist for safe discharge planning.  Pt would benefit from skilled OT services 2-3x/wk to address acute care needs and underlying  performance skills to promote safety, decrease fall risk, and enhance occupational performance to return to PLOF. Goals to be met within the next 10-14 days.   Goals   Patient Goals To get stronger   LTG Time Frame 10-14   Long Term Goal #1 See OT goals listed below   Plan   Treatment Interventions ADL retraining;Functional transfer training;UE strengthening/ROM;Endurance training;Patient/family training;Equipment evaluation/education;Compensatory technique education;Continued evaluation;Energy conservation;Activityengagement   Goal Expiration Date 10/29/24   OT Frequency 2-3x/wk   Discharge Recommendation   Rehab Resource Intensity Level, OT II (Moderate Resource Intensity)   AM-PAC Daily Activity Inpatient   Lower Body Dressing 2   Bathing 2   Toileting 2   Upper Body Dressing 3   Grooming 3   Eating 4   Daily Activity Raw Score 16   Daily Activity Standardized Score (Calc for Raw Score >=11) 35.96   AM-PAC Applied Cognition Inpatient   Following a Speech/Presentation 3   Understanding Ordinary Conversation 4   Taking Medications 3   Remembering Where Things Are Placed or Put Away 3   Remembering List of 4-5 Errands 3   Taking Care of Complicated Tasks 3   Applied Cognition Raw Score 19   Applied Cognition Standardized Score 39.77   End of Consult   Education Provided Yes   Patient Position at End of Consult Bedside chair;Bed/Chair alarm activated;All needs within reach   Nurse Communication Nurse aware of consult     OT GOALS:    Pt will improve functional mobility during ADL/IADL/leisure tasks with Mod I using AE/DME prn.    Pt will improve activity tolerance/functional endurance during ADL/IADL/leisure tasks for at least 20 minutes to improve occupational performance and engagement in valued occupations using AE/DME prn.    Pt will engage in ongoing functional/formal cognitive assessments to assist with safe d/c planning and increase safety during functional tasks.    Pt will participate in simulated IADL  management task w/ Mod I to increase independence and engagement in valued occupations w/ good balance/safety.    Pt will improve dynamic standing balance for at least 15 minutes with Mod I during functional tasks to decrease fall risk and improve independence and engagement in ADL/IADL/leisure activities.    Pt will follow 100% of multi-step commands in ADL/IADL/leisure activities to improve functional cognition used in functional daily routines.    Pt will complete functional transfers on and off all surfaces used in daily routines with Mod I for safety to maximize functional/occupational performance.    Pt will complete all bed mobility tasks with Mod I to serve as a prerequisite for EOB/OOB ADL/IADL/leisure tasks, optimize positioning/comfort, and increase functional independence.    Pt will independently demonstrate good carryover of safety precautions, WB status, and education/training during ADL/IADL/leisure tasks with energy conservation techniques s/p skilled instruction without verbal cues.    Pt will complete UB ADL tasks with Mod I using AE/DME prn to increase functional independence in ADL/IADL/leisure tasks.    Pt will complete LB ADL tasks with Mod I using AE/DME prn to increase functional independence in ADL/IADL/leisure tasks.     Pt will complete toileting tasks with Mod I and good hygiene/thoroughness using AE/DME prn to increase functional independence.    Pt will independently identify and utilize 2-3 positive coping strategies to enhance overall wellbeing and engagement in valued occupations.    Juanita Zambrano MS, OTR/L     7

## 2024-10-15 NOTE — CONSULTS
Consultation - Vascular Surgery   Sabiha Garcia 68 y.o. female MRN: [id number]  Unit/Bed#: [facility] 814-01 Encounter: [id number]    Assessment:   68yoF with history of uncontrolled DM (A1C 14.8 Sept '24), HLD, tobacco use (1PPD), history of recurrent falls with history of L femoral shaft fracture s/p repair '20 presented 10/11 with generalized weakness s/p falls, admitted for management of hyperosmolar hyperglycemia with associated metabolic acidosis with concern for DKA vs starvation ketosis. Patient with noted non-healing R 5th digit gangrenous wound, vascular surgery consulted for abnormal ABIs concern for contributory PAD.     Diagnostic Imaging  Preliminary 10/14/24 LE Arterial Duplex  RLE: High grade stenosis v occl. Prox SFA with reconstitution of midSFA; monophasic distal EIA suggestive of more proximal disease. SALO 0.34/-/-  LLE: Monophasic distal EIA waveforms, SALO 0.35/MTP 56/GTP 44    10/11/24 R Foot XR  Subtle periosteal reaction of 5th proximal phalanx with adjacent soft tissue swelling. Findings raise suspicion for OM.    Labs  WBC 9.77 (10.30)  Hgb 11.2 (11.1)  Plt 269 (263)    sCr 0.61 (0.52 from 0.61 from 0.63)  eGFR 93 (98 from 93)  Glucose 409    10/11 Blood Cx NG x72    Recommendations:  - Patient with uncontrolled diabetes, non healing R 5th toe gangrene, XR concerning for OM, also with multiple nonhealing excoriations on bilateral LE, ABIs indicative peripheral arterial disease. Patient would likely benefit from revascularization in setting of the above.   - With diminished femoral pulses and duplex suggestive of more proximal disease, patient would likely benefit from CTA to better evaluate perfusion with noted KOURTNEY on admission resolved. Will discuss with team in AM.   - Patient would benefit from ASA/Statin for best medical management, especially with history of hyperlipidemia.   - Patient advised on smoking cessation, is motivated to quit, maintained on NRT this admission  - Also discussed  importance of adequate blood sugar control for wound healing, patient understands importance of compliance with home diabetic therapy; endocrinology following  - Podiatry following- continue local wound care  - ABX per ID - Cefazolin/Flagyl  - Remainder of care per primary medicine team  - Contact BE Vascular Surgery Resident/AP role with further questions/concerns  - Will discuss with Dr. Jacinto.     Consulting Service: SLIM    Chief Complaint: R 5th toe wound    HPI: Sabiha Garcia is a 68 y.o. female smoker (1PPD) with uncontrolled diabetes, hyperlipidemia who presents with with generalized weakness and has notable history of recurrent falls who was found to have uncontrolled hyperglycemia with associated metabolic acidosis and admitted for management of DKA vs starvation ketosis.     Patient states that 2 weeks ago she sustained falls without head strike or loss of consciousness, had been globally weak, had not had any PO intake for at least a week secondary to generalized weakness, had been noncompliant with home medications. She lives independently and was brought in by EMS when neighbor heard her asking for help.     Patient with noted R 5th toe gangrene on admission, patient states that wound has been present for at least 2-3 weeks, wound to 5th toe 2/2 mechanical trauma from previous fall, had subsequently worsened in the last few weeks with color change after she pulled off overlying scab. Patient also noted to have multiple other small excoriations/wounds on bilateral LE she states have been present for months.     Patient ambulates with rolling walker for the past 3-4 months, in the last 2 months has noted short distance bilateral thigh claudication, denying significant calf or buttock claudication, denies symptoms consistent with ischemic rest pain. Denies significant pain to bilateral LE when seen and examined. On exam with diminished sensation to bilateral feet, though able to localize multiple areas  of light touch, motor intact; denies new paresthesias/motor weakness. Denies maintenance on antiplatelet or cholesterol lowering medication. Denies previous vascular surgery intervention, denies personal history of CVA, CAD, MI, malignancy, bleeding/clotting disorders, VTE. Denies contrast/shellfish allergy, unk reaction to possible penicillin allergy. Motivated to quit smoking.    Tobacco use is a significant patient-modifiable risk factor for this patient’s vascular disease with multiple vascular comorbidities, and a significant risk factor for failure of and complications from any endovascular or surgical interventions.    I explained to the patient the effects of smoking including peripheral artery disease, coronary artery disease, cerebrovascular disease as well as cancer. I asked the patient to stop smoking immediately. It is never too late to quit, and many studies show significant health benefits as well as economical savings after smoking cessation. Patient is maintained on nicotine replacement therapy and would benefit from referral to the smoking cessation program and access to the quit line 7-278-ORCAWAR or ambulatory referral to our network smoking cessation program.  Based on our conversation, this patient appears motivated to quit  And plans to use nicotine replacement to help quit    The patient did not set a quit date. I will continue to follow up on this issue. I spent approximately 5 minutes on tobacco cessation counseling with this patient.    Review of Systems:  General: negative for - chills   Cardiovascular: negative for - chest pain  Respiratory: negative for - shortness of breath  Gastrointestinal: negative for - abdominal pain  Genitourinary ROS: negative  Musculoskeletal ROS: positive for - gait disturbance  Neurological ROS: positive for - diminished sensation to bilateral feet  Hematological and Lymphatic ROS: negative  Dermatological ROS:  R 5th to dry gangrenous changes, bilateral LE  with other small wounds/excoriations  Psychological ROS: negative  Ophthalmic ROS: negative  ENT ROS: negative    Past Medical History:  Past Medical History:   Diagnosis Date    Allergic 320503    Depression 655331    Diabetes (HCC)     Diabetes mellitus (HCC) 421686    Smokes 1974    1/2 ppd smoker       Past Surgical History:  Past Surgical History:   Procedure Laterality Date    APPENDECTOMY      CHOLECYSTECTOMY      FRACTURE SURGERY      RI OPTX FEM SHFT FX W/INSJ IMED IMPLT W/WO SCREW Left 05/27/2020    Procedure: INSERTION NAIL IM FEMUR ANTEGRADE (TROCHANTERIC);  Surgeon: Jose Lyons MD;  Location: [facility] OR;  Service: Orthopedics    TONSILLECTOMY      TUBAL LIGATION         Social History:  Social History     Substance and Sexual Activity   Alcohol Use Never     Social History     Substance and Sexual Activity   Drug Use Never     Social History     Tobacco Use   Smoking Status Every Day    Current packs/day: 0.50    Average packs/day: 0.5 packs/day for 25.0 years (12.5 ttl pk-yrs)    Types: Cigarettes   Smokeless Tobacco Never       Family History:  Family History   Problem Relation Age of Onset    Stroke Mother     No Known Problems Father         left her at 18 months old.    Cholelithiasis Sister         Vascular disease    No Known Problems Half-Brother        Allergies:  Allergies   Allergen Reactions    Penicillins Hives    Prednisone Other (See Comments)     personality change       Medications:    Current Facility-Administered Medications:     acetaminophen (TYLENOL) tablet 975 mg, Q8H PRN    ceFAZolin (ANCEF) IVPB (premix in dextrose) 2,000 mg 50 mL, Q8H, Last Rate: 2,000 mg (10/14/24 2548)    chlorhexidine (PERIDEX) 0.12 % oral rinse 15 mL, Q12H JUAN    Cholecalciferol (VITAMIN D3) tablet 2,000 Units, Daily    dextromethorphan-guaiFENesin (ROBITUSSIN DM) oral syrup 10 mL, Q6H PRN    heparin (porcine) subcutaneous injection 5,000 Units, Q8H JUAN    [START ON 10/15/2024] insulin glargine (LANTUS)  "subcutaneous injection 25 Units 0.25 mL, QAM    insulin lispro (HumALOG/ADMELOG) 100 units/mL subcutaneous injection 1-5 Units, TID AC **AND** Fingerstick Glucose (POCT), TID AC    insulin lispro (HumALOG/ADMELOG) 100 units/mL subcutaneous injection 10 Units, TID With Meals    lidocaine (LIDODERM) 5 % patch 1 patch, Daily    metroNIDAZOLE (FLAGYL) tablet 500 mg, Q12H JUAN    nicotine (NICODERM CQ) 21 mg/24 hr TD 24 hr patch 21 mg, Daily    phenol (CHLORASEPTIC) 1.4 % mucosal liquid 1 spray, Q2H PRN    Vitals:  /69   Pulse 84   Temp 98.1 °F (36.7 °C)   Resp 16   Ht 5' 9\" (1.753 m)   Wt 51.4 kg (113 lb 3.7 oz)   SpO2 98%   BMI 16.72 kg/m²     I/Os:  I/O last 24 hours:  In: 952 [P.O.:472; IV Piggyback:480]  Out: 1250 [Urine:1250]    Lab Results and Cultures:   Lab Results   Component Value Date    WBC 9.77 10/14/2024    HGB 11.2 (L) 10/14/2024    HCT 35.6 10/14/2024    MCV 89 10/14/2024     10/14/2024     Lab Results   Component Value Date    CALCIUM 8.6 10/14/2024    K 4.5 10/14/2024    CO2 32 10/14/2024     10/14/2024    BUN 9 10/14/2024    CREATININE 0.61 10/14/2024     Lab Results   Component Value Date    INR 1.08 10/11/2024    INR 0.92 05/26/2020    INR 0.9 06/16/2018    PROTIME 14.3 10/11/2024    PROTIME 12.4 05/26/2020       Lipid Panel: No results found for: \"CHOL\",     Blood Culture:   Lab Results   Component Value Date    BLOODCX No Growth at 72 hrs. 10/11/2024    BLOODCX No Growth at 72 hrs. 10/11/2024   ,   Urinalysis:   Lab Results   Component Value Date    COLORU Light Yellow 10/11/2024    CLARITYU Clear 10/11/2024    SPECGRAV 1.021 10/11/2024    PHUR 5.5 10/11/2024    LEUKOCYTESUR (A) 10/11/2024     Elevated glucose may cause decreased leukocyte values. See urine microscopic for UWBC result    NITRITE Negative 10/11/2024    GLUCOSEU >=1000 (1%) (A) 10/11/2024    KETONESU 60 (2+) (A) 10/11/2024    BILIRUBINUR Negative 10/11/2024    BLOODU Negative 10/11/2024   ,   Urine Culture: " "No results found for: \"URINECX\",   Wound Culure: No results found for: \"WOUNDCULT\"    Imaging:  Reviewed as above.     10/11 CT Head - No acute intracranial process.No skull fracture. 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    Physical Exam:  General appearance: alert and oriented, in no acute distress. Thin.  Skin:  Grossly warm and dry throughout though bilateral feet cool. R foot with clean and dry dressing in place. Noted R 5th toe gangrene with betadine paint. Noted bilateral LE with multiple wounds/excoriations.   Neurologic: Pleasant and cooperative. Grossly neurologically intact with some bilateral foot diminished sensation though able to localize multiple areas of light touch. Intact movement of toes, bilateral dorsi/plantarflexion, hip flexion  Head: Normocephalic    Eyes:  EOMI bilaterally  Neck: supple, symmetrical, trachea midline  Lungs:  Normal work of breathing without accessory muscle use  Heart:  Regular rate and rhythm  Abdomen:  Soft, nontender abdomen  Extremities:  Bilateral LE grossly M/S intact as above.    Pulse exam:  Radial: Right: 2+ Left:: 2+  Femoral: Right: 1+ Left: 1+  AT: Right: doppler signal Left: doppler signal  PT: Right: doppler signal Left: doppler signal    Arpita Cisneros PA-C  10/14/2024    "

## 2024-10-15 NOTE — ASSESSMENT & PLAN NOTE
Gangrene of the right fifth digit with surrounding ischemic changes and concomitant localized cellulitis.  Stabilized to decreased erythema with the IV antibiotics  -Antibiotics as above   -Serial exams   -Await vascular and podiatry interventio

## 2024-10-15 NOTE — QUICK NOTE
Plan:  -We will continue the same regimen of her Insulin as her insulin was increased within last 24 hours.  -Continue monitoring B glucose with meals.      Daryn Parrish MD  [facility] Internal Medicine Residency  PGY-2

## 2024-10-15 NOTE — PROGRESS NOTES
Progress Note - Hospitalist   Name: Sabiha Garcia 68 y.o. female I MRN: [id number]  Unit/Bed#: [facility] 814-01 I Date of Admission: 10/11/2024   Date of Service: 10/14/2024 I Hospital Day: 3    Assessment & Plan  Gangrene of toe of right foot (HCC)  Right fifth digit dry gangrene with surround cellulitis  Xray - Subtle periosteal reaction of the fifth digit proximal phalanx with adjacent soft tissue swelling. Findings raise suspicion for osteomyelitis   LEADS abnormal - vascular consulted  Podiatry following - plan after vascular optimization  Wound care with Betadine paint open to air  Antibiotics changed to Cefazolin and Flagyl by ID  Cellulitis of right foot  Antibiotics as above  Ketosis (HCC)  Minimal oral intake for 6-7 days  Starvation ketosis vs DKA on presentation - resolved  Oral intake improved  Insulin as below  Type 2 diabetes mellitus (HCC)  Lab Results   Component Value Date    HGBA1C 14.8 (H) 09/18/2024       Recent Labs     10/14/24  0753 10/14/24  1110 10/14/24  1602 10/14/24  2120   POCGLU 400* 340* 212* 231*       Blood Sugar Average: Last 72 hrs:  (P) [id number]    Severely uncontrolled as evidenced by HbA1c  On Levemir at home  Possible DKA on arrival   Insulin dose increased to Lantus 25 in am, Humalog 10 TID,   Ct sliding scale insulin  Endocrine input appreciated     Ambulatory dysfunction  Presented with generalized weakness, 2 falls with no head strike  States had been down for prolonged period  CT head - no acute pathology  TSH low with normal free T4, CK normal  PT/OT  Tobacco user  Nicotine patch  Smoking cessation advised  Vitamin D deficiency  Continue supplementation  Lung nodule  CT chest with 3 mm right upper lobe noncalcified nodule  CT chest without contrast in 12 months    VTE Pharmacologic Prophylaxis: VTE Score: 3 Moderate Risk (Score 3-4) - Pharmacological DVT Prophylaxis Ordered: heparin.    Mobility:   Basic Mobility Inpatient Raw Score: 19  JH-HLM Goal: 6: Walk 10  steps or more  JH-HLM Achieved: 6: Walk 10 steps or more  JH-HLM Goal achieved. Continue to encourage appropriate mobility.    Patient Centered Rounds: Discussed with RN     Education and Discussions with Family / Patient: Patient declined call to .     Current Length of Stay: 3 day(s)  Current Patient Status: Inpatient   Certification Statement: The patient will continue to require additional inpatient hospital stay due to as above    Code Status: Level 1 - Full Code    Subjective   Had a headache this morning. No fever. No nausea, vomiting or diarrhea.    Objective :  Temp:  [98.2 °F (36.8 °C)-98.7 °F (37.1 °C)] 98.2 °F (36.8 °C)  HR:  [79-85] 85  BP: (141-160)/(59-80) 141/64  Resp:  [15-19] 19  SpO2:  [94 %-97 %] 96 %  O2 Device: None (Room air)    Body mass index is 16.72 kg/m².     Physical Exam  Vitals reviewed.   HENT:      Head: Normocephalic.      Nose: Nose normal.      Mouth/Throat:      Mouth: Mucous membranes are moist.   Eyes:      Extraocular Movements: Extraocular movements intact.   Cardiovascular:      Rate and Rhythm: Normal rate and regular rhythm.   Pulmonary:      Effort: Pulmonary effort is normal. No respiratory distress.      Breath sounds: Normal breath sounds. No wheezing.   Abdominal:      General: Bowel sounds are normal. There is no distension.      Palpations: Abdomen is soft.      Tenderness: There is no abdominal tenderness.   Musculoskeletal:         General: No swelling.      Cervical back: Neck supple.   Skin:     Comments: Right fifth toe dry gangrene    Neurological:      General: No focal deficit present.      Mental Status: She is alert.   Psychiatric:         Mood and Affect: Mood normal.         Behavior: Behavior normal.           Lab Results: I have reviewed the following results:   Results from last 7 days   Lab Units 10/14/24  0546 10/11/24  1205 10/11/24  0550   WBC Thousand/uL 9.77   < > 18.14*   HEMOGLOBIN g/dL 11.2*   < > 12.8   HEMATOCRIT % 35.6   < >  42.2   PLATELETS Thousands/uL 269   < > 505*   BANDS PCT %  --   --  6   SEGS PCT % 56   < >  --    LYMPHO PCT % 34   < > 14   MONO PCT % 6   < > 6   EOS PCT % 2   < > 3    < > = values in this interval not displayed.     Results from last 7 days   Lab Units 10/14/24  0546 10/11/24  1926 10/11/24  1438   SODIUM mmol/L 140   < > 146  144   POTASSIUM mmol/L 4.5   < > 3.5  3.4*   CHLORIDE mmol/L 100   < > 107  106   CO2 mmol/L 32   < > 28  28   BUN mg/dL 9   < > 49*  47*   CREATININE mg/dL 0.61   < > 0.89  0.87   ANION GAP mmol/L 8   < > 11  10   CALCIUM mg/dL 8.6   < > 8.6  8.7   ALBUMIN g/dL  --   --  3.1*   TOTAL BILIRUBIN mg/dL  --   --  0.35   ALK PHOS U/L  --   --  98   ALT U/L  --   --  9   AST U/L  --   --  10*   GLUCOSE RANDOM mg/dL 409*   < > 206*  206*    < > = values in this interval not displayed.     Results from last 7 days   Lab Units 10/11/24  0550   INR  1.08     Results from last 7 days   Lab Units 10/14/24  2120 10/14/24  1602 10/14/24  1110 10/14/24  0753 10/13/24  2049 10/13/24  1613 10/13/24  1108 10/13/24  0734 10/12/24  2046 10/12/24  1531 10/12/24  1108 10/12/24  0919   POC GLUCOSE mg/dl 231* 212* 340* 400* 288* 226* 368* 223* 179* 238* 225* 198*         Results from last 7 days   Lab Units 10/14/24  0546 10/13/24  0436 10/11/24  0747   LACTIC ACID mmol/L  --   --  1.5   PROCALCITONIN ng/ml 4.29* 0.79* 0.21       Recent Cultures (last 7 days):   Results from last 7 days   Lab Units 10/11/24  0707   BLOOD CULTURE  No Growth at 72 hrs.  No Growth at 72 hrs.       Last 24 Hours Medication List:     Current Facility-Administered Medications:     acetaminophen (TYLENOL) tablet 975 mg, Q8H PRN    ceFAZolin (ANCEF) IVPB (premix in dextrose) 2,000 mg 50 mL, Q8H, Last Rate: Stopped (10/14/24 1700)    chlorhexidine (PERIDEX) 0.12 % oral rinse 15 mL, Q12H JUAN    Cholecalciferol (VITAMIN D3) tablet 2,000 Units, Daily    dextromethorphan-guaiFENesin (ROBITUSSIN DM) oral syrup 10 mL, Q6H PRN     heparin (porcine) subcutaneous injection 5,000 Units, Q8H JUAN    [START ON 10/15/2024] insulin glargine (LANTUS) subcutaneous injection 25 Units 0.25 mL, QAM    insulin lispro (HumALOG/ADMELOG) 100 units/mL subcutaneous injection 1-5 Units, TID AC **AND** Fingerstick Glucose (POCT), TID AC    insulin lispro (HumALOG/ADMELOG) 100 units/mL subcutaneous injection 10 Units, TID With Meals    lidocaine (LIDODERM) 5 % patch 1 patch, Daily    metroNIDAZOLE (FLAGYL) tablet 500 mg, Q12H JUAN    nicotine (NICODERM CQ) 21 mg/24 hr TD 24 hr patch 21 mg, Daily    phenol (CHLORASEPTIC) 1.4 % mucosal liquid 1 spray, Q2H PRN    Administrative Statements   Today, Patient Was Seen By: Arpita Singh MD      **Please Note: This note may have been constructed using a voice recognition system.**

## 2024-10-15 NOTE — CONSULTS
The patient's vancomycin therapy has been completed/discontinued. Thank you for this consult. Pharmacy will sign off now.   Andie Moscoso, Pharmacist

## 2024-10-15 NOTE — PROGRESS NOTES
[facility] Podiatry - Progress Note  Patient: Sabiha Garcia 68 y.o. female   MRN: [id number]  PCP: Alexys Blunt MD  [facility] Encounter: [id number]  Date Of Visit: 10/15/24    ASSESSMENT:    Sabiha Garcia is a 68 y.o. female with:    [place] 4 DFU Right fifth digit  Cellulitis right foot  Uncontrolled type 2 diabetes mellitus with last A1c of 14.8% September 2024  PAD  Smoking history      PLAN:    Patient seen and evaluated at bedside. Right fifth digit dry gangrene remained stable at this time with improvement noted to forefoot redness. Rubor to right foot is gravity dependent which suggests vascular component vs infective.   Early development of pressure wound right lateral forefoot. Ordered prevalon boots to wear while in bed for offloading.  Patient will require podiatric surgical intervention on this admission consisting of a partial fifth ray amputation pending vascular optimization.  No indication for source control at this time.  Will continue to evaluate.   CTA abdominal with runoff reviewed:  Right lower extremity: Severe calcific narrowing of the proximal right common iliac artery. Occlusion of the right superficial femoral artery.  Left lower extremity: Areas of moderate multifocal narrowing.  Vascular and interventional radiology notes reviewed, plan for tentative right lower extremity angiogram with possible intervention on Thursday 10/17 pending availability.  Continue IV Ancef/oral Flagyl per infectious disease.   Labs and vitals reviewed. Patient is afebrile with no leukocytosis noted. Will continue to monitor and trend.   Continue local wound care, Betadine DSD to right fifth digit - appreciate nursing assistance with dressing changes.  Elevation on green foam wedges or pillows when non-ambulatory.  Rest of care per primary team.    Weight bearing status: WBAT       SUBJECTIVE:     The patient was seen, evaluated, and assessed at bedside today. The patient was awake, alert, and  "in no acute distress. No acute events overnight. The patient reports no pain to the right foot. Patient denies N/V/F/chills/SOB/CP.      OBJECTIVE:     Vitals:   /69   Pulse 81   Temp 98.1 °F (36.7 °C)   Resp 18   Ht 5' 9\" (1.753 m)   Wt 52 kg (114 lb 10.2 oz)   SpO2 96%   BMI 16.93 kg/m²     Temp (24hrs), Av.1 °F (36.7 °C), Min:98.1 °F (36.7 °C), Max:98.2 °F (36.8 °C)      Physical Exam:     General:  Alert, cooperative, and in no distress.  Lower extremity exam:  Cardiovascular status at baseline.  Neurological status at baseline.  Musculoskeletal status at baseline. No calf tenderness noted.     Lower extremity wound(s) as noted below:  Wound #: 1  Location: Right 5th digit   Deepest Tissue Noted in Base: Dry gangrene   Probe to Bone: No  Peripheral Skin Description: Attached  Granulation: 0% Fibrotic Tissue: 0% Necrotic Tissue: 100%   Drainage Amount: minimal  Signs of Infection: No - improvement of cellulitis     Right forefoot rubor was noted to improve with elevation of the limb.    Right lower extremity: Improvement noted to erythema and edema. Right fifth digit remains dry and stable. Early development of pressure wound to right lateral forefoot consistent with pressure.    Clinical Images 10/15/24:      Additional Data:     Labs:    Results from last 7 days   Lab Units 10/14/24  0546   WBC Thousand/uL 9.77   HEMOGLOBIN g/dL 11.2*   HEMATOCRIT % 35.6   PLATELETS Thousands/uL 269   SEGS PCT % 56   LYMPHO PCT % 34   MONO PCT % 6   EOS PCT % 2     Results from last 7 days   Lab Units 10/15/24  0523 10/11/24  1926 10/11/24  1438   POTASSIUM mmol/L 3.9   < > 3.5  3.4*   CHLORIDE mmol/L 103   < > 107  106   CO2 mmol/L 30   < > 28  28   BUN mg/dL 8   < > 49*  47*   CREATININE mg/dL 0.51*   < > 0.89  0.87   CALCIUM mg/dL 8.9   < > 8.6  8.7   ALK PHOS U/L  --   --  98   ALT U/L  --   --  9   AST U/L  --   --  10*    < > = values in this interval not displayed.     Results from last 7 days " "  Lab Units 10/11/24  0550   INR  1.08       * I Have Reviewed All Lab Data Listed Above.    Recent Cultures (last 7 days):     Results from last 7 days   Lab Units 10/11/24  0707   BLOOD CULTURE  No Growth After 4 Days.  No Growth After 4 Days.           Imaging: I have personally reviewed pertinent films in PACS  EKG, Pathology, and Other Studies: I have personally reviewed pertinent reports.      ** Please Note: Portions of the record may have been created with voice recognition software. Occasional wrong word or \"sound a like\" substitutions may have occurred due to the inherent limitations of voice recognition software. Read the chart carefully and recognize, using context, where substitutions have occurred. **      "

## 2024-10-15 NOTE — ASSESSMENT & PLAN NOTE
Lab Results   Component Value Date    HGBA1C 14.8 (H) 09/18/2024       Recent Labs     10/14/24  0753 10/14/24  1110 10/14/24  1602 10/14/24  2120   POCGLU 400* 340* 212* 231*       Blood Sugar Average: Last 72 hrs:  (P) [id number]    Severely uncontrolled as evidenced by HbA1c  On Levemir at home  Possible DKA on arrival   Insulin dose increased to Lantus 25 in am, Humalog 10 TID,   Ct sliding scale insulin  Endocrine input appreciated

## 2024-10-15 NOTE — ASSESSMENT & PLAN NOTE
Lab Results   Component Value Date    HGBA1C 14.8 (H) 09/18/2024       Recent Labs     10/14/24  1110 10/14/24  1602 10/14/24  2120 10/15/24  0734   POCGLU 340* 212* 231* 297*       Blood Sugar Average: Last 72 hrs:  (P) 240.125    Severely uncontrolled as evidenced by HbA1c  On Levemir at home  Possible DKA on arrival   Insulin dose increased to Lantus 25 in am, Humalog 10 TID,   Ct sliding scale insulin  Endocrine input appreciated

## 2024-10-15 NOTE — PLAN OF CARE
Problem: Potential for Falls  Goal: Patient will remain free of falls  Description: INTERVENTIONS:  - Educate patient/family on patient safety including physical limitations  - Instruct patient to call for assistance with activity   - Consult OT/PT to assist with strengthening/mobility   - Keep Call bell within reach  - Keep bed low and locked with side rails adjusted as appropriate  - Keep care items and personal belongings within reach  - Initiate and maintain comfort rounds  - Make Fall Risk Sign visible to staff  - Offer Toileting every 2 Hours, in advance of need  - Initiate/Maintain alarm  - Obtain necessary fall risk management equipment:   - Apply yellow socks and bracelet for high fall risk patients  - Consider moving patient to room near nurses station  Outcome: Progressing     Problem: CARDIOVASCULAR - ADULT  Goal: Maintains optimal cardiac output and hemodynamic stability  Description: INTERVENTIONS:  - Monitor I/O, vital signs and rhythm  - Monitor for S/S and trends of decreased cardiac output  - Administer and titrate ordered vasoactive medications to optimize hemodynamic stability  - Assess quality of pulses, skin color and temperature  - Assess for signs of decreased coronary artery perfusion  - Instruct patient to report change in severity of symptoms  Outcome: Progressing  Goal: Absence of cardiac dysrhythmias or at baseline rhythm  Description: INTERVENTIONS:  - Continuous cardiac monitoring, vital signs, obtain 12 lead EKG if ordered  - Administer antiarrhythmic and heart rate control medications as ordered  - Monitor electrolytes and administer replacement therapy as ordered  Outcome: Progressing     Problem: METABOLIC, FLUID AND ELECTROLYTES - ADULT  Goal: Electrolytes maintained within normal limits  Description: INTERVENTIONS:  - Monitor labs and assess patient for signs and symptoms of electrolyte imbalances  - Administer electrolyte replacement as ordered  - Monitor response to  electrolyte replacements, including repeat lab results as appropriate  - Instruct patient on fluid and nutrition as appropriate  Outcome: Progressing  Goal: Fluid balance maintained  Description: INTERVENTIONS:  - Monitor labs   - Monitor I/O and WT  - Instruct patient on fluid and nutrition as appropriate  - Assess for signs & symptoms of volume excess or deficit  Outcome: Progressing  Goal: Glucose maintained within target range  Description: INTERVENTIONS:  - Monitor Blood Glucose as ordered  - Assess for signs and symptoms of hyperglycemia and hypoglycemia  - Administer ordered medications to maintain glucose within target range  - Assess nutritional intake and initiate nutrition service referral as needed  Outcome: Progressing     Problem: Prexisting or High Potential for Compromised Skin Integrity  Goal: Skin integrity is maintained or improved  Description: INTERVENTIONS:  - Identify patients at risk for skin breakdown  - Assess and monitor skin integrity  - Assess and monitor nutrition and hydration status  - Monitor labs   - Assess for incontinence   - Turn and reposition patient  - Assist with mobility/ambulation  - Relieve pressure over bony prominences  - Avoid friction and shearing  - Provide appropriate hygiene as needed including keeping skin clean and dry  - Evaluate need for skin moisturizer/barrier cream  - Collaborate with interdisciplinary team   - Patient/family teaching  - Consider wound care consult   Outcome: Progressing

## 2024-10-16 ENCOUNTER — APPOINTMENT (INPATIENT)
Dept: NON INVASIVE DIAGNOSTICS | Facility: HOSPITAL | Age: 69
DRG: 271 | End: 2024-10-16
Payer: COMMERCIAL

## 2024-10-16 ENCOUNTER — TELEPHONE (OUTPATIENT)
Dept: VASCULAR SURGERY | Facility: CLINIC | Age: 69
End: 2024-10-16

## 2024-10-16 PROBLEM — I74.09 AORTOILIAC OCCLUSIVE DISEASE (HCC): Status: ACTIVE | Noted: 2024-10-16

## 2024-10-16 PROBLEM — G44.89 OTHER HEADACHE SYNDROME: Status: ACTIVE | Noted: 2024-10-16

## 2024-10-16 PROBLEM — Z01.810 PRE-OPERATIVE CARDIOVASCULAR EXAMINATION: Status: ACTIVE | Noted: 2024-10-16

## 2024-10-16 LAB
ANION GAP SERPL CALCULATED.3IONS-SCNC: 6 MMOL/L (ref 4–13)
AORTIC ROOT: 3 CM
APICAL FOUR CHAMBER EJECTION FRACTION: 57 %
ATRIAL RATE: 83 BPM
AV LVOT MEAN GRADIENT: 4 MMHG
AV LVOT PEAK GRADIENT: 8 MMHG
BACTERIA BLD CULT: NORMAL
BACTERIA BLD CULT: NORMAL
BSA FOR ECHO PROCEDURE: 1.63 M2
BUN SERPL-MCNC: 10 MG/DL (ref 5–25)
CALCIUM SERPL-MCNC: 8.9 MG/DL (ref 8.4–10.2)
CHLORIDE SERPL-SCNC: 101 MMOL/L (ref 96–108)
CO2 SERPL-SCNC: 31 MMOL/L (ref 21–32)
CREAT SERPL-MCNC: 0.53 MG/DL (ref 0.6–1.3)
DOP CALC LVOT PEAK VEL VTI: 31.11 CM
DOP CALC LVOT PEAK VEL: 1.44 M/S
E WAVE DECELERATION TIME: 212 MS
E/A RATIO: 0.96
ERYTHROCYTE [DISTWIDTH] IN BLOOD BY AUTOMATED COUNT: 14.7 % (ref 11.6–15.1)
FRACTIONAL SHORTENING: 26 (ref 28–44)
GFR SERPL CREATININE-BSD FRML MDRD: 97 ML/MIN/1.73SQ M
GLUCOSE SERPL-MCNC: 174 MG/DL (ref 65–140)
GLUCOSE SERPL-MCNC: 241 MG/DL (ref 65–140)
GLUCOSE SERPL-MCNC: 305 MG/DL (ref 65–140)
GLUCOSE SERPL-MCNC: 305 MG/DL (ref 65–140)
GLUCOSE SERPL-MCNC: 319 MG/DL (ref 65–140)
HCT VFR BLD AUTO: 33.6 % (ref 34.8–46.1)
HGB BLD-MCNC: 10.6 G/DL (ref 11.5–15.4)
INTERVENTRICULAR SEPTUM IN DIASTOLE (PARASTERNAL SHORT AXIS VIEW): 1 CM
INTERVENTRICULAR SEPTUM: 1 CM (ref 0.6–1.1)
LAAS-AP2: 17.1 CM2
LAAS-AP4: 12.7 CM2
LEFT ATRIUM SIZE: 3.4 CM
LEFT ATRIUM VOLUME (MOD BIPLANE): 39 ML
LEFT ATRIUM VOLUME INDEX (MOD BIPLANE): 23.9 ML/M2
LEFT INTERNAL DIMENSION IN SYSTOLE: 2.8 CM (ref 2.1–4)
LEFT VENTRICULAR INTERNAL DIMENSION IN DIASTOLE: 3.8 CM (ref 3.5–6)
LEFT VENTRICULAR POSTERIOR WALL IN END DIASTOLE: 0.8 CM
LEFT VENTRICULAR STROKE VOLUME: 30 ML
LVSV (TEICH): 30 ML
MCH RBC QN AUTO: 28 PG (ref 26.8–34.3)
MCHC RBC AUTO-ENTMCNC: 31.5 G/DL (ref 31.4–37.4)
MCV RBC AUTO: 89 FL (ref 82–98)
MV E'TISSUE VEL-SEP: 7 CM/S
MV PEAK A VEL: 0.9 M/S
MV PEAK E VEL: 86 CM/S
MV STENOSIS PRESSURE HALF TIME: 62 MS
MV VALVE AREA P 1/2 METHOD: 3.5
P AXIS: 73 DEGREES
PLATELET # BLD AUTO: 323 THOUSANDS/UL (ref 149–390)
PMV BLD AUTO: 8.8 FL (ref 8.9–12.7)
POTASSIUM SERPL-SCNC: 4.2 MMOL/L (ref 3.5–5.3)
PR INTERVAL: 158 MS
QRS AXIS: 54 DEGREES
QRSD INTERVAL: 68 MS
QT INTERVAL: 362 MS
QTC INTERVAL: 425 MS
RBC # BLD AUTO: 3.78 MILLION/UL (ref 3.81–5.12)
RIGHT ATRIUM AREA SYSTOLE A4C: 13.1 CM2
RIGHT VENTRICLE ID DIMENSION: 3.1 CM
SL CV LEFT ATRIUM LENGTH A2C: 4.4 CM
SL CV LV EF: 60
SL CV PED ECHO LEFT VENTRICLE DIASTOLIC VOLUME (MOD BIPLANE) 2D: 60 ML
SL CV PED ECHO LEFT VENTRICLE SYSTOLIC VOLUME (MOD BIPLANE) 2D: 30 ML
SODIUM SERPL-SCNC: 138 MMOL/L (ref 135–147)
T WAVE AXIS: 77 DEGREES
TR MAX PG: 23 MMHG
TR PEAK VELOCITY: 2.4 M/S
TRICUSPID ANNULAR PLANE SYSTOLIC EXCURSION: 2 CM
TRICUSPID VALVE PEAK REGURGITATION VELOCITY: 2.41 M/S
VENTRICULAR RATE: 83 BPM
WBC # BLD AUTO: 10.75 THOUSAND/UL (ref 4.31–10.16)

## 2024-10-16 PROCEDURE — 93010 ELECTROCARDIOGRAM REPORT: CPT | Performed by: INTERNAL MEDICINE

## 2024-10-16 PROCEDURE — 85027 COMPLETE CBC AUTOMATED: CPT | Performed by: PHYSICIAN ASSISTANT

## 2024-10-16 PROCEDURE — 93306 TTE W/DOPPLER COMPLETE: CPT

## 2024-10-16 PROCEDURE — 80048 BASIC METABOLIC PNL TOTAL CA: CPT | Performed by: PHYSICIAN ASSISTANT

## 2024-10-16 PROCEDURE — 99222 1ST HOSP IP/OBS MODERATE 55: CPT | Performed by: STUDENT IN AN ORGANIZED HEALTH CARE EDUCATION/TRAINING PROGRAM

## 2024-10-16 PROCEDURE — 93005 ELECTROCARDIOGRAM TRACING: CPT

## 2024-10-16 PROCEDURE — 99232 SBSQ HOSP IP/OBS MODERATE 35: CPT | Performed by: SURGERY

## 2024-10-16 PROCEDURE — 99233 SBSQ HOSP IP/OBS HIGH 50: CPT | Performed by: INTERNAL MEDICINE

## 2024-10-16 PROCEDURE — 99232 SBSQ HOSP IP/OBS MODERATE 35: CPT | Performed by: INTERNAL MEDICINE

## 2024-10-16 PROCEDURE — 82948 REAGENT STRIP/BLOOD GLUCOSE: CPT

## 2024-10-16 PROCEDURE — 93306 TTE W/DOPPLER COMPLETE: CPT | Performed by: INTERNAL MEDICINE

## 2024-10-16 RX ORDER — POLYETHYLENE GLYCOL 3350 17 G/17G
17 POWDER, FOR SOLUTION ORAL ONCE
Status: COMPLETED | OUTPATIENT
Start: 2024-10-16 | End: 2024-10-16

## 2024-10-16 RX ORDER — POLYETHYLENE GLYCOL 3350 17 G/17G
17 POWDER, FOR SOLUTION ORAL DAILY PRN
Status: DISCONTINUED | OUTPATIENT
Start: 2024-10-16 | End: 2024-10-27

## 2024-10-16 RX ORDER — DOCUSATE SODIUM 100 MG/1
100 CAPSULE, LIQUID FILLED ORAL 2 TIMES DAILY
Status: DISCONTINUED | OUTPATIENT
Start: 2024-10-16 | End: 2024-10-29 | Stop reason: HOSPADM

## 2024-10-16 RX ORDER — INSULIN LISPRO 100 [IU]/ML
1-5 INJECTION, SOLUTION INTRAVENOUS; SUBCUTANEOUS
Status: DISCONTINUED | OUTPATIENT
Start: 2024-10-16 | End: 2024-10-28

## 2024-10-16 RX ORDER — INSULIN LISPRO 100 [IU]/ML
12 INJECTION, SOLUTION INTRAVENOUS; SUBCUTANEOUS
Status: DISCONTINUED | OUTPATIENT
Start: 2024-10-16 | End: 2024-10-18

## 2024-10-16 RX ORDER — INSULIN LISPRO 100 [IU]/ML
12 INJECTION, SOLUTION INTRAVENOUS; SUBCUTANEOUS
Status: DISCONTINUED | OUTPATIENT
Start: 2024-10-17 | End: 2024-10-18

## 2024-10-16 RX ORDER — CALCIUM CARBONATE 500 MG/1
1000 TABLET, CHEWABLE ORAL 3 TIMES DAILY PRN
Status: DISCONTINUED | OUTPATIENT
Start: 2024-10-16 | End: 2024-10-29 | Stop reason: HOSPADM

## 2024-10-16 RX ORDER — INSULIN GLARGINE 100 [IU]/ML
30 INJECTION, SOLUTION SUBCUTANEOUS EVERY MORNING
Status: DISCONTINUED | OUTPATIENT
Start: 2024-10-17 | End: 2024-10-17

## 2024-10-16 RX ORDER — INSULIN LISPRO 100 [IU]/ML
10 INJECTION, SOLUTION INTRAVENOUS; SUBCUTANEOUS
Status: DISCONTINUED | OUTPATIENT
Start: 2024-10-17 | End: 2024-10-28

## 2024-10-16 RX ORDER — ATORVASTATIN CALCIUM 80 MG/1
80 TABLET, FILM COATED ORAL
Status: DISCONTINUED | OUTPATIENT
Start: 2024-10-16 | End: 2024-10-29 | Stop reason: HOSPADM

## 2024-10-16 RX ORDER — KETOROLAC TROMETHAMINE 30 MG/ML
15 INJECTION, SOLUTION INTRAMUSCULAR; INTRAVENOUS ONCE
Status: COMPLETED | OUTPATIENT
Start: 2024-10-16 | End: 2024-10-16

## 2024-10-16 RX ORDER — DIAZEPAM 5 MG/1
5 TABLET ORAL ONCE
Status: COMPLETED | OUTPATIENT
Start: 2024-10-16 | End: 2024-10-16

## 2024-10-16 RX ADMIN — INSULIN LISPRO 1 UNITS: 100 INJECTION, SOLUTION INTRAVENOUS; SUBCUTANEOUS at 16:36

## 2024-10-16 RX ADMIN — INSULIN LISPRO 12 UNITS: 100 INJECTION, SOLUTION INTRAVENOUS; SUBCUTANEOUS at 16:36

## 2024-10-16 RX ADMIN — METRONIDAZOLE 500 MG: 500 TABLET ORAL at 21:01

## 2024-10-16 RX ADMIN — POLYETHYLENE GLYCOL 3350 17 G: 17 POWDER, FOR SOLUTION ORAL at 10:17

## 2024-10-16 RX ADMIN — CEFAZOLIN SODIUM 2000 MG: 2 SOLUTION INTRAVENOUS at 16:40

## 2024-10-16 RX ADMIN — CHLORHEXIDINE GLUCONATE 0.12% ORAL RINSE 15 ML: 1.2 LIQUID ORAL at 08:11

## 2024-10-16 RX ADMIN — DOCUSATE SODIUM 100 MG: 100 CAPSULE, LIQUID FILLED ORAL at 16:40

## 2024-10-16 RX ADMIN — NICOTINE 21 MG: 21 PATCH, EXTENDED RELEASE TRANSDERMAL at 08:13

## 2024-10-16 RX ADMIN — KETOROLAC TROMETHAMINE 15 MG: 30 INJECTION, SOLUTION INTRAMUSCULAR at 10:59

## 2024-10-16 RX ADMIN — INSULIN LISPRO 10 UNITS: 100 INJECTION, SOLUTION INTRAVENOUS; SUBCUTANEOUS at 11:59

## 2024-10-16 RX ADMIN — Medication 2000 UNITS: at 08:11

## 2024-10-16 RX ADMIN — GUAIFENESIN AND DEXTROMETHORPHAN 10 ML: 100; 10 SYRUP ORAL at 21:03

## 2024-10-16 RX ADMIN — CEFAZOLIN SODIUM 2000 MG: 2 SOLUTION INTRAVENOUS at 01:30

## 2024-10-16 RX ADMIN — ACETAMINOPHEN 975 MG: 325 TABLET, FILM COATED ORAL at 08:06

## 2024-10-16 RX ADMIN — INSULIN LISPRO 3 UNITS: 100 INJECTION, SOLUTION INTRAVENOUS; SUBCUTANEOUS at 07:59

## 2024-10-16 RX ADMIN — ACETAMINOPHEN 975 MG: 325 TABLET, FILM COATED ORAL at 21:03

## 2024-10-16 RX ADMIN — INSULIN LISPRO 2 UNITS: 100 INJECTION, SOLUTION INTRAVENOUS; SUBCUTANEOUS at 21:12

## 2024-10-16 RX ADMIN — DOCUSATE SODIUM 100 MG: 100 CAPSULE, LIQUID FILLED ORAL at 10:17

## 2024-10-16 RX ADMIN — HEPARIN SODIUM 5000 UNITS: 5000 INJECTION INTRAVENOUS; SUBCUTANEOUS at 06:28

## 2024-10-16 RX ADMIN — CHLORHEXIDINE GLUCONATE 0.12% ORAL RINSE 15 ML: 1.2 LIQUID ORAL at 21:01

## 2024-10-16 RX ADMIN — HEPARIN SODIUM 5000 UNITS: 5000 INJECTION INTRAVENOUS; SUBCUTANEOUS at 21:02

## 2024-10-16 RX ADMIN — ASPIRIN 81 MG: 81 TABLET, COATED ORAL at 08:10

## 2024-10-16 RX ADMIN — DIAZEPAM 5 MG: 5 TABLET ORAL at 10:59

## 2024-10-16 RX ADMIN — HEPARIN SODIUM 5000 UNITS: 5000 INJECTION INTRAVENOUS; SUBCUTANEOUS at 15:55

## 2024-10-16 RX ADMIN — ATORVASTATIN CALCIUM 80 MG: 80 TABLET, FILM COATED ORAL at 15:56

## 2024-10-16 RX ADMIN — INSULIN LISPRO 10 UNITS: 100 INJECTION, SOLUTION INTRAVENOUS; SUBCUTANEOUS at 07:59

## 2024-10-16 RX ADMIN — CEFAZOLIN SODIUM 2000 MG: 2 SOLUTION INTRAVENOUS at 08:21

## 2024-10-16 RX ADMIN — GUAIFENESIN AND DEXTROMETHORPHAN 10 ML: 100; 10 SYRUP ORAL at 08:07

## 2024-10-16 RX ADMIN — METRONIDAZOLE 500 MG: 500 TABLET ORAL at 08:12

## 2024-10-16 RX ADMIN — INSULIN GLARGINE 25 UNITS: 100 INJECTION, SOLUTION SUBCUTANEOUS at 08:11

## 2024-10-16 RX ADMIN — INSULIN LISPRO 3 UNITS: 100 INJECTION, SOLUTION INTRAVENOUS; SUBCUTANEOUS at 11:59

## 2024-10-16 NOTE — PROGRESS NOTES
Progress Note - Hospitalist   Name: Sabiha Garcia 68 y.o. female I MRN: [id number]  Unit/Bed#: [facility] 814-01 I Date of Admission: 10/11/2024   Date of Service: 10/16/2024 I Hospital Day: 5     Assessment & Plan  Gangrene of toe of right foot (HCC)  Right fifth digit dry gangrene with surround cellulitis  Xray - Subtle periosteal reaction of the fifth digit proximal phalanx with adjacent soft tissue swelling. Findings raise suspicion for osteomyelitis   LEADS abnormal - vascular consulted  Podiatry following - plan after vascular optimization  Discussed with vascular surgery via secure chat.  Plan for surgical intervention noted.  Timing to be determined  Wound care with Betadine paint open to air  Antibiotics changed to Cefazolin and Flagyl by ID  Cellulitis of right foot  Antibiotics as above  Ketosis (HCC)  Minimal oral intake for 6-7 days  Starvation ketosis vs DKA on presentation - resolved  Oral intake improved  Insulin as below  Type 2 diabetes mellitus (HCC)  Lab Results   Component Value Date    HGBA1C 14.8 (H) 09/18/2024       Recent Labs     10/15/24  0734 10/15/24  1132 10/15/24  1611 10/16/24  0742   POCGLU 297* 270* 134 305*       Blood Sugar Average: Last 72 hrs:  (P) 274.5    Severely uncontrolled as evidenced by HbA1c  On Levemir at home  Possible DKA on arrival   Insulin dose increased to Lantus 25 in am, Humalog 10 TID,   Ct sliding scale insulin  Endocrine input appreciated     Ambulatory dysfunction  Presented with generalized weakness, 2 falls with no head strike  States had been down for prolonged period  CT head - no acute pathology  TSH low with normal free T4, CK normal  PT/OT  Tobacco user  Nicotine patch  Smoking cessation advised  Vitamin D deficiency  Continue supplementation  Lung nodule  CT chest with 3 mm right upper lobe noncalcified nodule  CT chest without contrast in 12 months  Aortoiliac occlusive disease (HCC)    VTE Pharmacologic Prophylaxis:   Pharmacologic: Heparin  Mechanical  VTE Prophylaxis in Place: No    Patient Centered Rounds: I have performed bedside rounds with nursing staff today.      Time Spent for Care: 1 hour.  More than 50% of total time spent on counseling and coordination of care as described above.    Current Length of Stay: 5 day(s)    Current Patient Status: Inpatient       Code Status: Level 1 - Full Code      Subjective:   nad    Objective:     Vitals:   Temp (24hrs), Av.5 °F (36.9 °C), Min:98.1 °F (36.7 °C), Max:99.2 °F (37.3 °C)    Temp:  [98.1 °F (36.7 °C)-99.2 °F (37.3 °C)] 99.2 °F (37.3 °C)  HR:  [81-87] 87  Resp:  [16-18] 18  BP: (123-143)/(57-95) 123/70  SpO2:  [94 %-97 %] 94 %  Body mass index is 16.93 kg/m².     Input and Output Summary (last 24 hours):       Intake/Output Summary (Last 24 hours) at 10/16/2024 0920  Last data filed at 10/16/2024 0853  Gross per 24 hour   Intake 816 ml   Output 2080 ml   Net -1264 ml       Physical Exam:     Physical Exam  Constitutional:       Appearance: Normal appearance.   HENT:      Head: Normocephalic and atraumatic.   Cardiovascular:      Rate and Rhythm: Normal rate and regular rhythm.   Pulmonary:      Effort: Pulmonary effort is normal.      Breath sounds: Normal breath sounds.   Abdominal:      General: Abdomen is flat.      Palpations: Abdomen is soft.   Neurological:      General: No focal deficit present.      Mental Status: She is oriented to person, place, and time.   Psychiatric:         Mood and Affect: Mood normal.         Additional Data:     Labs:    Results from last 7 days   Lab Units 10/16/24  0607 10/14/24  0546   WBC Thousand/uL 10.75* 9.77   HEMOGLOBIN g/dL 10.6* 11.2*   HEMATOCRIT % 33.6* 35.6   PLATELETS Thousands/uL 323 269   SEGS PCT %  --  56   LYMPHO PCT %  --  34   MONO PCT %  --  6   EOS PCT %  --  2     Results from last 7 days   Lab Units 10/16/24  0607 10/11/24  1926 10/11/24  1438   POTASSIUM mmol/L 4.2   < > 3.5  3.4*   CHLORIDE mmol/L 101   < > 107  106   CO2 mmol/L 31   < > 28   28   BUN mg/dL 10   < > 49*  47*   CREATININE mg/dL 0.53*   < > 0.89  0.87   CALCIUM mg/dL 8.9   < > 8.6  8.7   ALK PHOS U/L  --   --  98   ALT U/L  --   --  9   AST U/L  --   --  10*    < > = values in this interval not displayed.     Results from last 7 days   Lab Units 10/11/24  0550   INR  1.08       Recent Cultures (last 7 days):     Results from last 7 days   Lab Units 10/11/24  0707   BLOOD CULTURE  No Growth After 5 Days.  No Growth After 5 Days.       Last 24 Hours Medication List:   Current Facility-Administered Medications   Medication Dose Route Frequency Provider Last Rate    acetaminophen  975 mg Oral Q8H PRN Geri Justice MD      aspirin  81 mg Oral Daily Jeana Muñoz PA-C      atorvastatin  10 mg Oral Daily With Dinner Jeana Muñoz PA-C      cefazolin  2,000 mg Intravenous Q8H Jeana Muñoz PA-C 2,000 mg (10/16/24 0821)    chlorhexidine  15 mL Mouth/Throat Q12H JUAN Justice MD      cholecalciferol  2,000 Units Oral Daily Geri Justice MD      dextromethorphan-guaiFENesin  10 mL Oral Q6H PRN Geri Justice MD      heparin (porcine)  5,000 Units Subcutaneous Q8H JUAN Justice MD      insulin glargine  25 Units Subcutaneous QAM Jeana Muñoz PA-C      insulin lispro  1-5 Units Subcutaneous TID  Geri Justice MD      insulin lispro  10 Units Subcutaneous TID With Meals Jeana Muñoz PA-C      lidocaine  1 patch Topical Daily Geri Justice MD      metroNIDAZOLE  500 mg Oral Q12H Pending sale to [facility] Jeana Muñoz PA-C      nicotine  21 mg Transdermal Daily Octaviano Chau DO      phenol  1 spray Mouth/Throat Q2H PRN Geri Justice MD          Today, Patient Was Seen By: Guy Elias DO    ** Please Note: Dictation voice to text software may have been used in the creation of this document. **

## 2024-10-16 NOTE — ASSESSMENT & PLAN NOTE
I spent 5 minutes with the patient discussing tobacco cessation.  Harmful effects of cigarette smoking were discussed.  Patient expressed understanding.

## 2024-10-16 NOTE — PROGRESS NOTES
Progress Note - Hospitalist   Name: Sabiha Garcia 68 y.o. female I MRN: [id number]  Unit/Bed#: [facility] 814-01 I Date of Admission: 10/11/2024   Date of Service: 10/16/2024 I Hospital Day: 5     Assessment & Plan  Gangrene of toe of right foot (HCC)  Right fifth digit dry gangrene with surround cellulitis  Xray - Subtle periosteal reaction of the fifth digit proximal phalanx with adjacent soft tissue swelling. Findings raise suspicion for osteomyelitis   LEADS abnormal - vascular consulted  Podiatry following - plan after vascular optimization  Wound care with Betadine paint open to air  In the box as per infectious disease.  Continue with supportive care.  Cellulitis of right foot  Antibiotics as above  Ketosis (HCC)  Minimal oral intake for 6-7 days  Starvation ketosis vs DKA on presentation - resolved  Oral intake improved  Insulin as below  Type 2 diabetes mellitus (HCC)  Lab Results   Component Value Date    HGBA1C 14.8 (H) 09/18/2024       Recent Labs     10/15/24  0734 10/15/24  1132 10/15/24  1611 10/16/24  0742   POCGLU 297* 270* 134 305*       Blood Sugar Average: Last 72 hrs:  (P) 274.5    Severely uncontrolled as evidenced by HbA1c  On Levemir at home  Possible DKA on arrival   Insulin dose increased to Lantus 25 in am, Humalog 10 TID,   Ct sliding scale insulin  Endocrine input appreciated     Ambulatory dysfunction  Presented with generalized weakness, 2 falls with no head strike  States had been down for prolonged period  CT head - no acute pathology  TSH low with normal free T4, CK normal  PT/OT  Tobacco user  Nicotine patch  Smoking cessation advised  Vitamin D deficiency  Continue supplementation  Lung nodule  CT chest with 3 mm right upper lobe noncalcified nodule  CT chest without contrast in 12 months  Aortoiliac occlusive disease (HCC)    Other headache syndrome    Pre-operative cardiovascular examination    VTE Pharmacologic Prophylaxis:   Pharmacologic: Heparin  Mechanical VTE Prophylaxis in Place:  No    Patient Centered Rounds: I have performed bedside rounds with nursing staff today.    Time Spent for Care: 1 hour.  More than 50% of total time spent on counseling and coordination of care as described above.    Current Length of Stay: 5 day(s)    Current Patient Status: Inpatient       Code Status: Level 1 - Full Code      Subjective:   nad    Objective:     Vitals:   Temp (24hrs), Av.5 °F (36.9 °C), Min:98.1 °F (36.7 °C), Max:99.2 °F (37.3 °C)    Temp:  [98.1 °F (36.7 °C)-99.2 °F (37.3 °C)] 99.2 °F (37.3 °C)  HR:  [81-88] 88  Resp:  [16-18] 18  BP: (116-143)/(57-95) 138/60  SpO2:  [94 %-97 %] 94 %  Body mass index is 16.93 kg/m².     Input and Output Summary (last 24 hours):       Intake/Output Summary (Last 24 hours) at 10/16/2024 1048  Last data filed at 10/16/2024 0853  Gross per 24 hour   Intake 816 ml   Output 2080 ml   Net -1264 ml       Physical Exam:     Physical Exam  Constitutional:       Appearance: Normal appearance.   HENT:      Head: Normocephalic and atraumatic.      Mouth/Throat:      Mouth: Mucous membranes are dry.   Cardiovascular:      Rate and Rhythm: Normal rate and regular rhythm.   Pulmonary:      Effort: Pulmonary effort is normal.      Breath sounds: Normal breath sounds.   Abdominal:      General: Abdomen is flat. There is no distension.      Palpations: Abdomen is soft.   Musculoskeletal:         General: No swelling.   Skin:     General: Skin is warm and dry.   Neurological:      General: No focal deficit present.      Mental Status: She is alert and oriented to person, place, and time.   Psychiatric:         Mood and Affect: Mood normal.         Additional Data:     Labs:    Results from last 7 days   Lab Units 10/16/24  0607 10/14/24  0546   WBC Thousand/uL 10.75* 9.77   HEMOGLOBIN g/dL 10.6* 11.2*   HEMATOCRIT % 33.6* 35.6   PLATELETS Thousands/uL 323 269   SEGS PCT %  --  56   LYMPHO PCT %  --  34   MONO PCT %  --  6   EOS PCT %  --  2     Results from last 7 days   Lab  Units 10/16/24  0607 10/11/24  1926 10/11/24  1438   POTASSIUM mmol/L 4.2   < > 3.5  3.4*   CHLORIDE mmol/L 101   < > 107  106   CO2 mmol/L 31   < > 28  28   BUN mg/dL 10   < > 49*  47*   CREATININE mg/dL 0.53*   < > 0.89  0.87   CALCIUM mg/dL 8.9   < > 8.6  8.7   ALK PHOS U/L  --   --  98   ALT U/L  --   --  9   AST U/L  --   --  10*    < > = values in this interval not displayed.     Results from last 7 days   Lab Units 10/11/24  0550   INR  1.08         Recent Cultures (last 7 days):     Results from last 7 days   Lab Units 10/11/24  0707   BLOOD CULTURE  No Growth After 5 Days.  No Growth After 5 Days.       Last 24 Hours Medication List:   Current Facility-Administered Medications   Medication Dose Route Frequency Provider Last Rate    acetaminophen  975 mg Oral Q8H PRN Geri Justice MD      aspirin  81 mg Oral Daily Jeana Muñoz PA-C      atorvastatin  80 mg Oral Daily With Dinner Joellen Chong MD      cefazolin  2,000 mg Intravenous Q8H Jeana Muñoz PA-C 2,000 mg (10/16/24 0821)    chlorhexidine  15 mL Mouth/Throat Q12H [facility] Geri Justice MD      cholecalciferol  2,000 Units Oral Daily Geri Justice MD      dextromethorphan-guaiFENesin  10 mL Oral Q6H PRN Geri Justice MD      diazepam  5 mg Oral Once Hetul Elias, DO      docusate sodium  100 mg Oral BID Jeana Muñoz PA-C      heparin (porcine)  5,000 Units Subcutaneous Q8H [facility] Geri Justice MD      insulin glargine  25 Units Subcutaneous QAM Jeana Muñoz PA-C      insulin lispro  1-5 Units Subcutaneous TID  Geri Justice MD      insulin lispro  10 Units Subcutaneous TID With Meals Jeana Muñoz PA-C      ketorolac  15 mg Intravenous Once Hetul Elias, DO      lidocaine  1 patch Topical Daily Geri Justice MD      metroNIDAZOLE  500 mg Oral Q12H [facility] Jeana Strockoz-Scaff, PA-C      nicotine  21 mg Transdermal Daily Octaviano Chau,       phenol  1 spray Mouth/Throat Q2H PRN  Geri Justice MD      polyethylene glycol  17 g Oral Daily PRN Jeana Muñoz PA-C          Today, Patient Was Seen By: Guy Elias DO    ** Please Note: Dictation voice to text software may have been used in the creation of this document. **

## 2024-10-16 NOTE — ASSESSMENT & PLAN NOTE
Lab Results   Component Value Date    HGBA1C 14.8 (H) 09/18/2024       Recent Labs     10/15/24  0734 10/15/24  1132 10/15/24  1611 10/16/24  0742   POCGLU 297* 270* 134 305*     Blood Sugar Average: Last 72 hrs:  (P) 274.5    With likely diabetic ketoacidosis.  Uncontrolled with hemoglobin A1c of 14.8.  Certainly a risk factor for recurrent infection.  -Tighten diabetic control

## 2024-10-16 NOTE — ASSESSMENT & PLAN NOTE
Right fifth digit dry gangrene with surround cellulitis  Xray - Subtle periosteal reaction of the fifth digit proximal phalanx with adjacent soft tissue swelling. Findings raise suspicion for osteomyelitis   LEADS abnormal - vascular consulted  Podiatry following - plan after vascular optimization  Wound care with Betadine paint open to air  In the box as per infectious disease.  Continue with supportive care.

## 2024-10-16 NOTE — PROGRESS NOTES
Progress Note - Vascular Surgery   Name: Sabiha Garcia 68 y.o. female I MRN: [id number]  Unit/Bed#: [facility] 814-01 I Date of Admission: 10/11/2024   Date of Service: 10/16/2024 I Hospital Day: 5    Assessment & Plan  Aortoiliac occlusive disease (HCC)  68-year-old female current smoker with history of uncontrolled T2 DM (HgA1c 14.8), HLD, tobacco use, lung nodule, hx of recurrent falls and hx of L femoral shaft fx s/p repair in 2020 arrived in the ED 10/11 with generalized weakness s/p falls, admitted for management of hyperosmolar hyperglycemia with associated metabolic acidosis with concern for DKA vs starvation ketosis. Patient with noted non-healing R 5th digit gangrenous wound, vascular surgery consulted for abnormal ABIs concern for contributory PAD.     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   - Cardiology consult for risk assessment  - Discussed importance of adequate blood sugar control for wound healing, patient understands importance of compliance with home diabetic therapy; endocrinology following  - ABX per ID - Cefazolin/Flagyl  - Podiatry following- plan for 5th toe amp vs partial 5th ray resection  - Remainder of care per primary medicine team  - Patient was see and evaluated by  and discussed options for revascularization.      Subjective : Pt was seen and evaluated by the vascular team at the bedside. All questions answered.     Objective :  Temp:  [98.1 °F (36.7 °C)-99.2 °F (37.3 °C)] 99.2 °F (37.3 °C)  HR:  [81-87] 87  BP: (123-143)/(57-95) 123/70  Resp:  [16-18] 18  SpO2:  [94 %-97 %] 94 %     I/O         10/14  0701  10/15 0700 10/15 0701  10/16 0700 10/16 0701  10/17 0700    P.O. 472 400 596    IV Piggyback 280      Total Intake(mL/kg) 752 (14.5) 400 (7.7) 596 (11.5)    Urine (mL/kg/hr) 1300 (1) 1655 (1.3) 425 (3.4)    Stool 0 0     Total Output 1300 1655 425    Net -548 -1255 +171           Unmeasured Stool Occurrence 0 x 0 x             Physical Exam  Vitals reviewed.   Constitutional:       General: She is not in acute distress.     Appearance: Normal appearance. She is not ill-appearing.      Comments: BMI 16.93- pt is frail appearing   Cardiovascular:      Pulses:           Radial pulses are 2+ on the right side and 2+ on the left side.        Femoral pulses are 1+ on the right side and 1+ on the left side.       Posterior tibial pulses are detected w/ Doppler on the right side and detected w/ Doppler on the left side.      Comments: R AT- Doppler  L AT- Doppler  Pulmonary:      Effort: Pulmonary effort is normal. No respiratory distress.      Breath sounds: Normal breath sounds.   Abdominal:      General: There is no distension.      Palpations: Abdomen is soft.   Skin:     General: Skin is dry.   Neurological:      General: No focal deficit present.      Mental Status: She is alert.         Lab Results: I have reviewed the following results:  CBC with diff:   Lab Results   Component Value Date    WBC 10.75 (H) 10/16/2024    HGB 10.6 (L) 10/16/2024    HCT 33.6 (L) 10/16/2024    MCV 89 10/16/2024     10/16/2024    RBC 3.78 (L) 10/16/2024    MCH 28.0 10/16/2024    MCHC 31.5 10/16/2024    RDW 14.7 10/16/2024    MPV 8.8 (L) 10/16/2024    NRBC 0 10/14/2024   ,   BMP/CMP:  Lab Results   Component Value Date    SODIUM 138 10/16/2024    SODIUM 138 09/18/2024    SODIUM 135 04/06/2022    K 4.2 10/16/2024    K 4.6 09/18/2024    K 4.5 04/06/2022     10/16/2024    CL 95 (L) 09/18/2024    CL 98 (L) 04/06/2022    CO2 31 10/16/2024    CO2 33 (H) 09/18/2024    CO2 28 04/06/2022    BUN 10 10/16/2024    BUN 14 09/18/2024  "   BUN 13 04/06/2022    CREATININE 0.53 (L) 10/16/2024    CREATININE 0.86 04/06/2022    CALCIUM 8.9 10/16/2024    CALCIUM 9.6 09/18/2024    CALCIUM 9.7 04/06/2022    AST 10 (L) 10/11/2024    AST 11 09/18/2024    AST 35 04/06/2022    ALT 9 10/11/2024    ALT 8 09/18/2024    ALT 23 04/06/2022    ALKPHOS 98 10/11/2024    ALKPHOS 120 09/18/2024    ALKPHOS 154 (H) 04/06/2022    EGFR 97 10/16/2024    EGFR 74 04/06/2022    EGFR 75 06/16/2018   ,   Lipid Panel: No results found for: \"CHOL\",   Coags:   Lab Results   Component Value Date    PT 12.0 06/16/2018    PTT 27 10/11/2024    INR 1.08 10/11/2024    INR 0.9 06/16/2018   ,   Blood Culture:   Lab Results   Component Value Date    BLOODCX No Growth After 5 Days. 10/11/2024    BLOODCX No Growth After 5 Days. 10/11/2024   ,   Urinalysis:   Lab Results   Component Value Date    COLORU Light Yellow 10/11/2024    CLARITYU Clear 10/11/2024    SPECGRAV 1.021 10/11/2024    PHUR 5.5 10/11/2024    LEUKOCYTESUR (A) 10/11/2024     Elevated glucose may cause decreased leukocyte values. See urine microscopic for UWBC result    NITRITE Negative 10/11/2024    GLUCOSEU >=1000 (1%) (A) 10/11/2024    KETONESU 60 (2+) (A) 10/11/2024    BILIRUBINUR Negative 10/11/2024    BLOODU Negative 10/11/2024   ,   Urine Culture: No results found for: \"URINECX\",   Wound Culure: No results found for: \"WOUNDCULT\"    Imaging Results Review: I reviewed radiology reports from this admission including: CT abdomen/pelvis.      VTE Prophylaxis: Heparin  "

## 2024-10-16 NOTE — ASSESSMENT & PLAN NOTE
Dry gangrene of right fifth digit but with surrounding cellulitis. Right foot xray positive for periosteal reaction of the 5th digit proximal phalanx with suspicion for osteomyelitis. Not systemically ill. Some malodorous drainage suggesting the possibility of anaerobes. No history of MRSA.    -Continue intravenous cefazolin and oral Flagyl  -Patient for angiogram and possible PTA  -Anticipate ray amputation for eventual surgical cure  -Close podiatry and vascular follow-up  -Recheck CBC with differential and BMP to make sure no developing toxicities  -Local wound care

## 2024-10-16 NOTE — ASSESSMENT & PLAN NOTE
Right fifth digit dry gangrene with surround cellulitis  Xray - Subtle periosteal reaction of the fifth digit proximal phalanx with adjacent soft tissue swelling. Findings raise suspicion for osteomyelitis   LEADS abnormal - vascular consulted  Podiatry following - plan after vascular optimization  Discussed with vascular surgery via secure chat.  Plan for surgical intervention noted.  Timing to be determined  Wound care with Betadine paint open to air  Antibiotics changed to Cefazolin and Flagyl by ID

## 2024-10-16 NOTE — ASSESSMENT & PLAN NOTE
Lab Results   Component Value Date    HGBA1C 14.8 (H) 09/18/2024       Recent Labs     10/15/24  1132 10/15/24  1611 10/16/24  0742 10/16/24  1102   POCGLU 270* 134 305* 305*       Blood Sugar Average: Last 72 hrs:  (P) 276.9119846178512677  -HBA1C 14.8  -Patient takes insulin Tresiba 10-20 units at home.  Has not been taking it for almost weeks.  -She also added she is not able to see the insulin pen because she cannot see from left eye most likely 2/2 cataract and right eye is blurry  -Has not been eating and drinking enough for past couple of weeks.  -Her B sugars at home range between 180-230. One of PCPs note mention as high as 400. Also, her fingertsicks were not giving any reading probably 2/2 high B sugars  -She has never been hypoglycemic in the past.  -She was initially started on insulin drip 2/2 POA glucose 681; beta hydroxy butyrate 5.20  VBG 7.29/44/35/21  UA 2+ ketones, glucose  -Initially on Insulin drip on 10/11 but now off since 10/12 as the anion gap closed.  -States her appetite has improved significantly.  -Currently on Insulin Lantus 20 units and Lispro 8 units TID with meals.  -Will increase Lantus to 30 units and Lispro to 12 units with breakfast and dinner and 10 units at lunch.  -Correctional scale

## 2024-10-16 NOTE — ASSESSMENT & PLAN NOTE
68-year-old female current smoker with history of uncontrolled T2 DM (HgA1c 14.8), HLD, tobacco use, lung nodule, hx of recurrent falls and hx of L femoral shaft fx s/p repair in 2020 arrived in the ED 10/11 with generalized weakness s/p falls, admitted for management of hyperosmolar hyperglycemia with associated metabolic acidosis with concern for DKA vs starvation ketosis. Patient with noted non-healing R 5th digit gangrenous wound, vascular surgery consulted for abnormal ABIs concern for contributory PAD.     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   - Cardiology consult for risk assessment  - Discussed importance of adequate blood sugar control for wound healing, patient understands importance of compliance with home diabetic therapy; endocrinology following  - ABX per ID - Cefazolin/Flagyl  - Podiatry following- plan for 5th toe amp vs partial 5th ray resection  - Remainder of care per primary medicine team  - Patient was see and evaluated by  and discussed options for revascularization.

## 2024-10-16 NOTE — ASSESSMENT & PLAN NOTE
Lab Results   Component Value Date    HGBA1C 14.8 (H) 09/18/2024       Recent Labs     10/15/24  0734 10/15/24  1132 10/15/24  1611 10/16/24  0742   POCGLU 297* 270* 134 305*       Blood Sugar Average: Last 72 hrs:  (P) 274.5    Severely uncontrolled as evidenced by HbA1c  On Levemir at home  Possible DKA on arrival   Insulin dose increased to Lantus 25 in am, Humalog 10 TID,   Ct sliding scale insulin  Endocrine input appreciated

## 2024-10-16 NOTE — PROGRESS NOTES
Progress Note - Endocrinology   Name: Sabiha Garcia 68 y.o. female I MRN: [id number]  Unit/Bed#: [facility] 814-01 I Date of Admission: 10/11/2024   Date of Service: 10/16/2024 I Hospital Day: 5     Assessment & Plan  Type 2 diabetes mellitus (HCC)  Lab Results   Component Value Date    HGBA1C 14.8 (H) 09/18/2024       Recent Labs     10/15/24  1132 10/15/24  1611 10/16/24  0742 10/16/24  1102   POCGLU 270* 134 305* 305*       Blood Sugar Average: Last 72 hrs:  (P) 276.3854721123040025  -HBA1C 14.8  -Patient takes insulin Tresiba 10-20 units at home.  Has not been taking it for almost weeks.  -She also added she is not able to see the insulin pen because she cannot see from left eye most likely 2/2 cataract and right eye is blurry  -Has not been eating and drinking enough for past couple of weeks.  -Her B sugars at home range between 180-230. One of PCPs note mention as high as 400. Also, her fingertsicks were not giving any reading probably 2/2 high B sugars  -She has never been hypoglycemic in the past.  -She was initially started on insulin drip 2/2 POA glucose 681; beta hydroxy butyrate 5.20  VBG 7.29/44/35/21  UA 2+ ketones, glucose  -Initially on Insulin drip on 10/11 but now off since 10/12 as the anion gap closed.  -States her appetite has improved significantly.  -Currently on Insulin Lantus 20 units and Lispro 8 units TID with meals.  -Will increase Lantus to 30 units and Lispro to 12 units with breakfast and dinner and 10 units at lunch.  -Correctional scale  Tobacco user    Gangrene of toe of right foot (HCC)    Ambulatory dysfunction    Vitamin D deficiency    Lung nodule    Cellulitis of right foot    Aortoiliac occlusive disease (HCC)    Other headache syndrome    Pre-operative cardiovascular examination      24 Hour Events : No complaints  Subjective : No compalints. Very comfortable    Objective :  Temp:  [98.1 °F (36.7 °C)-99.2 °F (37.3 °C)] 99.2 °F (37.3 °C)  HR:  [81-88] 88  BP: (116-143)/(80-50)  138/60  Resp:  [16-18] 18  SpO2:  [94 %-97 %] 94 %    Physical Exam  Vitals reviewed.   Constitutional:       Appearance: Normal appearance.      Comments: cachetic   Cardiovascular:      Rate and Rhythm: Normal rate and regular rhythm.   Pulmonary:      Effort: Pulmonary effort is normal.   Musculoskeletal:      Right lower leg: No edema.      Left lower leg: No edema.   Neurological:      Mental Status: She is alert.         Lab Results: I have reviewed the following results:CBC/BMP:   .     10/16/24  0607   WBC 10.75*   HGB 10.6*   HCT 33.6*      SODIUM 138   K 4.2      CO2 31   BUN 10   CREATININE 0.53*   GLUC 319*

## 2024-10-16 NOTE — CONSULTS
Progress Note - Cardiology   Name: Sabiha Garcia 68 y.o. female I MRN: [id number]  Unit/Bed#: [facility] 814-01 I Date of Admission: 10/11/2024   Date of Service: 10/16/2024 I Hospital Day: 5     Assessment & Plan  Pre-operative cardiovascular examination  Patient has multiple comorbidities that make her high risk surgical candidate including her frailty, BMI 16.9, uncontrolled diabetes with hemoglobin A1c of 14.8, high cholesterol with LDL of 156 and ASCVD risk score of 30.8, multivessel coronary calcifications.  She likely has severe multivessel coronary artery disease, but reports excellent functional capacity.  Therefore, further stress testing is not indicated preoperatively.    She did have a new systolic murmur on exam, so I have ordered an echocardiogram for further evaluation.    For her elevated ASCVD risk score and cholesterol, I have decreased her statin from atorvastatin 10 mg daily to 80 mg daily.    ----  Risk assessment:    Results for Parrish Perioperative Cardiac Risk    Estimated Risk Probability for Perioperative Myocardial Infarction or Cardiac Arrest: 1.44 %    Answers calculated to formulate result:  1. Age? - 68 Years  2. Creatinine? - <133 µmol/L  3. ASA Class? - Patients with severe systemic disease  4. Preoperative Functional Status? - Total independent  5. Procedure Site? - Aortic    Results for Revised Cardiac Risk Index (Bismark Criteria)     Estimated Risk of Adverse Outcome with Non-cardiac Surgery: Moderate Risk    Estimated Rate of Myocardial Infarction, Pulmonary Edema, Ventricular Fibrillation, Cardiac Arrest, or Complete Heart Block: 6.6 %      Answers calculated to formulate result:    1. High Risk Surgery? - Yes  2. Coronary Artery Disease? - No  3. Congestive Heart Failure? - No  4. Cerebrovascular Disease? - No  5. Diabetes Mellitus on Insulin? - Yes  6. Serum Creatinine >2 mg/dl or >177 umol/L? - No  Aortoiliac occlusive disease (HCC)  See risk assessment above. Recommendations given to  primary team  Gangrene of toe of right foot (HCC)  See risk assessment above  Cellulitis of right foot  Per primary and surgery  Ketosis (HCC)  Per primary  Type 2 diabetes mellitus (HCC)  Last hemoglobin A1c on 9/18/2024 was 14.8%  Tobacco user  I spent 5 minutes with the patient discussing tobacco cessation.  Harmful effects of cigarette smoking were discussed.  Patient expressed understanding.   Ambulatory dysfunction    Lung nodule       Consultation - Cardiology   Sabiha Garcia 68 y.o. female MRN: [id number]  Unit/Bed#: [facility] 814-01 Encounter: [id number]        Inpatient consult to Cardiology  Consult performed by: Joellen Chong MD  Consult ordered by: Jeana Muñoz PA-C      Physician Requesting Consult: Guy Elias DO    Reason for Consult / Principal Problem:   Chief Complaint   Patient presents with    Fall     Pt reports falling 5 days ago and unable to stand up and was crawling around but was knocking on the wall and a neighbor called 911. Pt doesn't feel safe at home. Right pinky toe is black and hard       HPI: Sabiha Garcia is a 68 y.o. year old female who presented after fall and is currently being evaluated by vascular surgery for aortoiliac occlusive disease.  Cardiology was consulted for preoperative risk assessment prior to potential aorto by iliac versus axillary bifemoral bypass.    Patient's past medical history is significant for current smoking, type 2 diabetes with most recent hemoglobin A1c of 14.8, hyperlipidemia, lung nodule, prior left femoral shaft fracture status postrepair in 2020.  She initially presented to the emergency room on 10/11/2024 with generalized weakness and fall.  She was found to be profoundly ketotic and is being treated for ketoacidosis acidosis versus starvation ketosis.  She was noted to have a nonhealing right fifth digit gangrenous wound.  Vascular surgery was consulted.  She had a CTA with runoff that showed occlusion of her infrarenal abdominal aorta,  proximal right common iliac artery severe stenosis, right SFA occlusion, severe left internal iliac artery stenosis with moderate disease throughout.    She was seen and evaluated at bedside this morning.  She reports no known history of cardiovascular disease, however does not follow regularly with a physician.  She denies any history of heart attack, stroke or heart failure.  Denies any known valvular disease.  Has not had issues with blood clotting in the past.  She reports that she before this event that brought her into the hospital, she had a good functional capacity.  She was able to do her activities of daily living independently.  Was able to walk around without shortness of breath.  Was able to walk up 2 flights of stairs without dyspnea.    I reviewed her most recent ECG which was reported as normal sinus rhythm with premature supraventricular beats and possible septal infarct.  Septal infarct pattern seems consistent with lead placement, will repeat.    I personally reviewed her labs.  Significant labs include hemoglobin A1c of 14.8 in September 2024.  Point-of-care glucoses have been elevated throughout her hospitalization.  Creatinine is 0.53 with an estimated GFR of 97.  She does have leukocytosis with white blood cell count of 10.75.  Hemoglobin 10.6.  Blood cultures from 10/11/2024 showed no growth at 5 days.    I personally reviewed the CTA of her chest done on 10/11/2024.  She did have significant calcification within her aorta as well as all 3 major epicardial coronary arteries.    I reviewed her CTA of the aorta with runoff, and agree with results above.    She does smoke cigarettes.      Review of Systems   Constitutional:  Positive for fatigue.   HENT: Negative.     Respiratory:  Negative for chest tightness and shortness of breath.    Cardiovascular:  Negative for chest pain, palpitations and leg swelling.   Gastrointestinal: Negative.    Musculoskeletal:  Positive for arthralgias.    Neurological: Negative.    Psychiatric/Behavioral: Negative.         Historical Information   Past Medical History:   Diagnosis Date    Allergic 797625    Depression [id number]    Diabetes (HCC)     Diabetes mellitus (HCC) 586420    Smokes 1974    1/2 ppd smoker     Past Surgical History:   Procedure Laterality Date    APPENDECTOMY      CHOLECYSTECTOMY      FRACTURE SURGERY      SC OPTX FEM SHFT FX W/INSJ IMED IMPLT W/WO SCREW Left 2020    Procedure: INSERTION NAIL IM FEMUR ANTEGRADE (TROCHANTERIC);  Surgeon: Jose Lyons MD;  Location: [facility];  Service: Orthopedics    TONSILLECTOMY      TUBAL LIGATION       Social History     Substance and Sexual Activity   Alcohol Use Never     Social History     Substance and Sexual Activity   Drug Use Never     Social History     Tobacco Use   Smoking Status Every Day    Current packs/day: 0.50    Average packs/day: 0.5 packs/day for 25.0 years (12.5 ttl pk-yrs)    Types: Cigarettes   Smokeless Tobacco Never     Family History   Problem Relation Age of Onset    Stroke Mother     No Known Problems Father         left her at 18 months old.    Cholelithiasis Sister         Vascular disease    No Known Problems Half-Brother        Allergies:  Allergies   Allergen Reactions    Penicillins Hives    Prednisone Other (See Comments)     personality change       Medications: reviewed    Objective:   Vitals:   Vitals:    10/16/24 0952   BP: 138/60   Pulse: 88   Resp:    Temp:    SpO2: 94%     Body mass index is 16.93 kg/m².  Orthostatic Blood Pressures      Flowsheet Row Most Recent Value   Blood Pressure 138/60 filed at 10/16/2024 0952   Patient Position - Orthostatic VS Lying filed at 10/16/2024 0952          Systolic (24hrs), Av , Min:116 , Max:143     Diastolic (24hrs), Av, Min:57, Max:95      Intake/Output Summary (Last 24 hours) at 10/16/2024 1048  Last data filed at 10/16/2024 0853  Gross per 24 hour   Intake 816 ml   Output 2080 ml   Net -1264 ml     Weight  "(last 2 days)       Date/Time Weight    10/15/24 0541 52 (114.64)    10/14/24 0600 51.4 (113.23)          Invasive Devices       Peripheral Intravenous Line  Duration             Peripheral IV 10/14/24 Left;Ventral (anterior) Forearm 1 day                    Physical Exam  Vitals reviewed.   Constitutional:       General: She is not in acute distress.     Appearance: She is well-developed.   HENT:      Head: Normocephalic and atraumatic.   Eyes:      Conjunctiva/sclera: Conjunctivae normal.   Cardiovascular:      Rate and Rhythm: Normal rate and regular rhythm.      Heart sounds: Murmur heard.      Crescendo decrescendo systolic murmur is present with a grade of 2/6.   Pulmonary:      Effort: Pulmonary effort is normal. No respiratory distress.      Breath sounds: Normal breath sounds.   Musculoskeletal:         General: No swelling.      Cervical back: Neck supple.   Skin:     General: Skin is warm and dry.   Neurological:      Mental Status: She is alert.   Psychiatric:         Mood and Affect: Mood normal.         Labs:     Lab Results   Component Value Date    SODIUM 138 10/16/2024    K 4.2 10/16/2024     10/16/2024    CO2 31 10/16/2024    BUN 10 10/16/2024    CREATININE 0.53 (L) 10/16/2024    GLUC 319 (H) 10/16/2024    CALCIUM 8.9 10/16/2024     Lab Results   Component Value Date    WBC 10.75 (H) 10/16/2024    RBC 3.78 (L) 10/16/2024    HGB 10.6 (L) 10/16/2024    HCT 33.6 (L) 10/16/2024    MCV 89 10/16/2024    MCH 28.0 10/16/2024    RDW 14.7 10/16/2024     10/16/2024     Results from last 7 days   Lab Units 10/11/24  0550   PTT seconds 27   INR  1.08     Lab Results   Component Value Date    LDLCALC 156 (H) 09/18/2024     Lab Results   Component Value Date    PCG0AKRQXZNW 0.425 (L) 10/11/2024    TSH 1.39 09/18/2024     Results from last 7 days   Lab Units 10/11/24  0550   CK TOTAL U/L 81     No results for input(s): \"NTBNP\" in the last 72 hours.       Imaging & Testing   Cardiac testing:     EKG " "and telemetry reviewed personally if available  I reviewed her ECGs in MUSE from 10/11/2024, 5/26/2020.  Her current ECG shows normal sinus rhythm, possible age-indeterminate septal infarct which on review looks like it may be related to lead placement, and premature supraventricular complexes.  Prior ECGs from 5/26/2020 were normal.      Imaging:   I have personally reviewed pertinent imaging studies in PACS.  See above for my interpretations      Joellen Chong MD, PhD  Cardiology  [facility]    Portions of the record may have been created with voice recognition software.  Occasional wrong word or \"sound a like\" substitutions may have occurred due to the inherent limitations of voice recognition software.  Please read the chart carefully and recognize, using context, where substitutions have occurred. Please call the author of the note for any clarifications.   "

## 2024-10-16 NOTE — TELEPHONE ENCOUNTER
From: Jeana Muñoz <[email or web address]>  Sent: Wednesday, October 16, 2024 4:12 PM  To: Vascular Surgeons, PA & NP <[email or web address]>; Vascular Surgery Schedulers <[email or web address]>  Subject: sabiha senior     [facility] [address]    Sabiha JOSEBibi Senior    1955    MRN: [id number]         Needs scheduling for LEFT AxbiFem & RIGHT Fem- AK popl bypass (PTFE)--1 biiiiig  procedure--  this admission         LMK,    --L

## 2024-10-16 NOTE — PROGRESS NOTES
Progress Note - Infectious Disease   Name: Sabiha Garcia 68 y.o. female I MRN: [id number]  Unit/Bed#: [facility] 814-01 I Date of Admission: 10/11/2024   Date of Service: 10/16/2024 I Hospital Day: 5    Assessment & Plan  Gangrene of toe of right foot (HCC)  Dry gangrene of right fifth digit but with surrounding cellulitis. Right foot xray positive for periosteal reaction of the 5th digit proximal phalanx with suspicion for osteomyelitis. Not systemically ill. Some malodorous drainage suggesting the possibility of anaerobes. No history of MRSA.    -Continue intravenous cefazolin and oral Flagyl  -Patient for angiogram and possible PTA  -Anticipate ray amputation for eventual surgical cure  -Close podiatry and vascular follow-up  -Recheck CBC with differential and BMP to make sure no developing toxicities  -Local wound care  Cellulitis of right foot  Gangrene of the right fifth digit with surrounding ischemic changes and concomitant localized cellulitis.  Stabilized to decreased erythema with the IV antibiotics  -Antibiotics as above   -Serial exams   -Await vascular and podiatry interventio  Ketosis (HCC)  Starvation ketosis vs. DKA on admission that has since resolved.    -Continue management per primary team   Type 2 diabetes mellitus (HCC)  Lab Results   Component Value Date    HGBA1C 14.8 (H) 09/18/2024       Recent Labs     10/15/24  0734 10/15/24  1132 10/15/24  1611 10/16/24  0742   POCGLU 297* 270* 134 305*     Blood Sugar Average: Last 72 hrs:  (P) 274.5    With likely diabetic ketoacidosis.  Uncontrolled with hemoglobin A1c of 14.8.  Certainly a risk factor for recurrent infection.  -Tighten diabetic control  Tobacco user  Risk factor for recurrent infection.   -Nicotine patch   -Smoking cessation advised   Ambulatory dysfunction  Presented with generalized weakness and endorses frequent falls without loss of consciousness. Head CT negative for acute pathology.    -Continue PT/OT  Aortoiliac occlusive disease  (HCC)  As seen on CT angiogram.  -Patient for angiogram and possible intervention today.  Other headache syndrome  Unclear etiology but seems to be bilateral and predominate in the frontal region.  No other neurologic findings.  -Workup and treatment as per the primary    I have discussed with the primary service the above plan to continue the cefazolin and Flagyl.  The primary service agrees with the plan.    Antibiotics:  Cefazolin 3  Flagyl 3    Subjective   Patient has no fever, chills, sweats; no nausea, vomiting, diarrhea; no cough, shortness of breath; no pain. No new symptoms.    Objective :  Temp:  [98.1 °F (36.7 °C)-99.2 °F (37.3 °C)] 99.2 °F (37.3 °C)  HR:  [81-87] 87  BP: (123-143)/(57-95) 123/70  Resp:  [16-18] 18  SpO2:  [94 %-97 %] 94 %    General:  No acute distress  Psychiatric:  Awake and alert  Pulmonary:  Normal respiratory excursion without accessory muscle use  Abdomen:  Soft, nontender  Extremities: Right foot with decreased erythema but persistent gangrenous changes involving the fifth toe  Skin:  No rashes      Lab Results: I have reviewed the following results:  Results from last 7 days   Lab Units 10/16/24  0607 10/14/24  0546 10/13/24  0436   WBC Thousand/uL 10.75* 9.77 10.30*   HEMOGLOBIN g/dL 10.6* 11.2* 11.1*   PLATELETS Thousands/uL 323 269 263     Results from last 7 days   Lab Units 10/16/24  0607 10/15/24  0523 10/14/24  0546 10/11/24  1926 10/11/24  1438 10/11/24  1003 10/11/24  0550   SODIUM mmol/L 138 140 140   < > 146  144   < > 149*   POTASSIUM mmol/L 4.2 3.9 4.5   < > 3.5  3.4*   < > 4.3   CHLORIDE mmol/L 101 103 100   < > 107  106   < > 105   CO2 mmol/L 31 30 32   < > 28  28   < > 21   BUN mg/dL 10 8 9   < > 49*  47*   < > 71*   CREATININE mg/dL 0.53* 0.51* 0.61   < > 0.89  0.87   < > 1.46*   EGFR ml/min/1.73sq m 97 99 93   < > 66  68   < > 36   CALCIUM mg/dL 8.9 8.9 8.6   < > 8.6  8.7   < > 9.0   AST U/L  --   --   --   --  10*  --  8*   ALT U/L  --   --   --   --   9  --  10   ALK PHOS U/L  --   --   --   --  98  --  116*   ALBUMIN g/dL  --   --   --   --  3.1*  --  3.4*    < > = values in this interval not displayed.     Results from last 7 days   Lab Units 10/11/24  0707   BLOOD CULTURE  No Growth After 5 Days.  No Growth After 5 Days.     Results from last 7 days   Lab Units 10/14/24  0546 10/13/24  0436 10/11/24  0747   PROCALCITONIN ng/ml 4.29* 0.79* 0.21                 Imaging Results Review: I personally reviewed the following image studies in PACS and associated radiology reports: CT angiogram with runoff. My interpretation of the radiology images/reports is: Narrowing proximal right common iliac artery with occlusion of the right superficial femoral artery.  Left multifocal narrowing.

## 2024-10-16 NOTE — ASSESSMENT & PLAN NOTE
Unclear etiology but seems to be bilateral and predominate in the frontal region.  No other neurologic findings.  -Workup and treatment as per the primary

## 2024-10-16 NOTE — ASSESSMENT & PLAN NOTE
Patient has multiple comorbidities that make her high risk surgical candidate including her frailty, BMI 16.9, uncontrolled diabetes with hemoglobin A1c of 14.8, high cholesterol with LDL of 156 and ASCVD risk score of 30.8, multivessel coronary calcifications.  She likely has severe multivessel coronary artery disease, but reports excellent functional capacity.  Therefore, further stress testing is not indicated preoperatively.    She did have a new systolic murmur on exam, so I have ordered an echocardiogram for further evaluation.    For her elevated ASCVD risk score and cholesterol, I have decreased her statin from atorvastatin 10 mg daily to 80 mg daily.    ----  Risk assessment:    Results for Parrish Perioperative Cardiac Risk    Estimated Risk Probability for Perioperative Myocardial Infarction or Cardiac Arrest: 1.44 %    Answers calculated to formulate result:  1. Age? - 68 Years  2. Creatinine? - <133 µmol/L  3. ASA Class? - Patients with severe systemic disease  4. Preoperative Functional Status? - Total independent  5. Procedure Site? - Aortic    Results for Revised Cardiac Risk Index (Bismark Criteria)     Estimated Risk of Adverse Outcome with Non-cardiac Surgery: Moderate Risk    Estimated Rate of Myocardial Infarction, Pulmonary Edema, Ventricular Fibrillation, Cardiac Arrest, or Complete Heart Block: 6.6 %      Answers calculated to formulate result:    1. High Risk Surgery? - Yes  2. Coronary Artery Disease? - No  3. Congestive Heart Failure? - No  4. Cerebrovascular Disease? - No  5. Diabetes Mellitus on Insulin? - Yes  6. Serum Creatinine >2 mg/dl or >177 umol/L? - No

## 2024-10-17 ENCOUNTER — APPOINTMENT (INPATIENT)
Dept: RADIOLOGY | Facility: HOSPITAL | Age: 69
DRG: 271 | End: 2024-10-17
Payer: COMMERCIAL

## 2024-10-17 PROBLEM — E44.0 MODERATE PROTEIN-CALORIE MALNUTRITION (HCC): Status: ACTIVE | Noted: 2024-10-17

## 2024-10-17 LAB
ANION GAP SERPL CALCULATED.3IONS-SCNC: 5 MMOL/L (ref 4–13)
ANION GAP SERPL CALCULATED.3IONS-SCNC: 8 MMOL/L (ref 4–13)
BASOPHILS # BLD AUTO: 0.06 THOUSANDS/ΜL (ref 0–0.1)
BASOPHILS # BLD AUTO: 0.06 THOUSANDS/ΜL (ref 0–0.1)
BASOPHILS NFR BLD AUTO: 1 % (ref 0–1)
BASOPHILS NFR BLD AUTO: 1 % (ref 0–1)
BUN SERPL-MCNC: 15 MG/DL (ref 5–25)
BUN SERPL-MCNC: 16 MG/DL (ref 5–25)
CALCIUM SERPL-MCNC: 9 MG/DL (ref 8.4–10.2)
CALCIUM SERPL-MCNC: 9.2 MG/DL (ref 8.4–10.2)
CHLORIDE SERPL-SCNC: 101 MMOL/L (ref 96–108)
CHLORIDE SERPL-SCNC: 102 MMOL/L (ref 96–108)
CO2 SERPL-SCNC: 30 MMOL/L (ref 21–32)
CO2 SERPL-SCNC: 32 MMOL/L (ref 21–32)
CREAT SERPL-MCNC: 0.57 MG/DL (ref 0.6–1.3)
CREAT SERPL-MCNC: 0.76 MG/DL (ref 0.6–1.3)
EOSINOPHIL # BLD AUTO: 0.2 THOUSAND/ΜL (ref 0–0.61)
EOSINOPHIL # BLD AUTO: 0.23 THOUSAND/ΜL (ref 0–0.61)
EOSINOPHIL NFR BLD AUTO: 2 % (ref 0–6)
EOSINOPHIL NFR BLD AUTO: 2 % (ref 0–6)
ERYTHROCYTE [DISTWIDTH] IN BLOOD BY AUTOMATED COUNT: 14.8 % (ref 11.6–15.1)
ERYTHROCYTE [DISTWIDTH] IN BLOOD BY AUTOMATED COUNT: 15 % (ref 11.6–15.1)
GFR SERPL CREATININE-BSD FRML MDRD: 80 ML/MIN/1.73SQ M
GFR SERPL CREATININE-BSD FRML MDRD: 95 ML/MIN/1.73SQ M
GLUCOSE SERPL-MCNC: 194 MG/DL (ref 65–140)
GLUCOSE SERPL-MCNC: 203 MG/DL (ref 65–140)
GLUCOSE SERPL-MCNC: 223 MG/DL (ref 65–140)
GLUCOSE SERPL-MCNC: 234 MG/DL (ref 65–140)
GLUCOSE SERPL-MCNC: 262 MG/DL (ref 65–140)
GLUCOSE SERPL-MCNC: 265 MG/DL (ref 65–140)
HCT VFR BLD AUTO: 31.3 % (ref 34.8–46.1)
HCT VFR BLD AUTO: 32.8 % (ref 34.8–46.1)
HGB BLD-MCNC: 10.2 G/DL (ref 11.5–15.4)
HGB BLD-MCNC: 9.8 G/DL (ref 11.5–15.4)
IMM GRANULOCYTES # BLD AUTO: 0.1 THOUSAND/UL (ref 0–0.2)
IMM GRANULOCYTES # BLD AUTO: 0.11 THOUSAND/UL (ref 0–0.2)
IMM GRANULOCYTES NFR BLD AUTO: 1 % (ref 0–2)
IMM GRANULOCYTES NFR BLD AUTO: 1 % (ref 0–2)
LYMPHOCYTES # BLD AUTO: 3.47 THOUSANDS/ΜL (ref 0.6–4.47)
LYMPHOCYTES # BLD AUTO: 3.91 THOUSANDS/ΜL (ref 0.6–4.47)
LYMPHOCYTES NFR BLD AUTO: 29 % (ref 14–44)
LYMPHOCYTES NFR BLD AUTO: 35 % (ref 14–44)
MCH RBC QN AUTO: 28.2 PG (ref 26.8–34.3)
MCH RBC QN AUTO: 28.6 PG (ref 26.8–34.3)
MCHC RBC AUTO-ENTMCNC: 31.1 G/DL (ref 31.4–37.4)
MCHC RBC AUTO-ENTMCNC: 31.3 G/DL (ref 31.4–37.4)
MCV RBC AUTO: 91 FL (ref 82–98)
MCV RBC AUTO: 91 FL (ref 82–98)
MONOCYTES # BLD AUTO: 0.78 THOUSAND/ΜL (ref 0.17–1.22)
MONOCYTES # BLD AUTO: 0.85 THOUSAND/ΜL (ref 0.17–1.22)
MONOCYTES NFR BLD AUTO: 7 % (ref 4–12)
MONOCYTES NFR BLD AUTO: 7 % (ref 4–12)
NEUTROPHILS # BLD AUTO: 6.11 THOUSANDS/ΜL (ref 1.85–7.62)
NEUTROPHILS # BLD AUTO: 7.23 THOUSANDS/ΜL (ref 1.85–7.62)
NEUTS SEG NFR BLD AUTO: 54 % (ref 43–75)
NEUTS SEG NFR BLD AUTO: 60 % (ref 43–75)
NRBC BLD AUTO-RTO: 0 /100 WBCS
NRBC BLD AUTO-RTO: 0 /100 WBCS
PLATELET # BLD AUTO: 331 THOUSANDS/UL (ref 149–390)
PLATELET # BLD AUTO: 366 THOUSANDS/UL (ref 149–390)
PMV BLD AUTO: 8.6 FL (ref 8.9–12.7)
PMV BLD AUTO: 8.9 FL (ref 8.9–12.7)
POTASSIUM SERPL-SCNC: 4 MMOL/L (ref 3.5–5.3)
POTASSIUM SERPL-SCNC: 4.3 MMOL/L (ref 3.5–5.3)
RBC # BLD AUTO: 3.43 MILLION/UL (ref 3.81–5.12)
RBC # BLD AUTO: 3.62 MILLION/UL (ref 3.81–5.12)
SODIUM SERPL-SCNC: 138 MMOL/L (ref 135–147)
SODIUM SERPL-SCNC: 140 MMOL/L (ref 135–147)
WBC # BLD AUTO: 11.16 THOUSAND/UL (ref 4.31–10.16)
WBC # BLD AUTO: 11.95 THOUSAND/UL (ref 4.31–10.16)

## 2024-10-17 PROCEDURE — 99233 SBSQ HOSP IP/OBS HIGH 50: CPT | Performed by: INTERNAL MEDICINE

## 2024-10-17 PROCEDURE — 97112 NEUROMUSCULAR REEDUCATION: CPT

## 2024-10-17 PROCEDURE — 11721 DEBRIDE NAIL 6 OR MORE: CPT | Performed by: PODIATRIST

## 2024-10-17 PROCEDURE — 99231 SBSQ HOSP IP/OBS SF/LOW 25: CPT | Performed by: PODIATRIST

## 2024-10-17 PROCEDURE — 80048 BASIC METABOLIC PNL TOTAL CA: CPT | Performed by: INTERNAL MEDICINE

## 2024-10-17 PROCEDURE — 97530 THERAPEUTIC ACTIVITIES: CPT

## 2024-10-17 PROCEDURE — NC001 PR NO CHARGE: Performed by: PHYSICIAN ASSISTANT

## 2024-10-17 PROCEDURE — NC001 PR NO CHARGE: Performed by: STUDENT IN AN ORGANIZED HEALTH CARE EDUCATION/TRAINING PROGRAM

## 2024-10-17 PROCEDURE — 97116 GAIT TRAINING THERAPY: CPT

## 2024-10-17 PROCEDURE — 82948 REAGENT STRIP/BLOOD GLUCOSE: CPT

## 2024-10-17 PROCEDURE — 85025 COMPLETE CBC W/AUTO DIFF WBC: CPT | Performed by: INTERNAL MEDICINE

## 2024-10-17 PROCEDURE — 70450 CT HEAD/BRAIN W/O DYE: CPT

## 2024-10-17 RX ORDER — INSULIN GLARGINE 100 [IU]/ML
28 INJECTION, SOLUTION SUBCUTANEOUS EVERY MORNING
Status: DISCONTINUED | OUTPATIENT
Start: 2024-10-18 | End: 2024-10-17

## 2024-10-17 RX ORDER — KETOROLAC TROMETHAMINE 30 MG/ML
15 INJECTION, SOLUTION INTRAMUSCULAR; INTRAVENOUS EVERY 6 HOURS PRN
Status: DISPENSED | OUTPATIENT
Start: 2024-10-17 | End: 2024-10-20

## 2024-10-17 RX ORDER — INSULIN GLARGINE 100 [IU]/ML
30 INJECTION, SOLUTION SUBCUTANEOUS EVERY MORNING
Status: DISCONTINUED | OUTPATIENT
Start: 2024-10-18 | End: 2024-10-18

## 2024-10-17 RX ADMIN — ACETAMINOPHEN 975 MG: 325 TABLET, FILM COATED ORAL at 17:44

## 2024-10-17 RX ADMIN — CEFAZOLIN SODIUM 2000 MG: 2 SOLUTION INTRAVENOUS at 00:17

## 2024-10-17 RX ADMIN — CALCIUM CARBONATE (ANTACID) CHEW TAB 500 MG 1000 MG: 500 CHEW TAB at 00:16

## 2024-10-17 RX ADMIN — METRONIDAZOLE 500 MG: 500 TABLET ORAL at 21:48

## 2024-10-17 RX ADMIN — METRONIDAZOLE 500 MG: 500 TABLET ORAL at 08:05

## 2024-10-17 RX ADMIN — CEFAZOLIN SODIUM 2000 MG: 2 SOLUTION INTRAVENOUS at 08:06

## 2024-10-17 RX ADMIN — HEPARIN SODIUM 5000 UNITS: 5000 INJECTION INTRAVENOUS; SUBCUTANEOUS at 21:48

## 2024-10-17 RX ADMIN — LIDOCAINE 1 PATCH: 50 PATCH TOPICAL at 08:05

## 2024-10-17 RX ADMIN — Medication 2000 UNITS: at 08:05

## 2024-10-17 RX ADMIN — INSULIN LISPRO 2 UNITS: 100 INJECTION, SOLUTION INTRAVENOUS; SUBCUTANEOUS at 13:12

## 2024-10-17 RX ADMIN — DOCUSATE SODIUM 100 MG: 100 CAPSULE, LIQUID FILLED ORAL at 08:05

## 2024-10-17 RX ADMIN — NICOTINE 21 MG: 21 PATCH, EXTENDED RELEASE TRANSDERMAL at 08:05

## 2024-10-17 RX ADMIN — CHLORHEXIDINE GLUCONATE 0.12% ORAL RINSE 15 ML: 1.2 LIQUID ORAL at 21:48

## 2024-10-17 RX ADMIN — INSULIN GLARGINE 30 UNITS: 100 INJECTION, SOLUTION SUBCUTANEOUS at 08:08

## 2024-10-17 RX ADMIN — INSULIN LISPRO 1 UNITS: 100 INJECTION, SOLUTION INTRAVENOUS; SUBCUTANEOUS at 21:50

## 2024-10-17 RX ADMIN — CHLORHEXIDINE GLUCONATE 0.12% ORAL RINSE 15 ML: 1.2 LIQUID ORAL at 08:06

## 2024-10-17 RX ADMIN — INSULIN LISPRO 12 UNITS: 100 INJECTION, SOLUTION INTRAVENOUS; SUBCUTANEOUS at 08:07

## 2024-10-17 RX ADMIN — DOCUSATE SODIUM 100 MG: 100 CAPSULE, LIQUID FILLED ORAL at 17:44

## 2024-10-17 RX ADMIN — INSULIN LISPRO 1 UNITS: 100 INJECTION, SOLUTION INTRAVENOUS; SUBCUTANEOUS at 17:45

## 2024-10-17 RX ADMIN — ATORVASTATIN CALCIUM 80 MG: 80 TABLET, FILM COATED ORAL at 17:44

## 2024-10-17 RX ADMIN — INSULIN LISPRO 12 UNITS: 100 INJECTION, SOLUTION INTRAVENOUS; SUBCUTANEOUS at 17:45

## 2024-10-17 RX ADMIN — HEPARIN SODIUM 5000 UNITS: 5000 INJECTION INTRAVENOUS; SUBCUTANEOUS at 13:12

## 2024-10-17 RX ADMIN — ACETAMINOPHEN 975 MG: 325 TABLET, FILM COATED ORAL at 08:05

## 2024-10-17 RX ADMIN — CEFAZOLIN SODIUM 2000 MG: 2 SOLUTION INTRAVENOUS at 17:46

## 2024-10-17 RX ADMIN — INSULIN LISPRO 2 UNITS: 100 INJECTION, SOLUTION INTRAVENOUS; SUBCUTANEOUS at 08:06

## 2024-10-17 RX ADMIN — ASPIRIN 81 MG: 81 TABLET, COATED ORAL at 08:05

## 2024-10-17 RX ADMIN — KETOROLAC TROMETHAMINE 15 MG: 30 INJECTION, SOLUTION INTRAMUSCULAR at 21:48

## 2024-10-17 NOTE — PROGRESS NOTES
Vascular Surgery  Progress Note    L ROJAS Vinson Fem-AK popl bypass planned for Weds, 10/23 w/ Dr. Dukes.    Jeana Clancy-Brandan  10/17/2024

## 2024-10-17 NOTE — PLAN OF CARE
Problem: PHYSICAL THERAPY ADULT  Goal: Performs mobility at highest level of function for planned discharge setting.  See evaluation for individualized goals.  Description: Treatment/Interventions: Functional transfer training, LE strengthening/ROM, Therapeutic exercise, Endurance training, Cognitive reorientation, Patient/family training, Bed mobility, Gait training  Equipment Recommended: Walker       See flowsheet documentation for full assessment, interventions and recommendations.  10/17/2024 1537 by Dane Hewitt PTA  Outcome: Progressing  Note: Prognosis: Good  Problem List: Decreased strength, Decreased endurance, Impaired balance, Decreased mobility, Decreased safety awareness, Impaired judgement, Decreased cognition, Pain, Decreased skin integrity  Assessment: The patient was able to ambualte near household distances with extended standing rest breaks. She was very expressive about her situation, but she was redirectable to task. The patient requires increased time for all ambulation as well. Fatigue does occur with gait that currently limits her. She will benefit from continued therapies in order to maximize her functional mobility.  Barriers to Discharge: Inaccessible home environment, Decreased caregiver support     Rehab Resource Intensity Level, PT: II (Moderate Resource Intensity)    See flowsheet documentation for full assessment.     10/17/2024 1537 by Dane Hewitt PTA  Outcome: Progressing  Note: Prognosis: Good  Problem List: Decreased strength, Decreased endurance, Impaired balance, Decreased mobility, Decreased safety awareness, Impaired judgement, Decreased cognition, Pain, Decreased skin integrity  Assessment: The patient was able to ambualte near household distances with extended standing rest breaks. She was very expressive about her situation, but she was redirectable to task. The patient requires increased time for all ambulation as well. Fatigue does occur with gait that currently  limits her. She will benefit from continued therapies in order to maximize her functional mobility.  Barriers to Discharge: Inaccessible home environment, Decreased caregiver support     Rehab Resource Intensity Level, PT: II (Moderate Resource Intensity)    See flowsheet documentation for full assessment.

## 2024-10-17 NOTE — ASSESSMENT & PLAN NOTE
Dry gangrene of right fifth digit but with surrounding cellulitis. Right foot xray positive for periosteal reaction of the 5th digit proximal phalanx with suspicion for osteomyelitis. Not systemically ill. Some malodorous drainage suggesting the possibility of anaerobes. No history of MRSA.    -Continue intravenous cefazolin and oral Flagyl  -Patient for surgical revascularization with left axillary bifemoral, right fem-AK popliteal bypass planned for 10/23/2024  -Anticipate ray amputation for eventual surgical cure after vascular status optimized  -Close podiatry and vascular follow-up  -Recheck CBC with differential and BMP to make sure no developing toxicities  -Local wound care

## 2024-10-17 NOTE — PROGRESS NOTES
Progress Note - Infectious Disease   Name: Sabiha Garcia 68 y.o. female I MRN: [id number]  Unit/Bed#: [facility] 814-01 I Date of Admission: 10/11/2024   Date of Service: 10/17/2024 I Hospital Day: 6    Assessment & Plan  Gangrene of toe of right foot (HCC)  Dry gangrene of right fifth digit but with surrounding cellulitis. Right foot xray positive for periosteal reaction of the 5th digit proximal phalanx with suspicion for osteomyelitis. Not systemically ill. Some malodorous drainage suggesting the possibility of anaerobes. No history of MRSA.    -Continue intravenous cefazolin and oral Flagyl  -Patient for surgical revascularization with left axillary bifemoral, right fem-AK popliteal bypass planned for 10/23/2024  -Anticipate ray amputation for eventual surgical cure after vascular status optimized  -Close podiatry and vascular follow-up  -Recheck CBC with differential and BMP to make sure no developing toxicities  -Local wound care  Cellulitis of right foot  Gangrene of the right fifth digit with surrounding ischemic changes and concomitant localized cellulitis.  Stabilized to decreased erythema with the IV antibiotics  -Antibiotics as above   -Serial exams   -Await vascular and podiatry interventio  Type 2 diabetes mellitus (HCC)  Lab Results   Component Value Date    HGBA1C 14.8 (H) 09/18/2024       Recent Labs     10/16/24  1102 10/16/24  1635 10/16/24  2042 10/17/24  0749   POCGLU 305* 174* 241* 265*     Blood Sugar Average: Last 72 hrs:  (P) 264.5    With likely diabetic ketoacidosis.  Uncontrolled with hemoglobin A1c of 14.8.  Certainly a risk factor for recurrent infection.  -Tighten diabetic control  Tobacco user  Risk factor for recurrent infection.   -Nicotine patch   -Smoking cessation advised   Ambulatory dysfunction  Presented with generalized weakness and endorses frequent falls without loss of consciousness. Head CT negative for acute pathology.    -Continue PT/OT  Aortoiliac occlusive disease (HCC)  As  seen on CT angiogram.  -Patient for angiogram and possible intervention today.  Other headache syndrome  Unclear etiology but seems to be bilateral and initially predominate in the frontal region but now it is more posterior.  No other neurologic findings.  -Workup and treatment as per the primary    I have discussed with primary service the above plan to continue the cefazolin and Flagyl.  The primary service agrees with the plan.    Antibiotics:  Cefazolin 4  Flagyl 4    Subjective   Patient has no fever, chills, sweats; no nausea, vomiting, diarrhea; no cough, shortness of breath; no pain. No new symptoms.    Objective :  Temp:  [98.1 °F (36.7 °C)-98.4 °F (36.9 °C)] 98.4 °F (36.9 °C)  HR:  [82-86] 83  BP: (116-155)/(60-69) 116/67  Resp:  [16] 16  SpO2:  [96 %-97 %] 96 %    General:  No acute distress  Psychiatric:  Awake and alert  Pulmonary:  Normal respiratory excursion without accessory muscle use  Abdomen:  Soft, nontender  Extremities:  No edema.  Right foot erythema substantially decreased.  Gangrenous fifth toe.  Skin:  No rashes      Lab Results: I have reviewed the following results:  Results from last 7 days   Lab Units 10/17/24  0601 10/16/24  0607 10/14/24  0546   WBC Thousand/uL 11.16* 10.75* 9.77   HEMOGLOBIN g/dL 10.2* 10.6* 11.2*   PLATELETS Thousands/uL 331 323 269     Results from last 7 days   Lab Units 10/17/24  0601 10/16/24  0607 10/15/24  0523 10/11/24  1926 10/11/24  1438 10/11/24  1003 10/11/24  0550   SODIUM mmol/L 140 138 140   < > 146  144   < > 149*   POTASSIUM mmol/L 4.0 4.2 3.9   < > 3.5  3.4*   < > 4.3   CHLORIDE mmol/L 102 101 103   < > 107  106   < > 105   CO2 mmol/L 30 31 30   < > 28  28   < > 21   BUN mg/dL 16 10 8   < > 49*  47*   < > 71*   CREATININE mg/dL 0.57* 0.53* 0.51*   < > 0.89  0.87   < > 1.46*   EGFR ml/min/1.73sq m 95 97 99   < > 66  68   < > 36   CALCIUM mg/dL 9.0 8.9 8.9   < > 8.6  8.7   < > 9.0   AST U/L  --   --   --   --  10*  --  8*   ALT U/L  --   --    --   --  9  --  10   ALK PHOS U/L  --   --   --   --  98  --  116*   ALBUMIN g/dL  --   --   --   --  3.1*  --  3.4*    < > = values in this interval not displayed.     Results from last 7 days   Lab Units 10/11/24  0707   BLOOD CULTURE  No Growth After 5 Days.  No Growth After 5 Days.     Results from last 7 days   Lab Units 10/14/24  0546 10/13/24  0436 10/11/24  0747   PROCALCITONIN ng/ml 4.29* 0.79* 0.21

## 2024-10-17 NOTE — QUICK NOTE
Plan:  -We will continue Lantus 30 units and lispro 12 units with breakfast and dinner and 10 units with lunch.  -SSI  -Monitor B glucose.

## 2024-10-17 NOTE — ASSESSMENT & PLAN NOTE
Lab Results   Component Value Date    HGBA1C 14.8 (H) 09/18/2024       Recent Labs     10/16/24  1102 10/16/24  1635 10/16/24  2042 10/17/24  0749   POCGLU 305* 174* 241* 265*     Blood Sugar Average: Last 72 hrs:  (P) 264.5    With likely diabetic ketoacidosis.  Uncontrolled with hemoglobin A1c of 14.8.  Certainly a risk factor for recurrent infection.  -Tighten diabetic control

## 2024-10-17 NOTE — PLAN OF CARE
Problem: Potential for Falls  Goal: Patient will remain free of falls  Description: INTERVENTIONS:  - Educate patient/family on patient safety including physical limitations  - Instruct patient to call for assistance with activity   - Consult OT/PT to assist with strengthening/mobility   - Keep Call bell within reach  - Keep bed low and locked with side rails adjusted as appropriate  - Keep care items and personal belongings within reach  - Initiate and maintain comfort rounds  - Make Fall Risk Sign visible to staff    Problem: CARDIOVASCULAR - ADULT  Goal: Maintains optimal cardiac output and hemodynamic stability  Description: INTERVENTIONS:  - Monitor I/O, vital signs and rhythm  - Monitor for S/S and trends of decreased cardiac output  - Administer and titrate ordered vasoactive medications to optimize hemodynamic stability  - Assess quality of pulses, skin color and temperature  - Assess for signs of decreased coronary artery perfusion  - Instruct patient to report change in severity of symptoms  10/17/2024 0706 by Hilda Carrasco RN  Outcome: Progressing  10/17/2024 0706 by Hilda Carrasco RN  Outcome: Progressing     Problem: CARDIOVASCULAR - ADULT  Goal: Absence of cardiac dysrhythmias or at baseline rhythm  Description: INTERVENTIONS:  - Continuous cardiac monitoring, vital signs, obtain 12 lead EKG if ordered  - Administer antiarrhythmic and heart rate control medications as ordered  - Monitor electrolytes and administer replacement therapy as ordered  10/17/2024 0706 by Hilda Carrasco RN  Outcome: Progressing  10/17/2024 0706 by Hilda Carrasco RN  Outcome: Progressing     Problem: METABOLIC, FLUID AND ELECTROLYTES - ADULT  Goal: Electrolytes maintained within normal limits  Description: INTERVENTIONS:  - Monitor labs and assess patient for signs and symptoms of electrolyte imbalances  - Administer electrolyte replacement as ordered  - Monitor response to electrolyte replacements, including  repeat lab results as appropriate  - Instruct patient on fluid and nutrition as appropriate  10/17/2024 0706 by Hilda Carrasco RN  Outcome: Progressing  10/17/2024 0706 by Hilda Carrasco RN  Outcome: Progressing     Problem: METABOLIC, FLUID AND ELECTROLYTES - ADULT  Goal: Fluid balance maintained  Description: INTERVENTIONS:  - Monitor labs   - Monitor I/O and WT  - Instruct patient on fluid and nutrition as appropriate  - Assess for signs & symptoms of volume excess or deficit  10/17/2024 0706 by Hilda Carrasco RN  Outcome: Progressing  10/17/2024 0706 by Hilda Carrasco RN  Outcome: Progressing     Problem: Prexisting or High Potential for Compromised Skin Integrity  Goal: Skin integrity is maintained or improved  Description: INTERVENTIONS:  - Identify patients at risk for skin breakdown  - Assess and monitor skin integrity  - Assess and monitor nutrition and hydration status  - Monitor labs   - Assess for incontinence   - Turn and reposition patient  - Assist with mobility/ambulation  - Relieve pressure over bony prominences  - Avoid friction and shearing  - Provide appropriate hygiene as needed including keeping skin clean and dry  - Evaluate need for skin moisturizer/barrier cream  - Collaborate with interdisciplinary team   - Patient/family teaching  - Consider wound care consult   10/17/2024 0706 by Hilda Carrasco RN  Outcome: Progressing  10/17/2024 0706 by Hilda Carrasco RN  Outcome: Progressing     Problem: Nutrition/Hydration-ADULT  Goal: Nutrient/Hydration intake appropriate for improving, restoring or maintaining nutritional needs  Description: Monitor and assess patient's nutrition/hydration status for malnutrition. Collaborate with interdisciplinary team and initiate plan and interventions as ordered.  Monitor patient's weight and dietary intake as ordered or per policy. Utilize nutrition screening tool and intervene as necessary. Determine patient's food preferences and  provide high-protein, high-caloric foods as appropriate.     INTERVENTIONS:  - Monitor oral intake, urinary output, labs, and treatment plans  - Assess nutrition and hydration status and recommend course of action  - Evaluate amount of meals eaten  - Assist patient with eating if necessary   - Allow adequate time for meals  - Recommend/ encourage appropriate diets, oral nutritional supplements, and vitamin/mineral supplements  - Order, calculate, and assess calorie counts as needed  - Recommend, monitor, and adjust tube feedings and TPN/PPN based on assessed needs  - Assess need for intravenous fluids  - Provide specific nutrition/hydration education as appropriate  - Include patient/family/caregiver in decisions related to nutrition  10/17/2024 0706 by Hilda Carrasco RN  Outcome: Progressing  10/17/2024 0706 by Hilda Carrasco RN  Outcome: Progressing     Problem: MUSCULOSKELETAL - ADULT  Goal: Maintain or return mobility to safest level of function  Description: INTERVENTIONS:  - Assess patient's ability to carry out ADLs; assess patient's baseline for ADL function and identify physical deficits which impact ability to perform ADLs (bathing, care of mouth/teeth, toileting, grooming, dressing, etc.)  - Assess/evaluate cause of self-care deficits   - Assess range of motion  - Assess patient's mobility  - Assess patient's need for assistive devices and provide as appropriate  - Encourage maximum independence but intervene and supervise when necessary  - Involve family in performance of ADLs  - Assess for home care needs following discharge   - Consider OT consult to assist with ADL evaluation and planning for discharge  - Provide patient education as appropriate  10/17/2024 0706 by Hilda Carrasco RN  Outcome: Progressing  10/17/2024 0706 by Hilda Carrasco RN  Outcome: Progressing     Problem: MUSCULOSKELETAL - ADULT  Goal: Maintain proper alignment of affected body part  Description: INTERVENTIONS:  -  Support, maintain and protect limb and body alignment  - Provide patient/ family with appropriate education  10/17/2024 0706 by Hilda Carrasco RN  Outcome: Progressing  10/17/2024 0706 by Hilda Carrasco RN  Outcome: Progressing     Problem: PAIN - ADULT  Goal: Verbalizes/displays adequate comfort level or baseline comfort level  Description: Interventions:  - Encourage patient to monitor pain and request assistance  - Assess pain using appropriate pain scale  - Administer analgesics based on type and severity of pain and evaluate response  - Implement non-pharmacological measures as appropriate and evaluate response  - Consider cultural and social influences on pain and pain management  - Notify physician/advanced practitioner if interventions unsuccessful or patient reports new pain  Outcome: Progressing     Problem: INFECTION - ADULT  Goal: Absence or prevention of progression during hospitalization  Description: INTERVENTIONS:  - Assess and monitor for signs and symptoms of infection  - Monitor lab/diagnostic results  - Monitor all insertion sites, i.e. indwelling lines, tubes, and drains  - Monitor endotracheal if appropriate and nasal secretions for changes in amount and color  - [place] appropriate cooling/warming therapies per order  - Administer medications as ordered  - Instruct and encourage patient and family to use good hand hygiene technique  - Identify and instruct in appropriate isolation precautions for identified infection/condition  Outcome: Progressing     Problem: SAFETY ADULT  Goal: Patient will remain free of falls  Description: INTERVENTIONS:  - Educate patient/family on patient safety including physical limitations  - Instruct patient to call for assistance with activity   - Consult OT/PT to assist with strengthening/mobility   - Keep Call bell within reach  - Keep bed low and locked with side rails adjusted as appropriate  - Keep care items and personal belongings within reach  -  Initiate and maintain comfort rounds  - Make Fall Risk Sign visible to staff  Problem: DISCHARGE PLANNING  Goal: Discharge to home or other facility with appropriate resources  Description: INTERVENTIONS:  - Identify barriers to discharge w/patient and caregiver  - Arrange for needed discharge resources and transportation as appropriate  - Identify discharge learning needs (meds, wound care, etc.)  - Arrange for interpretive services to assist at discharge as needed  - Refer to Case Management Department for coordinating discharge planning if the patient needs post-hospital services based on physician/advanced practitioner order or complex needs related to functional status, cognitive ability, or social support system  Outcome: Progressing     Problem: Knowledge Deficit  Goal: Patient/family/caregiver demonstrates understanding of disease process, treatment plan, medications, and discharge instructions  Description: Complete learning assessment and assess knowledge base.  Interventions:  - Provide teaching at level of understanding  - Provide teaching via preferred learning methods  Outcome: Progressing     Problem: DISCHARGE PLANNING  Goal: Discharge to home or other facility with appropriate resources  Description: INTERVENTIONS:  - Identify barriers to discharge w/patient and caregiver  - Arrange for needed discharge resources and transportation as appropriate  - Identify discharge learning needs (meds, wound care, etc.)  - Arrange for interpretive services to assist at discharge as needed  - Refer to Case Management Department for coordinating discharge planning if the patient needs post-hospital services based on physician/advanced practitioner order or complex needs related to functional status, cognitive ability, or social support system  Outcome: Progressing     Problem: Knowledge Deficit  Goal: Patient/family/caregiver demonstrates understanding of disease process, treatment plan, medications, and discharge  instructions  Description: Complete learning assessment and assess knowledge base.  Interventions:  - Provide teaching at level of understanding  - Provide teaching via preferred learning methods  Outcome: Progressing     - Apply yellow socks and bracelet for high fall risk patients  - Consider moving patient to room near nurses station  10/17/2024 0706 by Hilda Carrasco RN  Outcome: Progressing  10/17/2024 0706 by Hilda Carrasco RN  Outcome: Progressing     - Apply yellow socks and bracelet for high fall risk patients  - Consider moving patient to room near nurses station  10/17/2024 0706 by Hilda Carrasco RN  Outcome: Progressing  10/17/2024 0706 by Hilda Carrasco RN  Outcome: Progressing     Problem: CARDIOVASCULAR - ADULT  Goal: Maintains optimal cardiac output and hemodynamic stability  Description: INTERVENTIONS:  - Monitor I/O, vital signs and rhythm  - Monitor for S/S and trends of decreased cardiac output  - Administer and titrate ordered vasoactive medications to optimize hemodynamic stability  - Assess quality of pulses, skin color and temperature  - Assess for signs of decreased coronary artery perfusion  - Instruct patient to report change in severity of symptoms  10/17/2024 0706 by Hilda Carrasco RN  Outcome: Progressing  10/17/2024 0706 by Hilda Carrasco RN  Outcome: Progressing

## 2024-10-17 NOTE — PLAN OF CARE
Problem: Potential for Falls  Goal: Patient will remain free of falls  Description: INTERVENTIONS:  - Educate patient/family on patient safety including physical limitations  - Instruct patient to call for assistance with activity   - Consult OT/PT to assist with strengthening/mobility   - Keep Call bell within reach  - Keep bed low and locked with side rails adjusted as appropriate  - Keep care items and personal belongings within reach  - Initiate and maintain comfort rounds  - Make Fall Risk Sign visible to staff  - Initiate/Maintain alarm  - Obtain necessary fall risk management equipment:   - Apply yellow socks and bracelet for high fall risk patients  - Consider moving patient to room near nurses station  Outcome: Progressing     Problem: CARDIOVASCULAR - ADULT  Goal: Maintains optimal cardiac output and hemodynamic stability  Description: INTERVENTIONS:  - Monitor I/O, vital signs and rhythm  - Monitor for S/S and trends of decreased cardiac output  - Administer and titrate ordered vasoactive medications to optimize hemodynamic stability  - Assess quality of pulses, skin color and temperature  - Assess for signs of decreased coronary artery perfusion  - Instruct patient to report change in severity of symptoms  Outcome: Progressing  Goal: Absence of cardiac dysrhythmias or at baseline rhythm  Description: INTERVENTIONS:  - Continuous cardiac monitoring, vital signs, obtain 12 lead EKG if ordered  - Administer antiarrhythmic and heart rate control medications as ordered  - Monitor electrolytes and administer replacement therapy as ordered  Outcome: Progressing     Problem: METABOLIC, FLUID AND ELECTROLYTES - ADULT  Goal: Electrolytes maintained within normal limits  Description: INTERVENTIONS:  - Monitor labs and assess patient for signs and symptoms of electrolyte imbalances  - Administer electrolyte replacement as ordered  - Monitor response to electrolyte replacements, including repeat lab results as  appropriate  - Instruct patient on fluid and nutrition as appropriate  Outcome: Progressing  Goal: Fluid balance maintained  Description: INTERVENTIONS:  - Monitor labs   - Monitor I/O and WT  - Instruct patient on fluid and nutrition as appropriate  - Assess for signs & symptoms of volume excess or deficit  Outcome: Progressing  Goal: Glucose maintained within target range  Description: INTERVENTIONS:  - Monitor Blood Glucose as ordered  - Assess for signs and symptoms of hyperglycemia and hypoglycemia  - Administer ordered medications to maintain glucose within target range  - Assess nutritional intake and initiate nutrition service referral as needed  Outcome: Progressing     Problem: Prexisting or High Potential for Compromised Skin Integrity  Goal: Skin integrity is maintained or improved  Description: INTERVENTIONS:  - Identify patients at risk for skin breakdown  - Assess and monitor skin integrity  - Assess and monitor nutrition and hydration status  - Monitor labs   - Assess for incontinence   - Turn and reposition patient  - Assist with mobility/ambulation  - Relieve pressure over bony prominences  - Avoid friction and shearing  - Provide appropriate hygiene as needed including keeping skin clean and dry  - Evaluate need for skin moisturizer/barrier cream  - Collaborate with interdisciplinary team   - Patient/family teaching  - Consider wound care consult   Outcome: Progressing

## 2024-10-17 NOTE — ASSESSMENT & PLAN NOTE
Unclear etiology but seems to be bilateral and initially predominate in the frontal region but now it is more posterior.  No other neurologic findings.  -Workup and treatment as per the primary

## 2024-10-17 NOTE — PROGRESS NOTES
Brief cardiology update:    I reviewed the patient's transthoracic echocardiogram done yesterday.  She had normal left ventricular systolic function without regional wall motion abnormalities.  She had mild, grade 1 diastolic dysfunction and normal right ventricular function.  She did not have any significant valvular abnormalities.    Due to her suspected, likely diffuse coronary artery disease, she does remain high risk for any surgical interventions, see my note from yesterday for more details.  However, with her good echo exercise capacity, she does not need stress test evaluation at this time.    Cardiology will sign off. Please page if additional questions arise.     Joellen Chong MD, PhD  Cardiology

## 2024-10-17 NOTE — PHYSICAL THERAPY NOTE
Physical Therapy Progress Note     10/17/24 1155   PT Last Visit   PT Visit Date 10/17/24   Note Type   Note Type Treatment   Pain Assessment   Pain Assessment Tool 0-10   Pain Score 7   Pain Location/Orientation Location: Neck   Hospital Pain Intervention(s) Repositioned;Ambulation/increased activity;Emotional support   Restrictions/Precautions   Other Precautions Pain;Fall Risk;Bed Alarm;Chair Alarm;WBS   Subjective   Subjective The patient states that it feels good to walk.   Bed Mobility   Supine to Sit 5  Supervision   Additional items Increased time required;Verbal cues   Sit to Supine 5  Supervision   Additional items Increased time required   Transfers   Sit to Stand 5  Supervision   Additional items Increased time required   Stand to Sit 5  Supervision   Additional items Increased time required;Verbal cues   Ambulation/Elevation   Gait pattern Excessively slow;Short stride;Inconsistent adonay;Forward Flexion   Gait Assistance 4  Minimal assist   Additional items Assist x 1   Assistive Device Rolling walker   Distance 40 feet, 20 feet x 2.   Balance   Static Sitting Fair +   Dynamic Sitting Fair   Static Standing Fair -   Dynamic Standing Fair -   Ambulatory Poor +   Activity Tolerance   Activity Tolerance Patient tolerated treatment well;Patient limited by fatigue;Patient limited by pain   Nurse Made Aware Yes.   Assessment   Prognosis Good   Problem List Decreased strength;Decreased endurance;Impaired balance;Decreased mobility;Decreased safety awareness;Impaired judgement;Decreased cognition;Pain;Decreased skin integrity   Assessment The patient was able to ambualte near household distances with extended standing rest breaks. She was very expressive about her situation, but she was redirectable to task. The patient requires increased time for all ambulation as well. Fatigue does occur with gait that currently limits her. She will benefit from continued therapies in order to maximize her functional  mobility.   Barriers to Discharge Inaccessible home environment;Decreased caregiver support   Goals   Patient Goals To feel better.   STG Expiration Date 10/29/24   PT Treatment Day 1   Plan   Treatment/Interventions Functional transfer training;LE strengthening/ROM;Therapeutic exercise;Endurance training;Cognitive reorientation;Patient/family training;Bed mobility;Gait training   Progress Progressing toward goals   PT Frequency 2-3x/wk   Discharge Recommendation   Rehab Resource Intensity Level, PT II (Moderate Resource Intensity)   Equipment Recommended Walker   Walker Package Recommended Wheeled walker   AM-PAC Basic Mobility Inpatient   Turning in Flat Bed Without Bedrails 3   Lying on Back to Sitting on Edge of Flat Bed Without Bedrails 3   Moving Bed to Chair 3   Standing Up From Chair Using Arms 3   Walk in Room 3   Climb 3-5 Stairs With Railing 2   Basic Mobility Inpatient Raw Score 17   Basic Mobility Standardized Score 39.67   [facility] Highest Level Of Mobility   -HLM Goal 5: Stand one or more mins   -HLM Achieved 7: Walk 25 feet or more         An AM-PAC Basic Mobility raw score less than 16 suggests the patient may benefit from discharge to post-acute rehab services.    Dane Hewitt, PTA

## 2024-10-17 NOTE — PROGRESS NOTES
"[facility] Podiatry - Progress Note  Patient: Sabiha Garcia 68 y.o. female   MRN: [id number]  PCP: Alexys Blunt MD  Unit/Bed#: [facility] 814-01 Encounter: [id number]  Date Of Visit: 10/17/24    ASSESSMENT:    Sabiha Garcia is a 68 y.o. female with:    [place] 4 DFU Right fifth digit  Cellulitis right foot  Uncontrolled type 2 diabetes mellitus with last A1c of 14.8% 2024  PAD  Smoking history  Onychomycosis    PLAN:    Dressings to right foot were kept intact.  Nails x 10 were debrided in length and width to reduce risk of ulceration/nail trauma especially in the setting of vascular disease. See documentation below  Continue use of prevalon boots  Will follow-up vascular plans, possible podiatric surgery to follow for right 5th digit/ray.  Continue local wound care, appreciate nursing assistance with dressing changes.  Elevation on green foam wedges or pillows when non-ambulatory.  Rest of care per primary team.    Weight bearing status: WBAT    Nail debridement documentation:  Toenails x 9 were excisionally debrided using a large nipper in length and thickness without incident.       SUBJECTIVE:     The patient was seen, evaluated, and assessed at bedside today. The patient was awake, alert, and in no acute distress. No acute events overnight. The patient reports no pain at this time however her nails are elongated and she is worried about them getting caught on bedding/her socks.. Patient denies N/V/F/chills/SOB/CP.      OBJECTIVE:     Vitals:   /67   Pulse 83   Temp 98.4 °F (36.9 °C)   Resp 16   Ht 5' 9\" (1.753 m)   Wt 51.7 kg (114 lb)   SpO2 96%   BMI 16.83 kg/m²     Temp (24hrs), Av.2 °F (36.8 °C), Min:98.1 °F (36.7 °C), Max:98.4 °F (36.9 °C)      Physical Exam:     General:  Alert, cooperative, and in no distress.  Lower extremity exam:    Dressing to right 5th digit kept intact    Nails x 9 were elongated, discolored, thickened with subungual debris.    Additional Data: " "    Labs:    Results from last 7 days   Lab Units 10/17/24  0601   WBC Thousand/uL 11.16*   HEMOGLOBIN g/dL 10.2*   HEMATOCRIT % 32.8*   PLATELETS Thousands/uL 331   SEGS PCT % 54   LYMPHO PCT % 35   MONO PCT % 7   EOS PCT % 2     Results from last 7 days   Lab Units 10/17/24  0601 10/11/24  1926 10/11/24  1438   POTASSIUM mmol/L 4.0   < > 3.5  3.4*   CHLORIDE mmol/L 102   < > 107  106   CO2 mmol/L 30   < > 28  28   BUN mg/dL 16   < > 49*  47*   CREATININE mg/dL 0.57*   < > 0.89  0.87   CALCIUM mg/dL 9.0   < > 8.6  8.7   ALK PHOS U/L  --   --  98   ALT U/L  --   --  9   AST U/L  --   --  10*    < > = values in this interval not displayed.     Results from last 7 days   Lab Units 10/11/24  0550   INR  1.08       * I Have Reviewed All Lab Data Listed Above.    Recent Cultures (last 7 days):     Results from last 7 days   Lab Units 10/11/24  0707   BLOOD CULTURE  No Growth After 5 Days.  No Growth After 5 Days.               ** Please Note: Portions of the record may have been created with voice recognition software. Occasional wrong word or \"sound a like\" substitutions may have occurred due to the inherent limitations of voice recognition software. Read the chart carefully and recognize, using context, where substitutions have occurred. **   "

## 2024-10-17 NOTE — CASE MANAGEMENT
Case Management Progress Note    Patient name Sabiha Garcia  Location [facility] 814/[facility] 814-01 MRN [id number]  : 1955 Date 10/17/2024       LOS (days): 6  Geometric Mean LOS (GMLOS) (days): 3.1  Days to GMLOS:-3.1        OBJECTIVE:        Current admission status: Inpatient  Preferred Pharmacy:   [id number] - [place] - [address]  [address] [place] [address]  Phone: [phone] Fax: [phone]    Primary Care Provider: Alexys Blunt MD    Primary Insurance: [employer]  Secondary Insurance: [facility]    PROGRESS NOTE:    CM sent blanket referrals for SNF for STR to LTC transition.  CM following.

## 2024-10-18 LAB
GLUCOSE SERPL-MCNC: 174 MG/DL (ref 65–140)
GLUCOSE SERPL-MCNC: 191 MG/DL (ref 65–140)
GLUCOSE SERPL-MCNC: 231 MG/DL (ref 65–140)
GLUCOSE SERPL-MCNC: 303 MG/DL (ref 65–140)

## 2024-10-18 PROCEDURE — 99233 SBSQ HOSP IP/OBS HIGH 50: CPT | Performed by: INTERNAL MEDICINE

## 2024-10-18 PROCEDURE — 82948 REAGENT STRIP/BLOOD GLUCOSE: CPT

## 2024-10-18 PROCEDURE — 99232 SBSQ HOSP IP/OBS MODERATE 35: CPT | Performed by: NURSE PRACTITIONER

## 2024-10-18 PROCEDURE — 99232 SBSQ HOSP IP/OBS MODERATE 35: CPT | Performed by: INTERNAL MEDICINE

## 2024-10-18 RX ORDER — INSULIN LISPRO 100 [IU]/ML
15 INJECTION, SOLUTION INTRAVENOUS; SUBCUTANEOUS
Status: DISCONTINUED | OUTPATIENT
Start: 2024-10-19 | End: 2024-10-21

## 2024-10-18 RX ORDER — INSULIN LISPRO 100 [IU]/ML
15 INJECTION, SOLUTION INTRAVENOUS; SUBCUTANEOUS
Status: DISCONTINUED | OUTPATIENT
Start: 2024-10-18 | End: 2024-10-21

## 2024-10-18 RX ORDER — INSULIN GLARGINE 100 [IU]/ML
32 INJECTION, SOLUTION SUBCUTANEOUS EVERY MORNING
Status: DISCONTINUED | OUTPATIENT
Start: 2024-10-19 | End: 2024-10-19

## 2024-10-18 RX ADMIN — KETOROLAC TROMETHAMINE 15 MG: 30 INJECTION, SOLUTION INTRAMUSCULAR at 23:07

## 2024-10-18 RX ADMIN — HEPARIN SODIUM 5000 UNITS: 5000 INJECTION INTRAVENOUS; SUBCUTANEOUS at 05:36

## 2024-10-18 RX ADMIN — ASPIRIN 81 MG: 81 TABLET, COATED ORAL at 08:17

## 2024-10-18 RX ADMIN — DOCUSATE SODIUM 100 MG: 100 CAPSULE, LIQUID FILLED ORAL at 08:17

## 2024-10-18 RX ADMIN — ACETAMINOPHEN 975 MG: 325 TABLET, FILM COATED ORAL at 01:02

## 2024-10-18 RX ADMIN — INSULIN LISPRO 1 UNITS: 100 INJECTION, SOLUTION INTRAVENOUS; SUBCUTANEOUS at 21:24

## 2024-10-18 RX ADMIN — METRONIDAZOLE 500 MG: 500 TABLET ORAL at 08:17

## 2024-10-18 RX ADMIN — INSULIN LISPRO 10 UNITS: 100 INJECTION, SOLUTION INTRAVENOUS; SUBCUTANEOUS at 11:29

## 2024-10-18 RX ADMIN — KETOROLAC TROMETHAMINE 15 MG: 30 INJECTION, SOLUTION INTRAMUSCULAR at 06:59

## 2024-10-18 RX ADMIN — DOCUSATE SODIUM 100 MG: 100 CAPSULE, LIQUID FILLED ORAL at 17:31

## 2024-10-18 RX ADMIN — CHLORHEXIDINE GLUCONATE 0.12% ORAL RINSE 15 ML: 1.2 LIQUID ORAL at 08:17

## 2024-10-18 RX ADMIN — CHLORHEXIDINE GLUCONATE 0.12% ORAL RINSE 15 ML: 1.2 LIQUID ORAL at 21:19

## 2024-10-18 RX ADMIN — ATORVASTATIN CALCIUM 80 MG: 80 TABLET, FILM COATED ORAL at 17:31

## 2024-10-18 RX ADMIN — INSULIN LISPRO 12 UNITS: 100 INJECTION, SOLUTION INTRAVENOUS; SUBCUTANEOUS at 08:19

## 2024-10-18 RX ADMIN — INSULIN LISPRO 2 UNITS: 100 INJECTION, SOLUTION INTRAVENOUS; SUBCUTANEOUS at 08:19

## 2024-10-18 RX ADMIN — INSULIN LISPRO 3 UNITS: 100 INJECTION, SOLUTION INTRAVENOUS; SUBCUTANEOUS at 11:29

## 2024-10-18 RX ADMIN — HEPARIN SODIUM 5000 UNITS: 5000 INJECTION INTRAVENOUS; SUBCUTANEOUS at 21:20

## 2024-10-18 RX ADMIN — Medication 2000 UNITS: at 08:18

## 2024-10-18 RX ADMIN — GUAIFENESIN AND DEXTROMETHORPHAN 10 ML: 100; 10 SYRUP ORAL at 03:41

## 2024-10-18 RX ADMIN — CEFAZOLIN SODIUM 2000 MG: 2 SOLUTION INTRAVENOUS at 08:17

## 2024-10-18 RX ADMIN — METRONIDAZOLE 500 MG: 500 TABLET ORAL at 21:20

## 2024-10-18 RX ADMIN — INSULIN LISPRO 15 UNITS: 100 INJECTION, SOLUTION INTRAVENOUS; SUBCUTANEOUS at 17:32

## 2024-10-18 RX ADMIN — CEFAZOLIN SODIUM 2000 MG: 2 SOLUTION INTRAVENOUS at 17:31

## 2024-10-18 RX ADMIN — INSULIN LISPRO 1 UNITS: 100 INJECTION, SOLUTION INTRAVENOUS; SUBCUTANEOUS at 17:31

## 2024-10-18 RX ADMIN — HEPARIN SODIUM 5000 UNITS: 5000 INJECTION INTRAVENOUS; SUBCUTANEOUS at 13:03

## 2024-10-18 RX ADMIN — NICOTINE 21 MG: 21 PATCH, EXTENDED RELEASE TRANSDERMAL at 08:20

## 2024-10-18 RX ADMIN — CEFAZOLIN SODIUM 2000 MG: 2 SOLUTION INTRAVENOUS at 00:06

## 2024-10-18 RX ADMIN — KETOROLAC TROMETHAMINE 15 MG: 30 INJECTION, SOLUTION INTRAMUSCULAR at 16:06

## 2024-10-18 RX ADMIN — INSULIN GLARGINE 30 UNITS: 100 INJECTION, SOLUTION SUBCUTANEOUS at 08:18

## 2024-10-18 NOTE — PROGRESS NOTES
Progress Note - Vascular Surgery   Name: Sabiha Garcia 68 y.o. female I MRN: [id number]  [facility] I Date of Admission: 10/11/2024   Date of Service: 10/18/2024 I Hospital Day: 7    Assessment & Plan  Aortoiliac occlusive disease (HCC)  68-year-old female current smoker with history of uncontrolled T2 DM (HgA1c 14.8), HLD, tobacco use, lung nodule, hx of recurrent falls and hx of L femoral shaft fx s/p repair in 2020 arrived in the ED 10/11 with generalized weakness s/p falls, admitted for management of hyperosmolar hyperglycemia with associated metabolic acidosis with concern for DKA vs starvation ketosis. Patient with noted non-healing R 5th digit gangrenous wound, vascular surgery consulted for abnormal ABIs concern for contributory PAD.     10/14/24 LEAD  Right: stenosis vs occlusion of p SFA with reconstitution of mSFA  SALO 0.34/-/-  Left: monophasic dis EIA  SALO 0.34/ 56/ 44    10/15/24 CTA w runoff  Focal cleft-like occlusion of infrarenal abdominal aorta; prox R CHARISSA severe stenosis, R SFA occlusion  Severe L IIA stenosis with mild-mod dz throughout    Plan  - Plan for L AxBiFem, R Fem-AK pop bypass planned for Wed 10/23 with . Discussed case with patient in detail, all questions answered.   - NPO at MD prior to surgery  - Cardiology consult- 'high risk', ECHO LVEF 60%, no stress test needed  - Discussed importance of adequate blood sugar control for wound healing, patient understands importance of compliance with home diabetic therapy; endocrinology following. Furthermore discussed importance of adequate nutrition.   - ABX per ID - Cefazolin/Flagyl  - Podiatry following- plan for 5th toe amp vs partial 5th ray resection  - Remainder of care per primary medicine team      Subjective : Pt seen and evaluated by the vascular team. Discussed revascularization procedure in detail. Pt verbalized understanding.     Objective :  Temp:  [97.6 °F (36.4 °C)-98.1 °F (36.7 °C)] 98.1 °F (36.7 °C)  HR:   [78-86] 78  BP: (109-114)/(60-64) 114/60  Resp:  [16] 16  SpO2:  [96 %-98 %] 96 %  O2 Device: None (Room air)     I/O         10/16 0701  10/17 0700 10/17 0701  10/18 0700 10/18 0701  10/19 0700    P.O. 651 720     IV Piggyback  86.7     Total Intake(mL/kg) 651 (12.6) 806.7 (15.4)     Urine (mL/kg/hr) 1075 (0.9) 1600 (1.3)     Stool 0      Total Output 1075 1600     Net -424 -793.3            Unmeasured Urine Occurrence 0 x      Unmeasured Stool Occurrence 0 x              Physical Exam  Vitals and nursing note reviewed.   Constitutional:       General: She is not in acute distress.     Appearance: Normal appearance. She is not ill-appearing.      Comments: BMI 16.24 kg/m   HENT:      Head: Normocephalic and atraumatic.   Pulmonary:      Effort: Pulmonary effort is normal. No respiratory distress.   Abdominal:      General: There is no distension.      Palpations: Abdomen is soft. There is no mass.   Skin:     General: Skin is dry.      Findings: Erythema, signs of injury and wound present.      Comments: R foot 5th toe noted to have black dry eschar with erythremia surrounding the area.    Neurological:      General: No focal deficit present.      Mental Status: She is alert and oriented to person, place, and time.      Sensory: No sensory deficit.   Psychiatric:         Mood and Affect: Mood normal.         Behavior: Behavior normal.           Lab Results: I have reviewed the following results:  CBC with diff:   Lab Results   Component Value Date    WBC 11.95 (H) 10/17/2024    HGB 9.8 (L) 10/17/2024    HCT 31.3 (L) 10/17/2024    MCV 91 10/17/2024     10/17/2024    RBC 3.43 (L) 10/17/2024    MCH 28.6 10/17/2024    MCHC 31.3 (L) 10/17/2024    RDW 15.0 10/17/2024    MPV 8.6 (L) 10/17/2024    NRBC 0 10/17/2024   ,   BMP/CMP:  Lab Results   Component Value Date    SODIUM 138 10/17/2024    SODIUM 138 09/18/2024    SODIUM 135 04/06/2022    K 4.3 10/17/2024    K 4.6 09/18/2024    K 4.5 04/06/2022      "10/17/2024    CL 95 (L) 09/18/2024    CL 98 (L) 04/06/2022    CO2 32 10/17/2024    CO2 33 (H) 09/18/2024    CO2 28 04/06/2022    BUN 15 10/17/2024    BUN 14 09/18/2024    BUN 13 04/06/2022    CREATININE 0.76 10/17/2024    CREATININE 0.86 04/06/2022    CALCIUM 9.2 10/17/2024    CALCIUM 9.6 09/18/2024    CALCIUM 9.7 04/06/2022    AST 10 (L) 10/11/2024    AST 11 09/18/2024    AST 35 04/06/2022    ALT 9 10/11/2024    ALT 8 09/18/2024    ALT 23 04/06/2022    ALKPHOS 98 10/11/2024    ALKPHOS 120 09/18/2024    ALKPHOS 154 (H) 04/06/2022    EGFR 80 10/17/2024    EGFR 74 04/06/2022    EGFR 75 06/16/2018   ,   Lipid Panel: No results found for: \"CHOL\",   Coags:   Lab Results   Component Value Date    PT 12.0 06/16/2018    PTT 27 10/11/2024    INR 1.08 10/11/2024    INR 0.9 06/16/2018   ,   Blood Culture:   Lab Results   Component Value Date    BLOODCX No Growth After 5 Days. 10/11/2024    BLOODCX No Growth After 5 Days. 10/11/2024   ,   Urinalysis:   Lab Results   Component Value Date    COLORU Light Yellow 10/11/2024    CLARITYU Clear 10/11/2024    SPECGRAV 1.021 10/11/2024    PHUR 5.5 10/11/2024    LEUKOCYTESUR (A) 10/11/2024     Elevated glucose may cause decreased leukocyte values. See urine microscopic for UWBC result    NITRITE Negative 10/11/2024    GLUCOSEU >=1000 (1%) (A) 10/11/2024    KETONESU 60 (2+) (A) 10/11/2024    BILIRUBINUR Negative 10/11/2024    BLOODU Negative 10/11/2024   ,   Urine Culture: No results found for: \"URINECX\",   Wound Culure: No results found for: \"WOUNDCULT\"    Imaging Results Review: I reviewed radiology reports from this admission including: CT abdomen/pelvis and Ultrasound(s).      VTE Prophylaxis: Sequential compression device (Venodyne)  and Heparin  "

## 2024-10-18 NOTE — ASSESSMENT & PLAN NOTE
68-year-old female current smoker with history of uncontrolled T2 DM (HgA1c 14.8), HLD, tobacco use, lung nodule, hx of recurrent falls and hx of L femoral shaft fx s/p repair in 2020 arrived in the ED 10/11 with generalized weakness s/p falls, admitted for management of hyperosmolar hyperglycemia with associated metabolic acidosis with concern for DKA vs starvation ketosis. Patient with noted non-healing R 5th digit gangrenous wound, vascular surgery consulted for abnormal ABIs concern for contributory PAD.     10/14/24 LEAD  Right: stenosis vs occlusion of p SFA with reconstitution of mSFA  SALO 0.34/-/-  Left: monophasic dis EIA  SALO 0.34/ 56/ 44    10/15/24 CTA w runoff  Focal cleft-like occlusion of infrarenal abdominal aorta; prox R CHARISSA severe stenosis, R SFA occlusion  Severe L IIA stenosis with mild-mod dz throughout    Plan  - Plan for L AxBiFem, R Fem-AK pop bypass planned for Wed 10/23 with . Discussed case with patient in detail, all questions answered.   - NPO at MD prior to surgery  - Cardiology consult- 'high risk', ECHO LVEF 60%, no stress test needed  - Discussed importance of adequate blood sugar control for wound healing, patient understands importance of compliance with home diabetic therapy; endocrinology following. Furthermore discussed importance of adequate nutrition.   - ABX per ID - Cefazolin/Flagyl  - Podiatry following- plan for 5th toe amp vs partial 5th ray resection  - Remainder of care per primary medicine team

## 2024-10-18 NOTE — ASSESSMENT & PLAN NOTE
Malnutrition Findings:   Adult Malnutrition type: Chronic illness  Adult Degree of Malnutrition: Malnutrition of moderate degree  Malnutrition Characteristics: Fat loss, Muscle loss, Inadequate energy                  360 Statement: Moderate malnutrition r/t inadequate intake as evidenced by fat/musle wasting of orbital/temple areas, BMI 16.9, consuming < 75% energy intake compared to estimated energy needs > 1 month. Treated with DM diet and Glucerna bid    BMI Findings:  Adult BMI Classifications: Underweight < 18.5        Body mass index is 16.24 kg/m².

## 2024-10-18 NOTE — ASSESSMENT & PLAN NOTE
Lab Results   Component Value Date    HGBA1C 14.8 (H) 09/18/2024       Recent Labs     10/17/24  1051 10/17/24  1605 10/17/24  2112 10/18/24  0703   POCGLU 262* 194* 223* 231*     Blood Sugar Average: Last 72 hrs:  (P) 241.75    With likely diabetic ketoacidosis.  Uncontrolled with hemoglobin A1c of 14.8.  Certainly a risk factor for recurrent infection.  -Tighten diabetic control

## 2024-10-18 NOTE — ASSESSMENT & PLAN NOTE
Lab Results   Component Value Date    HGBA1C 14.8 (H) 09/18/2024       Recent Labs     10/17/24  1605 10/17/24  2112 10/18/24  0703 10/18/24  1116   POCGLU 194* 223* 231* 303*       Blood Sugar Average: Last 72 hrs:  (P) 246.7653203898380651  -HBA1C 14.8  -Patient takes insulin Tresiba 10-20 units at home.  Has not been taking it for almost weeks.  -She also added she is not able to see the insulin pen because she cannot see from left eye most likely 2/2 cataract and right eye is blurry  -Has not been eating and drinking enough for past couple of weeks.  -Her B sugars at home range between 180-230. One of PCPs note mention as high as 400. Also, her fingertsicks were not giving any reading probably 2/2 high B sugars  -She has never been hypoglycemic in the past.  -She was initially started on insulin drip 2/2 POA glucose 681; beta hydroxy butyrate 5.20  VBG 7.29/44/35/21  UA 2+ ketones, glucose  -Initially on Insulin drip on 10/11 but now off since 10/12 as the anion gap closed.  -States her appetite has improved significantly.  -Currently on Insulin Lantus 30 units and Lispro 12 units with breakfast and dinner, 10 units at lunch.  -Will increase Lantus to 32 units and Lispro to 15 units with breakfast and dinner.  -Keep lunch lispro 10 units.  -Correctional scale

## 2024-10-18 NOTE — PROGRESS NOTES
Progress Note - Hospitalist   Name: Sabiha aGrcia 68 y.o. female I MRN: [id number]  Unit/Bed#: [facility] 814-01 I Date of Admission: 10/11/2024   Date of Service: 10/18/2024 I Hospital Day: 7     Assessment & Plan  Gangrene of toe of right foot (HCC)  Right fifth digit dry gangrene with surround cellulitis  Xray - Subtle periosteal reaction of the fifth digit proximal phalanx with adjacent soft tissue swelling. Findings raise suspicion for osteomyelitis   LEADS abnormal - vascular consulted  Podiatry following - plan after vascular optimization  Wound care with Betadine paint open to air  In the box as per infectious disease.  Continue with supportive care.  Cellulitis of right foot  Antibiotics as above  Type 2 diabetes mellitus (HCC)  Lab Results   Component Value Date    HGBA1C 14.8 (H) 09/18/2024       Recent Labs     10/17/24  1051 10/17/24  1605 10/17/24  2112 10/18/24  0703   POCGLU 262* 194* 223* 231*       Blood Sugar Average: Last 72 hrs:  (P) 241.75    Severely uncontrolled as evidenced by HbA1c  On Levemir at home  Possible DKA on arrival   Insulin dose increased to Lantus 25 in am, Humalog 10 TID,   Ct sliding scale insulin  Endocrine input appreciated     Ambulatory dysfunction  Presented with generalized weakness, 2 falls with no head strike  States had been down for prolonged period  CT head - no acute pathology  TSH low with normal free T4, CK normal  PT/OT  Tobacco user  Nicotine patch  Smoking cessation advised  Vitamin D deficiency  Continue supplementation  Lung nodule  CT chest with 3 mm right upper lobe noncalcified nodule  CT chest without contrast in 12 months  Aortoiliac occlusive disease (HCC)    Other headache syndrome    Pre-operative cardiovascular examination    Moderate protein-calorie malnutrition (HCC)  Malnutrition Findings:   Adult Malnutrition type: Chronic illness  Adult Degree of Malnutrition: Malnutrition of moderate degree  Malnutrition Characteristics: Fat loss, Muscle loss,  Inadequate energy                  360 Statement: Moderate malnutrition r/t inadequate intake as evidenced by fat/musle wasting of orbital/temple areas, BMI 16.9, consuming < 75% energy intake compared to estimated energy needs > 1 month. Treated with DM diet and Glucerna bid    BMI Findings:  Adult BMI Classifications: Underweight < 18.5        Body mass index is 16.24 kg/m².     VTE Pharmacologic Prophylaxis:   Pharmacologic: Heparin  Mechanical VTE Prophylaxis in Place: No    Patient Centered Rounds: I have performed bedside rounds with nursing staff today.    Time Spent for Care: 1 hour.  More than 50% of total time spent on counseling and coordination of care as described above.    Current Length of Stay: 7 day(s)    Current Patient Status: Inpatient     Code Status: Level 1 - Full Code      Subjective:   nad    Objective:     Vitals:   Temp (24hrs), Av.9 °F (36.6 °C), Min:97.6 °F (36.4 °C), Max:98.1 °F (36.7 °C)    Temp:  [97.6 °F (36.4 °C)-98.1 °F (36.7 °C)] 98.1 °F (36.7 °C)  HR:  [78-86] 78  Resp:  [16] 16  BP: (109-114)/(60-64) 114/60  SpO2:  [96 %-98 %] 96 %  Body mass index is 16.24 kg/m².     Input and Output Summary (last 24 hours):       Intake/Output Summary (Last 24 hours) at 10/18/2024 0840  Last data filed at 10/18/2024 0058  Gross per 24 hour   Intake 806.67 ml   Output 1600 ml   Net -793.33 ml       Physical Exam:     Physical Exam  HENT:      Head: Normocephalic and atraumatic.   Cardiovascular:      Rate and Rhythm: Normal rate and regular rhythm.      Heart sounds: No murmur heard.  Pulmonary:      Effort: Pulmonary effort is normal.      Breath sounds: Normal breath sounds.   Abdominal:      General: Abdomen is flat.      Palpations: Abdomen is soft.   Neurological:      General: No focal deficit present.      Mental Status: She is alert and oriented to person, place, and time.   Psychiatric:         Mood and Affect: Mood normal.         Additional Data:     Labs:    Results from last 7  days   Lab Units 10/17/24  1454   WBC Thousand/uL 11.95*   HEMOGLOBIN g/dL 9.8*   HEMATOCRIT % 31.3*   PLATELETS Thousands/uL 366   SEGS PCT % 60   LYMPHO PCT % 29   MONO PCT % 7   EOS PCT % 2     Results from last 7 days   Lab Units 10/17/24  1454 10/11/24  1926 10/11/24  1438   POTASSIUM mmol/L 4.3   < > 3.5  3.4*   CHLORIDE mmol/L 101   < > 107  106   CO2 mmol/L 32   < > 28  28   BUN mg/dL 15   < > 49*  47*   CREATININE mg/dL 0.76   < > 0.89  0.87   CALCIUM mg/dL 9.2   < > 8.6  8.7   ALK PHOS U/L  --   --  98   ALT U/L  --   --  9   AST U/L  --   --  10*    < > = values in this interval not displayed.           * I Have Reviewed All Lab Data Listed Above.  * Additional Pertinent Lab Tests Reviewed: All Labs Within Last 24 Hours Reviewed        Recent Cultures (last 7 days):           Last 24 Hours Medication List:   Current Facility-Administered Medications   Medication Dose Route Frequency Provider Last Rate    acetaminophen  975 mg Oral Q8H PRN Geri Justice MD      aspirin  81 mg Oral Daily Jeana Muñoz PA-C      atorvastatin  80 mg Oral Daily With Dinner Joellen Chong MD      calcium carbonate  1,000 mg Oral TID PRN Devon Mckeon PA-C      cefazolin  2,000 mg Intravenous Q8H Jeana Muñoz PA-C 2,000 mg (10/18/24 0817)    chlorhexidine  15 mL Mouth/Throat Q12H JUAN Justice MD      cholecalciferol  2,000 Units Oral Daily Geri Justice MD      dextromethorphan-guaiFENesin  10 mL Oral Q6H PRN Geri Justice MD      docusate sodium  100 mg Oral BID Jeana Muñoz PA-C      heparin (porcine)  5,000 Units Subcutaneous Q8H JUAN Justice MD      insulin glargine  30 Units Subcutaneous QAM Daryn Parrish MD      insulin lispro  1-5 Units Subcutaneous TID AC Gala Jones MD      insulin lispro  1-5 Units Subcutaneous HS Gala Jones MD      insulin lispro  10 Units Subcutaneous Daily With Lunch Daryn Parrish MD      insulin lispro  12 Units  Subcutaneous Daily With Breakfast Daryn Parrish MD      insulin lispro  12 Units Subcutaneous Daily With Dinner Daryn Parrish MD      ketorolac  15 mg Intravenous Q6H PRN Guy Elias DO      lidocaine  1 patch Topical Daily Geri Justice MD      metroNIDAZOLE  500 mg Oral Q12H JUAN Muñoz PA-C      nicotine  21 mg Transdermal Daily Octaviano Chau DO      phenol  1 spray Mouth/Throat Q2H PRN Geri Justice MD      polyethylene glycol  17 g Oral Daily PRN Jeana Muñoz PA-C          Today, Patient Was Seen By: Guy Elias DO    ** Please Note: Dictation voice to text software may have been used in the creation of this document. **

## 2024-10-18 NOTE — ASSESSMENT & PLAN NOTE
Lab Results   Component Value Date    HGBA1C 14.8 (H) 09/18/2024       Recent Labs     10/17/24  1051 10/17/24  1605 10/17/24  2112 10/18/24  0703   POCGLU 262* 194* 223* 231*       Blood Sugar Average: Last 72 hrs:  (P) 241.75    Severely uncontrolled as evidenced by HbA1c  On Levemir at home  Possible DKA on arrival   Insulin dose increased to Lantus 25 in am, Humalog 10 TID,   Ct sliding scale insulin  Endocrine input appreciated

## 2024-10-18 NOTE — PLAN OF CARE
Problem: Potential for Falls  Goal: Patient will remain free of falls  Description: INTERVENTIONS:  - Educate patient/family on patient safety including physical limitations  - Instruct patient to call for assistance with activity   - Consult OT/PT to assist with strengthening/mobility   - Keep Call bell within reach  - Keep bed low and locked with side rails adjusted as appropriate  - Keep care items and personal belongings within reach  - Initiate and maintain comfort rounds  - Make Fall Risk Sign visible to staf  - Apply yellow socks and bracelet for high fall risk patients  - Consider moving patient to room near nurses station  10/18/2024 0735 by Nelda Mcgraw, RN  Outcome: Progressing  10/18/2024 0734 by Nelda Mcgraw, RN  Outcome: Progressing

## 2024-10-18 NOTE — PROGRESS NOTES
Progress Note - Infectious Disease   Name: Sabiha Garcia 68 y.o. female I MRN: [id number]  Unit/Bed#: [facility] 814-01 I Date of Admission: 10/11/2024   Date of Service: 10/18/2024 I Hospital Day: 7    Assessment & Plan  Gangrene of toe of right foot (HCC)  Dry gangrene of right fifth digit but with surrounding cellulitis. Right foot xray positive for periosteal reaction of the 5th digit proximal phalanx with suspicion for osteomyelitis. Not systemically ill. Some malodorous drainage suggesting the possibility of anaerobes. No history of MRSA.    -Continue intravenous cefazolin and oral Flagyl  -Patient for surgical revascularization with left axillary bifemoral, right fem-AK popliteal bypass planned for 10/23/2024  -Anticipate ray amputation for eventual surgical cure after vascular status optimized  -Close podiatry and vascular follow-up  -Recheck CBC with differential and BMP to make sure no developing toxicities  -Local wound care  Cellulitis of right foot  Gangrene of the right fifth digit with surrounding ischemic changes and concomitant localized cellulitis.  Stabilized to decreased erythema with the IV antibiotics  -Antibiotics as above   -Serial exams   -Await vascular and podiatry interventio  Type 2 diabetes mellitus (HCC)  Lab Results   Component Value Date    HGBA1C 14.8 (H) 09/18/2024       Recent Labs     10/17/24  1051 10/17/24  1605 10/17/24  2112 10/18/24  0703   POCGLU 262* 194* 223* 231*     Blood Sugar Average: Last 72 hrs:  (P) 241.75    With likely diabetic ketoacidosis.  Uncontrolled with hemoglobin A1c of 14.8.  Certainly a risk factor for recurrent infection.  -Tighten diabetic control  Tobacco user  Risk factor for recurrent infection.   -Nicotine patch   -Smoking cessation advised   Ambulatory dysfunction  Presented with generalized weakness and endorses frequent falls without loss of consciousness. Head CT negative for acute pathology.    -Continue PT/OT  Aortoiliac occlusive disease (HCC)  As  seen on CT angiogram.  -Patient for angiogram and possible intervention today.  Other headache syndrome  Unclear etiology but seems to be bilateral and initially predominate in the frontal region but now it is more posterior.  No other neurologic findings.  CT brain nondiagnostic for source.  -Workup and treatment as per the primary    I have discussed with the primary service the above plan to continue the cefazolin and Flagyl.  The primary service agrees with the plan.    Infectious disease service will see the patient again 10/21/2024.  Please message me over the weekend if questions.    Antibiotics:  Cefazolin 5  Flagyl 5    Subjective   Patient has no fever, chills, sweats; no nausea, vomiting, diarrhea; no cough, shortness of breath; no increased pain. No new symptoms.  She is still having a headache that is more occipital now    Objective :  Temp:  [97.6 °F (36.4 °C)-98.1 °F (36.7 °C)] 98.1 °F (36.7 °C)  HR:  [78-86] 78  BP: (109-114)/(60-64) 114/60  Resp:  [16] 16  SpO2:  [96 %-98 %] 96 %  O2 Device: None (Room air)    General:  No acute distress  Psychiatric:  Awake and alert  Pulmonary:  Normal respiratory excursion without accessory muscle use  Abdomen:  Soft, nontender  Extremities:  No edema.  Right foot with resolution of the erythema but still with gangrenous changes around the fifth toe.  Skin:  No rashes      Lab Results: I have reviewed the following results:  Results from last 7 days   Lab Units 10/17/24  1454 10/17/24  0601 10/16/24  0607   WBC Thousand/uL 11.95* 11.16* 10.75*   HEMOGLOBIN g/dL 9.8* 10.2* 10.6*   PLATELETS Thousands/uL 366 331 323     Results from last 7 days   Lab Units 10/17/24  1454 10/17/24  0601 10/16/24  0607 10/11/24  1926 10/11/24  1438   SODIUM mmol/L 138 140 138   < > 146  144   POTASSIUM mmol/L 4.3 4.0 4.2   < > 3.5  3.4*   CHLORIDE mmol/L 101 102 101   < > 107  106   CO2 mmol/L 32 30 31   < > 28  28   BUN mg/dL 15 16 10   < > 49*  47*   CREATININE mg/dL 0.76  0.57* 0.53*   < > 0.89  0.87   EGFR ml/min/1.73sq m 80 95 97   < > 66  68   CALCIUM mg/dL 9.2 9.0 8.9   < > 8.6  8.7   AST U/L  --   --   --   --  10*   ALT U/L  --   --   --   --  9   ALK PHOS U/L  --   --   --   --  98   ALBUMIN g/dL  --   --   --   --  3.1*    < > = values in this interval not displayed.         Results from last 7 days   Lab Units 10/14/24  0546 10/13/24  0436   PROCALCITONIN ng/ml 4.29* 0.79*                 Imaging Results Review: I personally reviewed the following image studies in PACS and associated radiology reports: CT head. My interpretation of the radiology images/reports is: No bleed or mass seen.

## 2024-10-18 NOTE — PROGRESS NOTES
Progress Note - Endocrinology   Name: Sabiha Garcia 68 y.o. female I MRN: [id number]  Unit/Bed#: [facility] 814-01 I Date of Admission: 10/11/2024   Date of Service: 10/18/2024 I Hospital Day: 7     Assessment & Plan  Type 2 diabetes mellitus (HCC)  Lab Results   Component Value Date    HGBA1C 14.8 (H) 09/18/2024       Recent Labs     10/17/24  1605 10/17/24  2112 10/18/24  0703 10/18/24  1116   POCGLU 194* 223* 231* 303*       Blood Sugar Average: Last 72 hrs:  (P) 246.9493435922514501  -HBA1C 14.8  -Patient takes insulin Tresiba 10-20 units at home.  Has not been taking it for almost weeks.  -She also added she is not able to see the insulin pen because she cannot see from left eye most likely 2/2 cataract and right eye is blurry  -Has not been eating and drinking enough for past couple of weeks.  -Her B sugars at home range between 180-230. One of PCPs note mention as high as 400. Also, her fingertsicks were not giving any reading probably 2/2 high B sugars  -She has never been hypoglycemic in the past.  -She was initially started on insulin drip 2/2 POA glucose 681; beta hydroxy butyrate 5.20  VBG 7.29/44/35/21  UA 2+ ketones, glucose  -Initially on Insulin drip on 10/11 but now off since 10/12 as the anion gap closed.  -States her appetite has improved significantly.  -Currently on Insulin Lantus 30 units and Lispro 12 units with breakfast and dinner, 10 units at lunch.  -Will increase Lantus to 32 units and Lispro to 15 units with breakfast and dinner.  -Keep lunch lispro 10 units.  -Correctional scale  Tobacco user    Gangrene of toe of right foot (HCC)    Ambulatory dysfunction    Vitamin D deficiency    Lung nodule    Cellulitis of right foot    Aortoiliac occlusive disease (HCC)    Other headache syndrome    Pre-operative cardiovascular examination    Moderate protein-calorie malnutrition (HCC)  Malnutrition Findings:   Adult Malnutrition type: Chronic illness  Adult Degree of Malnutrition: Malnutrition of  moderate degree  Malnutrition Characteristics: Fat loss, Muscle loss, Inadequate energy                  360 Statement: Moderate malnutrition r/t inadequate intake as evidenced by fat/musle wasting of orbital/temple areas, BMI 16.9, consuming < 75% energy intake compared to estimated energy needs > 1 month. Treated with DM diet and Glucerna bid    BMI Findings:  Adult BMI Classifications: Underweight < 18.5        Body mass index is 16.24 kg/m².       24 Hour Events : None  Subjective : States she slept well because her neck pain has improved. No other complaints.    Objective :  Temp:  [97.6 °F (36.4 °C)-98.1 °F (36.7 °C)] 98.1 °F (36.7 °C)  HR:  [78-87] 87  BP: (109-118)/(60-64) 118/60  Resp:  [16-18] 18  SpO2:  [96 %-98 %] 96 %  O2 Device: None (Room air)    Physical Exam  HENT:      Head: Normocephalic.      Mouth/Throat:      Mouth: Mucous membranes are moist.   Cardiovascular:      Rate and Rhythm: Normal rate and regular rhythm.      Pulses: Normal pulses.      Heart sounds: Normal heart sounds.   Pulmonary:      Effort: Pulmonary effort is normal.   Musculoskeletal:      Right lower leg: No edema.      Left lower leg: No edema.   Neurological:      Mental Status: She is alert.         Lab Results: I have reviewed the following results:CBC/BMP: No new results in last 24 hours. , Creatinine Clearance: Estimated Creatinine Clearance: 55.8 mL/min (by C-G formula based on SCr of 0.76 mg/dL)., LFTs: No new results in last 24 hours.

## 2024-10-18 NOTE — ASSESSMENT & PLAN NOTE
Unclear etiology but seems to be bilateral and initially predominate in the frontal region but now it is more posterior.  No other neurologic findings.  CT brain nondiagnostic for source.  -Workup and treatment as per the primary

## 2024-10-18 NOTE — CASE MANAGEMENT
Case Management Progress Note    Patient name Sabiha Garcia  Location [facility] 814/[facility] 814-01 MRN [id number]  : 1955 Date 10/18/2024       LOS (days): 7  Geometric Mean LOS (GMLOS) (days): 3.1  Days to GMLOS:-4.1        PROGRESS NOTE:    CM provided patient choice list of accepting LTC facilities to pt.  CM read pt the list.  Pt requested that CM email the list to Leighann Moreira ( with [facility]).  CM emailed list to Leighann and will remain available for pt's choice.  Pt is not medically cleared at this time, plan to go to OR on Wednesday for toe amputation.

## 2024-10-19 ENCOUNTER — APPOINTMENT (INPATIENT)
Dept: RADIOLOGY | Facility: HOSPITAL | Age: 69
DRG: 271 | End: 2024-10-19
Payer: COMMERCIAL

## 2024-10-19 LAB
APTT PPP: 33 SECONDS (ref 23–34)
APTT PPP: 44 SECONDS (ref 23–34)
APTT PPP: 45 SECONDS (ref 23–34)
ERYTHROCYTE [DISTWIDTH] IN BLOOD BY AUTOMATED COUNT: 15 % (ref 11.6–15.1)
GLUCOSE SERPL-MCNC: 176 MG/DL (ref 65–140)
GLUCOSE SERPL-MCNC: 179 MG/DL (ref 65–140)
GLUCOSE SERPL-MCNC: 291 MG/DL (ref 65–140)
HCT VFR BLD AUTO: 31 % (ref 34.8–46.1)
HGB BLD-MCNC: 9.7 G/DL (ref 11.5–15.4)
INR PPP: 0.94 (ref 0.85–1.19)
MCH RBC QN AUTO: 28.6 PG (ref 26.8–34.3)
MCHC RBC AUTO-ENTMCNC: 31.3 G/DL (ref 31.4–37.4)
MCV RBC AUTO: 91 FL (ref 82–98)
PLATELET # BLD AUTO: 359 THOUSANDS/UL (ref 149–390)
PMV BLD AUTO: 8.4 FL (ref 8.9–12.7)
PROTHROMBIN TIME: 12.9 SECONDS (ref 12.3–15)
RBC # BLD AUTO: 3.39 MILLION/UL (ref 3.81–5.12)
WBC # BLD AUTO: 11.09 THOUSAND/UL (ref 4.31–10.16)

## 2024-10-19 PROCEDURE — 85730 THROMBOPLASTIN TIME PARTIAL: CPT | Performed by: INTERNAL MEDICINE

## 2024-10-19 PROCEDURE — 99232 SBSQ HOSP IP/OBS MODERATE 35: CPT | Performed by: INTERNAL MEDICINE

## 2024-10-19 PROCEDURE — 85610 PROTHROMBIN TIME: CPT

## 2024-10-19 PROCEDURE — 71045 X-RAY EXAM CHEST 1 VIEW: CPT

## 2024-10-19 PROCEDURE — 82948 REAGENT STRIP/BLOOD GLUCOSE: CPT

## 2024-10-19 PROCEDURE — 85027 COMPLETE CBC AUTOMATED: CPT

## 2024-10-19 PROCEDURE — 99233 SBSQ HOSP IP/OBS HIGH 50: CPT | Performed by: INTERNAL MEDICINE

## 2024-10-19 PROCEDURE — 85730 THROMBOPLASTIN TIME PARTIAL: CPT

## 2024-10-19 RX ORDER — HEPARIN SODIUM 10000 [USP'U]/100ML
3-30 INJECTION, SOLUTION INTRAVENOUS
Status: DISCONTINUED | OUTPATIENT
Start: 2024-10-19 | End: 2024-10-23

## 2024-10-19 RX ORDER — INSULIN GLARGINE 100 [IU]/ML
35 INJECTION, SOLUTION SUBCUTANEOUS EVERY MORNING
Status: DISCONTINUED | OUTPATIENT
Start: 2024-10-20 | End: 2024-10-24

## 2024-10-19 RX ORDER — GUAIFENESIN 600 MG/1
600 TABLET, EXTENDED RELEASE ORAL EVERY 12 HOURS SCHEDULED
Status: DISCONTINUED | OUTPATIENT
Start: 2024-10-19 | End: 2024-10-29 | Stop reason: HOSPADM

## 2024-10-19 RX ADMIN — INSULIN LISPRO 1 UNITS: 100 INJECTION, SOLUTION INTRAVENOUS; SUBCUTANEOUS at 17:38

## 2024-10-19 RX ADMIN — CEFAZOLIN SODIUM 2000 MG: 2 SOLUTION INTRAVENOUS at 01:16

## 2024-10-19 RX ADMIN — ACETAMINOPHEN 975 MG: 325 TABLET, FILM COATED ORAL at 08:31

## 2024-10-19 RX ADMIN — NICOTINE 21 MG: 21 PATCH, EXTENDED RELEASE TRANSDERMAL at 08:06

## 2024-10-19 RX ADMIN — GUAIFENESIN 600 MG: 600 TABLET, EXTENDED RELEASE ORAL at 21:34

## 2024-10-19 RX ADMIN — DOCUSATE SODIUM 100 MG: 100 CAPSULE, LIQUID FILLED ORAL at 17:36

## 2024-10-19 RX ADMIN — ASPIRIN 81 MG: 81 TABLET, COATED ORAL at 08:01

## 2024-10-19 RX ADMIN — INSULIN LISPRO 2 UNITS: 100 INJECTION, SOLUTION INTRAVENOUS; SUBCUTANEOUS at 11:52

## 2024-10-19 RX ADMIN — INSULIN GLARGINE 32 UNITS: 100 INJECTION, SOLUTION SUBCUTANEOUS at 08:01

## 2024-10-19 RX ADMIN — INSULIN LISPRO 15 UNITS: 100 INJECTION, SOLUTION INTRAVENOUS; SUBCUTANEOUS at 08:04

## 2024-10-19 RX ADMIN — ACETAMINOPHEN 975 MG: 325 TABLET, FILM COATED ORAL at 17:36

## 2024-10-19 RX ADMIN — KETOROLAC TROMETHAMINE 15 MG: 30 INJECTION, SOLUTION INTRAMUSCULAR at 11:55

## 2024-10-19 RX ADMIN — CHLORHEXIDINE GLUCONATE 0.12% ORAL RINSE 15 ML: 1.2 LIQUID ORAL at 21:34

## 2024-10-19 RX ADMIN — HEPARIN SODIUM 18 UNITS/KG/HR: 10000 INJECTION, SOLUTION INTRAVENOUS at 09:10

## 2024-10-19 RX ADMIN — ATORVASTATIN CALCIUM 80 MG: 80 TABLET, FILM COATED ORAL at 17:36

## 2024-10-19 RX ADMIN — INSULIN LISPRO 10 UNITS: 100 INJECTION, SOLUTION INTRAVENOUS; SUBCUTANEOUS at 11:52

## 2024-10-19 RX ADMIN — Medication 2000 UNITS: at 08:01

## 2024-10-19 RX ADMIN — METRONIDAZOLE 500 MG: 500 TABLET ORAL at 21:34

## 2024-10-19 RX ADMIN — METRONIDAZOLE 500 MG: 500 TABLET ORAL at 08:01

## 2024-10-19 RX ADMIN — CHLORHEXIDINE GLUCONATE 0.12% ORAL RINSE 15 ML: 1.2 LIQUID ORAL at 08:01

## 2024-10-19 RX ADMIN — INSULIN LISPRO 15 UNITS: 100 INJECTION, SOLUTION INTRAVENOUS; SUBCUTANEOUS at 17:38

## 2024-10-19 RX ADMIN — INSULIN LISPRO 1 UNITS: 100 INJECTION, SOLUTION INTRAVENOUS; SUBCUTANEOUS at 21:34

## 2024-10-19 RX ADMIN — CEFAZOLIN SODIUM 2000 MG: 2 SOLUTION INTRAVENOUS at 17:36

## 2024-10-19 RX ADMIN — CEFAZOLIN SODIUM 2000 MG: 2 SOLUTION INTRAVENOUS at 08:01

## 2024-10-19 RX ADMIN — DOCUSATE SODIUM 100 MG: 100 CAPSULE, LIQUID FILLED ORAL at 08:01

## 2024-10-19 RX ADMIN — HEPARIN SODIUM 5000 UNITS: 5000 INJECTION INTRAVENOUS; SUBCUTANEOUS at 05:08

## 2024-10-19 RX ADMIN — KETOROLAC TROMETHAMINE 15 MG: 30 INJECTION, SOLUTION INTRAMUSCULAR at 05:12

## 2024-10-19 RX ADMIN — INSULIN LISPRO 1 UNITS: 100 INJECTION, SOLUTION INTRAVENOUS; SUBCUTANEOUS at 08:03

## 2024-10-19 NOTE — PROGRESS NOTES
Progress Note - Endocrinology   Name: Sabiha Garcia 68 y.o. female I MRN: [id number]  Unit/Bed#: [facility] 814-01 I Date of Admission: 10/11/2024   Date of Service: 10/19/2024 I Hospital Day: 8    Assessment & Plan  Type 2 diabetes mellitus (HCC)  Lab Results   Component Value Date    HGBA1C 14.8 (H) 09/18/2024     Recent Labs     10/18/24  1601 10/18/24  2123 10/19/24  1143 10/19/24  1610   POCGLU 174* 191* 291* 176*   Blood Sugar Average: Last 72 hrs:  (P) 238.0972968703376884  -HBA1C 14.8 -uncontrolled type II diabetic  -Prescribed insulin Tresiba 10-20 units at home.  Has not been taking it for almost weeks.  -Reports has visual and auditory deficits, limiting her use of insulin pen as well  -Fingerstick glucose above goal    -Increase glargine to 35 units, continue daily  - Continue lispro 15 - 10 - 15 prior to meals  -Correctional scale  -Diabetic diet  - Hypoglycemia protocol  -Goal glucose is 110-180  -Endocrinology will continue to follow and make adjustments  Gangrene of toe of right foot (HCC)  Followed by podiatry  On antibiotic therapy  Aortoiliac occlusive disease (HCC)  Continue statin therapy  Vascular surgery following-surgical intervention planned for next week    24 Hour Events : Fingerstick glucose above goal   Subjective : Patient found in bed, reported feeling well, was in good spirits.  Reported posterior head and neck pain, otherwise denied other concerns.    Objective :  Temp:  [97.7 °F (36.5 °C)-98.7 °F (37.1 °C)] 98.7 °F (37.1 °C)  HR:  [81-88] 81  BP: (114-152)/(63-94) 146/63  Resp:  [18-20] 18  SpO2:  [97 %-98 %] 98 %    Physical Exam  Vitals reviewed.   Constitutional:       Appearance: Normal appearance. She is not ill-appearing or diaphoretic.   HENT:      Head: Normocephalic and atraumatic.   Eyes:      General: No scleral icterus.     Conjunctiva/sclera: Conjunctivae normal.   Cardiovascular:      Rate and Rhythm: Normal rate and regular rhythm.   Pulmonary:      Effort: Pulmonary effort  is normal. No respiratory distress.   Abdominal:      General: Bowel sounds are normal. There is no distension.      Palpations: Abdomen is soft.   Musculoskeletal:         General: Normal range of motion.      Cervical back: Normal range of motion.   Skin:     General: Skin is dry.      Coloration: Skin is not jaundiced or pale.   Neurological:      Mental Status: She is alert and oriented to person, place, and time. Mental status is at baseline.   Psychiatric:         Mood and Affect: Mood normal.         Behavior: Behavior normal.         Lab Results: I have reviewed the following results:CBC/BMP:   .     10/19/24  0901   WBC 11.09*   HGB 9.7*   HCT 31.0*       , LFTs: No new results in last 24 hours.     Imaging Results Review: No pertinent imaging studies reviewed.  Other Study Results Review: No additional pertinent studies reviewed.

## 2024-10-19 NOTE — PLAN OF CARE
Problem: PAIN - ADULT  Goal: Verbalizes/displays adequate comfort level or baseline comfort level  Description: Interventions:  - Encourage patient to monitor pain and request assistance  - Assess pain using appropriate pain scale  - Administer analgesics based on type and severity of pain and evaluate response  - Implement non-pharmacological measures as appropriate and evaluate response  - Consider cultural and social influences on pain and pain management  - Notify physician/advanced practitioner if interventions unsuccessful or patient reports new pain  Outcome: Progressing     Problem: INFECTION - ADULT  Goal: Absence or prevention of progression during hospitalization  Description: INTERVENTIONS:  - Assess and monitor for signs and symptoms of infection  - Monitor lab/diagnostic results  - Monitor all insertion sites, i.e. indwelling lines, tubes, and drains  - Monitor endotracheal if appropriate and nasal secretions for changes in amount and color  - [place] appropriate cooling/warming therapies per order  - Administer medications as ordered  - Instruct and encourage patient and family to use good hand hygiene technique  - Identify and instruct in appropriate isolation precautions for identified infection/condition  Outcome: Progressing

## 2024-10-19 NOTE — ASSESSMENT & PLAN NOTE
Lab Results   Component Value Date    HGBA1C 14.8 (H) 09/18/2024       Recent Labs     10/18/24  0703 10/18/24  1116 10/18/24  1601 10/18/24  2123   POCGLU 231* 303* 174* 191*       Blood Sugar Average: Last 72 hrs:  (P) 239    Severely uncontrolled as evidenced by HbA1c  On Levemir at home  Possible DKA on arrival   Insulin dose increased to Lantus 25 in am, Humalog 10 TID,   Ct sliding scale insulin  Endocrine input appreciated

## 2024-10-19 NOTE — PROGRESS NOTES
Progress Note - Hospitalist   Name: Sabiha Garcia 68 y.o. female I MRN: [id number]  Unit/Bed#: [facility] 814-01 I Date of Admission: 10/11/2024   Date of Service: 10/19/2024 I Hospital Day: 8     Assessment & Plan  Gangrene of toe of right foot (HCC)  Right fifth digit dry gangrene with surround cellulitis  Xray - Subtle periosteal reaction of the fifth digit proximal phalanx with adjacent soft tissue swelling. Findings raise suspicion for osteomyelitis   LEADS abnormal - vascular consulted  Podiatry following - plan after vascular optimization  Wound care with Betadine paint open to air  In the box as per infectious disease.  Continue with supportive care.  Cellulitis of right foot  Antibiotics as above  Type 2 diabetes mellitus (HCC)  Lab Results   Component Value Date    HGBA1C 14.8 (H) 09/18/2024       Recent Labs     10/18/24  0703 10/18/24  1116 10/18/24  1601 10/18/24  2123   POCGLU 231* 303* 174* 191*       Blood Sugar Average: Last 72 hrs:  (P) 239    Severely uncontrolled as evidenced by HbA1c  On Levemir at home  Possible DKA on arrival   Insulin dose increased to Lantus 25 in am, Humalog 10 TID,   Ct sliding scale insulin  Endocrine input appreciated     Ambulatory dysfunction  Presented with generalized weakness, 2 falls with no head strike  States had been down for prolonged period  CT head - no acute pathology  TSH low with normal free T4, CK normal  PT/OT  Tobacco user  Nicotine patch  Smoking cessation advised  Vitamin D deficiency  Continue supplementation  Lung nodule  CT chest with 3 mm right upper lobe noncalcified nodule  CT chest without contrast in 12 months  Aortoiliac occlusive disease (HCC)    Other headache syndrome    Pre-operative cardiovascular examination    Moderate protein-calorie malnutrition (HCC)  Malnutrition Findings:   Adult Malnutrition type: Chronic illness  Adult Degree of Malnutrition: Malnutrition of moderate degree  Malnutrition Characteristics: Fat loss, Muscle loss,  Inadequate energy                  360 Statement: Moderate malnutrition r/t inadequate intake as evidenced by fat/musle wasting of orbital/temple areas, BMI 16.9, consuming < 75% energy intake compared to estimated energy needs > 1 month. Treated with DM diet and Glucerna bid    BMI Findings:  Adult BMI Classifications: Underweight < 18.5        Body mass index is 16.24 kg/m².     VTE Pharmacologic Prophylaxis:   Pharmacologic: Heparin Drip  Mechanical VTE Prophylaxis in Place: No    Patient Centered Rounds: I have performed bedside rounds with nursing staff today.        Time Spent for Care: 1 hour.  More than 50% of total time spent on counseling and coordination of care as described above.    Current Length of Stay: 8 day(s)    Current Patient Status: Inpatient       Code Status: Level 1 - Full Code      Subjective:   nad    Objective:     Vitals:   Temp (24hrs), Av °F (36.7 °C), Min:97.7 °F (36.5 °C), Max:98.1 °F (36.7 °C)    Temp:  [97.7 °F (36.5 °C)-98.1 °F (36.7 °C)] 97.7 °F (36.5 °C)  HR:  [87-88] 88  Resp:  [18-20] 19  BP: (114-152)/(60-94) 152/67  SpO2:  [96 %-98 %] 98 %  Body mass index is 16.24 kg/m².     Input and Output Summary (last 24 hours):       Intake/Output Summary (Last 24 hours) at 10/19/2024 0958  Last data filed at 10/19/2024 0701  Gross per 24 hour   Intake 660 ml   Output 1950 ml   Net -1290 ml       Physical Exam:     Physical Exam  Constitutional:       Appearance: Normal appearance.   HENT:      Head: Normocephalic and atraumatic.   Cardiovascular:      Rate and Rhythm: Normal rate and regular rhythm.      Pulses: Normal pulses.      Heart sounds: Normal heart sounds.   Pulmonary:      Effort: Pulmonary effort is normal.      Breath sounds: Normal breath sounds.   Musculoskeletal:         General: No swelling. Normal range of motion.   Neurological:      General: No focal deficit present.      Mental Status: She is alert and oriented to person, place, and time.   Psychiatric:          Mood and Affect: Mood normal.         Additional Data:     Labs:    Results from last 7 days   Lab Units 10/19/24  0901 10/17/24  1454   WBC Thousand/uL 11.09* 11.95*   HEMOGLOBIN g/dL 9.7* 9.8*   HEMATOCRIT % 31.0* 31.3*   PLATELETS Thousands/uL 359 366   SEGS PCT %  --  60   LYMPHO PCT %  --  29   MONO PCT %  --  7   EOS PCT %  --  2     Results from last 7 days   Lab Units 10/17/24  1454   POTASSIUM mmol/L 4.3   CHLORIDE mmol/L 101   CO2 mmol/L 32   BUN mg/dL 15   CREATININE mg/dL 0.76   CALCIUM mg/dL 9.2     Results from last 7 days   Lab Units 10/19/24  0901   INR  0.94       * I Have Reviewed All Lab Data Listed Above.  * Additional Pertinent Lab Tests Reviewed: All Labs Within Last 24 Hours Reviewed      Recent Cultures (last 7 days):           Last 24 Hours Medication List:   Current Facility-Administered Medications   Medication Dose Route Frequency Provider Last Rate    acetaminophen  975 mg Oral Q8H PRN Geri Justice MD      aspirin  81 mg Oral Daily Jeana Muñoz PA-C      atorvastatin  80 mg Oral Daily With Dinner Joellen Chong MD      calcium carbonate  1,000 mg Oral TID PRN Devon Mckeon PA-C      cefazolin  2,000 mg Intravenous Q8H Jeana Muñoz PA-C 2,000 mg (10/19/24 0801)    chlorhexidine  15 mL Mouth/Throat Q12H JUAN Geri Justice MD      cholecalciferol  2,000 Units Oral Daily Geri Justice MD      dextromethorphan-guaiFENesin  10 mL Oral Q6H PRN Geri Justice MD      docusate sodium  100 mg Oral BID Jeana Muñoz PA-C      heparin (porcine)  3-30 Units/kg/hr (Order-Specific) Intravenous Titrated Mack Booker MD 18 Units/kg/hr (10/19/24 0910)    insulin glargine  32 Units Subcutaneous QAM Daryn Parrish MD      insulin lispro  1-5 Units Subcutaneous TID AC Gala Jones MD      insulin lispro  1-5 Units Subcutaneous HS Gala Jones MD      insulin lispro  10 Units Subcutaneous Daily With Lunch Daryn Parrish MD      insulin lispro  15  Units Subcutaneous Daily With Breakfast Daryn Parrish MD      insulin lispro  15 Units Subcutaneous Daily With Dinner Daryn Parrish MD      ketorolac  15 mg Intravenous Q6H PRN Guy Elias DO      lidocaine  1 patch Topical Daily Geri Justice MD      metroNIDAZOLE  500 mg Oral Q12H JUAN Muñoz PA-C      nicotine  21 mg Transdermal Daily Octaviano Chau DO      phenol  1 spray Mouth/Throat Q2H PRN Geri Justice MD      polyethylene glycol  17 g Oral Daily PRN Jeana Muñoz PA-C          Today, Patient Was Seen By: Guy Elias DO    ** Please Note: Dictation voice to text software may have been used in the creation of this document. **

## 2024-10-19 NOTE — ASSESSMENT & PLAN NOTE
Lab Results   Component Value Date    HGBA1C 14.8 (H) 09/18/2024     Recent Labs     10/18/24  1601 10/18/24  2123 10/19/24  1143 10/19/24  1610   POCGLU 174* 191* 291* 176*   Blood Sugar Average: Last 72 hrs:  (P) 238.2747790129144791  -HBA1C 14.8 -uncontrolled type II diabetic  -Prescribed insulin Tresiba 10-20 units at home.  Has not been taking it for almost weeks.  -Reports has visual and auditory deficits, limiting her use of insulin pen as well  -Fingerstick glucose above goal    -Increase glargine to 35 units, continue daily  - Continue lispro 15 - 10 - 15 prior to meals  -Correctional scale  -Diabetic diet  - Hypoglycemia protocol  -Goal glucose is 110-180  -Endocrinology will continue to follow and make adjustments

## 2024-10-20 LAB
APTT PPP: 52 SECONDS (ref 23–34)
APTT PPP: 55 SECONDS (ref 23–34)
APTT PPP: 59 SECONDS (ref 23–34)
GLUCOSE SERPL-MCNC: 205 MG/DL (ref 65–140)
GLUCOSE SERPL-MCNC: 217 MG/DL (ref 65–140)
GLUCOSE SERPL-MCNC: 252 MG/DL (ref 65–140)
GLUCOSE SERPL-MCNC: 314 MG/DL (ref 65–140)

## 2024-10-20 PROCEDURE — 85730 THROMBOPLASTIN TIME PARTIAL: CPT | Performed by: INTERNAL MEDICINE

## 2024-10-20 PROCEDURE — 94760 N-INVAS EAR/PLS OXIMETRY 1: CPT

## 2024-10-20 PROCEDURE — 99233 SBSQ HOSP IP/OBS HIGH 50: CPT | Performed by: INTERNAL MEDICINE

## 2024-10-20 PROCEDURE — 82948 REAGENT STRIP/BLOOD GLUCOSE: CPT

## 2024-10-20 RX ORDER — BUTALBITAL, ACETAMINOPHEN AND CAFFEINE 50; 325; 40 MG/1; MG/1; MG/1
1 TABLET ORAL ONCE
Status: COMPLETED | OUTPATIENT
Start: 2024-10-20 | End: 2024-10-20

## 2024-10-20 RX ADMIN — ACETAMINOPHEN 975 MG: 325 TABLET, FILM COATED ORAL at 09:59

## 2024-10-20 RX ADMIN — CHLORHEXIDINE GLUCONATE 0.12% ORAL RINSE 15 ML: 1.2 LIQUID ORAL at 08:01

## 2024-10-20 RX ADMIN — DOCUSATE SODIUM 100 MG: 100 CAPSULE, LIQUID FILLED ORAL at 17:14

## 2024-10-20 RX ADMIN — CEFAZOLIN SODIUM 2000 MG: 2 SOLUTION INTRAVENOUS at 01:09

## 2024-10-20 RX ADMIN — METRONIDAZOLE 500 MG: 500 TABLET ORAL at 08:00

## 2024-10-20 RX ADMIN — INSULIN LISPRO 3 UNITS: 100 INJECTION, SOLUTION INTRAVENOUS; SUBCUTANEOUS at 11:22

## 2024-10-20 RX ADMIN — NICOTINE 21 MG: 21 PATCH, EXTENDED RELEASE TRANSDERMAL at 08:01

## 2024-10-20 RX ADMIN — CHLORHEXIDINE GLUCONATE 0.12% ORAL RINSE 15 ML: 1.2 LIQUID ORAL at 21:17

## 2024-10-20 RX ADMIN — HEPARIN SODIUM 24 UNITS/KG/HR: 10000 INJECTION, SOLUTION INTRAVENOUS at 13:01

## 2024-10-20 RX ADMIN — CEFAZOLIN SODIUM 2000 MG: 2 SOLUTION INTRAVENOUS at 17:14

## 2024-10-20 RX ADMIN — INSULIN LISPRO 15 UNITS: 100 INJECTION, SOLUTION INTRAVENOUS; SUBCUTANEOUS at 17:13

## 2024-10-20 RX ADMIN — CEFAZOLIN SODIUM 2000 MG: 2 SOLUTION INTRAVENOUS at 08:01

## 2024-10-20 RX ADMIN — ATORVASTATIN CALCIUM 80 MG: 80 TABLET, FILM COATED ORAL at 17:14

## 2024-10-20 RX ADMIN — INSULIN LISPRO 1 UNITS: 100 INJECTION, SOLUTION INTRAVENOUS; SUBCUTANEOUS at 17:12

## 2024-10-20 RX ADMIN — ASPIRIN 81 MG: 81 TABLET, COATED ORAL at 08:00

## 2024-10-20 RX ADMIN — METRONIDAZOLE 500 MG: 500 TABLET ORAL at 21:16

## 2024-10-20 RX ADMIN — INSULIN LISPRO 2 UNITS: 100 INJECTION, SOLUTION INTRAVENOUS; SUBCUTANEOUS at 21:16

## 2024-10-20 RX ADMIN — Medication 2000 UNITS: at 08:00

## 2024-10-20 RX ADMIN — GUAIFENESIN 600 MG: 600 TABLET, EXTENDED RELEASE ORAL at 08:00

## 2024-10-20 RX ADMIN — DOCUSATE SODIUM 100 MG: 100 CAPSULE, LIQUID FILLED ORAL at 08:00

## 2024-10-20 RX ADMIN — KETOROLAC TROMETHAMINE 15 MG: 30 INJECTION, SOLUTION INTRAMUSCULAR at 01:09

## 2024-10-20 RX ADMIN — INSULIN GLARGINE 35 UNITS: 100 INJECTION, SOLUTION SUBCUTANEOUS at 08:00

## 2024-10-20 RX ADMIN — INSULIN LISPRO 10 UNITS: 100 INJECTION, SOLUTION INTRAVENOUS; SUBCUTANEOUS at 11:22

## 2024-10-20 RX ADMIN — INSULIN LISPRO 1 UNITS: 100 INJECTION, SOLUTION INTRAVENOUS; SUBCUTANEOUS at 07:57

## 2024-10-20 RX ADMIN — INSULIN LISPRO 15 UNITS: 100 INJECTION, SOLUTION INTRAVENOUS; SUBCUTANEOUS at 07:57

## 2024-10-20 RX ADMIN — KETOROLAC TROMETHAMINE 15 MG: 30 INJECTION, SOLUTION INTRAMUSCULAR at 08:05

## 2024-10-20 RX ADMIN — ACETAMINOPHEN 975 MG: 325 TABLET, FILM COATED ORAL at 20:56

## 2024-10-20 RX ADMIN — BUTALBITAL, ACETAMINOPHEN AND CAFFEINE 1 TABLET: 50; 325; 40 TABLET ORAL at 12:58

## 2024-10-20 RX ADMIN — GUAIFENESIN 600 MG: 600 TABLET, EXTENDED RELEASE ORAL at 21:17

## 2024-10-20 NOTE — ASSESSMENT & PLAN NOTE
Malnutrition Findings:   Adult Malnutrition type: Chronic illness  Adult Degree of Malnutrition: Malnutrition of moderate degree  Malnutrition Characteristics: Fat loss, Muscle loss, Inadequate energy                  360 Statement: Moderate malnutrition r/t inadequate intake as evidenced by fat/musle wasting of orbital/temple areas, BMI 16.9, consuming < 75% energy intake compared to estimated energy needs > 1 month. Treated with DM diet and Glucerna bid    BMI Findings:  Adult BMI Classifications: Underweight < 18.5        Body mass index is 16.24 kg/m².   VTE Pharmacologic Prophylaxis:   Pharmacologic: Heparin  Mechanical VTE Prophylaxis in Place: No    Patient Centered Rounds: I have performed bedside rounds with nursing staff today.      Time Spent for Care: 1 hour.  More than 50% of total time spent on counseling and coordination of care as described above.    Current Length of Stay: 9 day(s)    Current Patient Status: Inpatient     Code Status: Level 1 - Full Code      Subjective:   nad    Objective:     Vitals:   Temp (24hrs), Av.2 °F (36.8 °C), Min:97.5 °F (36.4 °C), Max:98.7 °F (37.1 °C)    Temp:  [97.5 °F (36.4 °C)-98.7 °F (37.1 °C)] 97.5 °F (36.4 °C)  HR:  [78-85] 85  Resp:  [16-18] 16  BP: (144-146)/(63-71) 146/71  SpO2:  [94 %-98 %] 94 %  Body mass index is 16.24 kg/m².     Input and Output Summary (last 24 hours):       Intake/Output Summary (Last 24 hours) at 10/20/2024 0959  Last data filed at 10/20/2024 0107  Gross per 24 hour   Intake --   Output 1450 ml   Net -1450 ml       Physical Exam:     Physical Exam    Additional Data:     Labs:    Results from last 7 days   Lab Units 10/19/24  0901 10/17/24  1454   WBC Thousand/uL 11.09* 11.95*   HEMOGLOBIN g/dL 9.7* 9.8*   HEMATOCRIT % 31.0* 31.3*   PLATELETS Thousands/uL 359 366   SEGS PCT %  --  60   LYMPHO PCT %  --  29   MONO PCT %  --  7   EOS PCT %  --  2     Results from last 7 days   Lab Units 10/17/24  1454   POTASSIUM mmol/L 4.3   CHLORIDE  mmol/L 101   CO2 mmol/L 32   BUN mg/dL 15   CREATININE mg/dL 0.76   CALCIUM mg/dL 9.2     Results from last 7 days   Lab Units 10/19/24  0901   INR  0.94       Recent Cultures (last 7 days):           Last 24 Hours Medication List:   Current Facility-Administered Medications   Medication Dose Route Frequency Provider Last Rate    acetaminophen  975 mg Oral Q8H PRN Geri Justice MD      aspirin  81 mg Oral Daily Jeana Muñoz PA-C      atorvastatin  80 mg Oral Daily With Dinner Joellen Chong MD      calcium carbonate  1,000 mg Oral TID PRN Devon Mckeon PA-C      cefazolin  2,000 mg Intravenous Q8H Jeana Muñoz PA-C 2,000 mg (10/20/24 0801)    chlorhexidine  15 mL Mouth/Throat Q12H [facility] Geri Justice MD      cholecalciferol  2,000 Units Oral Daily Geri Justice MD      dextromethorphan-guaiFENesin  10 mL Oral Q6H PRN Geri Justice MD      docusate sodium  100 mg Oral BID Jeana Muñoz PA-C      guaiFENesin  600 mg Oral Q12H [facility] Deep Moreno MD      heparin (porcine)  3-30 Units/kg/hr (Order-Specific) Intravenous Titrated Mack Booker MD 24 Units/kg/hr (10/20/24 0657)    insulin glargine  35 Units Subcutaneous QAM Herminio Caldera MD      insulin lispro  1-5 Units Subcutaneous TID AC Gala Jones MD      insulin lispro  1-5 Units Subcutaneous HS Gala Jones MD      insulin lispro  10 Units Subcutaneous Daily With Lunch Daryn Parrish MD      insulin lispro  15 Units Subcutaneous Daily With Breakfast Daryn Parrish MD      insulin lispro  15 Units Subcutaneous Daily With Dinner Daryn Parrish MD      ketorolac  15 mg Intravenous Q6H PRN Guy Elias DO      lidocaine  1 patch Topical Daily Geri Justice MD      metroNIDAZOLE  500 mg Oral Q12H [facility] Jeana Muñoz PA-C      nicotine  21 mg Transdermal Daily Octaviano Chau DO      phenol  1 spray Mouth/Throat Q2H PRN Geri Justice MD      polyethylene glycol  17 g Oral Daily PRN Jeana  MINA Muñoz          Today, Patient Was Seen By: Guy Elias DO    ** Please Note: Dictation voice to text software may have been used in the creation of this document. **

## 2024-10-20 NOTE — PLAN OF CARE
Problem: PAIN - ADULT  Goal: Verbalizes/displays adequate comfort level or baseline comfort level  Description: Interventions:  - Encourage patient to monitor pain and request assistance  - Assess pain using appropriate pain scale  - Administer analgesics based on type and severity of pain and evaluate response  - Implement non-pharmacological measures as appropriate and evaluate response  - Consider cultural and social influences on pain and pain management  - Notify physician/advanced practitioner if interventions unsuccessful or patient reports new pain  Outcome: Progressing     Problem: INFECTION - ADULT  Goal: Absence or prevention of progression during hospitalization  Description: INTERVENTIONS:  - Assess and monitor for signs and symptoms of infection  - Monitor lab/diagnostic results  - Monitor all insertion sites, i.e. indwelling lines, tubes, and drains  - Monitor endotracheal if appropriate and nasal secretions for changes in amount and color  - [place] appropriate cooling/warming therapies per order  - Administer medications as ordered  - Instruct and encourage patient and family to use good hand hygiene technique  - Identify and instruct in appropriate isolation precautions for identified infection/condition  Outcome: Progressing

## 2024-10-20 NOTE — RESPIRATORY THERAPY NOTE
RT Protocol Note  Sabiha Garcia 68 y.o. female MRN: [id number]  Unit/Bed#: [facility] 814-01 Encounter: [id number]    Assessment    Principal Problem:    Aortoiliac occlusive disease (HCC)  Active Problems:    Type 2 diabetes mellitus (HCC)    Tobacco user    Gangrene of toe of right foot (HCC)    Ambulatory dysfunction    Vitamin D deficiency    Lung nodule    Cellulitis of right foot    Other headache syndrome    Pre-operative cardiovascular examination    Moderate protein-calorie malnutrition (HCC)      Home Pulmonary Medications:  None per Pt. or chart hx.        Past Medical History:   Diagnosis Date    Allergic 010121    Depression 869507    Diabetes (HCC)     Diabetes mellitus (HCC) 477019    Smokes 1974 1/2 ppd smoker     Social History     Socioeconomic History    Marital status: Single     Spouse name: None    Number of children: None    Years of education: None    Highest education level: None   Occupational History    None   Tobacco Use    Smoking status: Every Day     Current packs/day: 0.50     Average packs/day: 0.5 packs/day for 25.0 years (12.5 ttl pk-yrs)     Types: Cigarettes    Smokeless tobacco: Never   Vaping Use    Vaping status: Never Used   Substance and Sexual Activity    Alcohol use: Never    Drug use: Never    Sexual activity: Not Currently     Partners: Male     Birth control/protection: Post-menopausal   Other Topics Concern    None   Social History Narrative    None     Social Determinants of Health     Financial Resource Strain: Low Risk  (4/3/2022)    Received from [facility]    Overall Financial Resource Strain (CARDIA)     Difficulty of Paying Living Expenses: Not very hard   Food Insecurity: No Food Insecurity (10/13/2024)    Hunger Vital Sign     Worried About Running Out of Food in the Last Year: Never true     Ran Out of Food in the Last Year: Never true   Transportation Needs: No Transportation Needs (10/13/2024)    PRAPARE - Transportation     Lack of  "Transportation (Medical): No     Lack of Transportation (Non-Medical): No   Physical Activity: Not on file   Stress: Unknown (4/3/2022)    Received from [facility]    [employer] - Occupational Stress Questionnaire     Feeling of Stress : Patient declined   Social Connections: Unknown (4/3/2022)    Received from [facility]    Social Connection and Isolation Panel [NHANES]     Frequency of Communication with Friends and Family: Not on file     Frequency of Social Gatherings with Friends and Family: Patient declined     Attends Scientologist Services: Patient declined     Active Member of Clubs or Organizations: No     Attends Club or Organization Meetings: Not on file     Marital Status:    Intimate Partner Violence: Unknown (4/3/2022)    Received from [facility]    Humiliation, Afraid, Rape, and Kick questionnaire     Fear of Current or Ex-Partner: Patient declined     Emotionally Abused: Patient declined     Physically Abused: Patient declined     Sexually Abused: Patient declined   Housing Stability: Low Risk  (10/13/2024)    Housing Stability Vital Sign     Unable to Pay for Housing in the Last Year: No     Number of Times Moved in the Last Year: 0     Homeless in the Last Year: No       Subjective         Objective    Physical Exam:   Assessment Type: Assess only  Bilateral Breath Sounds: Clear    Vitals:  Blood pressure 144/63, pulse 78, temperature 98.5 °F (36.9 °C), resp. rate 18, height 5' 9\" (1.753 m), weight 49.9 kg (110 lb), SpO2 97%.          Imaging and other studies:           Plan       Airway Clearance Plan: Incentive Spirometer     Resp Comments: Pt. evaluated for respiratory protocol. BS=clear and well aerated. Pt. in no distress on RMA. SpO2=98%. Pt's. CXR is unremarkable but she C/O a cough with clear secretions. Pt. instructed on use of IS and she was able to perform as instructed. No further respiratory " intervention indicated at this time.

## 2024-10-20 NOTE — ASSESSMENT & PLAN NOTE
Lab Results   Component Value Date    HGBA1C 14.8 (H) 09/18/2024       Recent Labs     10/19/24  1143 10/19/24  1610 10/19/24  2131 10/20/24  0737   POCGLU 291* 176* 179* 217*       Blood Sugar Average: Last 72 hrs:  (P) 225.5    Severely uncontrolled as evidenced by HbA1c  On Levemir at home  Possible DKA on arrival   Insulin dose increased to Lantus 25 in am, Humalog 10 TID,   Ct sliding scale insulin  Endocrine input appreciated

## 2024-10-20 NOTE — QUICK NOTE
Patient chart checked, not seen-  Fingerstick glucose significant for postprandial hyperglycemia.  Received first dose of glargine 35 units this morning which was increased yesterday from 32 units daily.    For now, keeping glargine 35 units and lispro 15/10/15 units 3 times daily prior to meals  Continuing with correctional insulin algorithm 1 AC and at bedtime  Changed diet-was on level 3 carb diet, changed to level 2 carbohydrate diet for no more than 60 g of carbs per meal

## 2024-10-20 NOTE — PLAN OF CARE
Problem: Potential for Falls  Goal: Patient will remain free of falls  Description: INTERVENTIONS:  - Educate patient/family on patient safety including physical limitations  - Instruct patient to call for assistance with activity   - Consult OT/PT to assist with strengthening/mobility   - Keep Call bell within reach  - Keep bed low and locked with side rails adjusted as appropriate  - Keep care items and personal belongings within reach  - Initiate and maintain comfort rounds  - Make Fall Risk Sign visible to staff  - Apply yellow socks and bracelet for high fall risk patients  - Consider moving patient to room near nurses station  10/20/2024 0746 by Nelda Mcgraw, RN  Outcome: Progressing  10/20/2024 0745 by Nelda Mcgraw, RN  Outcome: Progressing

## 2024-10-20 NOTE — PROGRESS NOTES
Progress Note - Hospitalist   Name: Sabiha Garcia 68 y.o. female I MRN: [id number]  Unit/Bed#: [facility] 814-01 I Date of Admission: 10/11/2024   Date of Service: 10/20/2024 I Hospital Day: 9     Assessment & Plan  Gangrene of toe of right foot (HCC)  Right fifth digit dry gangrene with surround cellulitis  Xray - Subtle periosteal reaction of the fifth digit proximal phalanx with adjacent soft tissue swelling. Findings raise suspicion for osteomyelitis   LEADS abnormal - vascular consulted  Podiatry following - plan after vascular optimization  Wound care with Betadine paint open to air  In the box as per infectious disease.  Continue with supportive care.  Cellulitis of right foot  Antibiotics as above  Type 2 diabetes mellitus (HCC)  Lab Results   Component Value Date    HGBA1C 14.8 (H) 09/18/2024       Recent Labs     10/19/24  1143 10/19/24  1610 10/19/24  2131 10/20/24  0737   POCGLU 291* 176* 179* 217*       Blood Sugar Average: Last 72 hrs:  (P) 225.5    Severely uncontrolled as evidenced by HbA1c  On Levemir at home  Possible DKA on arrival   Insulin dose increased to Lantus 25 in am, Humalog 10 TID,   Ct sliding scale insulin  Endocrine input appreciated     Ambulatory dysfunction  Presented with generalized weakness, 2 falls with no head strike  States had been down for prolonged period  CT head - no acute pathology  TSH low with normal free T4, CK normal  PT/OT  Tobacco user  Nicotine patch  Smoking cessation advised  Vitamin D deficiency  Continue supplementation  Lung nodule  CT chest with 3 mm right upper lobe noncalcified nodule  CT chest without contrast in 12 months  Aortoiliac occlusive disease (HCC)    Other headache syndrome    Pre-operative cardiovascular examination    Moderate protein-calorie malnutrition (HCC)  Malnutrition Findings:   Adult Malnutrition type: Chronic illness  Adult Degree of Malnutrition: Malnutrition of moderate degree  Malnutrition Characteristics: Fat loss, Muscle loss,  Inadequate energy                  360 Statement: Moderate malnutrition r/t inadequate intake as evidenced by fat/musle wasting of orbital/temple areas, BMI 16.9, consuming < 75% energy intake compared to estimated energy needs > 1 month. Treated with DM diet and Glucerna bid    BMI Findings:  Adult BMI Classifications: Underweight < 18.5        Body mass index is 16.24 kg/m².   VTE Pharmacologic Prophylaxis:   Pharmacologic: Heparin  Mechanical VTE Prophylaxis in Place: No    Patient Centered Rounds: I have performed bedside rounds with nursing staff today.      Time Spent for Care: 1 hour.  More than 50% of total time spent on counseling and coordination of care as described above.    Current Length of Stay: 9 day(s)    Current Patient Status: Inpatient     Code Status: Level 1 - Full Code      Subjective:   nad    Objective:     Vitals:   Temp (24hrs), Av.2 °F (36.8 °C), Min:97.5 °F (36.4 °C), Max:98.7 °F (37.1 °C)    Temp:  [97.5 °F (36.4 °C)-98.7 °F (37.1 °C)] 97.5 °F (36.4 °C)  HR:  [78-85] 85  Resp:  [16-18] 16  BP: (144-146)/(63-71) 146/71  SpO2:  [94 %-98 %] 94 %  Body mass index is 16.24 kg/m².     Input and Output Summary (last 24 hours):       Intake/Output Summary (Last 24 hours) at 10/20/2024 0959  Last data filed at 10/20/2024 0107  Gross per 24 hour   Intake --   Output 1450 ml   Net -1450 ml       Physical Exam:     Physical Exam    Additional Data:     Labs:    Results from last 7 days   Lab Units 10/19/24  0901 10/17/24  1454   WBC Thousand/uL 11.09* 11.95*   HEMOGLOBIN g/dL 9.7* 9.8*   HEMATOCRIT % 31.0* 31.3*   PLATELETS Thousands/uL 359 366   SEGS PCT %  --  60   LYMPHO PCT %  --  29   MONO PCT %  --  7   EOS PCT %  --  2     Results from last 7 days   Lab Units 10/17/24  1454   POTASSIUM mmol/L 4.3   CHLORIDE mmol/L 101   CO2 mmol/L 32   BUN mg/dL 15   CREATININE mg/dL 0.76   CALCIUM mg/dL 9.2     Results from last 7 days   Lab Units 10/19/24  0901   INR  0.94       Recent Cultures (last 7  days):           Last 24 Hours Medication List:   Current Facility-Administered Medications   Medication Dose Route Frequency Provider Last Rate    acetaminophen  975 mg Oral Q8H PRN Geri Justice MD      aspirin  81 mg Oral Daily Jeana Muñoz PA-C      atorvastatin  80 mg Oral Daily With Dinner Joellen Chong MD      calcium carbonate  1,000 mg Oral TID PRN Devon Mckeon PA-C      cefazolin  2,000 mg Intravenous Q8H Jeana Muñoz PA-C 2,000 mg (10/20/24 0801)    chlorhexidine  15 mL Mouth/Throat Q12H [facility] Geri Justice MD      cholecalciferol  2,000 Units Oral Daily Geri Justice MD      dextromethorphan-guaiFENesin  10 mL Oral Q6H PRN Geri Justice MD      docusate sodium  100 mg Oral BID Jeana Muñoz PA-C      guaiFENesin  600 mg Oral Q12H [facility] Deep Moreno MD      heparin (porcine)  3-30 Units/kg/hr (Order-Specific) Intravenous Titrated Mack Booker MD 24 Units/kg/hr (10/20/24 0657)    insulin glargine  35 Units Subcutaneous QAM Herminio Caldera MD      insulin lispro  1-5 Units Subcutaneous TID AC Gala Jones MD      insulin lispro  1-5 Units Subcutaneous HS Gala Jones MD      insulin lispro  10 Units Subcutaneous Daily With Lunch Dayrn Parrish MD      insulin lispro  15 Units Subcutaneous Daily With Breakfast Daryn Parrish MD      insulin lispro  15 Units Subcutaneous Daily With Dinner Daryn Parrish MD      ketorolac  15 mg Intravenous Q6H PRN Guy Elias DO      lidocaine  1 patch Topical Daily Geri Justice MD      metroNIDAZOLE  500 mg Oral Q12H [facility] Jeana Muñoz PA-C      nicotine  21 mg Transdermal Daily Octaviano Chau DO      phenol  1 spray Mouth/Throat Q2H PRN Geri Justice MD      polyethylene glycol  17 g Oral Daily PRN Jeana Muñoz PA-C          Today, Patient Was Seen By: Guy Elias DO    ** Please Note: Dictation voice to text software may have been used in the creation of this document. **

## 2024-10-21 LAB
APTT PPP: 69 SECONDS (ref 23–34)
APTT PPP: 73 SECONDS (ref 23–34)
GLUCOSE SERPL-MCNC: 146 MG/DL (ref 65–140)
GLUCOSE SERPL-MCNC: 148 MG/DL (ref 65–140)
GLUCOSE SERPL-MCNC: 150 MG/DL (ref 65–140)
GLUCOSE SERPL-MCNC: 197 MG/DL (ref 65–140)

## 2024-10-21 PROCEDURE — 85730 THROMBOPLASTIN TIME PARTIAL: CPT | Performed by: INTERNAL MEDICINE

## 2024-10-21 PROCEDURE — 97530 THERAPEUTIC ACTIVITIES: CPT

## 2024-10-21 PROCEDURE — 82948 REAGENT STRIP/BLOOD GLUCOSE: CPT

## 2024-10-21 PROCEDURE — 99233 SBSQ HOSP IP/OBS HIGH 50: CPT | Performed by: INTERNAL MEDICINE

## 2024-10-21 PROCEDURE — 97535 SELF CARE MNGMENT TRAINING: CPT

## 2024-10-21 PROCEDURE — 99232 SBSQ HOSP IP/OBS MODERATE 35: CPT | Performed by: PODIATRIST

## 2024-10-21 PROCEDURE — NC001 PR NO CHARGE: Performed by: STUDENT IN AN ORGANIZED HEALTH CARE EDUCATION/TRAINING PROGRAM

## 2024-10-21 RX ORDER — INSULIN LISPRO 100 [IU]/ML
18 INJECTION, SOLUTION INTRAVENOUS; SUBCUTANEOUS
Status: DISCONTINUED | OUTPATIENT
Start: 2024-10-21 | End: 2024-10-28

## 2024-10-21 RX ORDER — INSULIN LISPRO 100 [IU]/ML
18 INJECTION, SOLUTION INTRAVENOUS; SUBCUTANEOUS
Status: DISCONTINUED | OUTPATIENT
Start: 2024-10-22 | End: 2024-10-28

## 2024-10-21 RX ORDER — BUTALBITAL, ACETAMINOPHEN AND CAFFEINE 50; 325; 40 MG/1; MG/1; MG/1
1 TABLET ORAL ONCE
Status: COMPLETED | OUTPATIENT
Start: 2024-10-21 | End: 2024-10-21

## 2024-10-21 RX ADMIN — CEFAZOLIN SODIUM 2000 MG: 2 SOLUTION INTRAVENOUS at 08:20

## 2024-10-21 RX ADMIN — GUAIFENESIN 600 MG: 600 TABLET, EXTENDED RELEASE ORAL at 21:17

## 2024-10-21 RX ADMIN — INSULIN LISPRO 10 UNITS: 100 INJECTION, SOLUTION INTRAVENOUS; SUBCUTANEOUS at 11:12

## 2024-10-21 RX ADMIN — Medication 2000 UNITS: at 08:20

## 2024-10-21 RX ADMIN — CEFAZOLIN SODIUM 2000 MG: 2 SOLUTION INTRAVENOUS at 01:17

## 2024-10-21 RX ADMIN — CEFAZOLIN SODIUM 2000 MG: 2 SOLUTION INTRAVENOUS at 16:48

## 2024-10-21 RX ADMIN — ASPIRIN 81 MG: 81 TABLET, COATED ORAL at 08:20

## 2024-10-21 RX ADMIN — CHLORHEXIDINE GLUCONATE 0.12% ORAL RINSE 15 ML: 1.2 LIQUID ORAL at 21:17

## 2024-10-21 RX ADMIN — METRONIDAZOLE 500 MG: 500 TABLET ORAL at 21:17

## 2024-10-21 RX ADMIN — DOCUSATE SODIUM 100 MG: 100 CAPSULE, LIQUID FILLED ORAL at 16:48

## 2024-10-21 RX ADMIN — INSULIN LISPRO 18 UNITS: 100 INJECTION, SOLUTION INTRAVENOUS; SUBCUTANEOUS at 16:49

## 2024-10-21 RX ADMIN — GUAIFENESIN 600 MG: 600 TABLET, EXTENDED RELEASE ORAL at 08:20

## 2024-10-21 RX ADMIN — ATORVASTATIN CALCIUM 80 MG: 80 TABLET, FILM COATED ORAL at 16:48

## 2024-10-21 RX ADMIN — CHLORHEXIDINE GLUCONATE 0.12% ORAL RINSE 15 ML: 1.2 LIQUID ORAL at 08:23

## 2024-10-21 RX ADMIN — BUTALBITAL, ACETAMINOPHEN AND CAFFEINE 1 TABLET: 50; 325; 40 TABLET ORAL at 11:23

## 2024-10-21 RX ADMIN — HEPARIN SODIUM 28 UNITS/KG/HR: 10000 INJECTION, SOLUTION INTRAVENOUS at 11:11

## 2024-10-21 RX ADMIN — INSULIN LISPRO 15 UNITS: 100 INJECTION, SOLUTION INTRAVENOUS; SUBCUTANEOUS at 08:23

## 2024-10-21 RX ADMIN — DOCUSATE SODIUM 100 MG: 100 CAPSULE, LIQUID FILLED ORAL at 08:20

## 2024-10-21 RX ADMIN — INSULIN LISPRO 1 UNITS: 100 INJECTION, SOLUTION INTRAVENOUS; SUBCUTANEOUS at 08:23

## 2024-10-21 RX ADMIN — INSULIN GLARGINE 35 UNITS: 100 INJECTION, SOLUTION SUBCUTANEOUS at 08:20

## 2024-10-21 RX ADMIN — ACETAMINOPHEN 975 MG: 325 TABLET, FILM COATED ORAL at 21:17

## 2024-10-21 RX ADMIN — METRONIDAZOLE 500 MG: 500 TABLET ORAL at 08:20

## 2024-10-21 RX ADMIN — NICOTINE 21 MG: 21 PATCH, EXTENDED RELEASE TRANSDERMAL at 08:21

## 2024-10-21 RX ADMIN — INSULIN LISPRO 1 UNITS: 100 INJECTION, SOLUTION INTRAVENOUS; SUBCUTANEOUS at 11:12

## 2024-10-21 NOTE — PLAN OF CARE
Problem: PAIN - ADULT  Goal: Verbalizes/displays adequate comfort level or baseline comfort level  Description: Interventions:  - Encourage patient to monitor pain and request assistance  - Assess pain using appropriate pain scale  - Administer analgesics based on type and severity of pain and evaluate response  - Implement non-pharmacological measures as appropriate and evaluate response  - Consider cultural and social influences on pain and pain management  - Notify physician/advanced practitioner if interventions unsuccessful or patient reports new pain  Outcome: Progressing     Problem: INFECTION - ADULT  Goal: Absence or prevention of progression during hospitalization  Description: INTERVENTIONS:  - Assess and monitor for signs and symptoms of infection  - Monitor lab/diagnostic results  - Monitor all insertion sites, i.e. indwelling lines, tubes, and drains  - Monitor endotracheal if appropriate and nasal secretions for changes in amount and color  - [place] appropriate cooling/warming therapies per order  - Administer medications as ordered  - Instruct and encourage patient and family to use good hand hygiene technique  - Identify and instruct in appropriate isolation precautions for identified infection/condition  Outcome: Progressing

## 2024-10-21 NOTE — ASSESSMENT & PLAN NOTE
Right fifth digit dry gangrene with surround cellulitis  Xray - Subtle periosteal reaction of the fifth digit proximal phalanx with adjacent soft tissue swelling. Findings raise suspicion for osteomyelitis   LEADS abnormal - vascular consulted  Podiatry following - plan after vascular optimization  Wound care with Betadine paint open to air  Discussed with infectious disease.  Continue with antibiotic therapy.  Await surgical revascularization on Wednesday.

## 2024-10-21 NOTE — ASSESSMENT & PLAN NOTE
Lab Results   Component Value Date    HGBA1C 14.8 (H) 09/18/2024       Recent Labs     10/20/24  1108 10/20/24  1630 10/20/24  2112 10/21/24  0752   POCGLU 314* 205* 252* 150*       Blood Sugar Average: Last 72 hrs:  (P) [id number]    Severely uncontrolled as evidenced by HbA1c  On Levemir at home  Possible DKA on arrival.  Stable.  Continue with insulin regimen.  Lantus 25 in am, Humalog 10 TID,   Ct sliding scale insulin  Endocrine input appreciated

## 2024-10-21 NOTE — PROGRESS NOTES
Progress Note - Vascular Surgery   Name: Sabiha Garcia 68 y.o. female I MRN: [id number]  Unit/Bed#: [facility] 814-01 I Date of Admission: 10/11/2024   Date of Service: 10/21/2024 I Hospital Day: 10     Assessment & Plan  Aortoiliac occlusive disease (HCC)  68-year-old female current smoker with history of uncontrolled T2 DM (HgA1c 14.8), HLD, tobacco use, lung nodule, hx of recurrent falls and hx of L femoral shaft fx s/p repair in 2020 arrived in the ED 10/11 with generalized weakness s/p falls, admitted for management of hyperosmolar hyperglycemia with associated metabolic acidosis with concern for DKA vs starvation ketosis. Patient with noted non-healing R 5th digit gangrenous wound, vascular surgery consulted for abnormal ABIs concern for contributory PAD.     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 Discussed case with patient in detail, all questions answered.   - Preop 10/22  NPO at midnight for OR 10/23  - Preop anisha   -Cardiology consult- 'high risk', ECHO LVEF 60%, no stress test needed  - Strict glucose control   Goal 140 - 180 to promote wound healing  endocrinology onboard, appreciate recommendations   - ABX per ID - Cefazolin/Flagyl  - Podiatry following- plan for 5th toe amp vs partial 5th ray resection  - Remainder of care per primary medicine team    Vascular Surgery service will follow.    Subjective   Patient seen and examined at bedside. Her pain is improving. She expressed no acute concerns.     Objective :  Temp:  [97.5 °F (36.4 °C)-98.7 °F (37.1 °C)] 98.7 °F (37.1 °C)  HR:   [79-85] 80  BP: (131-146)/(52-71) 131/52  Resp:  [16] 16  SpO2:  [94 %-98 %] 97 %  O2 Device: None (Room air)    I/O         10/19 0701  10/20 0700 10/20 0701  10/21 0700 10/21 0701  10/22 0700    P.O. 400 462     Total Intake(mL/kg) 400 (8) 462 (9.3)     Urine (mL/kg/hr) 2150 (1.8) 1650 (1.4)     Stool 0      Total Output 2150 1650     Net -1750 -1188            Unmeasured Stool Occurrence 1 x              Physical Exam  Vitals and nursing note reviewed.   Constitutional:       General: She is not in acute distress.     Appearance: Normal appearance. She is normal weight. She is not ill-appearing or toxic-appearing.   HENT:      Head: Normocephalic and atraumatic.      Nose: Nose normal.   Eyes:      General: No scleral icterus.     Conjunctiva/sclera: Conjunctivae normal.   Cardiovascular:      Rate and Rhythm: Normal rate.   Pulmonary:      Effort: Pulmonary effort is normal.      Comments: Room air   Abdominal:      General: Abdomen is flat. There is no distension.      Palpations: Abdomen is soft.      Tenderness: There is no abdominal tenderness. There is no guarding.   Skin:     General: Skin is warm and dry.      Capillary Refill: Capillary refill takes less than 2 seconds.   Neurological:      Mental Status: She is alert.         Lab Results: I have reviewed the following results:  Recent Labs     10/19/24  0901 10/19/24  1544 10/21/24  0435   WBC 11.09*  --   --    HGB 9.7*  --   --    HCT 31.0*  --   --      --   --    PTT 33   < > 69*   INR 0.94  --   --     < > = values in this interval not displayed.       Imaging Results Review: No pertinent imaging studies reviewed.  Other Study Results Review: No additional pertinent studies reviewed.    VTE Pharmacologic Prophylaxis: VTE covered by:  heparin (porcine), Intravenous, 28 Units/kg/hr at 10/20/24 2220     VTE Mechanical Prophylaxis: sequential compression device

## 2024-10-21 NOTE — PROGRESS NOTES
Progress Note - Hospitalist   Name: Sabiha Garcia 68 y.o. female I MRN: [id number]  [facility] I Date of Admission: 10/11/2024   Date of Service: 10/21/2024 I Hospital Day: 10     Assessment & Plan  Gangrene of toe of right foot (HCC)  Right fifth digit dry gangrene with surround cellulitis  Xray - Subtle periosteal reaction of the fifth digit proximal phalanx with adjacent soft tissue swelling. Findings raise suspicion for osteomyelitis   LEADS abnormal - vascular consulted  Podiatry following - plan after vascular optimization  Wound care with Betadine paint open to air  Discussed with infectious disease.  Continue with antibiotic therapy.  Await surgical revascularization on Wednesday.  Cellulitis of right foot  Antibiotics as above  Type 2 diabetes mellitus (HCC)  Lab Results   Component Value Date    HGBA1C 14.8 (H) 09/18/2024       Recent Labs     10/20/24  1108 10/20/24  1630 10/20/24  2112 10/21/24  0752   POCGLU 314* 205* 252* 150*       Blood Sugar Average: Last 72 hrs:  (P) [id number]    Severely uncontrolled as evidenced by HbA1c  On Levemir at home  Possible DKA on arrival.  Stable.  Continue with insulin regimen.  Lantus 25 in am, Humalog 10 TID,   Ct sliding scale insulin  Endocrine input appreciated     Ambulatory dysfunction  Presented with generalized weakness, 2 falls with no head strike  States had been down for prolonged period  CT head - no acute pathology  TSH low with normal free T4, CK normal  PT/OT  Tobacco user  Nicotine patch  Smoking cessation advised  Vitamin D deficiency  Continue supplementation  Lung nodule  CT chest with 3 mm right upper lobe noncalcified nodule  CT chest without contrast in 12 months  Aortoiliac occlusive disease (HCC)    Other headache syndrome    Pre-operative cardiovascular examination    Moderate protein-calorie malnutrition (HCC)  Malnutrition Findings:   Adult Malnutrition type: Chronic illness  Adult Degree of Malnutrition: Malnutrition  of moderate degree  Malnutrition Characteristics: Fat loss, Muscle loss, Inadequate energy                  360 Statement: Moderate malnutrition r/t inadequate intake as evidenced by fat/musle wasting of orbital/temple areas, BMI 16.9, consuming < 75% energy intake compared to estimated energy needs > 1 month. Treated with DM diet and Glucerna bid    BMI Findings:  Adult BMI Classifications: Underweight < 18.5        Body mass index is 16.25 kg/m².   VTE Pharmacologic Prophylaxis:   Pharmacologic: Heparin  Mechanical VTE Prophylaxis in Place: No    Patient Centered Rounds: I have performed bedside rounds with nursing staff today.      Time Spent for Care: 1 hour.  More than 50% of total time spent on counseling and coordination of care as described above.    Current Length of Stay: 10 day(s)    Current Patient Status: Inpatient     Code Status: Level 1 - Full Code      Subjective:   nad    Objective:     Vitals:   Temp (24hrs), Av.4 °F (36.9 °C), Min:98 °F (36.7 °C), Max:98.7 °F (37.1 °C)    Temp:  [98 °F (36.7 °C)-98.7 °F (37.1 °C)] 98 °F (36.7 °C)  HR:  [79-86] 86  Resp:  [16] 16  BP: (120-135)/(52-59) 120/53  SpO2:  [97 %-98 %] 97 %  Body mass index is 16.25 kg/m².     Input and Output Summary (last 24 hours):       Intake/Output Summary (Last 24 hours) at 10/21/2024 0923  Last data filed at 10/21/2024 0438  Gross per 24 hour   Intake 462 ml   Output 1650 ml   Net -1188 ml       Physical Exam:     Physical Exam    Additional Data:     Labs:    Results from last 7 days   Lab Units 10/19/24  0901 10/17/24  1454   WBC Thousand/uL 11.09* 11.95*   HEMOGLOBIN g/dL 9.7* 9.8*   HEMATOCRIT % 31.0* 31.3*   PLATELETS Thousands/uL 359 366   SEGS PCT %  --  60   LYMPHO PCT %  --  29   MONO PCT %  --  7   EOS PCT %  --  2     Results from last 7 days   Lab Units 10/17/24  1454   POTASSIUM mmol/L 4.3   CHLORIDE mmol/L 101   CO2 mmol/L 32   BUN mg/dL 15   CREATININE mg/dL 0.76   CALCIUM mg/dL 9.2     Results from last 7 days    Lab Units 10/19/24  0901   INR  0.94       Recent Cultures (last 7 days):           Last 24 Hours Medication List:   Current Facility-Administered Medications   Medication Dose Route Frequency Provider Last Rate    acetaminophen  975 mg Oral Q8H PRN Geri Justice MD      aspirin  81 mg Oral Daily Jeana Muñoz PA-C      atorvastatin  80 mg Oral Daily With Dinner Joellen Chong MD      calcium carbonate  1,000 mg Oral TID PRN Devon Mckeon PA-C      cefazolin  2,000 mg Intravenous Q8H Jeana Muñoz PA-C 2,000 mg (10/21/24 0820)    chlorhexidine  15 mL Mouth/Throat Q12H [facility] Geri Justice MD      cholecalciferol  2,000 Units Oral Daily Geri Justice MD      dextromethorphan-guaiFENesin  10 mL Oral Q6H PRN Geri Justice MD      docusate sodium  100 mg Oral BID Jeana Muñoz PA-C      guaiFENesin  600 mg Oral Q12H [facility] Deep Moreno MD      heparin (porcine)  3-30 Units/kg/hr (Order-Specific) Intravenous Titrated Mack Booker MD 28 Units/kg/hr (10/20/24 2220)    insulin glargine  35 Units Subcutaneous QAM Herminio Caldera MD      insulin lispro  1-5 Units Subcutaneous TID AC Gala Jones MD      insulin lispro  1-5 Units Subcutaneous HS Gala Jones MD      insulin lispro  10 Units Subcutaneous Daily With Lunch Daryn Parrish MD      insulin lispro  15 Units Subcutaneous Daily With Breakfast Daryn Parrish MD      insulin lispro  15 Units Subcutaneous Daily With Dinner Daryn Parrish MD      lidocaine  1 patch Topical Daily Geri Justice MD      metroNIDAZOLE  500 mg Oral Q12H [facility] Jeana Muñoz PA-C      nicotine  21 mg Transdermal Daily Octaviano Chau DO      phenol  1 spray Mouth/Throat Q2H PRN Geri Justice MD      polyethylene glycol  17 g Oral Daily PRN Jeana Muñoz PA-C          Today, Patient Was Seen By: Guy Elias DO    ** Please Note: Dictation voice to text software may have been used in the creation of this  document. **       VTE Pharmacologic Prophylaxis:   Pharmacologic: Heparin Drip  Mechanical VTE Prophylaxis in Place: No    Patient Centered Rounds: I have performed bedside rounds with nursing staff today.      Time Spent for Care: 1 hour.  More than 50% of total time spent on counseling and coordination of care as described above.    Current Length of Stay: 10 day(s)    Current Patient Status: Inpatient       Code Status: Level 1 - Full Code      Subjective:   nad.  Patient with mild headache which appears to be persistent.  CT was negative    Objective:     Vitals:   Temp (24hrs), Av.4 °F (36.9 °C), Min:98 °F (36.7 °C), Max:98.7 °F (37.1 °C)    Temp:  [98 °F (36.7 °C)-98.7 °F (37.1 °C)] 98 °F (36.7 °C)  HR:  [79-86] 86  Resp:  [16] 16  BP: (120-135)/(52-59) 120/53  SpO2:  [97 %-98 %] 97 %  Body mass index is 16.25 kg/m².     Input and Output Summary (last 24 hours):       Intake/Output Summary (Last 24 hours) at 10/21/2024 1151  Last data filed at 10/21/2024 0438  Gross per 24 hour   Intake 462 ml   Output 1650 ml   Net -1188 ml       Physical Exam:     Physical Exam  Constitutional:       Appearance: Normal appearance.   HENT:      Head: Normocephalic and atraumatic.   Cardiovascular:      Rate and Rhythm: Normal rate.   Pulmonary:      Effort: Pulmonary effort is normal.      Breath sounds: Normal breath sounds.   Abdominal:      General: Abdomen is flat.   Skin:     General: Skin is warm and dry.   Neurological:      Mental Status: She is alert.         Additional Data:     Labs:    Results from last 7 days   Lab Units 10/19/24  0901 10/17/24  1454   WBC Thousand/uL 11.09* 11.95*   HEMOGLOBIN g/dL 9.7* 9.8*   HEMATOCRIT % 31.0* 31.3*   PLATELETS Thousands/uL 359 366   SEGS PCT %  --  60   LYMPHO PCT %  --  29   MONO PCT %  --  7   EOS PCT %  --  2     Results from last 7 days   Lab Units 10/17/24  1454   POTASSIUM mmol/L 4.3   CHLORIDE mmol/L 101   CO2 mmol/L 32   BUN mg/dL 15   CREATININE mg/dL 0.76    CALCIUM mg/dL 9.2     Results from last 7 days   Lab Units 10/19/24  0901   INR  0.94       *     Recent Cultures (last 7 days):           Last 24 Hours Medication List:   Current Facility-Administered Medications   Medication Dose Route Frequency Provider Last Rate    acetaminophen  975 mg Oral Q8H PRN Geri Justice MD      aspirin  81 mg Oral Daily Jeana Muñoz PA-C      atorvastatin  80 mg Oral Daily With Dinner Joellen Chong MD      calcium carbonate  1,000 mg Oral TID PRN Devon Mckeon PA-C      cefazolin  2,000 mg Intravenous Q8H Jeana Muñoz PA-C 2,000 mg (10/21/24 0820)    chlorhexidine  15 mL Mouth/Throat Q12H [facility] Geri Justice MD      cholecalciferol  2,000 Units Oral Daily Geri Justice MD      dextromethorphan-guaiFENesin  10 mL Oral Q6H PRN Geri Justice MD      docusate sodium  100 mg Oral BID Jeana Muñoz PA-C      guaiFENesin  600 mg Oral Q12H [facility] Deep Moreno MD      heparin (porcine)  3-30 Units/kg/hr (Order-Specific) Intravenous Titrated Mack Booker MD 28 Units/kg/hr (10/21/24 1111)    insulin glargine  35 Units Subcutaneous QAM Herminio Caldera MD      insulin lispro  1-5 Units Subcutaneous TID AC Gala Jones MD      insulin lispro  1-5 Units Subcutaneous HS Gala Jones MD      insulin lispro  10 Units Subcutaneous Daily With Lunch Daryn Parrish MD      insulin lispro  15 Units Subcutaneous Daily With Breakfast Daryn Parrish MD      insulin lispro  15 Units Subcutaneous Daily With Dinner Daryn Parrish MD      lidocaine  1 patch Topical Daily Geri Justice MD      metroNIDAZOLE  500 mg Oral Q12H [facility] Jeana Muñoz PA-C      nicotine  21 mg Transdermal Daily Octaviano Chau DO      phenol  1 spray Mouth/Throat Q2H PRN Geri Justice MD      polyethylene glycol  17 g Oral Daily PRN Jeana Muñoz PA-C          Today, Patient Was Seen By: Guy Elias DO    ** Please Note: Dictation voice to text  software may have been used in the creation of this document. **

## 2024-10-21 NOTE — ASSESSMENT & PLAN NOTE
Malnutrition Findings:   Adult Malnutrition type: Chronic illness  Adult Degree of Malnutrition: Malnutrition of moderate degree  Malnutrition Characteristics: Fat loss, Muscle loss, Inadequate energy                  360 Statement: Moderate malnutrition r/t inadequate intake as evidenced by fat/musle wasting of orbital/temple areas, BMI 16.9, consuming < 75% energy intake compared to estimated energy needs > 1 month. Treated with DM diet and Glucerna bid    BMI Findings:  Adult BMI Classifications: Underweight < 18.5        Body mass index is 16.25 kg/m².   VTE Pharmacologic Prophylaxis:   Pharmacologic: Heparin  Mechanical VTE Prophylaxis in Place: No    Patient Centered Rounds: I have performed bedside rounds with nursing staff today.      Time Spent for Care: 1 hour.  More than 50% of total time spent on counseling and coordination of care as described above.    Current Length of Stay: 10 day(s)    Current Patient Status: Inpatient     Code Status: Level 1 - Full Code      Subjective:   nad    Objective:     Vitals:   Temp (24hrs), Av.4 °F (36.9 °C), Min:98 °F (36.7 °C), Max:98.7 °F (37.1 °C)    Temp:  [98 °F (36.7 °C)-98.7 °F (37.1 °C)] 98 °F (36.7 °C)  HR:  [79-86] 86  Resp:  [16] 16  BP: (120-135)/(52-59) 120/53  SpO2:  [97 %-98 %] 97 %  Body mass index is 16.25 kg/m².     Input and Output Summary (last 24 hours):       Intake/Output Summary (Last 24 hours) at 10/21/2024 0923  Last data filed at 10/21/2024 0438  Gross per 24 hour   Intake 462 ml   Output 1650 ml   Net -1188 ml       Physical Exam:     Physical Exam    Additional Data:     Labs:    Results from last 7 days   Lab Units 10/19/24  0901 10/17/24  1454   WBC Thousand/uL 11.09* 11.95*   HEMOGLOBIN g/dL 9.7* 9.8*   HEMATOCRIT % 31.0* 31.3*   PLATELETS Thousands/uL 359 366   SEGS PCT %  --  60   LYMPHO PCT %  --  29   MONO PCT %  --  7   EOS PCT %  --  2     Results from last 7 days   Lab Units 10/17/24  1454   POTASSIUM mmol/L 4.3   CHLORIDE  mmol/L 101   CO2 mmol/L 32   BUN mg/dL 15   CREATININE mg/dL 0.76   CALCIUM mg/dL 9.2     Results from last 7 days   Lab Units 10/19/24  0901   INR  0.94       Recent Cultures (last 7 days):           Last 24 Hours Medication List:   Current Facility-Administered Medications   Medication Dose Route Frequency Provider Last Rate    acetaminophen  975 mg Oral Q8H PRN Geri Justice MD      aspirin  81 mg Oral Daily Jeana Muñoz PA-C      atorvastatin  80 mg Oral Daily With Dinner Joellen Chong MD      calcium carbonate  1,000 mg Oral TID PRN Devon Mckeon PA-C      cefazolin  2,000 mg Intravenous Q8H Jeana Muñoz PA-C 2,000 mg (10/21/24 0820)    chlorhexidine  15 mL Mouth/Throat Q12H [facility] Geri Justice MD      cholecalciferol  2,000 Units Oral Daily Geri Justice MD      dextromethorphan-guaiFENesin  10 mL Oral Q6H PRN Geri Justice MD      docusate sodium  100 mg Oral BID Jeana Muñoz PA-C      guaiFENesin  600 mg Oral Q12H [facility] Deep Moreno MD      heparin (porcine)  3-30 Units/kg/hr (Order-Specific) Intravenous Titrated Mack Booker MD 28 Units/kg/hr (10/20/24 2220)    insulin glargine  35 Units Subcutaneous QAM Herminio Caldera MD      insulin lispro  1-5 Units Subcutaneous TID AC Gala Jones MD      insulin lispro  1-5 Units Subcutaneous HS Gala Jones MD      insulin lispro  10 Units Subcutaneous Daily With Lunch Daryn Parrish MD      insulin lispro  15 Units Subcutaneous Daily With Breakfast Daryn Parrish MD      insulin lispro  15 Units Subcutaneous Daily With Dinner Daryn Parrish MD      lidocaine  1 patch Topical Daily Geri Justice MD      metroNIDAZOLE  500 mg Oral Q12H [facility] Jeana Muñoz PA-C      nicotine  21 mg Transdermal Daily Octaviano Chau DO      phenol  1 spray Mouth/Throat Q2H PRN Geri Justice MD      polyethylene glycol  17 g Oral Daily PRN Jeana Muñoz PA-C          Today, Patient Was Seen By:  Guy Elias, DO    ** Please Note: Dictation voice to text software may have been used in the creation of this document. **

## 2024-10-21 NOTE — ASSESSMENT & PLAN NOTE
Lab Results   Component Value Date    HGBA1C 14.8 (H) 09/18/2024       Recent Labs     10/20/24  1108 10/20/24  1630 10/20/24  2112 10/21/24  0752   POCGLU 314* 205* 252* 150*     Blood Sugar Average: Last 72 hrs:  (P) [id number]    With likely diabetic ketoacidosis.  Uncontrolled with hemoglobin A1c of 14.8.  Certainly a risk factor for recurrent infection.  Glucose still poorly controlled  -Tighten diabetic control

## 2024-10-21 NOTE — ASSESSMENT & PLAN NOTE
68-year-old female current smoker with history of uncontrolled T2 DM (HgA1c 14.8), HLD, tobacco use, lung nodule, hx of recurrent falls and hx of L femoral shaft fx s/p repair in 2020 arrived in the ED 10/11 with generalized weakness s/p falls, admitted for management of hyperosmolar hyperglycemia with associated metabolic acidosis with concern for DKA vs starvation ketosis. Patient with noted non-healing R 5th digit gangrenous wound, vascular surgery consulted for abnormal ABIs concern for contributory PAD.     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 Discussed case with patient in detail, all questions answered.   - Preop 10/22  NPO at midnight for OR 10/23  - Preop anisha   -Cardiology consult- 'high risk', ECHO LVEF 60%, no stress test needed  - Strict glucose control   Goal 140 - 180 to promote wound healing  endocrinology onboard, appreciate recommendations   - ABX per ID - Cefazolin/Flagyl  - Podiatry following- plan for 5th toe amp vs partial 5th ray resection  - Remainder of care per primary medicine team

## 2024-10-21 NOTE — PROGRESS NOTES
"[facility] Podiatry - Progress Note  Patient: Sabiha Garcia 68 y.o. female   MRN: [id number]  PCP: Alexys Blunt MD  Unit/Bed#: [facility] 814-01 Encounter: [id number]  Date Of Visit: 10/21/24    ASSESSMENT:    Sabiha Garcia is a 68 y.o. female with:    [place] 4 DFU Right fifth digit  Cellulitis right foot  Uncontrolled type 2 diabetes mellitus   A1c of 14.8% (24)  PAD  Smoking history  Onychomycosis      PLAN:    Patient seen at bedside and dressings changed.  Right fifth toe dry gangrene is stable with no acute clinical signs of infection at this time  Patient tentatively scheduled for left AxBiFem and right Fem-AK pop bypass with vascular surgery on 10/23.  Plan to continue local wound care at this time.  Patient will require podiatric surgical intervention once optimized by vascular surgery likely consisting of partial fifth ray amputation.  Continue IV Ancef/oral Flagyl per infectious disease recommendations  Continue strict offloading of bilateral heels and Prevalon boots when nonambulatory to prevent pressure ulceration  Continue local wound care, appreciate nursing assistance with dressing changes.  Elevation on green foam wedges or pillows when non-ambulatory.  Rest of care per primary team.    Weight bearing status: Weightbearing as tolerated      SUBJECTIVE:     The patient was seen, evaluated, and assessed at bedside today. The patient was awake, alert, and in no acute distress. No acute events overnight. The patient reports no pain to the right foot. Patient denies N/V/F/chills/SOB/CP.      OBJECTIVE:     Vitals:   /53   Pulse 86   Temp 98 °F (36.7 °C)   Resp 16   Ht 5' 9\" (1.753 m)   Wt 49.9 kg (110 lb 0.2 oz)   SpO2 97%   BMI 16.25 kg/m²     Temp (24hrs), Av.4 °F (36.9 °C), Min:98 °F (36.7 °C), Max:98.7 °F (37.1 °C)      Physical Exam:     General:  Alert, cooperative, and in no distress.  Lower extremity exam:  Cardiovascular status at baseline.  Neurological status at baseline. " " Musculoskeletal status at baseline. No calf tenderness noted.     Right lower extremity: Dry stable gangrene noted to the entirety of the fifth digit with minimal periwound erythema and without signs of active infection: no purulence, no malodor, no ascending erythema, no crepitus, no fluctuance.    Clinical Images 10/21/24:      Additional Data:     Labs:    Results from last 7 days   Lab Units 10/19/24  0901 10/17/24  1454   WBC Thousand/uL 11.09* 11.95*   HEMOGLOBIN g/dL 9.7* 9.8*   HEMATOCRIT % 31.0* 31.3*   PLATELETS Thousands/uL 359 366   SEGS PCT %  --  60   LYMPHO PCT %  --  29   MONO PCT %  --  7   EOS PCT %  --  2     Results from last 7 days   Lab Units 10/17/24  1454   POTASSIUM mmol/L 4.3   CHLORIDE mmol/L 101   CO2 mmol/L 32   BUN mg/dL 15   CREATININE mg/dL 0.76   CALCIUM mg/dL 9.2     Results from last 7 days   Lab Units 10/19/24  0901   INR  0.94       * I Have Reviewed All Lab Data Listed Above.    Recent Cultures (last 7 days):               Imaging: I have personally reviewed pertinent films in PACS  EKG, Pathology, and Other Studies: I have personally reviewed pertinent reports.      ** Please Note: Portions of the record may have been created with voice recognition software. Occasional wrong word or \"sound a like\" substitutions may have occurred due to the inherent limitations of voice recognition software. Read the chart carefully and recognize, using context, where substitutions have occurred. **      "

## 2024-10-21 NOTE — PLAN OF CARE
Problem: OCCUPATIONAL THERAPY ADULT  Goal: Performs self-care activities at highest level of function for planned discharge setting.  See evaluation for individualized goals.  Description: Treatment Interventions: ADL retraining, Functional transfer training, UE strengthening/ROM, Endurance training, Patient/family training, Equipment evaluation/education, Compensatory technique education, Continued evaluation, Energy conservation, Activityengagement          See flowsheet documentation for full assessment, interventions and recommendations.   Note: Limitation: Decreased ADL status, Decreased Safe judgement during ADL, Decreased endurance, Decreased self-care trans, Decreased high-level ADLs  Prognosis: Fair  Assessment: Patient participated in Skilled OT session this date with interventions consisting of ADL re training with the use of correct body mechnaics, Energy Conservation techniques, safety awareness and fall prevention techniques,  therapeutic activities to: increase activity tolerance, and increase OOB/ sitting tolerance . Patient agreeable to OT treatment session, upon arrival patient was found alert, responsive , in no apparent distress, and OOB in bathroom upon arrival .  In comparison to previous session, patient with improvements in ADL completion, orientation, functional mobility. Patient requiring frequent rest periods and ocassional safety reminders. Pt required Min A for STS txfs and functional mobility. SUP for toileting and dulce hygiene. Min A for LB dressing to don/doff socks. Min A for UB dressing to don robe. SUP for grooming to wash hands. Patient continues to be functioning below baseline level, occupational performance remains limited secondary to factors listed above and increased risk for falls and injury. The patient's raw score on the -PAC Daily Activity Inpatient Short Form is 17. A raw score of less than 19 suggests the patient may benefit from discharge to post-acute  rehabilitation services. Please refer to the recommendation of the Occupational Therapist for safe discharge planning. From OT standpoint, recommendation at time of d/c would be Level 2 resources - pending progress and following sx intervention.  Patient to benefit from continued Occupational Therapy treatment while in the hospital to address deficits as defined above and maximize level of functional independence with ADLs and functional mobility.     Rehab Resource Intensity Level, OT: II (Moderate Resource Intensity)

## 2024-10-21 NOTE — OCCUPATIONAL THERAPY NOTE
Occupational Therapy Progress Note     Patient Name: Sabiha Garcia  Today's Date: 10/21/2024  Problem List  Principal Problem:    Aortoiliac occlusive disease (HCC)  Active Problems:    Type 2 diabetes mellitus (HCC)    Tobacco user    Gangrene of toe of right foot (HCC)    Ambulatory dysfunction    Vitamin D deficiency    Lung nodule    Cellulitis of right foot    Other headache syndrome    Pre-operative cardiovascular examination    Moderate protein-calorie malnutrition (HCC)              10/21/24 1403   OT Last Visit   OT Visit Date 10/21/24   Note Type   Note Type Treatment   Pain Assessment   Pain Assessment Tool 0-10   Pain Score No Pain  (reports pain is mostly resolved at time of session)   Restrictions/Precautions   Weight Bearing Precautions Per Order Yes   RLE Weight Bearing Per Order WBAT   LLE Weight Bearing Per Order WBAT   Braces or Orthoses Other (Comment)  (RLE sx shoe)   Other Precautions Chair Alarm;Bed Alarm;WBS;Multiple lines;Fall Risk;Pain   Lifestyle   Autonomy PTA, pt was independent in ADLs, has some assistance with IADLs, and uses rollator vs SPC for functional mobility; (-) driving   Reciprocal Relationships Lives alone; neighbors can assist with functional needs prn   Service to Others Retired; reports previously working in the [employer] and doing other side jobs   Intrinsic Gratification Enjoys painting, gardening, and ballet   ADL   Where Assessed Edge of bed   Grooming Assistance 5  Supervision/Setup   Grooming Deficit Wash/dry hands;Wash/dry face   UB Dressing Assistance 4  Minimal Assistance   UB Dressing Deficit Thread RUE;Thread LUE;Pull around back   LB Dressing Assistance 4  Minimal Assistance   LB Dressing Deficit Don/doff R sock;Don/doff L sock   Toileting Assistance  5  Supervision/Setup   Toileting Deficit Clothing management up;Clothing management down;Perineal hygiene   Toileting Comments Pt able to complete dulce hygiene following BM with SUP   Bed Mobility   Supine to Sit  "Unable to assess   Sit to Supine Unable to assess   Additional Comments Pt OOB in bathroom upon arrival. Pt seated EOB post session, all needs met, call bell within reach. +bed alarm on   Transfers   Sit to Stand 4  Minimal assistance   Additional items Assist x 1;Increased time required;Verbal cues   Stand to Sit 4  Minimal assistance   Additional items Assist x 1;Increased time required;Verbal cues   Toilet transfer 4  Minimal assistance   Additional items Assist x 1;Increased time required;Verbal cues;Standard toilet   Additional Comments RW in stance   Functional Mobility   Functional Mobility 5  Supervision   Additional Comments household distances, RW   Additional items Rolling walker   Subjective   Subjective \"This feels so good to get up and walk\"   Cognition   Overall Cognitive Status WFL   Arousal/Participation Alert;Cooperative   Attention Attends with cues to redirect   Orientation Level Oriented X4   Memory Decreased recall of precautions   Following Commands Follows one step commands without difficulty   Comments Pt very pleasant and cooperative, motivated to participate. Pt requires some VCs for safety awareness and insight.   Activity Tolerance   Activity Tolerance Patient limited by fatigue;Patient tolerated treatment well   Medical Staff Made Aware RN cleared for therapy   Assessment   Assessment Patient participated in Skilled OT session this date with interventions consisting of ADL re training with the use of correct body mechnaics, Energy Conservation techniques, safety awareness and fall prevention techniques,  therapeutic activities to: increase activity tolerance, and increase OOB/ sitting tolerance . Patient agreeable to OT treatment session, upon arrival patient was found alert, responsive , in no apparent distress, and OOB in bathroom upon arrival .  In comparison to previous session, patient with improvements in ADL completion, orientation, functional mobility. Patient requiring frequent " rest periods and ocassional safety reminders. Pt required Min A for STS txfs and functional mobility. SUP for toileting and dulce hygiene. Min A for LB dressing to don/doff socks. Min A for UB dressing to don robe. SUP for grooming to wash hands. Patient continues to be functioning below baseline level, occupational performance remains limited secondary to factors listed above and increased risk for falls and injury. The patient's raw score on the AM-PAC Daily Activity Inpatient Short Form is 17. A raw score of less than 19 suggests the patient may benefit from discharge to post-acute rehabilitation services. Please refer to the recommendation of the Occupational Therapist for safe discharge planning. From OT standpoint, recommendation at time of d/c would be Level 2 resources - pending progress and following sx intervention.  Patient to benefit from continued Occupational Therapy treatment while in the hospital to address deficits as defined above and maximize level of functional independence with ADLs and functional mobility.   Plan   Treatment Interventions ADL retraining;Functional transfer training;Endurance training;Cognitive reorientation;Patient/family training;Compensatory technique education;Continued evaluation;Energy conservation;Activityengagement   Goal Expiration Date 10/29/24   OT Treatment Day 1   OT Frequency 2-3x/wk   Discharge Recommendation   Rehab Resource Intensity Level, OT II (Moderate Resource Intensity)   AM-PAC Daily Activity Inpatient   Lower Body Dressing 2   Bathing 2   Toileting 3   Upper Body Dressing 3   Grooming 3   Eating 4   Daily Activity Raw Score 17   Daily Activity Standardized Score (Calc for Raw Score >=11) 37.26   AM-PAC Applied Cognition Inpatient   Following a Speech/Presentation 3   Understanding Ordinary Conversation 4   Taking Medications 4   Remembering Where Things Are Placed or Put Away 4   Remembering List of 4-5 Errands 4   Taking Care of Complicated Tasks 3    Applied Cognition Raw Score 22   Applied Cognition Standardized Score 47.83   End of Consult   Education Provided Yes   Patient Position at End of Consult Seated edge of bed;Bed/Chair alarm activated;All needs within reach   Nurse Communication Nurse aware of consult         Bran Goldsmith MS, OTR/L     [facility] CERTIFIED RATER ID [id number]

## 2024-10-21 NOTE — QUICK NOTE
Patient chart checked, not seen-  Fingerstick glucose elevation significant for postprandial hyperglycemia.  Patient does have a gastroparesis which adds to unpredictable patterns of glucose.    For now, keeping glargine 35 units   Changing lispro 18-10-18 units 3 times daily prior to meals  Continuing with correctional insulin algorithm 1 AC and at bedtime  Continue level 2 carbohydrate diet     For planned surgery on Wednesday, October 23 -insulin basal change to approximately half of daily dose

## 2024-10-21 NOTE — PROGRESS NOTES
Progress Note - Infectious Disease   Name: Sabiha Garcia 68 y.o. female I MRN: [id number]  Unit/Bed#: [facility] 814-01 I Date of Admission: 10/11/2024   Date of Service: 10/21/2024 I Hospital Day: 10    Assessment & Plan  Gangrene of toe of right foot (HCC)  Dry gangrene of right fifth digit but with surrounding cellulitis. Right foot xray positive for periosteal reaction of the 5th digit proximal phalanx with suspicion for osteomyelitis. Not systemically ill. Some malodorous drainage suggesting the possibility of anaerobes. No history of MRSA.    -Continue intravenous cefazolin and oral Flagyl  -Patient for surgical revascularization with left axillary bifemoral, right fem-AK popliteal bypass planned for 10/23/2024  -Anticipate ray amputation for eventual surgical cure after vascular status optimized  -Close podiatry and vascular follow-up  -Recheck CBC with differential and BMP to make sure no developing toxicities  -Local wound care  Cellulitis of right foot  Gangrene of the right fifth digit with surrounding ischemic changes and concomitant localized cellulitis.  Stabilized to decreased erythema with the IV antibiotics  -Antibiotics as above   -Serial exams   -Await vascular and podiatry interventio  Type 2 diabetes mellitus (HCC)  Lab Results   Component Value Date    HGBA1C 14.8 (H) 09/18/2024       Recent Labs     10/20/24  1108 10/20/24  1630 10/20/24  2112 10/21/24  0752   POCGLU 314* 205* 252* 150*     Blood Sugar Average: Last 72 hrs:  (P) 223.2755976514893322    With likely diabetic ketoacidosis.  Uncontrolled with hemoglobin A1c of 14.8.  Certainly a risk factor for recurrent infection.  Glucose still poorly controlled  -Tighten diabetic control  Tobacco user  Risk factor for recurrent infection.   -Nicotine patch   -Smoking cessation advised   Ambulatory dysfunction  Presented with generalized weakness and endorses frequent falls without loss of consciousness. Head CT negative for acute pathology.     -Continue PT/OT  Aortoiliac occlusive disease (HCC)  As seen on CT angiogram.  -Patient for angiogram and possible intervention today.  Other headache syndrome  Unclear etiology but seems to be bilateral and initially predominate in the frontal region but now it is more posterior.  No other neurologic findings.  CT brain nondiagnostic for source.  -Workup and treatment as per the primary    I have discussed with primary service the above plan to continue the cefazolin and Flagyl.  The primary service agrees with the plan.    Antibiotics:  Cefazolin 8  Flagyl 8    Subjective   Patient has no fever, chills, sweats; no nausea, vomiting, diarrhea; no cough, shortness of breath; no pain. No new symptoms.    Objective :  Temp:  [98 °F (36.7 °C)-98.7 °F (37.1 °C)] 98 °F (36.7 °C)  HR:  [79-86] 86  BP: (120-135)/(52-59) 120/53  Resp:  [16] 16  SpO2:  [97 %-98 %] 97 %  O2 Device: None (Room air)    General:  No acute distress  Psychiatric:  Awake and alert  Pulmonary:  Normal respiratory excursion without accessory  Extremities: Right foot dressed with a dry dressing in place.      Lab Results: I have reviewed the following results:  Results from last 7 days   Lab Units 10/19/24  0901 10/17/24  1454 10/17/24  0601   WBC Thousand/uL 11.09* 11.95* 11.16*   HEMOGLOBIN g/dL 9.7* 9.8* 10.2*   PLATELETS Thousands/uL 359 366 331     Results from last 7 days   Lab Units 10/17/24  1454 10/17/24  0601 10/16/24  0607   SODIUM mmol/L 138 140 138   POTASSIUM mmol/L 4.3 4.0 4.2   CHLORIDE mmol/L 101 102 101   CO2 mmol/L 32 30 31   BUN mg/dL 15 16 10   CREATININE mg/dL 0.76 0.57* 0.53*   EGFR ml/min/1.73sq m 80 95 97   CALCIUM mg/dL 9.2 9.0 8.9                         Imaging Results Review: I personally reviewed the following image studies in PACS and associated radiology reports: chest xray. My interpretation of the radiology images/reports is: Clear lung fields.

## 2024-10-22 ENCOUNTER — APPOINTMENT (INPATIENT)
Dept: NON INVASIVE DIAGNOSTICS | Facility: HOSPITAL | Age: 69
DRG: 271 | End: 2024-10-22
Payer: COMMERCIAL

## 2024-10-22 LAB
ABO GROUP BLD: NORMAL
ANION GAP SERPL CALCULATED.3IONS-SCNC: 6 MMOL/L (ref 4–13)
ANISOCYTOSIS BLD QL SMEAR: PRESENT
APTT PPP: 61 SECONDS (ref 23–34)
BASOPHILS # BLD MANUAL: 0 THOUSAND/UL (ref 0–0.1)
BASOPHILS NFR MAR MANUAL: 0 % (ref 0–1)
BLD GP AB SCN SERPL QL: NEGATIVE
BUN SERPL-MCNC: 13 MG/DL (ref 5–25)
CALCIUM SERPL-MCNC: 8.5 MG/DL (ref 8.4–10.2)
CHLORIDE SERPL-SCNC: 105 MMOL/L (ref 96–108)
CO2 SERPL-SCNC: 28 MMOL/L (ref 21–32)
CREAT SERPL-MCNC: 0.53 MG/DL (ref 0.6–1.3)
EOSINOPHIL # BLD MANUAL: 0 THOUSAND/UL (ref 0–0.4)
EOSINOPHIL NFR BLD MANUAL: 0 % (ref 0–6)
ERYTHROCYTE [DISTWIDTH] IN BLOOD BY AUTOMATED COUNT: 16 % (ref 11.6–15.1)
GFR SERPL CREATININE-BSD FRML MDRD: 97 ML/MIN/1.73SQ M
GLUCOSE SERPL-MCNC: 148 MG/DL (ref 65–140)
GLUCOSE SERPL-MCNC: 171 MG/DL (ref 65–140)
GLUCOSE SERPL-MCNC: 179 MG/DL (ref 65–140)
GLUCOSE SERPL-MCNC: 197 MG/DL (ref 65–140)
GLUCOSE SERPL-MCNC: 239 MG/DL (ref 65–140)
HCT VFR BLD AUTO: 29.3 % (ref 34.8–46.1)
HGB BLD-MCNC: 9.2 G/DL (ref 11.5–15.4)
LYMPHOCYTES # BLD AUTO: 24 % (ref 14–44)
LYMPHOCYTES # BLD AUTO: 3.04 THOUSAND/UL (ref 0.6–4.47)
MCH RBC QN AUTO: 29 PG (ref 26.8–34.3)
MCHC RBC AUTO-ENTMCNC: 31.4 G/DL (ref 31.4–37.4)
MCV RBC AUTO: 92 FL (ref 82–98)
MONOCYTES # BLD AUTO: 0.38 THOUSAND/UL (ref 0–1.22)
MONOCYTES NFR BLD: 3 % (ref 4–12)
NEUTROPHILS # BLD MANUAL: 9.25 THOUSAND/UL (ref 1.85–7.62)
NEUTS BAND NFR BLD MANUAL: 4 % (ref 0–8)
NEUTS SEG NFR BLD AUTO: 69 % (ref 43–75)
PLATELET # BLD AUTO: 348 THOUSANDS/UL (ref 149–390)
PLATELET BLD QL SMEAR: ADEQUATE
PMV BLD AUTO: 10.3 FL (ref 8.9–12.7)
POTASSIUM SERPL-SCNC: 4.3 MMOL/L (ref 3.5–5.3)
RBC # BLD AUTO: 3.17 MILLION/UL (ref 3.81–5.12)
RBC MORPH BLD: PRESENT
RH BLD: POSITIVE
SODIUM SERPL-SCNC: 139 MMOL/L (ref 135–147)
SPECIMEN EXPIRATION DATE: NORMAL
WBC # BLD AUTO: 12.67 THOUSAND/UL (ref 4.31–10.16)

## 2024-10-22 PROCEDURE — 82948 REAGENT STRIP/BLOOD GLUCOSE: CPT

## 2024-10-22 PROCEDURE — 86920 COMPATIBILITY TEST SPIN: CPT

## 2024-10-22 PROCEDURE — 86850 RBC ANTIBODY SCREEN: CPT | Performed by: PHYSICIAN ASSISTANT

## 2024-10-22 PROCEDURE — 97110 THERAPEUTIC EXERCISES: CPT

## 2024-10-22 PROCEDURE — 94664 DEMO&/EVAL PT USE INHALER: CPT

## 2024-10-22 PROCEDURE — 85730 THROMBOPLASTIN TIME PARTIAL: CPT | Performed by: INTERNAL MEDICINE

## 2024-10-22 PROCEDURE — 80048 BASIC METABOLIC PNL TOTAL CA: CPT | Performed by: INTERNAL MEDICINE

## 2024-10-22 PROCEDURE — 85027 COMPLETE CBC AUTOMATED: CPT | Performed by: INTERNAL MEDICINE

## 2024-10-22 PROCEDURE — 86900 BLOOD TYPING SEROLOGIC ABO: CPT | Performed by: PHYSICIAN ASSISTANT

## 2024-10-22 PROCEDURE — 86901 BLOOD TYPING SEROLOGIC RH(D): CPT | Performed by: PHYSICIAN ASSISTANT

## 2024-10-22 PROCEDURE — 93930 UPPER EXTREMITY STUDY: CPT

## 2024-10-22 PROCEDURE — 97116 GAIT TRAINING THERAPY: CPT

## 2024-10-22 PROCEDURE — 99232 SBSQ HOSP IP/OBS MODERATE 35: CPT | Performed by: INTERNAL MEDICINE

## 2024-10-22 PROCEDURE — 85007 BL SMEAR W/DIFF WBC COUNT: CPT | Performed by: INTERNAL MEDICINE

## 2024-10-22 PROCEDURE — 99232 SBSQ HOSP IP/OBS MODERATE 35: CPT | Performed by: FAMILY MEDICINE

## 2024-10-22 RX ORDER — CHLORHEXIDINE GLUCONATE ORAL RINSE 1.2 MG/ML
15 SOLUTION DENTAL ONCE
Status: DISCONTINUED | OUTPATIENT
Start: 2024-10-22 | End: 2024-10-22

## 2024-10-22 RX ORDER — CLINDAMYCIN PHOSPHATE 900 MG/50ML
900 INJECTION, SOLUTION INTRAVENOUS ONCE
Status: CANCELLED | OUTPATIENT
Start: 2024-10-22 | End: 2024-10-22

## 2024-10-22 RX ORDER — CHLORHEXIDINE GLUCONATE ORAL RINSE 1.2 MG/ML
15 SOLUTION DENTAL ONCE
Status: COMPLETED | OUTPATIENT
Start: 2024-10-23 | End: 2024-10-23

## 2024-10-22 RX ORDER — LORAZEPAM 2 MG/ML
0.5 INJECTION INTRAMUSCULAR EVERY 8 HOURS PRN
Status: DISCONTINUED | OUTPATIENT
Start: 2024-10-22 | End: 2024-10-23

## 2024-10-22 RX ADMIN — INSULIN LISPRO 18 UNITS: 100 INJECTION, SOLUTION INTRAVENOUS; SUBCUTANEOUS at 16:24

## 2024-10-22 RX ADMIN — INSULIN LISPRO 10 UNITS: 100 INJECTION, SOLUTION INTRAVENOUS; SUBCUTANEOUS at 11:29

## 2024-10-22 RX ADMIN — ATORVASTATIN CALCIUM 80 MG: 80 TABLET, FILM COATED ORAL at 16:18

## 2024-10-22 RX ADMIN — GUAIFENESIN 600 MG: 600 TABLET, EXTENDED RELEASE ORAL at 08:25

## 2024-10-22 RX ADMIN — ACETAMINOPHEN 975 MG: 325 TABLET, FILM COATED ORAL at 11:03

## 2024-10-22 RX ADMIN — GUAIFENESIN 600 MG: 600 TABLET, EXTENDED RELEASE ORAL at 22:02

## 2024-10-22 RX ADMIN — CEFAZOLIN SODIUM 2000 MG: 2 SOLUTION INTRAVENOUS at 08:29

## 2024-10-22 RX ADMIN — CHLORHEXIDINE GLUCONATE 0.12% ORAL RINSE 15 ML: 1.2 LIQUID ORAL at 08:24

## 2024-10-22 RX ADMIN — HEPARIN SODIUM 28 UNITS/KG/HR: 10000 INJECTION, SOLUTION INTRAVENOUS at 07:24

## 2024-10-22 RX ADMIN — DOCUSATE SODIUM 100 MG: 100 CAPSULE, LIQUID FILLED ORAL at 08:28

## 2024-10-22 RX ADMIN — INSULIN LISPRO 2 UNITS: 100 INJECTION, SOLUTION INTRAVENOUS; SUBCUTANEOUS at 22:03

## 2024-10-22 RX ADMIN — INSULIN LISPRO 18 UNITS: 100 INJECTION, SOLUTION INTRAVENOUS; SUBCUTANEOUS at 07:26

## 2024-10-22 RX ADMIN — CEFAZOLIN SODIUM 2000 MG: 2 SOLUTION INTRAVENOUS at 00:44

## 2024-10-22 RX ADMIN — INSULIN LISPRO 1 UNITS: 100 INJECTION, SOLUTION INTRAVENOUS; SUBCUTANEOUS at 07:25

## 2024-10-22 RX ADMIN — Medication 2000 UNITS: at 08:25

## 2024-10-22 RX ADMIN — METRONIDAZOLE 500 MG: 500 TABLET ORAL at 22:02

## 2024-10-22 RX ADMIN — INSULIN GLARGINE 35 UNITS: 100 INJECTION, SOLUTION SUBCUTANEOUS at 08:24

## 2024-10-22 RX ADMIN — CEFAZOLIN SODIUM 2000 MG: 2 SOLUTION INTRAVENOUS at 16:18

## 2024-10-22 RX ADMIN — NICOTINE 21 MG: 21 PATCH, EXTENDED RELEASE TRANSDERMAL at 08:25

## 2024-10-22 RX ADMIN — ACETAMINOPHEN 975 MG: 325 TABLET, FILM COATED ORAL at 20:18

## 2024-10-22 RX ADMIN — METRONIDAZOLE 500 MG: 500 TABLET ORAL at 08:25

## 2024-10-22 RX ADMIN — CHLORHEXIDINE GLUCONATE 0.12% ORAL RINSE 15 ML: 1.2 LIQUID ORAL at 22:03

## 2024-10-22 RX ADMIN — ASPIRIN 81 MG: 81 TABLET, COATED ORAL at 08:25

## 2024-10-22 RX ADMIN — INSULIN LISPRO 1 UNITS: 100 INJECTION, SOLUTION INTRAVENOUS; SUBCUTANEOUS at 16:25

## 2024-10-22 RX ADMIN — DOCUSATE SODIUM 100 MG: 100 CAPSULE, LIQUID FILLED ORAL at 17:31

## 2024-10-22 NOTE — ASSESSMENT & PLAN NOTE
Lab Results   Component Value Date    HGBA1C 14.8 (H) 09/18/2024       Recent Labs     10/21/24  1059 10/21/24  1612 10/21/24  2131 10/22/24  0708   POCGLU 197* 146* 148* 179*     Blood Sugar Average: Last 72 hrs:  (P) 204.5    With likely diabetic ketoacidosis.  Uncontrolled with hemoglobin A1c of 14.8.  Certainly a risk factor for recurrent infection.  Glucose still poorly controlled  -Tighten diabetic control

## 2024-10-22 NOTE — ASSESSMENT & PLAN NOTE
Unclear etiology but seems to be bilateral and initially predominate in the frontal region but now it is more posterior.  No other neurologic findings.  CT brain nondiagnostic for source.  Resolved.

## 2024-10-22 NOTE — PROGRESS NOTES
Progress Note - Internal Medicine   Name: Sabiha Garcia 68 y.o. female I MRN: [id number]  [facility] I Date of Admission: 10/11/2024   Date of Service: 10/22/2024 I Hospital Day: 11  { ?Quick Links I Problem List I PORCH I Billing Tip:60920}  Assessment & Plan  Gangrene of toe of right foot (HCC)  Right fifth digit dry gangrene with surround cellulitis  Xray - Subtle periosteal reaction of the fifth digit proximal phalanx with adjacent soft tissue swelling. Findings raise suspicion for osteomyelitis   LEADS abnormal - vascular consulted  Podiatry following - plan after vascular optimization  Wound care with Betadine paint open to air  Discussed with infectious disease.  Continue with antibiotic therapy.  Await surgical revascularization on Wednesday.  Cellulitis of right foot  - Abx as above   Type 2 diabetes mellitus (HCC)  Lab Results   Component Value Date    HGBA1C 14.8 (H) 09/18/2024       Recent Labs     10/21/24  1612 10/21/24  2131 10/22/24  0708 10/22/24  1057   POCGLU 146* 148* 179* 148*       Blood Sugar Average: Last 72 hrs:  (P) [id number]  Severely uncontrolled as evidenced by HbA1c  On Levemir at home  Possible DKA on arrival.  Stable.  Continue with insulin regimen.  Lantus 25 in am, Humalog 10 TID,   Ct sliding scale insulin  Endocrine input appreciated     Tobacco user  Nicotine patch  Smoking cessation advised  Ambulatory dysfunction  Presented with generalized weakness, 2 falls with no head strike  States had been down for prolonged period  CT head - no acute pathology  TSH low with normal free T4, CK normal  PT/OT  Vitamin D deficiency  - Continue supplementation   Lung nodule  CT chest with 3 mm right upper lobe noncalcified nodule  CT chest without contrast in 12 months  Other headache syndrome    Pre-operative cardiovascular examination    Moderate protein-calorie malnutrition (HCC)    Aortoiliac occlusive disease (HCC)      Subjective   Patient seen and examined. No acute  events overnight.    Objective :{?Quick Links I ICU Summary I Vitals I I/Os I LDAs I Mobility (PT/OT) I Code Status / ACP   ?Quick Links I Active Meds I Pain Meds I Antibiotics I Anticoagulants:53128}  Temp:  [98.2 °F (36.8 °C)-98.8 °F (37.1 °C)] 98.2 °F (36.8 °C)  HR:  [79-87] 82  BP: (120-146)/(48-64) 140/62  Resp:  [12-20] 12  SpO2:  [97 %-98 %] 98 %  O2 Device: None (Room air)    I/O         10/20 0701  10/21 0700 10/21 0701  10/22 0700 10/22 0701  10/23 0700    P.O. 462 1500 535    Total Intake(mL/kg) 462 (9.3) 1500 (28.8) 535 (10.3)    Urine (mL/kg/hr) 1650 (1.4) 1850 (1.5)     Stool       Total Output 1650 1850     Net -1188 -350 +535           Unmeasured Urine Occurrence  3 x           Weights:   IBW (Ideal Body Weight): 66.2 kg    Body mass index is 16.94 kg/m².  Weight (last 2 days)       Date/Time Weight    10/22/24 0600 52 (114.7)    10/21/24 0535 49.9 (110.01)            Physical Exam  Constitutional:       Appearance: Normal appearance. She is normal weight.   HENT:      Head: Normocephalic.      Nose: Nose normal.      Mouth/Throat:      Mouth: Mucous membranes are moist.      Pharynx: Oropharynx is clear.   Eyes:      Extraocular Movements: Extraocular movements intact.      Pupils: Pupils are equal, round, and reactive to light.   Cardiovascular:      Rate and Rhythm: Normal rate.   Abdominal:      General: Abdomen is flat.      Palpations: Abdomen is soft.   Musculoskeletal:         General: Normal range of motion.      Cervical back: Normal range of motion.   Skin:     General: Skin is warm.   Neurological:      Mental Status: She is alert.   Psychiatric:         Mood and Affect: Mood normal.         Behavior: Behavior normal.         Thought Content: Thought content normal.         Judgment: Judgment normal.         {?Quick Links I Lab Review I Micro Results I Radiology I Cardiology:96116}  Lab Results: I have reviewed the following results:  Recent Labs     10/22/24  0610   WBC 12.67*   HGB 9.2*  "  HCT 29.3*      BANDSPCT 4   SODIUM 139   K 4.3      CO2 28   BUN 13   CREATININE 0.53*   GLUC 197*   PTT 61*       {Imaging Results Review:20087::\"No pertinent imaging studies reviewed.\"}  {Other Study Results Review:14088::\"No additional pertinent studies reviewed.\"}    Currently Ordered Meds:   Current Facility-Administered Medications:     acetaminophen (TYLENOL) tablet 975 mg, Q8H PRN    aspirin (ECOTRIN LOW STRENGTH) EC tablet 81 mg, Daily    atorvastatin (LIPITOR) tablet 80 mg, Daily With Dinner    calcium carbonate (TUMS) chewable tablet 1,000 mg, TID PRN    ceFAZolin (ANCEF) IVPB (premix in dextrose) 2,000 mg 50 mL, Q8H, Last Rate: 2,000 mg (10/22/24 0829)    chlorhexidine (PERIDEX) 0.12 % oral rinse 15 mL, Q12H JUAN    [START ON 10/23/2024] chlorhexidine (PERIDEX) 0.12 % oral rinse 15 mL, Once    Cholecalciferol (VITAMIN D3) tablet 2,000 Units, Daily    dextromethorphan-guaiFENesin (ROBITUSSIN DM) oral syrup 10 mL, Q6H PRN    docusate sodium (COLACE) capsule 100 mg, BID    guaiFENesin (MUCINEX) 12 hr tablet 600 mg, Q12H JUAN    heparin (porcine) 25,000 units in 0.45% NaCl 250 mL infusion (premix), Titrated, Last Rate: 28 Units/kg/hr (10/22/24 0724)    insulin glargine (LANTUS) subcutaneous injection 35 Units 0.35 mL, QAM    insulin lispro (HumALOG/ADMELOG) 100 units/mL subcutaneous injection 1-5 Units, TID AC **AND** Fingerstick Glucose (POCT), 4x Daily AC and at bedtime    insulin lispro (HumALOG/ADMELOG) 100 units/mL subcutaneous injection 1-5 Units, HS    insulin lispro (HumALOG/ADMELOG) 100 units/mL subcutaneous injection 10 Units, Daily With Lunch    insulin lispro (HumALOG/ADMELOG) 100 units/mL subcutaneous injection 18 Units, Daily With Breakfast    insulin lispro (HumALOG/ADMELOG) 100 units/mL subcutaneous injection 18 Units, Daily With Dinner    lidocaine (LIDODERM) 5 % patch 1 patch, Daily    LORazepam (ATIVAN) injection 0.5 mg, Q8H PRN    metroNIDAZOLE (FLAGYL) tablet 500 mg, Q12H " JUAN    nicotine (NICODERM CQ) 21 mg/24 hr TD 24 hr patch 21 mg, Daily    phenol (CHLORASEPTIC) 1.4 % mucosal liquid 1 spray, Q2H PRN    polyethylene glycol (MIRALAX) packet 17 g, Daily PRN  VTE Pharmacologic Prophylaxis: Heparin  VTE Mechanical Prophylaxis: reason for no mechanical VTE prophylaxis no    Administrative Statements   {Time Spent (Optional):77304}  Portions of the record may have been created with voice recognition software.

## 2024-10-22 NOTE — ASSESSMENT & PLAN NOTE
Dry gangrene of right fifth digit but with surrounding cellulitis. Right foot xray positive for periosteal reaction of the 5th digit proximal phalanx with suspicion for osteomyelitis. Not systemically ill. Some malodorous drainage suggesting the possibility of anaerobes. No history of MRSA.  Slight increase in WBC count noted.   -Continue intravenous cefazolin and oral Flagyl  -Patient for surgical revascularization with left axillary bifemoral, right fem-AK popliteal bypass tomorrow  -Anticipate ray amputation for eventual surgical cure after vascular status optimized  -Close podiatry and vascular follow-up  -Recheck CBC with differential and BMP to make sure no developing toxicities  -Local wound care

## 2024-10-22 NOTE — ASSESSMENT & PLAN NOTE
Lab Results   Component Value Date    HGBA1C 14.8 (H) 09/18/2024       Recent Labs     10/21/24  1612 10/21/24  2131 10/22/24  0708 10/22/24  1057   POCGLU 146* 148* 179* 148*       Blood Sugar Average: Last 72 hrs:  (P) 200.5893488004960845    Severely uncontrolled as evidenced by HbA1c  On Levemir at home  Possible DKA on arrival.  Stable.  Continue with insulin regimen.  Lantus 25 in am, Humalog 10 TID,   Ct sliding scale insulin  Endocrine input appreciated

## 2024-10-22 NOTE — PLAN OF CARE
Problem: Potential for Falls  Goal: Patient will remain free of falls  Description: INTERVENTIONS:  - Educate patient/family on patient safety including physical limitations  - Instruct patient to call for assistance with activity   - Consult OT/PT to assist with strengthening/mobility   - Keep Call bell within reach  - Keep bed low and locked with side rails adjusted as appropriate  - Keep care items and personal belongings within reach  - Initiate and maintain comfort rounds  - Make Fall Risk Sign visible to staff  - Apply yellow socks and bracelet for high fall risk patients  - Consider moving patient to room near nurses station  Outcome: Progressing     Problem: CARDIOVASCULAR - ADULT  Goal: Maintains optimal cardiac output and hemodynamic stability  Description: INTERVENTIONS:  - Monitor I/O, vital signs and rhythm  - Monitor for S/S and trends of decreased cardiac output  - Administer and titrate ordered vasoactive medications to optimize hemodynamic stability  - Assess quality of pulses, skin color and temperature  - Assess for signs of decreased coronary artery perfusion  - Instruct patient to report change in severity of symptoms  Outcome: Progressing     Problem: METABOLIC, FLUID AND ELECTROLYTES - ADULT  Goal: Electrolytes maintained within normal limits  Description: INTERVENTIONS:  - Monitor labs and assess patient for signs and symptoms of electrolyte imbalances  - Administer electrolyte replacement as ordered  - Monitor response to electrolyte replacements, including repeat lab results as appropriate  - Instruct patient on fluid and nutrition as appropriate  Outcome: Progressing     Problem: METABOLIC, FLUID AND ELECTROLYTES - ADULT  Goal: Glucose maintained within target range  Description: INTERVENTIONS:  - Monitor Blood Glucose as ordered  - Assess for signs and symptoms of hyperglycemia and hypoglycemia  - Administer ordered medications to maintain glucose within target range  - Assess  nutritional intake and initiate nutrition service referral as needed  Outcome: Progressing     Problem: MUSCULOSKELETAL - ADULT  Goal: Maintain or return mobility to safest level of function  Description: INTERVENTIONS:  - Assess patient's ability to carry out ADLs; assess patient's baseline for ADL function and identify physical deficits which impact ability to perform ADLs (bathing, care of mouth/teeth, toileting, grooming, dressing, etc.)  - Assess/evaluate cause of self-care deficits   - Assess range of motion  - Assess patient's mobility  - Assess patient's need for assistive devices and provide as appropriate  - Encourage maximum independence but intervene and supervise when necessary  - Involve family in performance of ADLs  - Assess for home care needs following discharge   - Consider OT consult to assist with ADL evaluation and planning for discharge  - Provide patient education as appropriate  Outcome: Progressing     Problem: SAFETY ADULT  Goal: Patient will remain free of falls  Description: INTERVENTIONS:  - Educate patient/family on patient safety including physical limitations  - Instruct patient to call for assistance with activity   - Consult OT/PT to assist with strengthening/mobility   - Keep Call bell within reach  - Keep bed low and locked with side rails adjusted as appropriate  - Keep care items and personal belongings within reach  - Initiate and maintain comfort rounds  - Make Fall Risk Sign visible to staff  - Apply yellow socks and bracelet for high fall risk patients  - Consider moving patient to room near nurses station  Outcome: Progressing     Problem: INFECTION - ADULT  Goal: Absence or prevention of progression during hospitalization  Description: INTERVENTIONS:  - Assess and monitor for signs and symptoms of infection  - Monitor lab/diagnostic results  - Monitor all insertion sites, i.e. indwelling lines, tubes, and drains  - Monitor endotracheal if appropriate and nasal secretions  for changes in amount and color  - [place] appropriate cooling/warming therapies per order  - Administer medications as ordered  - Instruct and encourage patient and family to use good hand hygiene technique  - Identify and instruct in appropriate isolation precautions for identified infection/condition  Outcome: Progressing

## 2024-10-22 NOTE — PROGRESS NOTES
Progress Note - Hospitalist   Name: Sabiha Garcia 68 y.o. female I MRN: [id number]  [facility] I Date of Admission: 10/11/2024   Date of Service: 10/22/2024 I Hospital Day: 11    Assessment & Plan  Gangrene of toe of right foot (HCC)  Right fifth digit dry gangrene with surround cellulitis  Xray - Subtle periosteal reaction of the fifth digit proximal phalanx with adjacent soft tissue swelling. Findings raise suspicion for osteomyelitis   LEADS abnormal - vascular consulted  Podiatry following - plan after vascular optimization  Wound care with Betadine paint open to air  Discussed with infectious disease.  Continue with antibiotic therapy.  Await surgical revascularization on Wednesday.  Cellulitis of right foot  Continue Ancef  Type 2 diabetes mellitus (HCC)  Lab Results   Component Value Date    HGBA1C 14.8 (H) 09/18/2024       Recent Labs     10/21/24  1612 10/21/24  2131 10/22/24  0708 10/22/24  1057   POCGLU 146* 148* 179* 148*       Blood Sugar Average: Last 72 hrs:  (P) 200.3841539767802086    Severely uncontrolled as evidenced by HbA1c  On Levemir at home  Possible DKA on arrival.  Stable.  Continue with insulin regimen.  Lantus 25 in am, Humalog 10 TID,   Ct sliding scale insulin  Endocrine input appreciated     Ambulatory dysfunction  Presented with generalized weakness, 2 falls with no head strike  States had been down for prolonged period  CT head - no acute pathology  TSH low with normal free T4, CK normal  PT/OT  Tobacco user  Nicotine patch  Smoking cessation advised  Vitamin D deficiency  Continue supplementation  Lung nodule  CT chest with 3 mm right upper lobe noncalcified nodule  CT chest without contrast in 12 months  Aortoiliac occlusive disease (HCC)  Vascular surgery consulted  Patient to have revascularization scheduled for October 23    VTE Pharmacologic Prophylaxis: VTE Score: 3 High Risk (Score >/= 5) - Pharmacological DVT Prophylaxis Ordered: heparin drip. Sequential  Compression Devices Ordered.    Mobility:   Basic Mobility Inpatient Raw Score: 16  JH-HLM Goal: 5: Stand one or more mins  JH-HLM Achieved: 5: Stand (1 or more minutes)  JH-HLM Goal achieved. Continue to encourage appropriate mobility.    Patient Centered Rounds: I performed bedside rounds with nursing staff today.   Discussions with Specialists or Other Care Team Provider: ID, Vascular     Education and Discussions with Family / Patient: Patient declined call to .     Current Length of Stay: 11 day(s)  Current Patient Status: Inpatient   Certification Statement: The patient will continue to require additional inpatient hospital stay due to Pt requiring vascular intervention   Discharge Plan: Anticipate discharge in >72 hrs to Pending PT/OT reevaluation     Code Status: Level 1 - Full Code    Subjective   This is a very pleasant 68-year-old female was seen evaluated today at bedside.  Patient denies any acute complaints at this time.  Patient is agreeable to plan.    Objective :  Temp:  [98.2 °F (36.8 °C)-98.8 °F (37.1 °C)] 98.2 °F (36.8 °C)  HR:  [79-87] 82  BP: (120-146)/(48-64) 140/62  Resp:  [12-20] 12  SpO2:  [97 %-98 %] 98 %  O2 Device: None (Room air)    Body mass index is 16.94 kg/m².     Input and Output Summary (last 24 hours):     Intake/Output Summary (Last 24 hours) at 10/22/2024 1356  Last data filed at 10/22/2024 0838  Gross per 24 hour   Intake 1495 ml   Output 1850 ml   Net -355 ml       Physical Exam  Vitals reviewed.   Constitutional:       General: She is not in acute distress.     Appearance: She is not ill-appearing.   HENT:      Head: Normocephalic.   Eyes:      Conjunctiva/sclera: Conjunctivae normal.   Cardiovascular:      Rate and Rhythm: Normal rate and regular rhythm.   Pulmonary:      Effort: Pulmonary effort is normal.   Abdominal:      General: Abdomen is flat.      Palpations: Abdomen is soft.      Tenderness: There is no abdominal tenderness.   Musculoskeletal:          General: Signs of injury present.   Skin:     General: Skin is warm and dry.      Findings: Lesion present.   Neurological:      Mental Status: She is alert. Mental status is at baseline.           Lines/Drains:              Lab Results: I have reviewed the following results:   Results from last 7 days   Lab Units 10/22/24  0610 10/19/24  0901 10/17/24  1454   WBC Thousand/uL 12.67*   < > 11.95*   HEMOGLOBIN g/dL 9.2*   < > 9.8*   HEMATOCRIT % 29.3*   < > 31.3*   PLATELETS Thousands/uL 348   < > 366   BANDS PCT % 4  --   --    SEGS PCT %  --   --  60   LYMPHO PCT % 24  --  29   MONO PCT % 3*  --  7   EOS PCT % 0  --  2    < > = values in this interval not displayed.     Results from last 7 days   Lab Units 10/22/24  0610   SODIUM mmol/L 139   POTASSIUM mmol/L 4.3   CHLORIDE mmol/L 105   CO2 mmol/L 28   BUN mg/dL 13   CREATININE mg/dL 0.53*   ANION GAP mmol/L 6   CALCIUM mg/dL 8.5   GLUCOSE RANDOM mg/dL 197*     Results from last 7 days   Lab Units 10/19/24  0901   INR  0.94     Results from last 7 days   Lab Units 10/22/24  1057 10/22/24  0708 10/21/24  2131 10/21/24  1612 10/21/24  1059 10/21/24  0752 10/20/24  2112 10/20/24  1630 10/20/24  1108 10/20/24  0737 10/19/24  2131 10/19/24  1610   POC GLUCOSE mg/dl 148* 179* 148* 146* 197* 150* 252* 205* 314* 217* 179* 176*               Recent Cultures (last 7 days):         Imaging Results Review: I reviewed radiology reports from this admission including: CT abdomen/pelvis, CT head, and Ultrasound(s).  Other Study Results Review: EKG was reviewed.     Last 24 Hours Medication List:     Current Facility-Administered Medications:     acetaminophen (TYLENOL) tablet 975 mg, Q8H PRN    aspirin (ECOTRIN LOW STRENGTH) EC tablet 81 mg, Daily    atorvastatin (LIPITOR) tablet 80 mg, Daily With Dinner    calcium carbonate (TUMS) chewable tablet 1,000 mg, TID PRN    ceFAZolin (ANCEF) IVPB (premix in dextrose) 2,000 mg 50 mL, Q8H, Last Rate: 2,000 mg (10/22/24 7207)     chlorhexidine (PERIDEX) 0.12 % oral rinse 15 mL, Q12H JUAN    [START ON 10/23/2024] chlorhexidine (PERIDEX) 0.12 % oral rinse 15 mL, Once    Cholecalciferol (VITAMIN D3) tablet 2,000 Units, Daily    dextromethorphan-guaiFENesin (ROBITUSSIN DM) oral syrup 10 mL, Q6H PRN    docusate sodium (COLACE) capsule 100 mg, BID    guaiFENesin (MUCINEX) 12 hr tablet 600 mg, Q12H JUAN    heparin (porcine) 25,000 units in 0.45% NaCl 250 mL infusion (premix), Titrated, Last Rate: 28 Units/kg/hr (10/22/24 0724)    insulin glargine (LANTUS) subcutaneous injection 35 Units 0.35 mL, QAM    insulin lispro (HumALOG/ADMELOG) 100 units/mL subcutaneous injection 1-5 Units, TID AC **AND** Fingerstick Glucose (POCT), 4x Daily AC and at bedtime    insulin lispro (HumALOG/ADMELOG) 100 units/mL subcutaneous injection 1-5 Units, HS    insulin lispro (HumALOG/ADMELOG) 100 units/mL subcutaneous injection 10 Units, Daily With Lunch    insulin lispro (HumALOG/ADMELOG) 100 units/mL subcutaneous injection 18 Units, Daily With Breakfast    insulin lispro (HumALOG/ADMELOG) 100 units/mL subcutaneous injection 18 Units, Daily With Dinner    lidocaine (LIDODERM) 5 % patch 1 patch, Daily    LORazepam (ATIVAN) injection 0.5 mg, Q8H PRN    metroNIDAZOLE (FLAGYL) tablet 500 mg, Q12H JUAN    nicotine (NICODERM CQ) 21 mg/24 hr TD 24 hr patch 21 mg, Daily    phenol (CHLORASEPTIC) 1.4 % mucosal liquid 1 spray, Q2H PRN    polyethylene glycol (MIRALAX) packet 17 g, Daily PRN    Administrative Statements   Today, Patient Was Seen By: Jose Spaulding MD  I have spent a total time of 40 minutes in caring for this patient on the day of the visit/encounter including Diagnostic results, Prognosis, Instructions for management, Patient and family education, Counseling / Coordination of care, Documenting in the medical record, Obtaining or reviewing history  , and Communicating with other healthcare professionals .    **Please Note: This note may have been constructed using a  voice recognition system.**

## 2024-10-22 NOTE — RESTORATIVE TECHNICIAN NOTE
Restorative Technician Note      Patient Name: Sabiha Garcia     Restorative Tech Visit Date: 10/22/24  Note Type: Mobility  Patient Position Upon Consult: Supine  Activity Performed: Ambulated  Assistive Device: Standard walker  Education Provided: Yes  Patient Position at End of Consult: Supine; All needs within reach; Bed/Chair alarm activated    Shannan Khalil  DPT, Restorative Technician

## 2024-10-22 NOTE — PHYSICAL THERAPY NOTE
PHYSICAL THERAPY NOTE          Patient Name: Sabiha Garcia  Today's Date: 10/22/2024     10/22/24 0858   PT Last Visit   PT Visit Date 10/22/24   Note Type   Note Type Treatment   Pain Assessment   Pain Assessment Tool 0-10   Pain Score 5   Pain Location/Orientation Orientation: Right;Location: Foot   Pain Onset/Description Onset: Ongoing   Effect of Pain on Daily Activities Increased time for activity   Patient's Stated Pain Goal No pain   Hospital Pain Intervention(s) Repositioned   Restrictions/Precautions   Weight Bearing Precautions Per Order Yes   RLE Weight Bearing Per Order WBAT  (Per Podiatry)   LLE Weight Bearing Per Order WBAT   Braces or Orthoses Other (Comment)  (Sx shoe)   Other Precautions Chair Alarm;Bed Alarm;WBS;Pain;Fall Risk;Multiple lines   General   Chart Reviewed Yes   Family/Caregiver Present No   Cognition   Overall Cognitive Status WFL   Arousal/Participation Alert;Responsive   Attention Within functional limits   Orientation Level Oriented X4   Following Commands Follows one step commands without difficulty   Comments Pt cooperative during session   Subjective   Subjective Agreeable to mobilize   Bed Mobility   Supine to Sit 5  Supervision   Additional items Increased time required   Sit to Supine 5  Supervision   Additional items Bedrails   Additional Comments Pt in bed upon arrival. Pt maintains sitting EOB with Close SUP.   Transfers   Sit to Stand 4  Minimal assistance   Additional items Assist x 1;Increased time required;Verbal cues   Stand to Sit 4  Minimal assistance   Additional items Assist x 1;Increased time required;Verbal cues   Additional Comments w/RW- VCs for hand placement   Ambulation/Elevation   Gait pattern Improper Weight shift;Forward Flexion;Step to   Gait Assistance 4  Minimal assist   Additional items Assist x 1;Verbal cues   Assistive Device Rolling walker   Distance 2 ft    Ambulation/Elevation Additional Comments Limited mobility today, pt awaiting Sx intervention.   Balance   Static Sitting Fair +   Dynamic Sitting Fair   Static Standing Fair -   Dynamic Standing Poor +   Ambulatory Poor +   Endurance Deficit   Endurance Deficit Yes   Activity Tolerance   Activity Tolerance Patient limited by fatigue   Nurse Made Aware RN cleared pt   Exercises   Glute Sets Supine;15 reps;Bilateral   Hip Flexion Sitting;15 reps;Bilateral   Hip Abduction Supine;15 reps;Bilateral   Hip Adduction Supine;15 reps;Bilateral   Knee AROM Long Arc Quad Sitting;20 reps;Bilateral   Ankle Pumps Supine;20 reps;Bilateral   Balance training  EOB sitting ~ 8 minutes   Assessment   Prognosis Good   Problem List Decreased endurance;Decreased strength;Impaired balance;Decreased mobility;Pain;Decreased skin integrity   Assessment Pt seen for PT treatment session this date. Pt cooperative and very pleasant during session. Pt able to transfer and ambulate few feet with Assist X 1 and RW. Pt agreeable to participate in therapeutic exercise seated EOB, and in semi supine to promote maintained ROM and strength. Pt was left back in bed at the end of PT session with all needs in reach. Pt would benefit from continued PT services while in hospital to address remaining limitations. The patient's AM-PAC Basic Mobility Inpatient Short Form Raw Score is 16. A Raw score of less than or equal to 16 suggests the patient may benefit from discharge to post-acute rehabilitation services. Please also refer to the recommendation of the Physical Therapist for safe discharge planning. Post dc recommendation is Level 2 at this time pending further progress.   Barriers to Discharge Decreased caregiver support   Goals   Patient Goals to get better   STG Expiration Date 10/29/24   PT Treatment Day 2   Plan   Treatment/Interventions Functional transfer training;Therapeutic exercise;Bed mobility;Gait training;Spoke to nursing;Spoke to case  management;OT   Progress Progressing toward goals   PT Frequency 2-3x/wk   Discharge Recommendation   Rehab Resource Intensity Level, PT II (Moderate Resource Intensity)   Equipment Recommended Walker   AM-PAC Basic Mobility Inpatient   Turning in Flat Bed Without Bedrails 3   Lying on Back to Sitting on Edge of Flat Bed Without Bedrails 3   Moving Bed to Chair 3   Standing Up From Chair Using Arms 3   Walk in Room 3   Climb 3-5 Stairs With Railing 1   Basic Mobility Inpatient Raw Score 16   Basic Mobility Standardized Score 38.32   [facility] Highest Level Of Mobility   -HL Goal 5: Stand one or more mins   -HLM Achieved 5: Stand (1 or more minutes)   Education   Education Provided Home exercise program   Patient Demonstrates verbal understanding   End of Consult   Patient Position at End of Consult Bed/Chair alarm activated;All needs within reach;Supine   Savanna Mcbride PT DPT

## 2024-10-22 NOTE — ASSESSMENT & PLAN NOTE
Gangrene of the right fifth digit with surrounding ischemic changes and concomitant localized cellulitis.  Stabilized to decreased erythema with the IV antibiotics  -Antibiotics as above   -Serial exams   -Await vascular and podiatry intervention

## 2024-10-22 NOTE — PROGRESS NOTES
Progress Note - Infectious Disease   Name: Sabiha Garcia 68 y.o. female I MRN: [id number]  Unit/Bed#: [facility] 814-01 I Date of Admission: 10/11/2024   Date of Service: 10/22/2024 I Hospital Day: 11    Assessment & Plan  Gangrene of toe of right foot (HCC)  Dry gangrene of right fifth digit but with surrounding cellulitis. Right foot xray positive for periosteal reaction of the 5th digit proximal phalanx with suspicion for osteomyelitis. Not systemically ill. Some malodorous drainage suggesting the possibility of anaerobes. No history of MRSA.  Slight increase in WBC count noted.   -Continue intravenous cefazolin and oral Flagyl  -Patient for surgical revascularization with left axillary bifemoral, right fem-AK popliteal bypass tomorrow  -Anticipate ray amputation for eventual surgical cure after vascular status optimized  -Close podiatry and vascular follow-up  -Recheck CBC with differential and BMP to make sure no developing toxicities  -Local wound care  Cellulitis of right foot  Gangrene of the right fifth digit with surrounding ischemic changes and concomitant localized cellulitis.  Stabilized to decreased erythema with the IV antibiotics  -Antibiotics as above   -Serial exams   -Await vascular and podiatry intervention  Type 2 diabetes mellitus (HCC)  Lab Results   Component Value Date    HGBA1C 14.8 (H) 09/18/2024       Recent Labs     10/21/24  1059 10/21/24  1612 10/21/24  2131 10/22/24  0708   POCGLU 197* 146* 148* 179*     Blood Sugar Average: Last 72 hrs:  (P) 204.5    With likely diabetic ketoacidosis.  Uncontrolled with hemoglobin A1c of 14.8.  Certainly a risk factor for recurrent infection.  Glucose still poorly controlled  -Tighten diabetic control  Tobacco user  Risk factor for recurrent infection.   -Nicotine patch   -Smoking cessation advised   Ambulatory dysfunction  Presented with generalized weakness and endorses frequent falls without loss of consciousness. Head CT negative for acute pathology.     -Continue PT/OT  Aortoiliac occlusive disease (HCC)  As seen on CT angiogram.  -Await surgical revascularization  Other headache syndrome  Unclear etiology but seems to be bilateral and initially predominate in the frontal region but now it is more posterior.  No other neurologic findings.  CT brain nondiagnostic for source.  Resolved.    I have discussed with the primary service the above plan to continue the cefazolin and Flagyl.  Primary service agrees with the plan.    Antibiotics:  Cefazolin 9  Flagyl 9    Subjective   Patient has no fever, chills, sweats; no nausea, vomiting, diarrhea; no cough, shortness of breath; no increased pain. No new symptoms.    Objective :  Temp:  [98.2 °F (36.8 °C)-98.8 °F (37.1 °C)] 98.2 °F (36.8 °C)  HR:  [79-87] 82  BP: (120-146)/(48-64) 140/62  Resp:  [12-20] 12  SpO2:  [97 %-98 %] 98 %    General:  No acute distress  Psychiatric:  Awake and alert  Pulmonary:  Normal respiratory excursion without accessory muscle use  Abdomen:  Soft, nontender  Extremities: Right foot with gangrenous fifth toe with surrounding erythema.  Skin:  No rashes      Lab Results: I have reviewed the following results:  Results from last 7 days   Lab Units 10/22/24  0610 10/19/24  0901 10/17/24  1454   WBC Thousand/uL 12.67* 11.09* 11.95*   HEMOGLOBIN g/dL 9.2* 9.7* 9.8*   PLATELETS Thousands/uL 348 359 366     Results from last 7 days   Lab Units 10/22/24  0610 10/17/24  1454 10/17/24  0601   SODIUM mmol/L 139 138 140   POTASSIUM mmol/L 4.3 4.3 4.0   CHLORIDE mmol/L 105 101 102   CO2 mmol/L 28 32 30   BUN mg/dL 13 15 16   CREATININE mg/dL 0.53* 0.76 0.57*   EGFR ml/min/1.73sq m 97 80 95   CALCIUM mg/dL 8.5 9.2 9.0

## 2024-10-22 NOTE — ASSESSMENT & PLAN NOTE
Lab Results   Component Value Date    HGBA1C 14.8 (H) 09/18/2024       Recent Labs     10/21/24  1612 10/21/24  2131 10/22/24  0708 10/22/24  1057   POCGLU 146* 148* 179* 148*       Blood Sugar Average: Last 72 hrs:  (P) 200.3094798153422764  Severely uncontrolled as evidenced by HbA1c  On Levemir at home  Possible DKA on arrival.  Stable.  Continue with insulin regimen.  Lantus 25 in am, Humalog 10 TID,   Ct sliding scale insulin  Endocrine input appreciated

## 2024-10-23 ENCOUNTER — ANESTHESIA (INPATIENT)
Dept: PERIOP | Facility: HOSPITAL | Age: 69
DRG: 271 | End: 2024-10-23
Payer: COMMERCIAL

## 2024-10-23 ENCOUNTER — APPOINTMENT (INPATIENT)
Dept: RADIOLOGY | Facility: HOSPITAL | Age: 69
DRG: 271 | End: 2024-10-23
Payer: COMMERCIAL

## 2024-10-23 ENCOUNTER — PATIENT OUTREACH (OUTPATIENT)
Dept: CASE MANAGEMENT | Facility: OTHER | Age: 69
End: 2024-10-23

## 2024-10-23 ENCOUNTER — ANESTHESIA EVENT (INPATIENT)
Dept: PERIOP | Facility: HOSPITAL | Age: 69
DRG: 271 | End: 2024-10-23
Payer: COMMERCIAL

## 2024-10-23 LAB
ANION GAP SERPL CALCULATED.3IONS-SCNC: 6 MMOL/L (ref 4–13)
APTT PPP: 63 SECONDS (ref 23–34)
BUN SERPL-MCNC: 11 MG/DL (ref 5–25)
CALCIUM SERPL-MCNC: 8.4 MG/DL (ref 8.4–10.2)
CHLORIDE SERPL-SCNC: 105 MMOL/L (ref 96–108)
CO2 SERPL-SCNC: 28 MMOL/L (ref 21–32)
CREAT SERPL-MCNC: 0.46 MG/DL (ref 0.6–1.3)
ERYTHROCYTE [DISTWIDTH] IN BLOOD BY AUTOMATED COUNT: 16.1 % (ref 11.6–15.1)
GFR SERPL CREATININE-BSD FRML MDRD: 102 ML/MIN/1.73SQ M
GLUCOSE SERPL-MCNC: 128 MG/DL (ref 65–140)
GLUCOSE SERPL-MCNC: 171 MG/DL (ref 65–140)
GLUCOSE SERPL-MCNC: 176 MG/DL (ref 65–140)
GLUCOSE SERPL-MCNC: 193 MG/DL (ref 65–140)
GLUCOSE SERPL-MCNC: 201 MG/DL (ref 65–140)
GLUCOSE SERPL-MCNC: 201 MG/DL (ref 65–140)
GLUCOSE SERPL-MCNC: 229 MG/DL (ref 65–140)
HCT VFR BLD AUTO: 29.3 % (ref 34.8–46.1)
HGB BLD-MCNC: 9.1 G/DL (ref 11.5–15.4)
MCH RBC QN AUTO: 28.3 PG (ref 26.8–34.3)
MCHC RBC AUTO-ENTMCNC: 31.1 G/DL (ref 31.4–37.4)
MCV RBC AUTO: 91 FL (ref 82–98)
PLATELET # BLD AUTO: 355 THOUSANDS/UL (ref 149–390)
PLATELET # BLD AUTO: 369 THOUSANDS/UL (ref 149–390)
PMV BLD AUTO: 8.4 FL (ref 8.9–12.7)
PMV BLD AUTO: 8.5 FL (ref 8.9–12.7)
POTASSIUM SERPL-SCNC: 4.3 MMOL/L (ref 3.5–5.3)
RBC # BLD AUTO: 3.21 MILLION/UL (ref 3.81–5.12)
SODIUM SERPL-SCNC: 139 MMOL/L (ref 135–147)
WBC # BLD AUTO: 12.05 THOUSAND/UL (ref 4.31–10.16)

## 2024-10-23 PROCEDURE — C1768 GRAFT, VASCULAR: HCPCS | Performed by: STUDENT IN AN ORGANIZED HEALTH CARE EDUCATION/TRAINING PROGRAM

## 2024-10-23 PROCEDURE — 041K0JL BYPASS RIGHT FEMORAL ARTERY TO POPLITEAL ARTERY WITH SYNTHETIC SUBSTITUTE, OPEN APPROACH: ICD-10-PCS | Performed by: STUDENT IN AN ORGANIZED HEALTH CARE EDUCATION/TRAINING PROGRAM

## 2024-10-23 PROCEDURE — NC001 PR NO CHARGE: Performed by: STUDENT IN AN ORGANIZED HEALTH CARE EDUCATION/TRAINING PROGRAM

## 2024-10-23 PROCEDURE — 85730 THROMBOPLASTIN TIME PARTIAL: CPT | Performed by: FAMILY MEDICINE

## 2024-10-23 PROCEDURE — P9016 RBC LEUKOCYTES REDUCED: HCPCS

## 2024-10-23 PROCEDURE — 80048 BASIC METABOLIC PNL TOTAL CA: CPT | Performed by: PHYSICIAN ASSISTANT

## 2024-10-23 PROCEDURE — 82948 REAGENT STRIP/BLOOD GLUCOSE: CPT

## 2024-10-23 PROCEDURE — 85347 COAGULATION TIME ACTIVATED: CPT

## 2024-10-23 PROCEDURE — 84295 ASSAY OF SERUM SODIUM: CPT

## 2024-10-23 PROCEDURE — 85049 AUTOMATED PLATELET COUNT: CPT | Performed by: SURGERY

## 2024-10-23 PROCEDURE — 88305 TISSUE EXAM BY PATHOLOGIST: CPT | Performed by: STUDENT IN AN ORGANIZED HEALTH CARE EDUCATION/TRAINING PROGRAM

## 2024-10-23 PROCEDURE — 85027 COMPLETE CBC AUTOMATED: CPT | Performed by: PHYSICIAN ASSISTANT

## 2024-10-23 PROCEDURE — 93930 UPPER EXTREMITY STUDY: CPT | Performed by: SURGERY

## 2024-10-23 PROCEDURE — 82330 ASSAY OF CALCIUM: CPT

## 2024-10-23 PROCEDURE — 03150JC BYPASS RIGHT AXILLARY ARTERY TO BILATERAL LOWER LEG ARTERY WITH SYNTHETIC SUBSTITUTE, OPEN APPROACH: ICD-10-PCS | Performed by: STUDENT IN AN ORGANIZED HEALTH CARE EDUCATION/TRAINING PROGRAM

## 2024-10-23 PROCEDURE — 35654 BPG AXILLARY-FEMORAL-FEMORAL: CPT | Performed by: STUDENT IN AN ORGANIZED HEALTH CARE EDUCATION/TRAINING PROGRAM

## 2024-10-23 PROCEDURE — 84132 ASSAY OF SERUM POTASSIUM: CPT

## 2024-10-23 PROCEDURE — 04CK0ZZ EXTIRPATION OF MATTER FROM RIGHT FEMORAL ARTERY, OPEN APPROACH: ICD-10-PCS | Performed by: STUDENT IN AN ORGANIZED HEALTH CARE EDUCATION/TRAINING PROGRAM

## 2024-10-23 PROCEDURE — 82803 BLOOD GASES ANY COMBINATION: CPT

## 2024-10-23 PROCEDURE — 82947 ASSAY GLUCOSE BLOOD QUANT: CPT

## 2024-10-23 PROCEDURE — 04CH0ZZ EXTIRPATION OF MATTER FROM RIGHT EXTERNAL ILIAC ARTERY, OPEN APPROACH: ICD-10-PCS | Performed by: STUDENT IN AN ORGANIZED HEALTH CARE EDUCATION/TRAINING PROGRAM

## 2024-10-23 PROCEDURE — 85014 HEMATOCRIT: CPT

## 2024-10-23 DEVICE — PROPATEN VASC GRAFT SW RR AXBI 8MMX70CM 8MMX40CM HEPARIN
Type: IMPLANTABLE DEVICE | Site: ARTERIAL | Status: FUNCTIONAL
Brand: GORE PROPATEN VASCULAR GRAFT

## 2024-10-23 DEVICE — PROPATEN VASCULAR GRAFT TW RR 8MMX50CM 40CM RINGS HEPARIN
Type: IMPLANTABLE DEVICE | Site: LEG | Status: FUNCTIONAL
Brand: GORE PROPATEN VASCULAR GRAFT

## 2024-10-23 RX ORDER — PROPOFOL 10 MG/ML
INJECTION, EMULSION INTRAVENOUS AS NEEDED
Status: DISCONTINUED | OUTPATIENT
Start: 2024-10-23 | End: 2024-10-23

## 2024-10-23 RX ORDER — ONDANSETRON 2 MG/ML
4 INJECTION INTRAMUSCULAR; INTRAVENOUS ONCE AS NEEDED
Status: DISCONTINUED | OUTPATIENT
Start: 2024-10-23 | End: 2024-10-23 | Stop reason: HOSPADM

## 2024-10-23 RX ORDER — ACETAMINOPHEN 325 MG/1
975 TABLET ORAL EVERY 8 HOURS SCHEDULED
Status: DISCONTINUED | OUTPATIENT
Start: 2024-10-23 | End: 2024-10-29 | Stop reason: HOSPADM

## 2024-10-23 RX ORDER — HYDROMORPHONE HCL/PF 1 MG/ML
SYRINGE (ML) INJECTION AS NEEDED
Status: DISCONTINUED | OUTPATIENT
Start: 2024-10-23 | End: 2024-10-23

## 2024-10-23 RX ORDER — SODIUM CHLORIDE, SODIUM LACTATE, POTASSIUM CHLORIDE, CALCIUM CHLORIDE 600; 310; 30; 20 MG/100ML; MG/100ML; MG/100ML; MG/100ML
INJECTION, SOLUTION INTRAVENOUS CONTINUOUS PRN
Status: DISCONTINUED | OUTPATIENT
Start: 2024-10-23 | End: 2024-10-23

## 2024-10-23 RX ORDER — FENTANYL CITRATE 50 UG/ML
INJECTION, SOLUTION INTRAMUSCULAR; INTRAVENOUS AS NEEDED
Status: DISCONTINUED | OUTPATIENT
Start: 2024-10-23 | End: 2024-10-23

## 2024-10-23 RX ORDER — PROTAMINE SULFATE 10 MG/ML
INJECTION, SOLUTION INTRAVENOUS AS NEEDED
Status: DISCONTINUED | OUTPATIENT
Start: 2024-10-23 | End: 2024-10-23

## 2024-10-23 RX ORDER — ROCURONIUM BROMIDE 10 MG/ML
INJECTION, SOLUTION INTRAVENOUS AS NEEDED
Status: DISCONTINUED | OUTPATIENT
Start: 2024-10-23 | End: 2024-10-23

## 2024-10-23 RX ORDER — ONDANSETRON 2 MG/ML
INJECTION INTRAMUSCULAR; INTRAVENOUS AS NEEDED
Status: DISCONTINUED | OUTPATIENT
Start: 2024-10-23 | End: 2024-10-23

## 2024-10-23 RX ORDER — FENTANYL CITRATE/PF 50 MCG/ML
50 SYRINGE (ML) INJECTION
Status: DISCONTINUED | OUTPATIENT
Start: 2024-10-23 | End: 2024-10-23 | Stop reason: HOSPADM

## 2024-10-23 RX ORDER — SODIUM CHLORIDE 9 MG/ML
INJECTION, SOLUTION INTRAVENOUS CONTINUOUS PRN
Status: DISCONTINUED | OUTPATIENT
Start: 2024-10-23 | End: 2024-10-23

## 2024-10-23 RX ORDER — CEFAZOLIN SODIUM 2 G/50ML
SOLUTION INTRAVENOUS AS NEEDED
Status: DISCONTINUED | OUTPATIENT
Start: 2024-10-23 | End: 2024-10-23

## 2024-10-23 RX ORDER — HYDROMORPHONE HCL/PF 1 MG/ML
0.5 SYRINGE (ML) INJECTION
Status: DISCONTINUED | OUTPATIENT
Start: 2024-10-23 | End: 2024-10-23 | Stop reason: HOSPADM

## 2024-10-23 RX ORDER — PHENYLEPHRINE HCL IN 0.9% NACL 1 MG/10 ML
SYRINGE (ML) INTRAVENOUS AS NEEDED
Status: DISCONTINUED | OUTPATIENT
Start: 2024-10-23 | End: 2024-10-23

## 2024-10-23 RX ORDER — HEPARIN SODIUM 1000 [USP'U]/ML
INJECTION, SOLUTION INTRAVENOUS; SUBCUTANEOUS AS NEEDED
Status: DISCONTINUED | OUTPATIENT
Start: 2024-10-23 | End: 2024-10-23

## 2024-10-23 RX ORDER — OXYCODONE HYDROCHLORIDE 5 MG/1
5 TABLET ORAL EVERY 4 HOURS PRN
Status: DISCONTINUED | OUTPATIENT
Start: 2024-10-23 | End: 2024-10-29 | Stop reason: HOSPADM

## 2024-10-23 RX ORDER — MIDAZOLAM HYDROCHLORIDE 2 MG/2ML
INJECTION, SOLUTION INTRAMUSCULAR; INTRAVENOUS AS NEEDED
Status: DISCONTINUED | OUTPATIENT
Start: 2024-10-23 | End: 2024-10-23

## 2024-10-23 RX ORDER — HYDROMORPHONE HCL/PF 1 MG/ML
0.5 SYRINGE (ML) INJECTION EVERY 4 HOURS PRN
Status: DISCONTINUED | OUTPATIENT
Start: 2024-10-23 | End: 2024-10-29 | Stop reason: HOSPADM

## 2024-10-23 RX ORDER — HEPARIN SODIUM 5000 [USP'U]/ML
5000 INJECTION, SOLUTION INTRAVENOUS; SUBCUTANEOUS EVERY 8 HOURS SCHEDULED
Status: DISCONTINUED | OUTPATIENT
Start: 2024-10-23 | End: 2024-10-24

## 2024-10-23 RX ORDER — OXYCODONE HYDROCHLORIDE 10 MG/1
10 TABLET ORAL EVERY 4 HOURS PRN
Status: DISCONTINUED | OUTPATIENT
Start: 2024-10-23 | End: 2024-10-29 | Stop reason: HOSPADM

## 2024-10-23 RX ORDER — ONDANSETRON 2 MG/ML
4 INJECTION INTRAMUSCULAR; INTRAVENOUS EVERY 6 HOURS PRN
Status: DISCONTINUED | OUTPATIENT
Start: 2024-10-23 | End: 2024-10-29 | Stop reason: HOSPADM

## 2024-10-23 RX ORDER — EPHEDRINE SULFATE 50 MG/ML
INJECTION INTRAVENOUS AS NEEDED
Status: DISCONTINUED | OUTPATIENT
Start: 2024-10-23 | End: 2024-10-23

## 2024-10-23 RX ORDER — LIDOCAINE HYDROCHLORIDE 10 MG/ML
INJECTION, SOLUTION EPIDURAL; INFILTRATION; INTRACAUDAL; PERINEURAL AS NEEDED
Status: DISCONTINUED | OUTPATIENT
Start: 2024-10-23 | End: 2024-10-23

## 2024-10-23 RX ADMIN — SODIUM CHLORIDE 2 UNITS/HR: 9 INJECTION, SOLUTION INTRAVENOUS at 13:14

## 2024-10-23 RX ADMIN — HYDROMORPHONE HYDROCHLORIDE 0.5 MG: 1 INJECTION, SOLUTION INTRAMUSCULAR; INTRAVENOUS; SUBCUTANEOUS at 18:25

## 2024-10-23 RX ADMIN — Medication 100 MCG: at 13:17

## 2024-10-23 RX ADMIN — INSULIN LISPRO 2 UNITS: 100 INJECTION, SOLUTION INTRAVENOUS; SUBCUTANEOUS at 22:52

## 2024-10-23 RX ADMIN — Medication 100 MCG: at 10:05

## 2024-10-23 RX ADMIN — ROCURONIUM BROMIDE 50 MG: 10 INJECTION, SOLUTION INTRAVENOUS at 11:01

## 2024-10-23 RX ADMIN — METRONIDAZOLE 500 MG: 500 TABLET ORAL at 21:19

## 2024-10-23 RX ADMIN — Medication 100 MCG: at 09:25

## 2024-10-23 RX ADMIN — Medication 100 MCG: at 14:13

## 2024-10-23 RX ADMIN — FENTANYL CITRATE 50 MCG: 50 INJECTION INTRAMUSCULAR; INTRAVENOUS at 08:40

## 2024-10-23 RX ADMIN — ACETAMINOPHEN 975 MG: 325 TABLET, FILM COATED ORAL at 04:30

## 2024-10-23 RX ADMIN — HEPARIN SODIUM 6000 UNITS: 1000 INJECTION INTRAVENOUS; SUBCUTANEOUS at 11:34

## 2024-10-23 RX ADMIN — OXYCODONE HYDROCHLORIDE 5 MG: 5 TABLET ORAL at 22:07

## 2024-10-23 RX ADMIN — Medication 50 MCG: at 09:32

## 2024-10-23 RX ADMIN — PHENYLEPHRINE HYDROCHLORIDE 30 MCG/MIN: 10 INJECTION INTRAVENOUS at 09:33

## 2024-10-23 RX ADMIN — PROTAMINE SULFATE 10 MG: 10 INJECTION, SOLUTION INTRAVENOUS at 14:16

## 2024-10-23 RX ADMIN — HEPARIN SODIUM 2000 UNITS: 1000 INJECTION INTRAVENOUS; SUBCUTANEOUS at 12:59

## 2024-10-23 RX ADMIN — OXYCODONE HYDROCHLORIDE 10 MG: 10 TABLET ORAL at 17:52

## 2024-10-23 RX ADMIN — ONDANSETRON 4 MG: 2 INJECTION INTRAMUSCULAR; INTRAVENOUS at 15:00

## 2024-10-23 RX ADMIN — PROTAMINE SULFATE 20 MG: 10 INJECTION, SOLUTION INTRAVENOUS at 14:22

## 2024-10-23 RX ADMIN — GUAIFENESIN 600 MG: 600 TABLET, EXTENDED RELEASE ORAL at 21:19

## 2024-10-23 RX ADMIN — HYDROMORPHONE HYDROCHLORIDE 0.5 MG: 1 INJECTION, SOLUTION INTRAMUSCULAR; INTRAVENOUS; SUBCUTANEOUS at 15:09

## 2024-10-23 RX ADMIN — DOCUSATE SODIUM 100 MG: 100 CAPSULE, LIQUID FILLED ORAL at 17:54

## 2024-10-23 RX ADMIN — Medication 100 MCG: at 14:01

## 2024-10-23 RX ADMIN — HEPARIN SODIUM 28 UNITS/KG/HR: 10000 INJECTION, SOLUTION INTRAVENOUS at 01:22

## 2024-10-23 RX ADMIN — ACETAMINOPHEN 975 MG: 325 TABLET, FILM COATED ORAL at 21:19

## 2024-10-23 RX ADMIN — Medication 100 MCG: at 15:14

## 2024-10-23 RX ADMIN — ACETAMINOPHEN 975 MG: 325 TABLET, FILM COATED ORAL at 17:24

## 2024-10-23 RX ADMIN — HEPARIN SODIUM 5000 UNITS: 5000 INJECTION, SOLUTION INTRAVENOUS; SUBCUTANEOUS at 21:19

## 2024-10-23 RX ADMIN — EPHEDRINE SULFATE 10 MG: 50 INJECTION, SOLUTION INTRAVENOUS at 11:41

## 2024-10-23 RX ADMIN — HEPARIN SODIUM 3000 UNITS: 1000 INJECTION INTRAVENOUS; SUBCUTANEOUS at 11:43

## 2024-10-23 RX ADMIN — CEFAZOLIN SODIUM 2000 MG: 2 SOLUTION INTRAVENOUS at 01:21

## 2024-10-23 RX ADMIN — HEPARIN SODIUM 5000 UNITS: 1000 INJECTION INTRAVENOUS; SUBCUTANEOUS at 12:21

## 2024-10-23 RX ADMIN — SUGAMMADEX 200 MG: 100 INJECTION, SOLUTION INTRAVENOUS at 15:00

## 2024-10-23 RX ADMIN — CEFAZOLIN SODIUM 2000 MG: 2 SOLUTION INTRAVENOUS at 09:17

## 2024-10-23 RX ADMIN — ROCURONIUM BROMIDE 50 MG: 10 INJECTION, SOLUTION INTRAVENOUS at 08:41

## 2024-10-23 RX ADMIN — PROTAMINE SULFATE 10 MG: 10 INJECTION, SOLUTION INTRAVENOUS at 14:10

## 2024-10-23 RX ADMIN — ROCURONIUM BROMIDE 30 MG: 10 INJECTION, SOLUTION INTRAVENOUS at 14:08

## 2024-10-23 RX ADMIN — SODIUM CHLORIDE, SODIUM LACTATE, POTASSIUM CHLORIDE, AND CALCIUM CHLORIDE 500 ML: .6; .31; .03; .02 INJECTION, SOLUTION INTRAVENOUS at 17:11

## 2024-10-23 RX ADMIN — ONDANSETRON 4 MG: 2 INJECTION INTRAMUSCULAR; INTRAVENOUS at 18:22

## 2024-10-23 RX ADMIN — CHLORHEXIDINE GLUCONATE 0.12% ORAL RINSE 15 ML: 1.2 LIQUID ORAL at 21:19

## 2024-10-23 RX ADMIN — SODIUM CHLORIDE: 0.9 INJECTION, SOLUTION INTRAVENOUS at 08:51

## 2024-10-23 RX ADMIN — MIDAZOLAM 2 MG: 1 INJECTION INTRAMUSCULAR; INTRAVENOUS at 08:30

## 2024-10-23 RX ADMIN — CEFAZOLIN SODIUM 2000 MG: 2 SOLUTION INTRAVENOUS at 13:17

## 2024-10-23 RX ADMIN — PROPOFOL 80 MG: 10 INJECTION, EMULSION INTRAVENOUS at 08:40

## 2024-10-23 RX ADMIN — PROTAMINE SULFATE 10 MG: 10 INJECTION, SOLUTION INTRAVENOUS at 14:13

## 2024-10-23 RX ADMIN — CHLORHEXIDINE GLUCONATE 0.12% ORAL RINSE 15 ML: 1.2 LIQUID ORAL at 07:42

## 2024-10-23 RX ADMIN — LIDOCAINE HYDROCHLORIDE 40 MG: 10 INJECTION, SOLUTION EPIDURAL; INFILTRATION; INTRACAUDAL; PERINEURAL at 08:40

## 2024-10-23 RX ADMIN — SODIUM CHLORIDE, SODIUM LACTATE, POTASSIUM CHLORIDE, AND CALCIUM CHLORIDE: .6; .31; .03; .02 INJECTION, SOLUTION INTRAVENOUS at 08:33

## 2024-10-23 RX ADMIN — FENTANYL CITRATE 50 MCG: 50 INJECTION INTRAMUSCULAR; INTRAVENOUS at 09:37

## 2024-10-23 RX ADMIN — FENTANYL CITRATE 50 MCG: 50 INJECTION INTRAMUSCULAR; INTRAVENOUS at 16:05

## 2024-10-23 NOTE — ANESTHESIA PREPROCEDURE EVALUATION
Procedure:  L AxBiFem (Left: Abdomen)  R Femoral- AK popliteal bypass w/ PTFE (Right: Leg Upper)    Relevant Problems   CARDIO   (+) Aortoiliac occlusive disease (HCC)   (+) Hyperlipidemia      ENDO   (+) Type 2 diabetes mellitus (HCC)      HEMATOLOGY   (+) Blood loss anemia      NEURO/PSYCH   (+) Claudication of both lower extremities (HCC)   (+) Depression   (+) Generalized anxiety disorder   (+) Other headache syndrome        Physical Exam    Airway    Mallampati score: II  TM Distance: >3 FB  Neck ROM: full     Dental   No notable dental hx     Cardiovascular  Rhythm: regular, Rate: normal    Pulmonary   Breath sounds clear to auscultation    Other Findings  post-pubertal.      Anesthesia Plan  ASA Score- 4     Anesthesia Type- general with ASA Monitors.         Additional Monitors: arterial line and central venous line.    Airway Plan: ETT.    Comment: ·  Left Ventricle: Left ventricular cavity size is normal. Wall thickness is normal. The left ventricular ejection fraction is 60%. Systolic function is normal. Wall motion is normal. Diastolic function is mildly abnormal, consistent with grade I (abnormal) relaxation.  ·  Right Ventricle: Right ventricular cavity size is normal. Systolic function is normal.    High risk per cardiology, a-line. Central line if poor peripheral access. .       Plan Factors-Exercise tolerance (METS): >4 METS.    Chart reviewed. EKG reviewed.  Existing labs reviewed. Patient summary reviewed.    Patient is a current smoker.  Patient instructed to abstain from smoking on day of procedure. Patient did not smoke on day of surgery.    Obstructive sleep apnea risk education given perioperatively.        Induction- intravenous.    Postoperative Plan- Plan for postoperative opioid use.     Perioperative Resuscitation Plan - Level 1 - Full Code.       Informed Consent- Anesthetic plan and risks discussed with patient.  I personally reviewed this patient with the CRNA. Discussed and agreed on  the Anesthesia Plan with the CRNA..

## 2024-10-23 NOTE — PLAN OF CARE
Problem: Potential for Falls  Goal: Patient will remain free of falls  Description: INTERVENTIONS:  - Educate patient/family on patient safety including physical limitations  - Instruct patient to call for assistance with activity   - Consult OT/PT to assist with strengthening/mobility   - Keep Call bell within reach  - Keep bed low and locked with side rails adjusted as appropriate  - Keep care items and personal belongings within reach  - Initiate and maintain comfort rounds  - Make Fall Risk Sign visible to staff  - Apply yellow socks and bracelet for high fall risk patients  - Consider moving patient to room near nurses station  Outcome: Progressing     Problem: CARDIOVASCULAR - ADULT  Goal: Maintains optimal cardiac output and hemodynamic stability  Description: INTERVENTIONS:  - Monitor I/O, vital signs and rhythm  - Monitor for S/S and trends of decreased cardiac output  - Administer and titrate ordered vasoactive medications to optimize hemodynamic stability  - Assess quality of pulses, skin color and temperature  - Assess for signs of decreased coronary artery perfusion  - Instruct patient to report change in severity of symptoms  Outcome: Progressing  Goal: Absence of cardiac dysrhythmias or at baseline rhythm  Description: INTERVENTIONS:  - Continuous cardiac monitoring, vital signs, obtain 12 lead EKG if ordered  - Administer antiarrhythmic and heart rate control medications as ordered  - Monitor electrolytes and administer replacement therapy as ordered  Outcome: Progressing     Problem: METABOLIC, FLUID AND ELECTROLYTES - ADULT  Goal: Electrolytes maintained within normal limits  Description: INTERVENTIONS:  - Monitor labs and assess patient for signs and symptoms of electrolyte imbalances  - Administer electrolyte replacement as ordered  - Monitor response to electrolyte replacements, including repeat lab results as appropriate  - Instruct patient on fluid and nutrition as appropriate  Outcome:  Progressing  Goal: Fluid balance maintained  Description: INTERVENTIONS:  - Monitor labs   - Monitor I/O and WT  - Instruct patient on fluid and nutrition as appropriate  - Assess for signs & symptoms of volume excess or deficit  Outcome: Progressing  Goal: Glucose maintained within target range  Description: INTERVENTIONS:  - Monitor Blood Glucose as ordered  - Assess for signs and symptoms of hyperglycemia and hypoglycemia  - Administer ordered medications to maintain glucose within target range  - Assess nutritional intake and initiate nutrition service referral as needed  Outcome: Progressing     Problem: INFECTION - ADULT  Goal: Absence or prevention of progression during hospitalization  Description: INTERVENTIONS:  - Assess and monitor for signs and symptoms of infection  - Monitor lab/diagnostic results  - Monitor all insertion sites, i.e. indwelling lines, tubes, and drains  - Monitor endotracheal if appropriate and nasal secretions for changes in amount and color  - [place] appropriate cooling/warming therapies per order  - Administer medications as ordered  - Instruct and encourage patient and family to use good hand hygiene technique  - Identify and instruct in appropriate isolation precautions for identified infection/condition  Outcome: Progressing     Problem: Knowledge Deficit  Goal: Patient/family/caregiver demonstrates understanding of disease process, treatment plan, medications, and discharge instructions  Description: Complete learning assessment and assess knowledge base.  Interventions:  - Provide teaching at level of understanding  - Provide teaching via preferred learning methods  Outcome: Progressing

## 2024-10-23 NOTE — QUICK NOTE
Patient was in OR with vascular surgery throughout the day.  Unable to evaluate patient as she is off the floor.  Will evaluate the patient tomorrow when she is back on the medical floor.    Reviewed op note from vascular surgery.

## 2024-10-23 NOTE — OCCUPATIONAL THERAPY NOTE
"          Occupational Therapy         Patient Name: Sabiha Garcia  Today's Date: 10/23/2024         10/23/24 0758   OT Last Visit   OT Visit Date 10/23/24   Note Type   Note type Cancelled Session   Cancel Reasons Patient to operating room         Pt chart reviewed. Per Vascular, \"Plan for Aortogram, possible EVAR, possible Right AxBiFem, possible R Fem-AK pop bypass with PTFE planned today, 10/23 with \". Will hold OT tx and continue to follow and treat post op as appropriate.     Bran Goldsmith MS, OTR/L     [facility] CERTIFIED RATER ID [id number]                        "

## 2024-10-23 NOTE — QUICK NOTE
Vascular Surgery    Postop check:    S/p left axillary bifemoral bypass, right CFA to AK pop bypass and right iliofemoral endarterectomy today.    Patient recovering well, pain currently controlled.    AVSS, received 1 bolus IVF following return from OR    Left chest, bilateral groin and RLE incisions CDI, no hematoma, no bleeding.  Feet warm, motor and sensation intact.  DP/PT signals present

## 2024-10-23 NOTE — ASSESSMENT & PLAN NOTE
68-year-old female current smoker with history of uncontrolled T2 DM (HgA1c 14.8), HLD, tobacco use, lung nodule, hx of recurrent falls and hx of L femoral shaft fx s/p repair in 2020 arrived in the ED 10/11 with generalized weakness s/p falls, admitted for management of hyperosmolar hyperglycemia with associated metabolic acidosis with concern for DKA vs starvation ketosis. Patient with noted non-healing R 5th digit gangrenous wound, vascular surgery consulted for abnormal ABIs concern for contributory PAD.     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 Digit pressure of 143 mmHg    Afebrile, vitals stable on room air   Glucose 239 from 171  WBC 12.1 from 12.7  Hbg 9.1 from 9.2  Creatinine 0.46 from 0.53    Plan  - NPO  - Plan for Aortogram, possible EVAR, possible Right AxBiFem, possible R Fem-AK pop bypass with PTFE planned today, 10/23 with . Discussed case with patient in detail, all questions answered.   - Preop anisha   -Cardiology consult- 'high risk', ECHO LVEF 60%, no stress test needed  - Strict glucose control   -Goal 140 - 180 to promote wound healing  endocrinology onboard, appreciate recommendations   -Recent glucose 239 from 171 from 148  - ABX per ID - Cefazolin/Flagyl  - Podiatry following- plan for 5th toe amp vs partial 5th ray resection  - Remainder of care per primary medicine team

## 2024-10-23 NOTE — PROGRESS NOTES
Progress Note - Vascular Surgery   Name: Sabiha Garcia 68 y.o. female I MRN: [id number]  Unit/Bed#: [facility] 814-01 I Date of Admission: 10/11/2024   Date of Service: 10/23/2024 I Hospital Day: 12     Assessment & Plan  Aortoiliac occlusive disease (HCC)  68-year-old female current smoker with history of uncontrolled T2 DM (HgA1c 14.8), HLD, tobacco use, lung nodule, hx of recurrent falls and hx of L femoral shaft fx s/p repair in 2020 arrived in the ED 10/11 with generalized weakness s/p falls, admitted for management of hyperosmolar hyperglycemia with associated metabolic acidosis with concern for DKA vs starvation ketosis. Patient with noted non-healing R 5th digit gangrenous wound, vascular surgery consulted for abnormal ABIs concern for contributory PAD.     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 Digit pressure of 143 mmHg    Afebrile, vitals stable on room air   Glucose 239 from 171  WBC 12.1 from 12.7  Hbg 9.1 from 9.2  Creatinine 0.46 from 0.53    Plan  - NPO  - Plan for Aortogram, possible EVAR, possible Right AxBiFem, possible R Fem-AK pop bypass with PTFE planned today, 10/23 with . Discussed case with patient in detail, all questions answered.   - Preop anisha   -Cardiology consult- 'high risk', ECHO LVEF 60%, no stress test needed  - Strict glucose control   -Goal 140 - 180 to promote wound healing  endocrinology onboard, appreciate recommendations   -Recent glucose 239 from 171 from 148  - ABX per ID - Cefazolin/Flagyl  - Podiatry following- plan for 5th toe amp vs partial 5th ray resection  - Remainder of  care per primary medicine team    Medications reviewed. Continue current medications at prescribed doses.  Vascular Surgery service will follow.    Subjective   Patient seen and examined at bedside. Pain is controlled. Patient is aware of the plan for surgery today. Consent signed. All questions and concerns were addressed.     Objective :  Temp:  [98.3 °F (36.8 °C)-98.5 °F (36.9 °C)] 98.5 °F (36.9 °C)  HR:  [77-83] 83  BP: (135-147)/(55-64) 147/64  Resp:  [15-17] 15  SpO2:  [95 %-97 %] 97 %  O2 Device: None (Room air)    I/O         10/21 0701  10/22 0700 10/22 0701  10/23 0700 10/23 0701  10/24 0700    P.O. 1500 1015     Total Intake(mL/kg) 1500 (28.8) 1015 (19.5)     Urine (mL/kg/hr) 1850 (1.5) 450 (0.4)     Stool  0     Total Output 1850 450     Net -350 +565            Unmeasured Urine Occurrence 3 x 1 x     Unmeasured Stool Occurrence  0 x             Physical Exam  Vitals and nursing note reviewed.   Constitutional:       General: She is not in acute distress.     Appearance: She is normal weight. She is not ill-appearing or toxic-appearing.   HENT:      Head: Normocephalic and atraumatic.   Eyes:      General: No scleral icterus.     Conjunctiva/sclera: Conjunctivae normal.   Cardiovascular:      Rate and Rhythm: Normal rate.   Pulmonary:      Effort: Pulmonary effort is normal.      Comments: On room air   Abdominal:      General: Abdomen is flat. There is no distension.      Tenderness: There is no abdominal tenderness. There is no guarding or rebound.   Musculoskeletal:      Right lower leg: No edema.      Left lower leg: No edema.   Skin:     General: Skin is warm and dry.      Capillary Refill: Capillary refill takes less than 2 seconds.   Neurological:      Mental Status: She is alert and oriented to person, place, and time.   Psychiatric:         Mood and Affect: Mood normal.         Behavior: Behavior normal.         Thought Content: Thought content normal.           Lab Results: I have reviewed the  following results:  Recent Labs     10/22/24  0610 10/23/24  0526   WBC 12.67* 12.05*   HGB 9.2* 9.1*   HCT 29.3* 29.3*    355   BANDSPCT 4  --    SODIUM 139 139   K 4.3 4.3    105   CO2 28 28   BUN 13 11   CREATININE 0.53* 0.46*   GLUC 197* 128   PTT 61* 63*       Imaging Results Review: I reviewed radiology reports from this admission including: Ultrasound(s).  Other Study Results Review: No additional pertinent studies reviewed.    VTE Pharmacologic Prophylaxis: VTE covered by:  heparin (porcine), Intravenous, Stopped at 10/23/24 0638     VTE Mechanical Prophylaxis: sequential compression device

## 2024-10-23 NOTE — OP NOTE
VASCULAR AND ENDOVASCULAR SURGERY OP NOTE     DATE:  10/23/24     PREOPERATIVE DIAGNOSIS:    - Aortoiliac and infrainguinal occlusive disease with right foot tissue loss     POSTOPERATIVE DIAGNOSIS:   - SAME     PROCEDURE PERFORMED:   - Left axillary bifemoral bypass using bifurcated 8mm ringed PTFE graft  - Right common femoral to above knee popliteal artery bypass with 8mm ringed PTFE graft  - Right ileofemoral endarterectomy      ATTENDING SURGEON:  - Haroon Dukes MD     RESIDENT SURGEON(S):  - Surgeons and Role:     * Haroon Dukes MD - Primary     * Onur Galeas MD - Assisting     * Yovana Yen DPM - Assisting     * Bria Mcfadden MD - Fellow                             BLOOD LOSS:  - 300 mL     ANESTHESIA:  General endotracheal anesthesia     SPECIMENS:  - Right femoral artery plaque       PROCEDURE IN DETAIL:  Informed consent was obtained from the patient prior to proceeding to the operating room. The patient was then identified in the pre-anesthesia area, then taken to the operating room, placed in the supine position on the operating table, and induced under general endotracheal anesthesia. The patient was correctly positioned, padded at all pressure points.  The right upper extremity, right axilla, abdomen, bilateral groins, and right lower extremity were then prepared and draped in a sterile fashion.  A pre-operative timeout was performed.  Preoperative antibiotics were administered at this time.     A left infraclavicular incision was made with a #15 blade and dissection was carried down through the skin and subcutaneous tissue.  The pectoralis minor muscle was identified and the muscle fibers were gently  to expose the axillary artery and vein. The axillary artery was dissected circumferentially and controlled with vessel loops. Vertical incisions were also made over the bilateral groins and dissection was carried down through the skin and subcutaneous tissue. Bridging veins  were sharply divided between ligatures of silk. The inguinal ligament was identified on each side followed by the common femoral artery.  Dissection was carried distally to the bifurcation.  The common femoral, superficial femoral, and profunda arteries were all circumferentially dissected and isolated with Vesseloops.    We then turned our attention to the above-knee popliteal artery exposure.  A longitudinal incision was made with a #15 blade on the medial aspect of the right thigh just above the knee and a few centimeters posterior to the femur.  Dissection was carried down through the subcutaneous tissue. The sartorius was identified and retracted posteriorly.  The avascular plane was entered and the popliteal neurovascular bundle was isolated. The popliteal artery was dissected free circumferentially and encircled with vessel loops proximally and distally.  A [place] tunneler was then used to create an anatomic tunnel between the common femoral and above-knee popliteal arteries. The PTFE graft described above was then pulled through the tunnel ensuring no twisting or kinking occurred.     A [place] tunneler was then used to tunnel under the pectoralis minor muscle from the left infraclavicular incision down to about the left groin staying in the subcutaneous plane.  We then brought the 8mm bifurcated Dacron graft onto the field and pulled the graft through the tunneler to the infraclavicular incision.      The patient was then systemically heparinized.  ACTs were maintained greater than 250 throughout the case.      Atraumatic vascular clamps were then used to control the subclavian artery. A #11 blade was used to create an arteriotomy, which was lengthened with Aguirre scissors. The 8mm PTFE graft was cut to length and beveled. The anastomosis was sewn with running 6-0 prolene suture. Prior to completion of the anastomosis, the artery was back bled and deaired.     Vessel loops were pulled up to control the SFA and  profunda arteries. An atraumatic clamp was used to control the left CFA.  A vertical arteriotomy was created with a #11 blade on the left common femoral artery at the profunda/SFA bifurcation and was extended proximally with Aguirre scissors. The left end of the bifurcated 8mm graft was cut to length and spatulated. An anastomoses from the graft to the left CFA was created with 6-0 prolene suture. Prior to completion of the anastomosis, the artery was back bled and deaired.     We then turned attention to the right ileofemoral endarterectomy and anastomoses. Vessel loops on the right SFA/PFA were pulled up and an atraumatic vascular clamp was placed proximally on the right distal external iliac for control.  A #11 blade was used to create a vertical arteriotomy on the common femoral artery and this was extended proximally and distally to the level of the bifurcation with Aguirre scissors.  Endarterectomy of the distal EIA, CFA, and proximal SFA was then performed with a [place] elevator, DeBakey, and right angle clamp.  The proximal endpoint was transected perpendicular to the vessel wall.  The distal endarterectomy endpoint at the PFA was transected proximal to the ostium so as to not risk dissection into the vessel. This endpoint was tacked with 6-0 prolene suture. Good backbleeding from the profunda was noted.  No backbleeding was appreciated from the chronically occluded SFA. Heparinized saline was used to irrigate the endarterectomy bed and careful attention was paid to remove all free-floating debris from the vessel wall. The end of the right limb of the bifurcated graft was cut to length and spatulated. An anastomosis from the graft to the right CFA using running 6-0 prolene suture. Prior to completion of the anastomosis, the artery was back bled and deaired.      Control for the right femoral vessels was maintained as described above. A #11 blade was used to create a graftotomy in the right limb of the  bifurcated graft. This was extended proximally/distally with Aguirre scissors. The proximal end of the 8mm graft used for the RLE bypass was cut to size, spatulated, and beveled.  The proximal anastomosis was completed with 6-0 running prolene suture. Appropriate 1:1 movement of the graft was confirmed. A soft noncalcified region of the above knee popliteal artery was identified. Proximal and distal Vesseloops were pulled up and a #11 blade was used to create a longitudinal arteriotomy. This was extended proximally distally with Aguirre scissors. The distal aspect of the graft was was cut to size, spatulated, and beveled. The anastomosis was then created with running 6-0 prolene suture. The native artery was backbled and the anastomosis was de-aired.      After completion of the anastomoses, the patient was given protamine. Good hemostasis was achieved in the wound beds. The wounds were closed in multiple layers of monocryl suture, and the skin was closed with 4-0 Monocryl. The incisions were covered with surgical glue and sterile dressings.     The patient was awoken from general anesthesia having tolerated the procedure well and transported to PACU in stable condition.      Haroon Dukes MD  10/23/24   3:46 PM    Vascular Quality Initiative - Infra-Inguinal Bypass   [id number]]        Urgency: Elective     Side: right       Functional Status:  Fully active; able to carry on all predisease activities without restriction.   Ambulation: Amb = independently ambulatory       Ambulation:  Amb = independently ambulatory    Exercise Program:  NO        Pathology:Occlusive Disease    Anesthesia: General  ASA Class: ASA 3 - Patient with moderate systemic disease with functional limitations    Skin Prep:Chlorhexidene    Graft Origin: Common Femoral    Graft Recipient: AK Pop    Graft Vein Type: None    Number of Vein Segments: None    Prosthetic: PTFE      Groin Incision: NONE/HORIZONTAL/VERTICAL: Vertical.    Groin Closure Type: Absorbable Subcuticular. Closure Dressing is: Cyanoacrylate Adhesive..     Total Procedure Time:   Event Time In   Procedure Start 10/23/2024  9:46 AM   Procedure Closing 10/23/2024  2:35 PM   Procedure Finish 10/23/2024  3:11 PM       EBL: 300    If Graft Vein: Vein Malott Incision: na    Vein Graft Location: na    Adjuncts:  Vein Cuff:  no Sequential Graft: yes, left axillary to bilateral CFA bypass    Concomitant Proximal Ipsilateral:   PVI: no       Endarterectomy:yes    Right CFA     Supra-inguinal Bypass: yes    INFLOW    NO    Completion Study:   Doppler: yes   Duplex: no    Arteriogram: no       Yes Yes Yes Yes Yes Yes Yes Yes Yes Yes

## 2024-10-23 NOTE — ANESTHESIA POSTPROCEDURE EVALUATION
Post-Op Assessment Note    CV Status:  Stable  Pain Score: 0    Pain management: adequate       Mental Status:  Awake and alert   Hydration Status:  Stable   PONV Controlled:  None   Airway Patency:  Patent     Post Op Vitals Reviewed: Yes    No anethesia notable event occurred.    Staff: CRNA           Last Filed PACU Vitals:  Vitals Value Taken Time   Temp 97.5 °F (36.4 °C) 10/23/24 1526   Pulse 72 10/23/24 1530   BP 84/46 10/23/24 1528   Resp 22 10/23/24 1530   SpO2 95 % 10/23/24 1530   Vitals shown include unfiled device data.    Modified Nahid:  No data recorded

## 2024-10-23 NOTE — PROGRESS NOTES
ADT alert rcvd pt is admitted to hospital.  Referrals sent to SNF for STR transitioning to LTC.  Will close case at this time.

## 2024-10-24 ENCOUNTER — DOCUMENTATION (OUTPATIENT)
Dept: VASCULAR SURGERY | Facility: CLINIC | Age: 69
End: 2024-10-24

## 2024-10-24 LAB
ANION GAP SERPL CALCULATED.3IONS-SCNC: 5 MMOL/L (ref 4–13)
BASE EXCESS BLDA CALC-SCNC: -2 MMOL/L (ref -2–3)
BASE EXCESS BLDA CALC-SCNC: -4 MMOL/L (ref -2–3)
BASE EXCESS BLDA CALC-SCNC: -4 MMOL/L (ref -2–3)
BASE EXCESS BLDA CALC-SCNC: 0 MMOL/L (ref -2–3)
BUN SERPL-MCNC: 11 MG/DL (ref 5–25)
CA-I BLD-SCNC: 1.07 MMOL/L (ref 1.12–1.32)
CA-I BLD-SCNC: 1.1 MMOL/L (ref 1.12–1.32)
CA-I BLD-SCNC: 1.14 MMOL/L (ref 1.12–1.32)
CA-I BLD-SCNC: 1.19 MMOL/L (ref 1.12–1.32)
CALCIUM SERPL-MCNC: 7 MG/DL (ref 8.4–10.2)
CHLORIDE SERPL-SCNC: 106 MMOL/L (ref 96–108)
CO2 SERPL-SCNC: 24 MMOL/L (ref 21–32)
CREAT SERPL-MCNC: 0.6 MG/DL (ref 0.6–1.3)
ERYTHROCYTE [DISTWIDTH] IN BLOOD BY AUTOMATED COUNT: 16.5 % (ref 11.6–15.1)
GFR SERPL CREATININE-BSD FRML MDRD: 93 ML/MIN/1.73SQ M
GLUCOSE SERPL-MCNC: 118 MG/DL (ref 65–140)
GLUCOSE SERPL-MCNC: 124 MG/DL (ref 65–140)
GLUCOSE SERPL-MCNC: 153 MG/DL (ref 65–140)
GLUCOSE SERPL-MCNC: 176 MG/DL (ref 65–140)
GLUCOSE SERPL-MCNC: 185 MG/DL (ref 65–140)
GLUCOSE SERPL-MCNC: 189 MG/DL (ref 65–140)
GLUCOSE SERPL-MCNC: 193 MG/DL (ref 65–140)
GLUCOSE SERPL-MCNC: 198 MG/DL (ref 65–140)
GLUCOSE SERPL-MCNC: 199 MG/DL (ref 65–140)
GLUCOSE SERPL-MCNC: 78 MG/DL (ref 65–140)
HCO3 BLDA-SCNC: 21.8 MMOL/L (ref 22–28)
HCO3 BLDA-SCNC: 22 MMOL/L (ref 22–28)
HCO3 BLDA-SCNC: 23.6 MMOL/L (ref 22–28)
HCO3 BLDA-SCNC: 25.7 MMOL/L (ref 22–28)
HCT VFR BLD AUTO: 25.2 % (ref 34.8–46.1)
HCT VFR BLD CALC: 21 % (ref 34.8–46.1)
HCT VFR BLD CALC: 21 % (ref 34.8–46.1)
HCT VFR BLD CALC: 26 % (ref 34.8–46.1)
HCT VFR BLD CALC: 29 % (ref 34.8–46.1)
HGB BLD-MCNC: 7.9 G/DL (ref 11.5–15.4)
HGB BLDA-MCNC: 7.1 G/DL (ref 11.5–15.4)
HGB BLDA-MCNC: 7.1 G/DL (ref 11.5–15.4)
HGB BLDA-MCNC: 8.8 G/DL (ref 11.5–15.4)
HGB BLDA-MCNC: 9.9 G/DL (ref 11.5–15.4)
KCT BLD-ACNC: 210 SEC (ref 89–137)
KCT BLD-ACNC: 235 SEC (ref 89–137)
KCT BLD-ACNC: 268 SEC (ref 89–137)
KCT BLD-ACNC: 301 SEC (ref 89–137)
MCH RBC QN AUTO: 29.7 PG (ref 26.8–34.3)
MCHC RBC AUTO-ENTMCNC: 31.3 G/DL (ref 31.4–37.4)
MCV RBC AUTO: 95 FL (ref 82–98)
PCO2 BLD: 23 MMOL/L (ref 21–32)
PCO2 BLD: 23 MMOL/L (ref 21–32)
PCO2 BLD: 25 MMOL/L (ref 21–32)
PCO2 BLD: 27 MMOL/L (ref 21–32)
PCO2 BLD: 41.9 MM HG (ref 36–44)
PCO2 BLD: 42 MM HG (ref 36–44)
PCO2 BLD: 45.8 MM HG (ref 36–44)
PCO2 BLD: 45.9 MM HG (ref 36–44)
PH BLD: 7.29 [PH] (ref 7.35–7.45)
PH BLD: 7.33 [PH] (ref 7.35–7.45)
PH BLD: 7.36 [PH] (ref 7.35–7.45)
PH BLD: 7.36 [PH] (ref 7.35–7.45)
PLATELET # BLD AUTO: 324 THOUSANDS/UL (ref 149–390)
PMV BLD AUTO: 8.7 FL (ref 8.9–12.7)
PO2 BLD: 182 MM HG (ref 75–129)
PO2 BLD: 197 MM HG (ref 75–129)
PO2 BLD: 198 MM HG (ref 75–129)
PO2 BLD: 208 MM HG (ref 75–129)
POTASSIUM BLD-SCNC: 3.8 MMOL/L (ref 3.5–5.3)
POTASSIUM BLD-SCNC: 4.1 MMOL/L (ref 3.5–5.3)
POTASSIUM BLD-SCNC: 4.1 MMOL/L (ref 3.5–5.3)
POTASSIUM BLD-SCNC: 4.3 MMOL/L (ref 3.5–5.3)
POTASSIUM SERPL-SCNC: 4.9 MMOL/L (ref 3.5–5.3)
RBC # BLD AUTO: 2.66 MILLION/UL (ref 3.81–5.12)
SAO2 % BLD FROM PO2: 100 % (ref 60–85)
SODIUM BLD-SCNC: 139 MMOL/L (ref 136–145)
SODIUM BLD-SCNC: 139 MMOL/L (ref 136–145)
SODIUM BLD-SCNC: 140 MMOL/L (ref 136–145)
SODIUM BLD-SCNC: 140 MMOL/L (ref 136–145)
SODIUM SERPL-SCNC: 135 MMOL/L (ref 135–147)
SPECIMEN SOURCE: ABNORMAL
WBC # BLD AUTO: 15.14 THOUSAND/UL (ref 4.31–10.16)

## 2024-10-24 PROCEDURE — NC001 PR NO CHARGE: Performed by: PODIATRIST

## 2024-10-24 PROCEDURE — 97530 THERAPEUTIC ACTIVITIES: CPT

## 2024-10-24 PROCEDURE — 80048 BASIC METABOLIC PNL TOTAL CA: CPT | Performed by: SURGERY

## 2024-10-24 PROCEDURE — 82948 REAGENT STRIP/BLOOD GLUCOSE: CPT

## 2024-10-24 PROCEDURE — 99232 SBSQ HOSP IP/OBS MODERATE 35: CPT | Performed by: INTERNAL MEDICINE

## 2024-10-24 PROCEDURE — 0T9B70Z DRAINAGE OF BLADDER WITH DRAINAGE DEVICE, VIA NATURAL OR ARTIFICIAL OPENING: ICD-10-PCS | Performed by: UROLOGY

## 2024-10-24 PROCEDURE — 99233 SBSQ HOSP IP/OBS HIGH 50: CPT | Performed by: INTERNAL MEDICINE

## 2024-10-24 PROCEDURE — 85027 COMPLETE CBC AUTOMATED: CPT | Performed by: SURGERY

## 2024-10-24 PROCEDURE — NC001 PR NO CHARGE: Performed by: STUDENT IN AN ORGANIZED HEALTH CARE EDUCATION/TRAINING PROGRAM

## 2024-10-24 PROCEDURE — 97535 SELF CARE MNGMENT TRAINING: CPT

## 2024-10-24 PROCEDURE — 99232 SBSQ HOSP IP/OBS MODERATE 35: CPT | Performed by: FAMILY MEDICINE

## 2024-10-24 PROCEDURE — 51702 INSERT TEMP BLADDER CATH: CPT | Performed by: PHYSICIAN ASSISTANT

## 2024-10-24 RX ORDER — ASPIRIN 81 MG/1
TABLET ORAL
Status: COMPLETED
Start: 2024-10-24 | End: 2024-10-24

## 2024-10-24 RX ORDER — INSULIN GLARGINE 100 [IU]/ML
40 INJECTION, SOLUTION SUBCUTANEOUS EVERY MORNING
Status: DISCONTINUED | OUTPATIENT
Start: 2024-10-25 | End: 2024-10-26

## 2024-10-24 RX ORDER — ONDANSETRON 2 MG/ML
4 INJECTION INTRAMUSCULAR; INTRAVENOUS ONCE
Status: COMPLETED | OUTPATIENT
Start: 2024-10-24 | End: 2024-10-24

## 2024-10-24 RX ADMIN — ONDANSETRON 4 MG: 2 INJECTION INTRAMUSCULAR; INTRAVENOUS at 13:14

## 2024-10-24 RX ADMIN — METRONIDAZOLE 500 MG: 500 TABLET ORAL at 21:13

## 2024-10-24 RX ADMIN — ASPIRIN 81 MG: 81 TABLET, COATED ORAL at 08:51

## 2024-10-24 RX ADMIN — OXYCODONE HYDROCHLORIDE 10 MG: 10 TABLET ORAL at 15:00

## 2024-10-24 RX ADMIN — GUAIFENESIN 600 MG: 600 TABLET, EXTENDED RELEASE ORAL at 21:13

## 2024-10-24 RX ADMIN — METRONIDAZOLE 500 MG: 500 TABLET ORAL at 08:48

## 2024-10-24 RX ADMIN — DOCUSATE SODIUM 100 MG: 100 CAPSULE, LIQUID FILLED ORAL at 08:36

## 2024-10-24 RX ADMIN — INSULIN LISPRO 1 UNITS: 100 INJECTION, SOLUTION INTRAVENOUS; SUBCUTANEOUS at 21:09

## 2024-10-24 RX ADMIN — RIVAROXABAN 2.5 MG: 2.5 TABLET, FILM COATED ORAL at 21:14

## 2024-10-24 RX ADMIN — OXYCODONE HYDROCHLORIDE 10 MG: 10 TABLET ORAL at 06:52

## 2024-10-24 RX ADMIN — HYDROMORPHONE HYDROCHLORIDE 0.5 MG: 1 INJECTION, SOLUTION INTRAMUSCULAR; INTRAVENOUS; SUBCUTANEOUS at 21:04

## 2024-10-24 RX ADMIN — ACETAMINOPHEN 975 MG: 325 TABLET, FILM COATED ORAL at 21:09

## 2024-10-24 RX ADMIN — ONDANSETRON 4 MG: 2 INJECTION INTRAMUSCULAR; INTRAVENOUS at 09:05

## 2024-10-24 RX ADMIN — LIDOCAINE 1 PATCH: 50 PATCH TOPICAL at 08:36

## 2024-10-24 RX ADMIN — INSULIN LISPRO 1 UNITS: 100 INJECTION, SOLUTION INTRAVENOUS; SUBCUTANEOUS at 06:52

## 2024-10-24 RX ADMIN — OXYCODONE HYDROCHLORIDE 10 MG: 10 TABLET ORAL at 02:34

## 2024-10-24 RX ADMIN — NICOTINE 21 MG: 21 PATCH, EXTENDED RELEASE TRANSDERMAL at 08:36

## 2024-10-24 RX ADMIN — CHLORHEXIDINE GLUCONATE 0.12% ORAL RINSE 15 ML: 1.2 LIQUID ORAL at 08:36

## 2024-10-24 RX ADMIN — OXYCODONE HYDROCHLORIDE 10 MG: 10 TABLET ORAL at 20:27

## 2024-10-24 RX ADMIN — CEFAZOLIN SODIUM 2000 MG: 2 SOLUTION INTRAVENOUS at 00:35

## 2024-10-24 RX ADMIN — HYDROMORPHONE HYDROCHLORIDE 0.5 MG: 1 INJECTION, SOLUTION INTRAMUSCULAR; INTRAVENOUS; SUBCUTANEOUS at 00:35

## 2024-10-24 RX ADMIN — CEFAZOLIN SODIUM 2000 MG: 2 SOLUTION INTRAVENOUS at 16:11

## 2024-10-24 RX ADMIN — INSULIN GLARGINE 35 UNITS: 100 INJECTION, SOLUTION SUBCUTANEOUS at 08:37

## 2024-10-24 RX ADMIN — GUAIFENESIN 600 MG: 600 TABLET, EXTENDED RELEASE ORAL at 08:37

## 2024-10-24 RX ADMIN — ACETAMINOPHEN 975 MG: 325 TABLET, FILM COATED ORAL at 05:37

## 2024-10-24 RX ADMIN — ACETAMINOPHEN 975 MG: 325 TABLET, FILM COATED ORAL at 13:19

## 2024-10-24 RX ADMIN — INSULIN LISPRO 18 UNITS: 100 INJECTION, SOLUTION INTRAVENOUS; SUBCUTANEOUS at 06:53

## 2024-10-24 RX ADMIN — INSULIN LISPRO 1 UNITS: 100 INJECTION, SOLUTION INTRAVENOUS; SUBCUTANEOUS at 11:08

## 2024-10-24 RX ADMIN — ATORVASTATIN CALCIUM 80 MG: 80 TABLET, FILM COATED ORAL at 16:14

## 2024-10-24 RX ADMIN — CEFAZOLIN SODIUM 2000 MG: 2 SOLUTION INTRAVENOUS at 08:52

## 2024-10-24 RX ADMIN — RIVAROXABAN 2.5 MG: 2.5 TABLET, FILM COATED ORAL at 14:30

## 2024-10-24 RX ADMIN — CALCIUM CARBONATE (ANTACID) CHEW TAB 500 MG 1000 MG: 500 CHEW TAB at 21:00

## 2024-10-24 RX ADMIN — DOCUSATE SODIUM 100 MG: 100 CAPSULE, LIQUID FILLED ORAL at 16:59

## 2024-10-24 RX ADMIN — HEPARIN SODIUM 5000 UNITS: 5000 INJECTION, SOLUTION INTRAVENOUS; SUBCUTANEOUS at 05:38

## 2024-10-24 RX ADMIN — OXYCODONE HYDROCHLORIDE 10 MG: 10 TABLET ORAL at 11:07

## 2024-10-24 RX ADMIN — Medication 2000 UNITS: at 08:36

## 2024-10-24 RX ADMIN — CHLORHEXIDINE GLUCONATE 0.12% ORAL RINSE 15 ML: 1.2 LIQUID ORAL at 21:13

## 2024-10-24 RX ADMIN — CALCIUM CARBONATE (ANTACID) CHEW TAB 500 MG 1000 MG: 500 CHEW TAB at 23:14

## 2024-10-24 RX ADMIN — INSULIN LISPRO 10 UNITS: 100 INJECTION, SOLUTION INTRAVENOUS; SUBCUTANEOUS at 11:09

## 2024-10-24 RX ADMIN — ONDANSETRON 4 MG: 2 INJECTION INTRAMUSCULAR; INTRAVENOUS at 21:00

## 2024-10-24 NOTE — PROGRESS NOTES
Vascular Nurse Navigator Post Op Education    Met with patient at bedside to introduce myself as Vascular Nurse Navigator and explained my role.  Patient is appropriate and accepting to education. Patient was educated with Review of written materials provided, Teachback, Explanation, Demonstration, and Question & Answer on expectations of post op care and recovery on L ax-bifem with R CFEA and R CFA-AK pop bypass w/PTFE. Patient is a smoker  (1/2 ppd x 25 yrs), as such Smoking effects on the lungs, tobacco triggers, and Smoking cessation was reviewed. Education provided to patient on infection prevention, activity limitations, when to call the office, importance of follow up, and incisional care.  Discharge instruction handout provided to patient to review.  Provided patient with a pack of disposable washcloths, a pack of guaze, and a bottle of chlorhexidine for incision care upon discharge.        Tobacco use is a significant patient-modifiable risk factor for this patient’s vascular disease with multiple vascular comorbidities, and a significant risk factor for failure of and complications from any endovascular or surgical interventions.    I explained to the patient the effects of smoking including peripheral artery disease, coronary artery disease, cerebrovascular disease as well as cancer and chronic obstructive pulmonary disease. I asked the patient to stop smoking immediately. It is never too late to quit, and many studies show significant health benefits as well as economical savings after smoking cessation. I offered to the patient nicotine replacement therapy as well as referral to the smoking cessation program and access to the quit line 7-747-STYCN[facility] or ambulatory referral to our network smoking cessation program.    Based on our conversation, this patient appears motivated to quit  And declined my offer of nicotine replacement or tobacco cessation medications    The patient set a quit date of 10/10/24  .    I spent approximately 5 minutes on tobacco cessation counseling with this patient.

## 2024-10-24 NOTE — PROGRESS NOTES
Podiatry - Progress Note  Patient: Sabiha Garcia 68 y.o. female   MRN: [id number]  PCP: Alexys Blunt MD  Unit/Bed#: [facility] 504-01 Encounter: [id number]  Date Of Visit: 10/24/24    ASSESSMENT:    Sabiha Garcia is a 68 y.o. female with:    [place] 4 DFU Right fifth digit  Cellulitis right foot  Uncontrolled type 2 diabetes mellitus   A1c of 14.8% (9/18/24)  PAD  S/p bypass 10/23/24  Smoking history  Onychomycosis      PLAN:    Patient was seen and evaluated at bedside with all questions and concerns addressed.  Finished complicated vascular bypass surgery yesterday and was clearance done for podiatric surgery from the vascular surgical team.  Dressing was changed. The current dressing consists of betadine, Adaptic, 4 x 4, and Brandy.   Right fifth toe gangrene is showing mild maceration from the base of the toe with serous drainage.  Please see physical examination part for detail.  Patients' lab and vital signs were reviewed. Patient is afebrile with  leukocytosis, consistent with postsurgical day 1. Patient does not  meet the SIRS criteria. Will keep monitoring.  Continue IV Ancef and Flagyl per recommendation from ID  Follow-up with  [facility], vascular surgery, ID, PT, OT, CM recommendations. Surgical clearance was granted from [facility].   Patient will proceed to right partial fifth ray amputation surgery. Patient denies to have surgery tomorrow because of pain. Will talk to the patient tomorrow and possibly book surgery 10/26-10/27. Consent signed and placed in OR  Elevation and offloading on green foam wedges or pillows when non-ambulatory.     Antibiotics started: Ancef and metronidazole  Pharmacologic VTE Prophylaxis: Heparin   Mechanical VTE Prophylaxis: sequential compression device   Weightbearing status: Weightbearing as tolerated    Disposition: Patient will stay in hospital for more than 2 overnights for the reasons above    SUBJECTIVE:     The patient was seen, evaluated, and assessed at bedside today. The  "patient was awake, alert, and in no acute distress. No acute events overnight. The patient reports no pain from the right foot. Patient denies N/V/F/chills/SOB/CP.      OBJECTIVE:     Vitals:   BP (!) 98/47   Pulse 95   Temp 99.2 °F (37.3 °C)   Resp 20   Ht 5' 9\" (1.753 m)   Wt 58.5 kg (128 lb 15.5 oz)   SpO2 96%   BMI 19.05 kg/m²     Temp (24hrs), Av °F (36.7 °C), Min:97.3 °F (36.3 °C), Max:99.2 °F (37.3 °C)      Physical Exam:     Lungs: Non labored breathing  Abdomen: Soft, non-tender.  Lower Extremity:  Cardiovascular status at baseline from admission.  Neurological status at baseline from admission.  Musculoskeletal status at baseline from admission. No calf tenderness noted.       Right lower limb shows full dry gangrene of the right fifth toe, wound around the base of the fifth toe.  The periwound shows maceration with no sign of infection.     Wound #: 1  Location: Base of the fifth toe  Deepest Tissue Noted in Base: Subcutaneous  Probe to Bone: No  Peripheral Skin Description: Attached  Granulation: 90% Fibrotic Tissue: 10% Necrotic Tissue: 0%   Drainage Amount: minimal, serous  Signs of Infection: No. negative probe to bone, negative crepitus, fluctuance, negative purulence, negative periwound skin erythema, edema, negative proximal tracking erythema    Clinical Images 10/24/24:                Additional Data:     Labs:    Results from last 7 days   Lab Units 10/24/24  0541 10/23/24  0526 10/22/24  0610 10/19/24  0901 10/17/24  1454   WBC Thousand/uL 15.14*   < > 12.67*   < > 11.95*   HEMOGLOBIN g/dL 7.9*   < > 9.2*   < > 9.8*   HEMATOCRIT % 25.2*   < > 29.3*   < > 31.3*   PLATELETS Thousands/uL 324   < > 348   < > 366   SEGS PCT %  --   --   --   --  60   LYMPHO PCT %  --   --  24  --  29   MONO PCT %  --   --  3*  --  7   EOS PCT %  --   --  0  --  2    < > = values in this interval not displayed.     Results from last 7 days   Lab Units 10/24/24  0541   POTASSIUM mmol/L 4.9   CHLORIDE mmol/L " "106   CO2 mmol/L 24   BUN mg/dL 11   CREATININE mg/dL 0.60   CALCIUM mg/dL 7.0*     Results from last 7 days   Lab Units 10/19/24  0901   INR  0.94       * I Have Reviewed All Lab Data Listed Above.    Recent Cultures (last 7 days):               Imaging: I have personally reviewed pertinent films in PACS  EKG, Pathology, and Other Studies: I have personally reviewed pertinent reports.    ** Please Note: Portions of the record may have been created with voice recognition software. Occasional wrong word or \"sound a like\" substitutions may have occurred due to the inherent limitations of voice recognition software. Read the chart carefully and recognize, using context, where substitutions have occurred. **     "

## 2024-10-24 NOTE — PROGRESS NOTES
Progress Note - Endocrinology   Name: Sabiha Garcia 68 y.o. female I MRN: [id number]  Unit/Bed#: [facility] 504-01 I Date of Admission: 10/11/2024   Date of Service: 10/24/2024 I Hospital Day: 13    Assessment & Plan  Type 2 diabetes mellitus (HCC)  Lab Results   Component Value Date    HGBA1C 14.8 (H) 09/18/2024     Recent Labs     10/23/24  1715 10/23/24  2028 10/24/24  0631 10/24/24  1026   POCGLU 176* 229* 193* 176*   Blood Sugar Average: Last 72 hrs:  (P) 182.375    -HBA1C 14.8 -uncontrolled type II diabetic  -Prescribed insulin Tresiba 10-20 units at home.  Has not been taking it for almost weeks.  -On presentation, reported baseline of visual and auditory deficits, limiting her use of insulin pen as well  -On 10/23 underwent vascular surgery- L ax-bifem with R CFEA and R CFA-AK pop bypass w/PTFE   -On review, fingerstick glucose above goal    -Increase glargine to 40 units, continue daily  -Continue lispro 18- 10 -18 prior to meals  -Correctional scale  -Diabetic diet-no juice  - Hypoglycemia protocol  -Goal glucose is 110-180  -Endocrinology will continue to follow and make adjustments  Gangrene of toe of right foot (HCC)  Followed by podiatry, infectious disease  On antibiotic therapy  Aortoiliac occlusive disease (HCC)  Continue statin therapy  Vascular surgery following-s/p surgical intervention pop bypass    24 Hour Events : While fasting yesterday, hide above goal sugars and this morning sugars remain above goal.  Subjective : Patient reported experiencing significant pain.  Otherwise, denies any other acute concerns.    Objective :  Temp:  [97.3 °F (36.3 °C)-99.2 °F (37.3 °C)] 99.2 °F (37.3 °C)  HR:  [71-95] 95  BP: ()/(46-60) 98/47  Resp:  [12-23] 20  SpO2:  [95 %-100 %] 96 %  O2 Device: None (Room air)  Nasal Cannula O2 Flow Rate (L/min):  [2 L/min] 2 L/min    Physical Exam    Lab Results: I have reviewed the following results:CBC/BMP:   .     10/24/24  0541   WBC 15.14*   HGB 7.9*   HCT 25.2*   PLT  324   SODIUM 135   K 4.9      CO2 24   BUN 11   CREATININE 0.60   GLUC 185*    , LFTs: No new results in last 24 hours. , Lipid Profile:       Imaging Results Review: No pertinent imaging studies reviewed.  Other Study Results Review: No additional pertinent studies reviewed.      Herminio Caldera MD  Endocrinology fellow, PGY-4

## 2024-10-24 NOTE — PLAN OF CARE
Problem: PHYSICAL THERAPY ADULT  Goal: Performs mobility at highest level of function for planned discharge setting.  See evaluation for individualized goals.  Description: Treatment/Interventions: Functional transfer training, LE strengthening/ROM, Therapeutic exercise, Endurance training, Cognitive reorientation, Patient/family training, Bed mobility, Gait training  Equipment Recommended: Walker       See flowsheet documentation for full assessment, interventions and recommendations.  Outcome: Progressing  Note: Prognosis: Good  Problem List: Decreased endurance, Decreased strength, Impaired balance, Decreased mobility, Pain, Decreased skin integrity  Assessment: PT initiated treatment session in order to assist patient in achieving goals to improve transfers, gait training, and overall activity tolerance. Patient demonstrated progress toward achieving functional mobility goals as evidenced by improving activity tolerance, and overall mobility. Patient pleasant, cooperative, and agreeable to today's treatment session. Patient received supine in bed. Patient is currently ModAx1 bed mobility, ModAx2 transfers, and ambulation. Throughout treatment session patient required both verbal and tactile cuing to improve safety, efficiency, and mechanics of mobility in addition to hands on assistance for all aspects of functional mobility. Additionally, pt required increased time to execute specific mobility tasks with rest breaks in between secondary to gross fatigue and weakness. Throughout treatment session, patient performed x4 STS with use of RW, requiring increased assistance due to pain. Patient able to maintain static standing balance ~3 mins prior to requiring a seated rest break. Patient able to take 2 small steps towards head of bed, further ambulation limited due to increased pain levels, generalized LE weakness, fatigue, nausea, and intermittent dizziness. Patient performed stand pivot transfer to bedside chair  with drop arm, requiring ModAx2. Patient left OOB in bedside chair with alarm and all needs met. Patient will benefit from continued skilled physical therapy to address gait / balance dysfunction, decreased activity tolerance, and generalized weakness. The patients AM-PAC Basic Mobility Inpatient Short From Raw Score is 11 .  Based on AM-PAC scoring and patient presentation, PT currently recommending Level II (Moderate Resource Intensity). Please also refer to the recommendation of the Physical Therapist for safe discharge planning.  Barriers to Discharge: Decreased caregiver support     Rehab Resource Intensity Level, PT: II (Moderate Resource Intensity)    See flowsheet documentation for full assessment.

## 2024-10-24 NOTE — PROGRESS NOTES
Progress Note - Vascular Surgery   Name: Sabiha Garcia 68 y.o. female I MRN: [id number]  Unit/Bed#: [facility] 504-01 I Date of Admission: 10/11/2024   Date of Service: 10/24/2024 I Hospital Day: 13   Assessment & Plan  Aortoiliac occlusive disease (HCC)  68-year-old female current smoker with history of uncontrolled T2 DM (HgA1c 14.8), HLD, tobacco use, lung nodule, hx of recurrent falls and hx of L femoral shaft fx s/p repair in 2020 arrived in the ED 10/11 with generalized weakness s/p falls, admitted for management of hyperosmolar hyperglycemia with associated metabolic acidosis with concern for DKA vs starvation ketosis. Patient with noted non-healing R 5th digit gangrenous wound, vascular surgery consulted for abnormal ABIs concern for contributory PAD.     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 Digit pressure of 143 mmHg    10/23 L ax-bifem with R CFEA and R CFA-AK pop bypass w/PTFE    Plan  - PO pain control PRN  - Regular diet  - DC crawford  - DC arterial line  - Continue aspirin  - Encourage ambulation, PT/OT, ISB  - Appreciate medicine and Endocrinology teams for assistance with medical management  - Appreciate Podiatry for toe wounds, ok for intervention per them  - Ok for downgrade today    Subjective : POD1 s/p L ax-bifem with R CFEA and R CFA-AK pop bypass w/PTFE. No acute events overnight. Pain is well controlled. Denies N/V, CP, SOB, M/S changes.     Objective :  Temp:  [97.3 °F (36.3 °C)-99.2 °F (37.3 °C)] 99.2 °F (37.3 °C)  HR:  [71-95] 95  BP: ()/(46-60) 118/55  Resp:  [12-23] 20  SpO2:  [95 %-100 %] 96 %  O2  Device: None (Room air)  Nasal Cannula O2 Flow Rate (L/min):  [2 L/min] 2 L/min     I/O         10/22 0701  10/23 0700 10/23 0701  10/24 0700 10/24 0701  10/25 0700    P.O. 1015 480     I.V. (mL/kg)  5000 (85.5)     Blood  700     IV Piggyback  500     Total Intake(mL/kg) 1015 (19.5) 6680 (114.2)     Urine (mL/kg/hr) 450 (0.4) 1715 (1.2)     Stool 0      Blood  400     Total Output 450 2115     Net +565 +4565            Unmeasured Urine Occurrence 1 x      Unmeasured Stool Occurrence 0 x            Lines/Drains/Airways       Active Status       Name Placement date Placement time Site Days    Arterial Line 10/23/24 Right Radial 10/23/24  1526  Radial  less than 1                  Physical Exam  Vitals reviewed.   Constitutional:       General: She is not in acute distress.     Appearance: Normal appearance.   HENT:      Right Ear: External ear normal.      Left Ear: External ear normal.      Nose: Nose normal.      Mouth/Throat:      Mouth: Mucous membranes are moist.   Eyes:      Extraocular Movements: Extraocular movements intact.   Cardiovascular:      Rate and Rhythm: Normal rate.      Comments: R DP/PT signals present. L radial pulse palpable, L ulnar and palmar arch signals present.  Pulmonary:      Effort: Pulmonary effort is normal. No respiratory distress.   Abdominal:      General: There is no distension.      Palpations: Abdomen is soft.      Tenderness: There is no abdominal tenderness.   Genitourinary:     Comments: Monteiro in place with clear yellow urine  Musculoskeletal:         General: Normal range of motion.      Comments: L 5th toe with dressing in place   Skin:     General: Skin is warm.      Coloration: Skin is not pale.      Comments: Bilateral groin, medial thigh, and left infraclavicular incisional dressings c/d/I without appreciable hematoma   Neurological:      General: No focal deficit present.      Mental Status: She is alert and oriented to person, place, and time.      Sensory: No sensory  "deficit.      Motor: No weakness.   Psychiatric:         Mood and Affect: Mood normal.         Thought Content: Thought content normal.             Lab Results: I have reviewed the following results:  CBC with diff:   Lab Results   Component Value Date    WBC 15.14 (H) 10/24/2024    HGB 7.9 (L) 10/24/2024    HCT 25.2 (L) 10/24/2024    MCV 95 10/24/2024     10/24/2024    RBC 2.66 (L) 10/24/2024    MCH 29.7 10/24/2024    MCHC 31.3 (L) 10/24/2024    RDW 16.5 (H) 10/24/2024    MPV 8.7 (L) 10/24/2024    NRBC 0 10/17/2024   ,   BMP/CMP:  Lab Results   Component Value Date    SODIUM 135 10/24/2024    SODIUM 138 09/18/2024    SODIUM 135 04/06/2022    K 4.9 10/24/2024    K 4.6 09/18/2024    K 4.5 04/06/2022     10/24/2024    CL 95 (L) 09/18/2024    CL 98 (L) 04/06/2022    CO2 24 10/24/2024    CO2 33 (H) 09/18/2024    CO2 28 04/06/2022    BUN 11 10/24/2024    BUN 14 09/18/2024    BUN 13 04/06/2022    CREATININE 0.60 10/24/2024    CREATININE 0.86 04/06/2022    CALCIUM 7.0 (L) 10/24/2024    CALCIUM 9.6 09/18/2024    CALCIUM 9.7 04/06/2022    AST 10 (L) 10/11/2024    AST 11 09/18/2024    AST 35 04/06/2022    ALT 9 10/11/2024    ALT 8 09/18/2024    ALT 23 04/06/2022    ALKPHOS 98 10/11/2024    ALKPHOS 120 09/18/2024    ALKPHOS 154 (H) 04/06/2022    EGFR 93 10/24/2024    EGFR 74 04/06/2022    EGFR 75 06/16/2018   ,   Lipid Panel: No results found for: \"CHOL\",   Coags:   Lab Results   Component Value Date    PT 12.0 06/16/2018    PTT 63 (H) 10/23/2024    INR 0.94 10/19/2024    INR 0.9 06/16/2018   ,   Blood Culture:   Lab Results   Component Value Date    BLOODCX No Growth After 5 Days. 10/11/2024    BLOODCX No Growth After 5 Days. 10/11/2024   ,   Urinalysis:   Lab Results   Component Value Date    COLORU Light Yellow 10/11/2024    CLARITYU Clear 10/11/2024    SPECGRAV 1.021 10/11/2024    PHUR 5.5 10/11/2024    LEUKOCYTESUR (A) 10/11/2024     Elevated glucose may cause decreased leukocyte values. See urine microscopic " "for UWBC result    NITRITE Negative 10/11/2024    GLUCOSEU >=1000 (1%) (A) 10/11/2024    KETONESU 60 (2+) (A) 10/11/2024    BILIRUBINUR Negative 10/11/2024    BLOODU Negative 10/11/2024   ,   Urine Culture: No results found for: \"URINECX\",   Wound Culure: No results found for: \"WOUNDCULT\"    VTE Prophylaxis: Heparin  "

## 2024-10-24 NOTE — PHYSICAL THERAPY NOTE
"                                                                                  PHYSICAL THERAPY NOTE          Patient Name: Sabiha Garcia  Today's Date: 10/24/2024     10/24/24 0904   PT Last Visit   PT Visit Date 10/24/24   Note Type   Note Type Treatment   Education   Education Provided Yes   End of Consult   Patient Position at End of Consult Bedside chair;All needs within reach;Bed/Chair alarm activated   Pain Assessment   Pain Assessment Tool 0-10   Pain Score 8   Pain Location/Orientation Orientation: Bilateral;Location: Leg   Pain Onset/Description Onset: Ongoing   Effect of Pain on Daily Activities limits functional mobility   Patient's Stated Pain Goal No pain   Hospital Pain Intervention(s) Repositioned;Emotional support;Rest   Restrictions/Precautions   Weight Bearing Precautions Per Order Yes   RLE Weight Bearing Per Order WBAT   LLE Weight Bearing Per Order WBAT   Braces or Orthoses   (R surgical shoe)   Other Precautions Chair Alarm;Bed Alarm;Cognitive;Fall Risk;Pain   General   Chart Reviewed Yes   Response to Previous Treatment Patient with no complaints from previous session.   Family/Caregiver Present No   Cognition   Overall Cognitive Status WFL   Arousal/Participation Alert;Responsive   Attention Within functional limits   Orientation Level Oriented X4   Memory Decreased recall of precautions   Following Commands Follows one step commands without difficulty   Comments patient pleasant and cooperative, decreased safety awareness, limited due to pain   Subjective   Subjective \"I can't do it\"   Bed Mobility   Supine to Sit 3  Moderate assistance   Additional items Assist x 1;HOB elevated;Bedrails;Increased time required;Verbal cues   Sit to Supine Unable to assess   Additional Comments patient left OOB in bedside chair with alarm and all needs met   Transfers   Sit to Stand 3  Moderate assistance   Additional items Assist x 2;Increased time required;Verbal cues   Stand to Sit 3  Moderate assistance "   Additional items Assist x 2;Increased time required;Verbal cues   Stand pivot 3  Moderate assistance   Additional items Assist x 2;Increased time required;Verbal cues   Additional Comments Throughout treatment session, patient performed x4 STS with use of RW, requiring increased assistance due to pain. Patient able to maintain static standing balance ~3 mins prior to requiring a seated rest break.   Ambulation/Elevation   Gait pattern Decreased foot clearance;Step to;Excessively slow;Antalgic;Short stride   Gait Assistance 3  Moderate assist   Additional items Assist x 2;Verbal cues;Tactile cues   Assistive Device Rolling walker   Distance 2 steps towards head of bed   Ambulation/Elevation Additional Comments Patient able to take 2 small steps towards head of bed, further ambulation limited due to increased pain levels, generalized LE weakness, fatigue, nausea, and intermittent dizziness.   Balance   Static Sitting Fair +   Dynamic Sitting Fair   Static Standing Fair -   Dynamic Standing Poor +   Ambulatory Poor   Endurance Deficit   Endurance Deficit Yes   Endurance Deficit Description generalized LE weakness, pain, fatigue   Activity Tolerance   Activity Tolerance Patient limited by fatigue;Patient limited by pain   Medical Staff Made Aware OT Amaya; Co-treatment with occupational therapy was performed today. This patient's participation in therapy services was limited by decreased tolerance for activity and current medical status. For these reasons, co-treatment was performed so that patient could optimally participate in therapy services and maximize their rehabilitation during treatment time. PT and OT disciplines addressed separate goals of patient's individualized care plan.   Nurse Made Aware RN Cleared   Exercises   Balance training  Patient able to maintain static standing balance ~3 mins prior to requiring a seated rest break.  (EOB sitting ~8 mins, requiring use of b/l UE support to maintain balance)    Assessment   Prognosis Good   Problem List Decreased endurance;Decreased strength;Impaired balance;Decreased mobility;Pain;Decreased skin integrity   Assessment PT initiated treatment session in order to assist patient in achieving goals to improve transfers, gait training, and overall activity tolerance. Patient demonstrated progress toward achieving functional mobility goals as evidenced by improving activity tolerance, and overall mobility. Patient pleasant, cooperative, and agreeable to today's treatment session. Patient received supine in bed. Patient is currently ModAx1 bed mobility, ModAx2 transfers, and ambulation. Throughout treatment session patient required both verbal and tactile cuing to improve safety, efficiency, and mechanics of mobility in addition to hands on assistance for all aspects of functional mobility. Additionally, pt required increased time to execute specific mobility tasks with rest breaks in between secondary to gross fatigue and weakness. Throughout treatment session, patient performed x4 STS with use of RW, requiring increased assistance due to pain. Patient able to maintain static standing balance ~3 mins prior to requiring a seated rest break. Patient able to take 2 small steps towards head of bed, further ambulation limited due to increased pain levels, generalized LE weakness, fatigue, nausea, and intermittent dizziness. Patient performed stand pivot transfer to bedside chair with drop arm, requiring ModAx2. Patient left OOB in bedside chair with alarm and all needs met.       Patient will benefit from continued skilled physical therapy to address gait / balance dysfunction, decreased activity tolerance, and generalized weakness. The patients AM-PAC Basic Mobility Inpatient Short From Raw Score is 11 .  Based on AM-PAC scoring and patient presentation, PT currently recommending Level II (Moderate Resource Intensity). Please also refer to the recommendation of the Physical  Therapist for safe discharge planning.   Barriers to Discharge Decreased caregiver support   Goals   Patient Goals to reduce pain   STG Expiration Date 10/29/24   PT Treatment Day 3   Plan   Treatment/Interventions ADL retraining;Functional transfer training;LE strengthening/ROM;Elevations;Therapeutic exercise;Endurance training;Patient/family training;Gait training;Spoke to nursing;Bed mobility;OT   Progress Progressing toward goals   PT Frequency 2-3x/wk   Discharge Recommendation   Rehab Resource Intensity Level, PT II (Moderate Resource Intensity)   Equipment Recommended Walker   Walker Package Recommended Wheeled walker   Change/add to Walker Package? No   AM-PAC Basic Mobility Inpatient   Turning in Flat Bed Without Bedrails 3   Lying on Back to Sitting on Edge of Flat Bed Without Bedrails 2   Moving Bed to Chair 2   Standing Up From Chair Using Arms 2   Walk in Room 1   Climb 3-5 Stairs With Railing 1   Basic Mobility Inpatient Raw Score 11   Basic Mobility Standardized Score 30.25   [facility] Highest Level Of Mobility   -[facility] Goal 4: Move to chair/commode   -HLM Achieved 5: Stand (1 or more minutes)   Education   Education Provided Mobility training;Assistive device   Patient Demonstrates acceptance/verbal understanding   End of Consult   Patient Position at End of Consult Bedside chair;Bed/Chair alarm activated;All needs within reach     Radha Emery PT, DPT

## 2024-10-24 NOTE — ASSESSMENT & PLAN NOTE
Right fifth digit dry gangrene with surround cellulitis  Xray - Subtle periosteal reaction of the fifth digit proximal phalanx with adjacent soft tissue swelling. Findings raise suspicion for osteomyelitis   LEADS abnormal - vascular consulted  Podiatry following - plan after vascular optimization  Wound care with Betadine paint open to air  Discussed with infectious disease.  Continue with antibiotic therapy.  Patient had revascularization done on October 23, 2024  Patient is a high risk patient for a moderate risk surgery (amputation).  Patient is medically cleared and optimized to proceed with planned surgery by podiatry.

## 2024-10-24 NOTE — ASSESSMENT & PLAN NOTE
Gangrene of the right fifth digit with surrounding ischemic changes and concomitant localized cellulitis.  Stabilized to decreased erythema with the IV antibiotics.  Patient status post revascularization as above.  -Antibiotics as above   -Serial exams   -Await podiatry intervention

## 2024-10-24 NOTE — ASSESSMENT & PLAN NOTE
68-year-old female current smoker with history of uncontrolled T2 DM (HgA1c 14.8), HLD, tobacco use, lung nodule, hx of recurrent falls and hx of L femoral shaft fx s/p repair in 2020 arrived in the ED 10/11 with generalized weakness s/p falls, admitted for management of hyperosmolar hyperglycemia with associated metabolic acidosis with concern for DKA vs starvation ketosis. Patient with noted non-healing R 5th digit gangrenous wound, vascular surgery consulted for abnormal ABIs concern for contributory PAD.     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 Digit pressure of 143 mmHg    10/23 L ax-bifem with R CFEA and R CFA-AK pop bypass w/PTFE    Plan  - PO pain control PRN  - Regular diet  - DC crawford  - DC arterial line  - Continue aspirin  - Encourage ambulation, PT/OT, ISB  - Appreciate medicine and Endocrinology teams for assistance with medical management  - Appreciate Podiatry for toe wounds, ok for intervention per them  - Ok for downgrade today

## 2024-10-24 NOTE — PROGRESS NOTES
Progress Note - Hospitalist   Name: Sabiha Garcia 68 y.o. female I MRN: [id number]  Unit/Bed#: [facility] 504-01 I Date of Admission: 10/11/2024   Date of Service: 10/24/2024 I Hospital Day: 13    Assessment & Plan  Gangrene of toe of right foot (HCC)  Right fifth digit dry gangrene with surround cellulitis  Xray - Subtle periosteal reaction of the fifth digit proximal phalanx with adjacent soft tissue swelling. Findings raise suspicion for osteomyelitis   LEADS abnormal - vascular consulted  Podiatry following - plan after vascular optimization  Wound care with Betadine paint open to air  Discussed with infectious disease.  Continue with antibiotic therapy.  Patient had revascularization done on October 23, 2024  Patient is a high risk patient for a moderate risk surgery (amputation).  Patient is medically cleared and optimized to proceed with planned surgery by podiatry.  Cellulitis of right foot  Continue Ancef  Type 2 diabetes mellitus (HCC)  Lab Results   Component Value Date    HGBA1C 14.8 (H) 09/18/2024       Recent Labs     10/23/24  1715 10/23/24  2028 10/24/24  0631 10/24/24  1026   POCGLU 176* 229* 193* 176*       Blood Sugar Average: Last 72 hrs:  (P) 182.375    Severely uncontrolled as evidenced by HbA1c  On Levemir at home  Possible DKA on arrival.  Stable.  Continue with insulin regimen.  Lantus 25 in am, Humalog 10 TID,   Ct sliding scale insulin  Endocrine input appreciated     Ambulatory dysfunction  Presented with generalized weakness, 2 falls with no head strike  States had been down for prolonged period  CT head - no acute pathology  TSH low with normal free T4, CK normal  PT/OT  Tobacco user  Nicotine patch  Smoking cessation advised  Vitamin D deficiency  Continue supplementation  Lung nodule  CT chest with 3 mm right upper lobe noncalcified nodule  CT chest without contrast in 12 months  Aortoiliac occlusive disease (HCC)  Vascular surgery consulted  Patient is status post bypass on October 23,  2024    VTE Pharmacologic Prophylaxis: VTE Score: 3 Moderate Risk (Score 3-4) - Pharmacological DVT Prophylaxis Ordered: heparin.    Mobility:   Basic Mobility Inpatient Raw Score: 11  JH-HLM Goal: 4: Move to chair/commode  JH-HLM Achieved: 5: Stand (1 or more minutes)  JH-HLM Goal NOT achieved. Continue with multidisciplinary rounding and encourage appropriate mobility to improve upon JH-HLM goals.    Patient Centered Rounds: I performed bedside rounds with nursing staff today.   Discussions with Specialists or Other Care Team Provider: Vascular surgery, podiatry    Education and Discussions with Family / Patient: Patient declined call to .     Current Length of Stay: 13 day(s)  Current Patient Status: Inpatient   Certification Statement: The patient will continue to require additional inpatient hospital stay due to patient requires amputation  Discharge Plan: Anticipate discharge in 24-48 hrs to rehab facility.    Code Status: Level 1 - Full Code    Subjective   This is a very pleasant 68-year-old female was seen evaluated today at bedside.  Patient is status post revascularization.  Patient feels sore after yesterday surgery however states that the pain is well-controlled with medication.    Objective :  Temp:  [97.3 °F (36.3 °C)-99.2 °F (37.3 °C)] 99.2 °F (37.3 °C)  HR:  [71-95] 95  BP: ()/(46-60) 98/47  Resp:  [12-23] 20  SpO2:  [95 %-100 %] 96 %  O2 Device: None (Room air)  Nasal Cannula O2 Flow Rate (L/min):  [2 L/min] 2 L/min    Body mass index is 19.05 kg/m².     Input and Output Summary (last 24 hours):     Intake/Output Summary (Last 24 hours) at 10/24/2024 1433  Last data filed at 10/24/2024 1301  Gross per 24 hour   Intake 2217 ml   Output 790 ml   Net 1427 ml       Physical Exam  Vitals reviewed.   Constitutional:       General: She is not in acute distress.     Appearance: She is not ill-appearing.   HENT:      Head: Normocephalic.   Eyes:      Conjunctiva/sclera: Conjunctivae  normal.   Cardiovascular:      Rate and Rhythm: Normal rate and regular rhythm.   Pulmonary:      Effort: Pulmonary effort is normal.   Abdominal:      General: Abdomen is flat.      Palpations: Abdomen is soft.      Tenderness: There is no abdominal tenderness.   Musculoskeletal:         General: Signs of injury present.   Skin:     General: Skin is warm and dry.      Findings: Lesion present.   Neurological:      Mental Status: She is alert. Mental status is at baseline.           Lines/Drains:                Lab Results: I have reviewed the following results:   Results from last 7 days   Lab Units 10/24/24  0541 10/23/24  0526 10/22/24  0610 10/19/24  0901 10/17/24  1454   WBC Thousand/uL 15.14*   < > 12.67*   < > 11.95*   HEMOGLOBIN g/dL 7.9*   < > 9.2*   < > 9.8*   HEMATOCRIT % 25.2*   < > 29.3*   < > 31.3*   PLATELETS Thousands/uL 324   < > 348   < > 366   BANDS PCT %  --   --  4  --   --    SEGS PCT %  --   --   --   --  60   LYMPHO PCT %  --   --  24  --  29   MONO PCT %  --   --  3*  --  7   EOS PCT %  --   --  0  --  2    < > = values in this interval not displayed.     Results from last 7 days   Lab Units 10/24/24  0541   SODIUM mmol/L 135   POTASSIUM mmol/L 4.9   CHLORIDE mmol/L 106   CO2 mmol/L 24   BUN mg/dL 11   CREATININE mg/dL 0.60   ANION GAP mmol/L 5   CALCIUM mg/dL 7.0*   GLUCOSE RANDOM mg/dL 185*     Results from last 7 days   Lab Units 10/19/24  0901   INR  0.94     Results from last 7 days   Lab Units 10/24/24  1026 10/24/24  0631 10/23/24  2028 10/23/24  1715 10/23/24  1534 10/23/24  1439 10/23/24  1356 10/23/24  1332 10/22/24  2129 10/22/24  1617 10/22/24  1057 10/22/24  0708   POC GLUCOSE mg/dl 176* 193* 229* 176* 171* 193* 201* 201* 239* 171* 148* 179*               Recent Cultures (last 7 days):         Imaging Results Review: No pertinent imaging studies reviewed.  Other Study Results Review: No additional pertinent studies reviewed.    Last 24 Hours Medication List:     Current  Facility-Administered Medications:     acetaminophen (TYLENOL) tablet 975 mg, Q8H JUAN    aspirin (ECOTRIN LOW STRENGTH) EC tablet 81 mg, Daily    atorvastatin (LIPITOR) tablet 80 mg, Daily With Dinner    calcium carbonate (TUMS) chewable tablet 1,000 mg, TID PRN    ceFAZolin (ANCEF) IVPB (premix in dextrose) 2,000 mg 50 mL, Q8H, Last Rate: Stopped (10/24/24 6722)    chlorhexidine (PERIDEX) 0.12 % oral rinse 15 mL, Q12H JUAN    Cholecalciferol (VITAMIN D3) tablet 2,000 Units, Daily    dextromethorphan-guaiFENesin (ROBITUSSIN DM) oral syrup 10 mL, Q6H PRN    docusate sodium (COLACE) capsule 100 mg, BID    guaiFENesin (MUCINEX) 12 hr tablet 600 mg, Q12H JUAN    HYDROmorphone (DILAUDID) injection 0.5 mg, Q4H PRN    [START ON 10/25/2024] insulin glargine (LANTUS) subcutaneous injection 40 Units 0.4 mL, QAM    insulin lispro (HumALOG/ADMELOG) 100 units/mL subcutaneous injection 1-5 Units, TID AC **AND** Fingerstick Glucose (POCT), 4x Daily AC and at bedtime    insulin lispro (HumALOG/ADMELOG) 100 units/mL subcutaneous injection 1-5 Units, HS    insulin lispro (HumALOG/ADMELOG) 100 units/mL subcutaneous injection 10 Units, Daily With Lunch    insulin lispro (HumALOG/ADMELOG) 100 units/mL subcutaneous injection 18 Units, Daily With Breakfast    insulin lispro (HumALOG/ADMELOG) 100 units/mL subcutaneous injection 18 Units, Daily With Dinner    lidocaine (LIDODERM) 5 % patch 1 patch, Daily    metroNIDAZOLE (FLAGYL) tablet 500 mg, Q12H JUAN    nicotine (NICODERM CQ) 21 mg/24 hr TD 24 hr patch 21 mg, Daily    ondansetron (ZOFRAN) injection 4 mg, Q6H PRN    oxyCODONE (ROXICODONE) immediate release tablet 10 mg, Q4H PRN    oxyCODONE (ROXICODONE) IR tablet 5 mg, Q4H PRN    phenol (CHLORASEPTIC) 1.4 % mucosal liquid 1 spray, Q2H PRN    polyethylene glycol (MIRALAX) packet 17 g, Daily PRN    rivaroxaban (XARELTO) tablet 2.5 mg, BID    Administrative Statements   Today, Patient Was Seen By: Jose Spaulding MD  I have spent a total  time of 45 minutes in caring for this patient on the day of the visit/encounter including Diagnostic results, Prognosis, Patient and family education, Importance of tx compliance, Reviewing / ordering tests, medicine, procedures  , and Communicating with other healthcare professionals .    **Please Note: This note may have been constructed using a voice recognition system.**

## 2024-10-24 NOTE — OCCUPATIONAL THERAPY NOTE
Occupational Therapy Progress Note     Patient Name: Sabiha Garcia  Today's Date: 10/24/2024  Problem List  Principal Problem:    Aortoiliac occlusive disease (HCC)  Active Problems:    Type 2 diabetes mellitus (HCC)    Tobacco user    Gangrene of toe of right foot (HCC)    Ambulatory dysfunction    Vitamin D deficiency    Lung nodule    Cellulitis of right foot    Other headache syndrome    Pre-operative cardiovascular examination    Moderate protein-calorie malnutrition (HCC)           10/24/24 0903   OT Last Visit   OT Visit Date 10/24/24   Note Type   Note Type Treatment   Pain Assessment   Pain Assessment Tool 0-10   Pain Score 8   Pain Location/Orientation Orientation: Bilateral;Location: Leg   Patient's Stated Pain Goal No pain   Hospital Pain Intervention(s) Repositioned;Ambulation/increased activity;Emotional support   Restrictions/Precautions   Weight Bearing Precautions Per Order Yes   RLE Weight Bearing Per Order WBAT   LLE Weight Bearing Per Order WBAT   Braces or Orthoses   (r sx shoe)   Other Precautions Chair Alarm;Bed Alarm;Multiple lines;Telemetry;Fall Risk;Pain   ADL   Where Assessed Edge of bed   UB Bathing Assistance 4  Minimal Assistance   UB Bathing Deficit Steadying;Setup;Increased time to complete;Chest;Right arm;Left arm;Supervision/safety;Abdomen   UB Bathing Comments a provided for washing both UEs and underarms, pt required min a to maintain midline   LB Bathing Assistance 3  Moderate Assistance   LB Bathing Deficit Setup;Increased time to complete;Perineal area;Buttocks;Left upper leg;Right upper leg;Right lower leg including foot;Left lower leg including foot   LB Bathing Comments mod a for lower le and perineal area   UB Dressing Assistance 5  Supervision/Setup   UB Dressing Deficit Increased time to complete;Thread RUE;Thread LUE   LB Dressing Assistance 3  Moderate Assistance   LB Dressing Deficit Don/doff L sock   Bed Mobility   Supine to Sit 3  Moderate assistance   Additional  items Assist x 1;Increased time required;Verbal cues;LE management   Additional Comments found in bed, left in chair w all needs in reach and alarm on.   Transfers   Sit to Stand 3  Moderate assistance   Additional items Assist x 2;Increased time required;Verbal cues   Stand to Sit 3  Moderate assistance   Additional items Assist x 2;Increased time required;Verbal cues   Stand pivot 3  Moderate assistance   Additional items Assist x 2;Increased time required;Verbal cues   Additional Comments c rw and HHA 2' pain. pt w difficulty achieving full stance 2' groin pain. ultimately, stand pivot was performed to bedside chair (drop arm)   Functional Mobility   Functional Mobility 3  Moderate assistance   Additional Comments ax2, pt only able to take 1-2 steps to bedside chair 2' groin pain   Additional items Hand hold assistance   Cognition   Overall Cognitive Status WFL   Arousal/Participation Alert;Responsive;Cooperative   Attention Within functional limits   Orientation Level Oriented X4   Memory Within functional limits   Following Commands Follows all commands and directions without difficulty   Comments pt cooperative w overall fair safety awareness and insight to condition t/o session.   Activity Tolerance   Activity Tolerance Patient limited by pain   Medical Staff Made Aware dpt 2' pts med complexity, comorbidities and regression from baseline.   Assessment   Assessment Pt seen on this date for OT session focusing on ADL retraining, body mechanics, transfer retraining, increasing activity tolerance/endurance and EOB sitting to increase ability to participate in ADL/functional tasks. Pt was found in bed and was left in chair w/ all needs within reach, chair alarm on. Pt completed bed mob w mod ax1, sat eob w min ax1 while completing adls. Ub completed w min a for bathing, s for dressing. Lb adl w mod a overall. Pt completed sts and transfers w mod ax2, initially c rw, however as session progressed, pt's pain also  progressed and she ultimately required mod ax2 b/l hha and knee blocking to stand pivot to drop arm chair. Pt w/ improvements in activity tolerance, transfer ability, endurance , however is still limited 2* decreased ADL/High-level ADL status, decreased activity tolerance/endurance, decreased self-care trans, decreased safety awareness.   The patient's raw score on the AM-PAC Daily Activity Inpatient Short Form is 18. A raw score of less than 19 suggests the patient may benefit from discharge to post-acute rehabilitation services. Please refer to the recommendation of the Occupational Therapist for safe discharge planning.   Recommending pt D/C to level 2  when medically stable. Pt will continue to benefit from acute OT services to meet goals.   Plan   Treatment Interventions ADL retraining;Functional transfer training;Endurance training;Patient/family training;Energy conservation;Activityengagement;Compensatory technique education   Goal Expiration Date 10/29/24   OT Treatment Day 2   OT Frequency 2-3x/wk   Discharge Recommendation   Rehab Resource Intensity Level, OT II (Moderate Resource Intensity)   AM-PAC Daily Activity Inpatient   Lower Body Dressing 2   Bathing 2   Toileting 3   Upper Body Dressing 3   Grooming 4   Eating 4   Daily Activity Raw Score 18   Daily Activity Standardized Score (Calc for Raw Score >=11) 38.66   AM-PAC Applied Cognition Inpatient   Following a Speech/Presentation 4   Understanding Ordinary Conversation 4   Taking Medications 4   Remembering Where Things Are Placed or Put Away 4   Remembering List of 4-5 Errands 4   Taking Care of Complicated Tasks 4   Applied Cognition Raw Score 24   Applied Cognition Standardized Score 62.21   Modified [place] Scale   Modified [place] Scale 4   End of Consult   Education Provided Yes   Patient Position at End of Consult Bedside chair;Bed/Chair alarm activated;All needs within reach   Nurse Communication Nurse aware of consult       Amaya Vilchis  MOT, OTR/L

## 2024-10-24 NOTE — PROGRESS NOTES
Progress Note - Infectious Disease   Name: Sabiha Garcia 68 y.o. female I MRN: [id number]  Unit/Bed#: [facility] 504-01 I Date of Admission: 10/11/2024   Date of Service: 10/24/2024 I Hospital Day: 13    Assessment & Plan  Gangrene of toe of right foot (HCC)  Dry gangrene of right fifth digit but with surrounding cellulitis. Right foot xray positive for periosteal reaction of the 5th digit proximal phalanx with suspicion for osteomyelitis. Not systemically ill. Some malodorous drainage suggesting the possibility of anaerobes. No history of MRSA.  Slight increase in WBC count noted.  Patient's status postL ax-bifem with R CFEA and R CFA-AK pop bypass w/PTFE on 10/23/2024.  Postop bump in the white count noted.   -Continue intravenous cefazolin and oral Flagyl  -Anticipate ray amputation for eventual surgical cure   -Close podiatry and vascular follow-up  -Recheck CBC with differential and BMP to make sure no developing toxicities  -Local wound care  Cellulitis of right foot  Gangrene of the right fifth digit with surrounding ischemic changes and concomitant localized cellulitis.  Stabilized to decreased erythema with the IV antibiotics.  Patient status post revascularization as above.  -Antibiotics as above   -Serial exams   -Await podiatry intervention  Type 2 diabetes mellitus (HCC)  Lab Results   Component Value Date    HGBA1C 14.8 (H) 09/18/2024       Recent Labs     10/23/24  1534 10/23/24  1715 10/23/24  2028 10/24/24  0631   POCGLU 171* 176* 229* 193*     Blood Sugar Average: Last 72 hrs:  (P) 182.8    With likely diabetic ketoacidosis.  Uncontrolled with hemoglobin A1c of 14.8.  Certainly a risk factor for recurrent infection.  Glucose still poorly controlled  -Tighten diabetic control  Tobacco user  Risk factor for recurrent infection.   -Nicotine patch   -Smoking cessation advised   Ambulatory dysfunction  Presented with generalized weakness and endorses frequent falls without loss of consciousness. Head CT  negative for acute pathology.    -Continue PT/OT  Aortoiliac occlusive disease (HCC)  As seen on CT angiogram.  Patient's status post L ax-bifem with R CFEA and R CFA-AK pop bypass w/PTFE on 10/23/2024  -Close vascular follow-up  Other headache syndrome  Unclear etiology but seems to be bilateral and initially predominate in the frontal region but now it is more posterior.  No other neurologic findings.  CT brain nondiagnostic for source.  Resolved.    I have discussed with the primary service the above plan to continue the cefazolin and Flagyl for now.  The primary service agrees with the plan.    Antibiotics:  Cefazolin 11  Flagyl 11  Postop day 1    Subjective   Patient underwent L ax-bifem with R CFEA and R CFA-AK pop bypass w/PTFE yesterday.  Patient has no fever, chills, sweats; no nausea, vomiting, diarrhea; no cough, shortness of breath; having some postoperative pain. No new symptoms.    Objective :  Temp:  [97.3 °F (36.3 °C)-99.2 °F (37.3 °C)] 99.2 °F (37.3 °C)  HR:  [71-95] 95  BP: ()/(46-60) 98/47  Resp:  [12-23] 20  SpO2:  [95 %-100 %] 96 %  O2 Device: None (Room air)  Nasal Cannula O2 Flow Rate (L/min):  [2 L/min] 2 L/min    General:  No acute distress  Psychiatric:  Awake and alert  Pulmonary:  Normal respiratory excursion without accessory muscle use  Abdomen:  Soft, nontender  Extremities: Right thigh with dressing in place.  Right foot dressed with a dry dressing in place.  Skin:  No rashes      Lab Results: I have reviewed the following results:  Results from last 7 days   Lab Units 10/24/24  0541 10/23/24  1712 10/23/24  0526 10/22/24  0610   WBC Thousand/uL 15.14*  --  12.05* 12.67*   HEMOGLOBIN g/dL 7.9*  --  9.1* 9.2*   PLATELETS Thousands/uL 324 369 355 348     Results from last 7 days   Lab Units 10/24/24  0541 10/23/24  0526 10/22/24  0610   SODIUM mmol/L 135 139 139   POTASSIUM mmol/L 4.9 4.3 4.3   CHLORIDE mmol/L 106 105 105   CO2 mmol/L 24 28 28   BUN mg/dL 11 11 13   CREATININE  mg/dL 0.60 0.46* 0.53*   EGFR ml/min/1.73sq m 93 102 97   CALCIUM mg/dL 7.0* 8.4 8.5

## 2024-10-24 NOTE — DISCHARGE INSTR - AVS FIRST PAGE
DISCHARGE INSTRUCTIONS  LEG/BYPASS SURGERY    ACTIVITY:   Limit your physical activity to walking for the first week and then increase your activity as tolerated.  If you become short of breath or tired, stop and rest.  You may require help with walking or feel more secure with something to lean on.  Walking up steps and normal activities may be resumed as you feel ready.  Most people tire easily for the first few weeks following leg surgery.  This improves as conditioning returns.  Avoid strenuous activity such as vigorous exercise.  Avoid heavy lifting (do not lift more than 15 pounds) for the first four weeks after surgery.  You should not drive a car for at least two weeks following discharge from the hospital and you are off all narcotic pain medication.  You may ride in a car.    DIET:  Resume your normal diet.  Good nutrition is important for healing of your incision.  If you are discharged on narcotics for pain control, continue taking your stool softeners until you are having regular bowel movements.    INCISION:  You should shower daily.  Wash incision daily with soap and water, but do not rub or scrub the incision; rinse thoroughly and pat dry.  You may have stitches or staples to close your incision and it is okay for these to get wet.  Do not bathe in a tub or swim for the first 4 week following surgery or if you have any open wounds.  It is normal to have swelling or discoloration around the incision.   If increasing redness or pain develops, call our office immediately.  Numbness in the region of the incision may occur following the surgery.  This normally improves over six to twelve months.    You may have surgical glue over your incisions. There are stitches present under the skin which will absorb on their own.   The glue is used to cover the access, assist in closure, and prevent contamination. This adhesive will darken and peel away on its own within one to two weeks. Do not pick at it.    If you  have a groin wound/incision, place a clean dry piece of gauze to cover your groin incision to keep incision clean and dry and prevent your skin from sticking together.  Change gauze daily.  You may have staples or stitches at your incisions.  These will be removed at your follow-up appointment or when they are ready to come out.  If you have a dressing over your surgical site, remove this on the second day after surgery.  If you have foot or leg wounds, please follow your podiatrist/wound care doctor's instructions for care.  If any of your incisions are open and require dressing changes, you will be given instructions for your daily incision care. If you are not able to change the dressings, a visiting nurse will be arranged.    DO NOT put any powders, creams, ointments, or lotions on your incision.    LEG SWELLING: Most patients have noticeable leg swelling after leg surgery. This usually improves within a few weeks. If swelling is present, elevate the leg whenever possible. Avoid sitting with the leg hanging down for prolonged periods of time.  Walking is beneficial.  An ACE bandage or support stocking may be helpful, but this should be discussed with your physician prior to use if you have a bypass.    FOLLOW UP STUDIES:  Doppler ultrasound studies are very important for long-term management.  Your surgeon will arrange this at your first postoperative visit.  Repeat studies are then scheduled every three months for the first year and periodically after this.    FOLLOW UP APPOINTMENTS:  Making and keeping follow up appointments and ultrasound tests are important to your recovery.  If you have difficulty making it to or keeping your follow up appointments, call the office.    If you have increased pain, fever >101.5, increased drainage, redness or a bad smell at your surgery site, new coldness/numbness of your arm or leg, please call us immediately and GO directly to the ER.    PLEASE CALL THE OFFICE IF YOU HAVE  ANY QUESTIONS  [phone]  [phone] [address], [address], [place] [address]  [address] [facility], [place] [address]  [address] [facility], [address], [place] [address]  [address] [facility], [place] [address]  [address], [address], [place] [address]  [address] [facility], [address], [place] [address]  [address] [place] [address]  [address] [facility], [address]st [address], [address], [place] [address]  [address], [address], [place] [address]  [address] [facility], [address], [place] [address]

## 2024-10-24 NOTE — PLAN OF CARE
Problem: OCCUPATIONAL THERAPY ADULT  Goal: Performs self-care activities at highest level of function for planned discharge setting.  See evaluation for individualized goals.  Description: Treatment Interventions: ADL retraining, Functional transfer training, UE strengthening/ROM, Endurance training, Patient/family training, Equipment evaluation/education, Compensatory technique education, Continued evaluation, Energy conservation, Activityengagement          See flowsheet documentation for full assessment, interventions and recommendations.   Outcome: Progressing  Note: Limitation: Decreased ADL status, Decreased Safe judgement during ADL, Decreased endurance, Decreased self-care trans, Decreased high-level ADLs  Prognosis: Fair  Assessment: Pt seen on this date for OT session focusing on ADL retraining, body mechanics, transfer retraining, increasing activity tolerance/endurance and EOB sitting to increase ability to participate in ADL/functional tasks. Pt was found in bed and was left in chair w/ all needs within reach, chair alarm on. Pt completed bed mob w mod ax1, sat eob w min ax1 while completing adls. Ub completed w min a for bathing, s for dressing. Lb adl w mod a overall. Pt completed sts and transfers w mod ax2, initially c rw, however as session progressed, pt's pain also progressed and she ultimately required mod ax2 b/l hha and knee blocking to stand pivot to drop arm chair. Pt w/ improvements in activity tolerance, transfer ability, endurance , however is still limited 2* decreased ADL/High-level ADL status, decreased activity tolerance/endurance, decreased self-care trans, decreased safety awareness.   The patient's raw score on the AM-PAC Daily Activity Inpatient Short Form is 18. A raw score of less than 19 suggests the patient may benefit from discharge to post-acute rehabilitation services. Please refer to the recommendation of the Occupational Therapist for safe discharge planning.    Recommending pt D/C to level 2  when medically stable. Pt will continue to benefit from acute OT services to meet goals.     Rehab Resource Intensity Level, OT: II (Moderate Resource Intensity)

## 2024-10-24 NOTE — ASSESSMENT & PLAN NOTE
Dry gangrene of right fifth digit but with surrounding cellulitis. Right foot xray positive for periosteal reaction of the 5th digit proximal phalanx with suspicion for osteomyelitis. Not systemically ill. Some malodorous drainage suggesting the possibility of anaerobes. No history of MRSA.  Slight increase in WBC count noted.  Patient's status postL ax-bifem with R CFEA and R CFA-AK pop bypass w/PTFE on 10/23/2024.  Postop bump in the white count noted.   -Continue intravenous cefazolin and oral Flagyl  -Anticipate ray amputation for eventual surgical cure   -Close podiatry and vascular follow-up  -Recheck CBC with differential and BMP to make sure no developing toxicities  -Local wound care

## 2024-10-24 NOTE — ASSESSMENT & PLAN NOTE
Malnutrition Findings:   Adult Malnutrition type: Chronic illness  Adult Degree of Malnutrition: Malnutrition of moderate degree  Malnutrition Characteristics: Fat loss, Muscle loss, Inadequate energy                  360 Statement: Moderate malnutrition r/t inadequate intake as evidenced by fat/musle wasting of orbital/temple areas, BMI 16.9, consuming < 75% energy intake compared to estimated energy needs > 1 month. Treated with DM diet and Glucerna bid    BMI Findings:  Adult BMI Classifications: Underweight < 18.5        Body mass index is 19.05 kg/m².   VTE Pharmacologic Prophylaxis:   Pharmacologic: Heparin  Mechanical VTE Prophylaxis in Place: No    Patient Centered Rounds: I have performed bedside rounds with nursing staff today.      Time Spent for Care: 1 hour.  More than 50% of total time spent on counseling and coordination of care as described above.    Current Length of Stay: 13 day(s)    Current Patient Status: Inpatient     Code Status: Level 1 - Full Code      Subjective:   nad    Objective:     Vitals:   Temp (24hrs), Av °F (36.7 °C), Min:97.3 °F (36.3 °C), Max:99.2 °F (37.3 °C)    Temp:  [97.3 °F (36.3 °C)-99.2 °F (37.3 °C)] 99.2 °F (37.3 °C)  HR:  [71-95] 95  Resp:  [12-23] 20  BP: ()/(46-60) 118/55  SpO2:  [95 %-100 %] 96 %  Body mass index is 19.05 kg/m².     Input and Output Summary (last 24 hours):       Intake/Output Summary (Last 24 hours) at 10/24/2024 0846  Last data filed at 10/24/2024 0632  Gross per 24 hour   Intake 6680 ml   Output 2115 ml   Net 4565 ml         Physical Exam:     Physical Exam    Additional Data:     Labs:    Results from last 7 days   Lab Units 10/24/24  0541 10/23/24  0526 10/22/24  0610 10/19/24  0901 10/17/24  1454   WBC Thousand/uL 15.14*   < > 12.67*   < > 11.95*   HEMOGLOBIN g/dL 7.9*   < > 9.2*   < > 9.8*   HEMATOCRIT % 25.2*   < > 29.3*   < > 31.3*   PLATELETS Thousands/uL 324   < > 348   < > 366   SEGS PCT %  --   --   --   --  60   LYMPHO PCT %   --   --  24  --  29   MONO PCT %  --   --  3*  --  7   EOS PCT %  --   --  0  --  2    < > = values in this interval not displayed.       Results from last 7 days   Lab Units 10/24/24  0541   POTASSIUM mmol/L 4.9   CHLORIDE mmol/L 106   CO2 mmol/L 24   BUN mg/dL 11   CREATININE mg/dL 0.60   CALCIUM mg/dL 7.0*       Results from last 7 days   Lab Units 10/19/24  0901   INR  0.94         Recent Cultures (last 7 days):           Last 24 Hours Medication List:   Current Facility-Administered Medications   Medication Dose Route Frequency Provider Last Rate   • acetaminophen  975 mg Oral Q8H [facility] Bria Mcfadden MD     • aspirin  81 mg Oral Daily Bria Mcfadden MD     • atorvastatin  80 mg Oral Daily With Dinner Bria Mcfadden MD     • calcium carbonate  1,000 mg Oral TID PRN Bria Mcfadden MD     • cefazolin  2,000 mg Intravenous Q8H Bria Mcfadden MD 2,000 mg (10/24/24 0035)   • chlorhexidine  15 mL Mouth/Throat Q12H [facility] Bria Mcfadden MD     • cholecalciferol  2,000 Units Oral Daily Bria Mcfadden MD     • dextromethorphan-guaiFENesin  10 mL Oral Q6H PRN Bria Mcfadden MD     • docusate sodium  100 mg Oral BID Bria Mcfadden MD     • guaiFENesin  600 mg Oral Q12H [facility] Bria Mcfadden MD     • heparin (porcine)  5,000 Units Subcutaneous Q8H [facility] Bria Mcfadden MD     • HYDROmorphone  0.5 mg Intravenous Q4H PRN Ella Parekh PA-C     • insulin glargine  35 Units Subcutaneous QAM Bria Mcfadden MD     • insulin lispro  1-5 Units Subcutaneous TID AC Bria Mcfadden MD     • insulin lispro  1-5 Units Subcutaneous HS Bria Mcfadden MD     • insulin lispro  10 Units Subcutaneous Daily With Lunch Bria Mcfadden MD     • insulin lispro  18 Units Subcutaneous Daily With Breakfast Bria Mcfadden MD     • insulin lispro  18 Units Subcutaneous Daily With Dinner Bria Mcfadden MD     • lactated ringers  500 mL Intravenous Once PRN Bria Mcfadden MD      And   • lactated ringers  500 mL Intravenous Once PRN Bria Mcfadden MD Stopped  (10/23/24 1901)   • lidocaine  1 patch Topical Daily Bria Mcfadden MD     • metroNIDAZOLE  500 mg Oral Q12H JUAN Bria Mcfadden MD     • nicotine  21 mg Transdermal Daily Bria Mcfadden MD     • ondansetron  4 mg Intravenous Q6H PRN Bria Mcfadden MD     • oxyCODONE  10 mg Oral Q4H PRN Bria Mcfadden MD     • oxyCODONE  5 mg Oral Q4H PRN Bria Mcfadden MD     • phenol  1 spray Mouth/Throat Q2H PRN Bria Mcfadden MD     • polyethylene glycol  17 g Oral Daily PRN Bria Mcfadden MD     • sodium chloride  500 mL Intravenous Once PRN Bria Mcfadden MD      And   • sodium chloride  500 mL Intravenous Once PRN Bria Mcfadden MD          Today, Patient Was Seen By: Jose Spaulding MD    ** Please Note: Dictation voice to text software may have been used in the creation of this document. **

## 2024-10-24 NOTE — DISCHARGE INSTR - OTHER ORDERS
Surgical incisional care:   Wash incisions daily with soap and water.  Pat dry thoroughly.  Clean, dry gauze to cover groin incision to keep area clean and dry and to prevent skin-skin contact

## 2024-10-24 NOTE — ASSESSMENT & PLAN NOTE
Lab Results   Component Value Date    HGBA1C 14.8 (H) 09/18/2024       Recent Labs     10/23/24  1534 10/23/24  1715 10/23/24  2028 10/24/24  0631   POCGLU 171* 176* 229* 193*     Blood Sugar Average: Last 72 hrs:  (P) 182.8    With likely diabetic ketoacidosis.  Uncontrolled with hemoglobin A1c of 14.8.  Certainly a risk factor for recurrent infection.  Glucose still poorly controlled  -Tighten diabetic control

## 2024-10-24 NOTE — ASSESSMENT & PLAN NOTE
Lab Results   Component Value Date    HGBA1C 14.8 (H) 09/18/2024       Recent Labs     10/23/24  1715 10/23/24  2028 10/24/24  0631 10/24/24  1026   POCGLU 176* 229* 193* 176*       Blood Sugar Average: Last 72 hrs:  (P) 182.375    Severely uncontrolled as evidenced by HbA1c  On Levemir at home  Possible DKA on arrival.  Stable.  Continue with insulin regimen.  Lantus 25 in am, Humalog 10 TID,   Ct sliding scale insulin  Endocrine input appreciated

## 2024-10-24 NOTE — PLAN OF CARE
Problem: Potential for Falls  Goal: Patient will remain free of falls  Description: INTERVENTIONS:  - Educate patient/family on patient safety including physical limitations  - Instruct patient to call for assistance with activity   - Consult OT/PT to assist with strengthening/mobility   - Keep Call bell within reach  - Keep bed low and locked with side rails adjusted as appropriate  - Keep care items and personal belongings within reach  - Initiate and maintain comfort rounds  - Make Fall Risk Sign visible to staff  - Offer Toileting every Hours, in advance of need  - Initiate/Maintain alarm  - Obtain necessary fall risk management equipment:  - Apply yellow socks and bracelet for high fall risk patients  - Consider moving patient to room near nurses station  Outcome: Progressing     Problem: CARDIOVASCULAR - ADULT  Goal: Maintains optimal cardiac output and hemodynamic stability  Description: INTERVENTIONS:  - Monitor I/O, vital signs and rhythm  - Monitor for S/S and trends of decreased cardiac output  - Administer and titrate ordered vasoactive medications to optimize hemodynamic stability  - Assess quality of pulses, skin color and temperature  - Assess for signs of decreased coronary artery perfusion  - Instruct patient to report change in severity of symptoms  Outcome: Progressing  Goal: Absence of cardiac dysrhythmias or at baseline rhythm  Description: INTERVENTIONS:  - Continuous cardiac monitoring, vital signs, obtain 12 lead EKG if ordered  - Administer antiarrhythmic and heart rate control medications as ordered  - Monitor electrolytes and administer replacement therapy as ordered  Outcome: Progressing     Problem: METABOLIC, FLUID AND ELECTROLYTES - ADULT  Goal: Electrolytes maintained within normal limits  Description: INTERVENTIONS:  - Monitor labs and assess patient for signs and symptoms of electrolyte imbalances  - Administer electrolyte replacement as ordered  - Monitor response to electrolyte  replacements, including repeat lab results as appropriate  - Instruct patient on fluid and nutrition as appropriate  Outcome: Progressing  Goal: Fluid balance maintained  Description: INTERVENTIONS:  - Monitor labs   - Monitor I/O and WT  - Instruct patient on fluid and nutrition as appropriate  - Assess for signs & symptoms of volume excess or deficit  Outcome: Progressing  Goal: Glucose maintained within target range  Description: INTERVENTIONS:  - Monitor Blood Glucose as ordered  - Assess for signs and symptoms of hyperglycemia and hypoglycemia  - Administer ordered medications to maintain glucose within target range  - Assess nutritional intake and initiate nutrition service referral as needed  Outcome: Progressing     Problem: Prexisting or High Potential for Compromised Skin Integrity  Goal: Skin integrity is maintained or improved  Description: INTERVENTIONS:  - Identify patients at risk for skin breakdown  - Assess and monitor skin integrity  - Assess and monitor nutrition and hydration status  - Monitor labs   - Assess for incontinence   - Turn and reposition patient  - Assist with mobility/ambulation  - Relieve pressure over bony prominences  - Avoid friction and shearing  - Provide appropriate hygiene as needed including keeping skin clean and dry  - Evaluate need for skin moisturizer/barrier cream  - Collaborate with interdisciplinary team   - Patient/family teaching  - Consider wound care consult   Outcome: Progressing     Problem: Nutrition/Hydration-ADULT  Goal: Nutrient/Hydration intake appropriate for improving, restoring or maintaining nutritional needs  Description: Monitor and assess patient's nutrition/hydration status for malnutrition. Collaborate with interdisciplinary team and initiate plan and interventions as ordered.  Monitor patient's weight and dietary intake as ordered or per policy. Utilize nutrition screening tool and intervene as necessary. Determine patient's food preferences and  provide high-protein, high-caloric foods as appropriate.     INTERVENTIONS:  - Monitor oral intake, urinary output, labs, and treatment plans  - Assess nutrition and hydration status and recommend course of action  - Evaluate amount of meals eaten  - Assist patient with eating if necessary   - Allow adequate time for meals  - Recommend/ encourage appropriate diets, oral nutritional supplements, and vitamin/mineral supplements  - Order, calculate, and assess calorie counts as needed  - Recommend, monitor, and adjust tube feedings and TPN/PPN based on assessed needs  - Assess need for intravenous fluids  - Provide specific nutrition/hydration education as appropriate  - Include patient/family/caregiver in decisions related to nutrition  Outcome: Progressing     Problem: MUSCULOSKELETAL - ADULT  Goal: Maintain or return mobility to safest level of function  Description: INTERVENTIONS:  - Assess patient's ability to carry out ADLs; assess patient's baseline for ADL function and identify physical deficits which impact ability to perform ADLs (bathing, care of mouth/teeth, toileting, grooming, dressing, etc.)  - Assess/evaluate cause of self-care deficits   - Assess range of motion  - Assess patient's mobility  - Assess patient's need for assistive devices and provide as appropriate  - Encourage maximum independence but intervene and supervise when necessary  - Involve family in performance of ADLs  - Assess for home care needs following discharge   - Consider OT consult to assist with ADL evaluation and planning for discharge  - Provide patient education as appropriate  Outcome: Progressing  Goal: Maintain proper alignment of affected body part  Description: INTERVENTIONS:  - Support, maintain and protect limb and body alignment  - Provide patient/ family with appropriate education  Outcome: Progressing     Problem: PAIN - ADULT  Goal: Verbalizes/displays adequate comfort level or baseline comfort level  Description:  Interventions:  - Encourage patient to monitor pain and request assistance  - Assess pain using appropriate pain scale  - Administer analgesics based on type and severity of pain and evaluate response  - Implement non-pharmacological measures as appropriate and evaluate response  - Consider cultural and social influences on pain and pain management  - Notify physician/advanced practitioner if interventions unsuccessful or patient reports new pain  Outcome: Progressing     Problem: INFECTION - ADULT  Goal: Absence or prevention of progression during hospitalization  Description: INTERVENTIONS:  - Assess and monitor for signs and symptoms of infecion  - Monitor lab/diagnostic results  - Monitor all insertion sites, i.e. indwelling lines, tubes, and drains  - Monitor endotracheal if appropriate and nasal secretions for changes in amount and color  - [place] appropriate cooling/warming therapies per order  - Administer medications as ordered  - Instruct and encourage patient and family to use good hand hygiene technique  - Identify and instruct in appropriate isolation precautions for identified infection/condition  Outcome: Progressing     Problem: SAFETY ADULT  Goal: Patient will remain free of falls  Description: INTERVENTIONS:  - Educate patient/family on patient safety including physical limitations  - Instruct patient to call for assistance with activity   - Consult OT/PT to assist with strengthening/mobility   - Keep Call bell within reach  - Keep bed low and locked with side rails adjusted as appropriate  - Keep care items and personal belongings within reach  - Initiate and maintain comfort rounds  - Make Fall Risk Sign visible to staff  - Offer Toileting every  Hours, in advance of need  - Initiate/Maintain alarm  - Obtain necessary fall risk management equipment:   - Apply yellow socks and bracelet for high fall risk patients  - Consider moving patient to room near nurses station  Outcome: Progressing      Problem: DISCHARGE PLANNING  Goal: Discharge to home or other facility with appropriate resources  Description: INTERVENTIONS:  - Identify barriers to discharge w/patient and caregiver  - Arrange for needed discharge resources and transportation as appropriate  - Identify discharge learning needs (meds, wound care, etc.)  - Arrange for interpretive services to assist at discharge as needed  - Refer to Case Management Department for coordinating discharge planning if the patient needs post-hospital services based on physician/advanced practitioner order or complex needs related to functional status, cognitive ability, or social support system  Outcome: Progressing     Problem: Knowledge Deficit  Goal: Patient/family/caregiver demonstrates understanding of disease process, treatment plan, medications, and discharge instructions  Description: Complete learning assessment and assess knowledge base.  Interventions:  - Provide teaching at level of understanding  - Provide teaching via preferred learning methods  Outcome: Progressing

## 2024-10-24 NOTE — ASSESSMENT & PLAN NOTE
Lab Results   Component Value Date    HGBA1C 14.8 (H) 09/18/2024     Recent Labs     10/23/24  1715 10/23/24  2028 10/24/24  0631 10/24/24  1026   POCGLU 176* 229* 193* 176*   Blood Sugar Average: Last 72 hrs:  (P) 182.375    -HBA1C 14.8 -uncontrolled type II diabetic  -Prescribed insulin Tresiba 10-20 units at home.  Has not been taking it for almost weeks.  -On presentation, reported baseline of visual and auditory deficits, limiting her use of insulin pen as well  -On 10/23 underwent vascular surgery- L ax-bifem with R CFEA and R CFA-AK pop bypass w/PTFE   -On review, fingerstick glucose above goal    -Increase glargine to 40 units, continue daily  -Continue lispro 18- 10 -18 prior to meals  -Correctional scale  -Diabetic diet-no juice  - Hypoglycemia protocol  -Goal glucose is 110-180  -Endocrinology will continue to follow and make adjustments

## 2024-10-24 NOTE — CASE MANAGEMENT
Case Management Discharge Planning Note    Patient name Sabiha Garcia  Location [facility] 504/[facility] 504-01 MRN [id number]  : 1955 Date 10/24/2024       Current Admission Date: 10/11/2024  Current Admission Diagnosis:Aortoiliac occlusive disease (HCC)   Patient Active Problem List    Diagnosis Date Noted Date Diagnosed    Moderate protein-calorie malnutrition (HCC) 10/17/2024     Aortoiliac occlusive disease (HCC) 10/16/2024     Other headache syndrome 10/16/2024     Pre-operative cardiovascular examination 10/16/2024     Cellulitis of right foot 10/14/2024     Gangrene of toe of right foot (HCC) 10/13/2024     Ambulatory dysfunction 10/13/2024     Vitamin D deficiency 10/13/2024     Lung nodule 10/13/2024     Abnormal gait 2024     Weight loss, unintentional 2024     Claudication of both lower extremities (HCC) 2024     Disorder of refraction and accommodation 2024     Disorder of gallbladder 2024     Arthralgia of shoulder 2024     Eczema 2024     Generalized anxiety disorder 2022     Depression 2022     Fracture of surgical neck of right humerus 2022     History of gallbladder disease 2022     Numbness of toes 2022     Blood loss anemia 2020     Type 2 diabetes mellitus (HCC) 2020     Tobacco user 2020     Hyperlipidemia 2013       LOS (days): 13  Geometric Mean LOS (GMLOS) (days): 3.8  Days to GMLOS:-9.4     OBJECTIVE:  Risk of Unplanned Readmission Score: 16.01         Current admission status: Inpatient   Preferred Pharmacy:   [id number] [place] - [address]  [address]  [place] [address]  Phone: [phone] Fax: [phone]    Primary Care Provider: Alexys Blunt MD    Primary Insurance: [facility]  Secondary Insurance: [facility]    DISCHARGE DETAILS:    Discharge planning discussed with:: pt at bedside  [place] of Choice: Yes  Comments - Freedom of  Choice: Therapy is recommending STR. Discussed FOC and provided options of 5 facilities that are available to pt-she did not want to choose facility w/o opinion of  , Leighann Moreira . Message left for Leighann w/ request for RTC  CM contacted family/caregiver?: Yes  Were Treatment Team discharge recommendations reviewed with patient/caregiver?: Yes  Did patient/caregiver verbalize understanding of patient care needs?: Yes  Were patient/caregiver advised of the risks associated with not following Treatment Team discharge recommendations?: Yes    Contacts  Patient Contacts: Leighann Moreira [facility]   Contact Method: Phone  Phone Number: [phone]  Reason/Outcome: Discharge Planning, Referral      Other Referral/Resources/Interventions Provided:  Interventions: Short Term Rehab       Treatment Team Recommendation: Short Term Rehab        **[facility]-covered 100%. Vascular PA, Jeana Scaff laly.

## 2024-10-24 NOTE — ASSESSMENT & PLAN NOTE
As seen on CT angiogram.  Patient's status post L ax-bifem with R CFEA and R CFA-AK pop bypass w/PTFE on 10/23/2024  -Close vascular follow-up

## 2024-10-24 NOTE — ANESTHESIA POSTPROCEDURE EVALUATION
Post-Op Assessment Note    Last Filed PACU Vitals:  Vitals Value Taken Time   Temp 97.3 °F (36.3 °C) 10/23/24 1645   Pulse 75 10/23/24 1700   /55 10/23/24 1700   Resp 12 10/23/24 1645   SpO2 100 % 10/23/24 1700       Modified Nahid:  Activity: 2 (10/23/2024  4:15 PM)  Respiration: 2 (10/23/2024  4:15 PM)  Circulation: 2 (10/23/2024  4:15 PM)  Consciousness: 2 (10/23/2024  4:15 PM)  Oxygen Saturation: 1 (10/23/2024  4:15 PM)  Modified Nahid Score: 9 (10/23/2024  4:15 PM)

## 2024-10-25 LAB
ALBUMIN SERPL BCG-MCNC: 2.5 G/DL (ref 3.5–5)
ALP SERPL-CCNC: 61 U/L (ref 34–104)
ALT SERPL W P-5'-P-CCNC: 4 U/L (ref 7–52)
ANION GAP SERPL CALCULATED.3IONS-SCNC: 4 MMOL/L (ref 4–13)
AST SERPL W P-5'-P-CCNC: 16 U/L (ref 13–39)
BILIRUB SERPL-MCNC: 0.27 MG/DL (ref 0.2–1)
BUN SERPL-MCNC: 13 MG/DL (ref 5–25)
CALCIUM ALBUM COR SERPL-MCNC: 9.4 MG/DL (ref 8.3–10.1)
CALCIUM SERPL-MCNC: 8.2 MG/DL (ref 8.4–10.2)
CHLORIDE SERPL-SCNC: 103 MMOL/L (ref 96–108)
CO2 SERPL-SCNC: 27 MMOL/L (ref 21–32)
CREAT SERPL-MCNC: 0.62 MG/DL (ref 0.6–1.3)
ERYTHROCYTE [DISTWIDTH] IN BLOOD BY AUTOMATED COUNT: 16.3 % (ref 11.6–15.1)
GFR SERPL CREATININE-BSD FRML MDRD: 92 ML/MIN/1.73SQ M
GLUCOSE SERPL-MCNC: 123 MG/DL (ref 65–140)
GLUCOSE SERPL-MCNC: 129 MG/DL (ref 65–140)
GLUCOSE SERPL-MCNC: 151 MG/DL (ref 65–140)
GLUCOSE SERPL-MCNC: 69 MG/DL (ref 65–140)
GLUCOSE SERPL-MCNC: 79 MG/DL (ref 65–140)
GLUCOSE SERPL-MCNC: 88 MG/DL (ref 65–140)
HCT VFR BLD AUTO: 23.6 % (ref 34.8–46.1)
HGB BLD-MCNC: 7.5 G/DL (ref 11.5–15.4)
MAGNESIUM SERPL-MCNC: 1.9 MG/DL (ref 1.9–2.7)
MCH RBC QN AUTO: 29.3 PG (ref 26.8–34.3)
MCHC RBC AUTO-ENTMCNC: 31.8 G/DL (ref 31.4–37.4)
MCV RBC AUTO: 92 FL (ref 82–98)
PLATELET # BLD AUTO: 286 THOUSANDS/UL (ref 149–390)
PMV BLD AUTO: 8.5 FL (ref 8.9–12.7)
POTASSIUM SERPL-SCNC: 4.8 MMOL/L (ref 3.5–5.3)
PROT SERPL-MCNC: 4.6 G/DL (ref 6.4–8.4)
RBC # BLD AUTO: 2.56 MILLION/UL (ref 3.81–5.12)
SODIUM SERPL-SCNC: 134 MMOL/L (ref 135–147)
WBC # BLD AUTO: 13.9 THOUSAND/UL (ref 4.31–10.16)

## 2024-10-25 PROCEDURE — 85027 COMPLETE CBC AUTOMATED: CPT

## 2024-10-25 PROCEDURE — 83735 ASSAY OF MAGNESIUM: CPT | Performed by: FAMILY MEDICINE

## 2024-10-25 PROCEDURE — 99232 SBSQ HOSP IP/OBS MODERATE 35: CPT | Performed by: INTERNAL MEDICINE

## 2024-10-25 PROCEDURE — NC001 PR NO CHARGE: Performed by: PODIATRIST

## 2024-10-25 PROCEDURE — 99233 SBSQ HOSP IP/OBS HIGH 50: CPT | Performed by: INTERNAL MEDICINE

## 2024-10-25 PROCEDURE — 99024 POSTOP FOLLOW-UP VISIT: CPT | Performed by: SURGERY

## 2024-10-25 PROCEDURE — 99232 SBSQ HOSP IP/OBS MODERATE 35: CPT | Performed by: FAMILY MEDICINE

## 2024-10-25 PROCEDURE — 80053 COMPREHEN METABOLIC PANEL: CPT | Performed by: FAMILY MEDICINE

## 2024-10-25 PROCEDURE — 82948 REAGENT STRIP/BLOOD GLUCOSE: CPT

## 2024-10-25 RX ORDER — CLOPIDOGREL BISULFATE 75 MG/1
75 TABLET ORAL DAILY
Status: DISCONTINUED | OUTPATIENT
Start: 2024-10-25 | End: 2024-10-27

## 2024-10-25 RX ADMIN — NICOTINE 21 MG: 21 PATCH, EXTENDED RELEASE TRANSDERMAL at 09:51

## 2024-10-25 RX ADMIN — LIDOCAINE 1 PATCH: 50 PATCH TOPICAL at 09:44

## 2024-10-25 RX ADMIN — GUAIFENESIN 600 MG: 600 TABLET, EXTENDED RELEASE ORAL at 09:43

## 2024-10-25 RX ADMIN — OXYCODONE HYDROCHLORIDE 10 MG: 10 TABLET ORAL at 16:32

## 2024-10-25 RX ADMIN — Medication 2000 UNITS: at 09:45

## 2024-10-25 RX ADMIN — POLYETHYLENE GLYCOL 3350 17 G: 17 POWDER, FOR SOLUTION ORAL at 07:30

## 2024-10-25 RX ADMIN — GUAIFENESIN 600 MG: 600 TABLET, EXTENDED RELEASE ORAL at 21:22

## 2024-10-25 RX ADMIN — ACETAMINOPHEN 975 MG: 325 TABLET, FILM COATED ORAL at 21:22

## 2024-10-25 RX ADMIN — CHLORHEXIDINE GLUCONATE 0.12% ORAL RINSE 15 ML: 1.2 LIQUID ORAL at 09:42

## 2024-10-25 RX ADMIN — INSULIN GLARGINE 40 UNITS: 100 INJECTION, SOLUTION SUBCUTANEOUS at 10:35

## 2024-10-25 RX ADMIN — CEFAZOLIN SODIUM 2000 MG: 2 SOLUTION INTRAVENOUS at 01:28

## 2024-10-25 RX ADMIN — CHLORHEXIDINE GLUCONATE 0.12% ORAL RINSE 15 ML: 1.2 LIQUID ORAL at 21:22

## 2024-10-25 RX ADMIN — DOCUSATE SODIUM 100 MG: 100 CAPSULE, LIQUID FILLED ORAL at 16:33

## 2024-10-25 RX ADMIN — ACETAMINOPHEN 975 MG: 325 TABLET, FILM COATED ORAL at 05:19

## 2024-10-25 RX ADMIN — CEFAZOLIN SODIUM 2000 MG: 2 SOLUTION INTRAVENOUS at 09:43

## 2024-10-25 RX ADMIN — METRONIDAZOLE 500 MG: 500 TABLET ORAL at 21:22

## 2024-10-25 RX ADMIN — ASPIRIN 81 MG: 81 TABLET, COATED ORAL at 09:43

## 2024-10-25 RX ADMIN — INSULIN LISPRO 18 UNITS: 100 INJECTION, SOLUTION INTRAVENOUS; SUBCUTANEOUS at 07:32

## 2024-10-25 RX ADMIN — CLOPIDOGREL BISULFATE 75 MG: 75 TABLET ORAL at 09:43

## 2024-10-25 RX ADMIN — CEFAZOLIN SODIUM 2000 MG: 2 SOLUTION INTRAVENOUS at 16:22

## 2024-10-25 RX ADMIN — OXYCODONE HYDROCHLORIDE 10 MG: 10 TABLET ORAL at 07:32

## 2024-10-25 RX ADMIN — DOCUSATE SODIUM 100 MG: 100 CAPSULE, LIQUID FILLED ORAL at 09:43

## 2024-10-25 RX ADMIN — ATORVASTATIN CALCIUM 80 MG: 80 TABLET, FILM COATED ORAL at 16:22

## 2024-10-25 RX ADMIN — METRONIDAZOLE 500 MG: 500 TABLET ORAL at 09:43

## 2024-10-25 NOTE — PROGRESS NOTES
Progress Note - Infectious Disease   Name: Sabiha Garcia 68 y.o. female I MRN: [id number]  Unit/Bed#: [facility] 504-01 I Date of Admission: 10/11/2024   Date of Service: 10/25/2024 I Hospital Day: 14    Assessment & Plan  Gangrene of toe of right foot (HCC)  Dry gangrene of right fifth digit but with surrounding cellulitis. Right foot xray positive for periosteal reaction of the 5th digit proximal phalanx with suspicion for osteomyelitis. Not systemically ill. Some malodorous drainage suggesting the possibility of anaerobes. No history of MRSA.  Slight increase in WBC count noted.  Patient's status postL ax-bifem with R CFEA and R CFA-AK pop bypass w/PTFE on 10/23/2024.  Postop bump in the white count noted but the white cell count has modestly decreased..   -Continue intravenous cefazolin and oral Flagyl  -Anticipate ray amputation for eventual surgical cure 10/26/2024 10/27/2024  -Close podiatry and vascular follow-up  -Recheck CBC with differential and BMP to make sure no developing toxicities  -Local wound care  -Possible transition to all oral antibiotics on 10/28/2024 as long as surgical cure of any bone infection.  Cellulitis of right foot  Gangrene of the right fifth digit with surrounding ischemic changes and concomitant localized cellulitis.  Stabilized to decreased erythema with the IV antibiotics.  Patient status post revascularization as above.  -Antibiotics as above   -Serial exams   -Await podiatry intervention  Type 2 diabetes mellitus (HCC)  Lab Results   Component Value Date    HGBA1C 14.8 (H) 09/18/2024       Recent Labs     10/24/24  1549 10/24/24  1735 10/24/24  2102 10/25/24  0649   POCGLU 78 124 198* 123     Blood Sugar Average: Last 72 hrs:  (P) 175    With likely diabetic ketoacidosis.  Uncontrolled with hemoglobin A1c of 14.8.  Certainly a risk factor for recurrent infection.  Glucose still poorly controlled  -Tighten diabetic control  Tobacco user  Risk factor for recurrent infection.   -Nicotine  patch   -Smoking cessation advised   Ambulatory dysfunction  Presented with generalized weakness and endorses frequent falls without loss of consciousness. Head CT negative for acute pathology.    -Continue PT/OT  Aortoiliac occlusive disease (HCC)  As seen on CT angiogram.  Patient's status post L ax-bifem with R CFEA and R CFA-AK pop bypass w/PTFE on 10/23/2024  -Close vascular follow-up    I have discussed with the primary service the above plan to continue the cefazolin and Flagyl.  Primary service agrees with the plan.    Infectious disease service will see the patient again 10/28/2024.  Please message me over the weekend if any questions.    Antibiotics:  Cefazolin 12  Flagyl 12  Postop day 2    Subjective   Patient has no fever, chills, sweats; no nausea, vomiting, diarrhea; no cough, shortness of breath; having some postoperative pain. No new symptoms.    Objective :  Temp:  [98.1 °F (36.7 °C)-100 °F (37.8 °C)] 98.1 °F (36.7 °C)  HR:  [94-99] 99  BP: (108-144)/(48-58) 144/53  Resp:  [17-24] 24  SpO2:  [95 %-97 %] 95 %    General:  No acute distress  Psychiatric:  Awake and alert  Pulmonary:  Normal respiratory excursion without accessory muscle use  Abdomen:  Soft, nontender  Extremities: Right foot dressed with a dry dressing in place.  Right medial thigh incision well-approximated without erythema or drainage.  Skin:  No rashes      Lab Results: I have reviewed the following results:  Results from last 7 days   Lab Units 10/25/24  0426 10/24/24  0541 10/23/24  1712 10/23/24  1418 10/23/24  0929 10/23/24  0526   WBC Thousand/uL 13.90* 15.14*  --   --   --  12.05*   HEMOGLOBIN g/dL 7.5* 7.9*  --   --   --  9.1*   I STAT HEMOGLOBIN g/dl  --   --   --  8.8*   < >  --    PLATELETS Thousands/uL 286 324 369  --   --  355    < > = values in this interval not displayed.     Results from last 7 days   Lab Units 10/25/24  0426 10/24/24  0541 10/23/24  1418 10/23/24  0929 10/23/24  0526   SODIUM mmol/L 134* 135  --    --  139   POTASSIUM mmol/L 4.8 4.9  --   --  4.3   CHLORIDE mmol/L 103 106  --   --  105   CO2 mmol/L 27 24  --   --  28   CO2, I-STAT mmol/L  --   --  23   < >  --    BUN mg/dL 13 11  --   --  11   CREATININE mg/dL 0.62 0.60  --   --  0.46*   EGFR ml/min/1.73sq m 92 93  --   --  102   GLUCOSE, ISTAT mg/dl  --   --  199*   < >  --    CALCIUM mg/dL 8.2* 7.0*  --   --  8.4   AST U/L 16  --   --   --   --    ALT U/L 4*  --   --   --   --    ALK PHOS U/L 61  --   --   --   --    ALBUMIN g/dL 2.5*  --   --   --   --     < > = values in this interval not displayed.

## 2024-10-25 NOTE — ASSESSMENT & PLAN NOTE
Lab Results   Component Value Date    HGBA1C 14.8 (H) 09/18/2024       Recent Labs     10/24/24  1735 10/24/24  2102 10/25/24  0649 10/25/24  1018   POCGLU 124 198* 123 151*       Blood Sugar Average: Last 72 hrs:  (P) 173.0524320827714930    Severely uncontrolled as evidenced by HbA1c  On Levemir at home  Possible DKA on arrival.  Stable.  Continue with insulin regimen.  Lantus 25 in am, Humalog 10 TID,   Ct sliding scale insulin  Endocrine input appreciated

## 2024-10-25 NOTE — PROGRESS NOTES
Podiatry - Progress Note  Patient: Sabiha Garcia 68 y.o. female   MRN: [id number]  PCP: Alexys Blunt MD  Unit/Bed#: [facility] 504-01 Encounter: [id number]  Date Of Visit: 10/25/24    ASSESSMENT:    Sabiha Garcia is a 68 y.o. female with:    [place] 4 DFU Right fifth digit  Cellulitis right foot  Uncontrolled type 2 diabetes mellitus   A1c of 14.8% (9/18/24)  PAD  S/p axillary to femoral and femoral to popliteal bypass 10/23/24  Smoking history  Onychomycosis      PLAN:    Patient was seen and evaluated at bedside with all questions and concerns addressed.  POD 2 of complicated vascular surgery.  Surgical clearance for podiatric procedures was granted by both slim and vascular surgical team.  Patient refused to have podiatric surgery today because of pain.  Dressing was clean, dry with no strikethrough. Dressing was kept intact. Patient will have surgery tomorrow.  Patients' lab and vital signs were reviewed. Patient is afebrile with down-trending leukocytosis, consistent with the second day after surgery. Patient   meets the SIRS criteria with elevated heart rate and respiration rate. Will keep monitoring.  Continue IV Ancef and p.o. metronidazole pre recommendation from ID  Continue local wound care, appreciate nursing assistance with dressing changes. Wound care order is in.  Follow-up with  SLIM, vascular surgery, ID, PT, OT, CM recommendations  Patient will proceed to surgery pending tomorrow morning with Dr. Dhillon. She is consented for partial 5th ray amputation, possible open packing, wound vac or possible Achilles tendon lengthening/gastrocnemius resection. Patient is aware of NPO starting tonight.   Elevation and offloading on green foam wedges or pillows when non-ambulatory.  Rest of care per primary team.     Weightbearing status: Weightbearing as tolerated    SUBJECTIVE:     The patient was seen, evaluated, and assessed at bedside today. The patient was awake, alert, and in no acute distress. No acute  "events overnight. The patient reports generally feeling much better and ready to have podiatric surgery. Patient denies N/V/F/chills/SOB/CP.      OBJECTIVE:     Vitals:   /53   Pulse 99   Temp 98.1 °F (36.7 °C)   Resp (!) 24   Ht 5' 9\" (1.753 m)   Wt 58.5 kg (128 lb 15.5 oz)   SpO2 95%   BMI 19.05 kg/m²     Temp (24hrs), Av.9 °F (37.2 °C), Min:98.1 °F (36.7 °C), Max:100 °F (37.8 °C)      Physical Exam:     Lungs: Non labored breathing  Abdomen: Soft, non-tender.  Lower Extremity:  Cardiovascular status at baseline from admission.  Neurological status at baseline from admission.  Musculoskeletal status at baseline from admission. No calf tenderness noted.     Right lower limb shows clean, dry and intact dressing to her right foot for her right 5th toe gangrene/wound.         Additional Data:     Labs:    Results from last 7 days   Lab Units 10/25/24  0426 10/23/24  0526 10/22/24  0610   WBC Thousand/uL 13.90*   < > 12.67*   HEMOGLOBIN g/dL 7.5*   < > 9.2*   I STAT HEMOGLOBIN   --    < >  --    HEMATOCRIT % 23.6*   < > 29.3*   HEMATOCRIT, ISTAT   --    < >  --    PLATELETS Thousands/uL 286   < > 348   LYMPHO PCT %  --   --  24   MONO PCT %  --   --  3*   EOS PCT %  --   --  0    < > = values in this interval not displayed.     Results from last 7 days   Lab Units 10/25/24  0426 10/24/24  0541 10/23/24  1418   POTASSIUM mmol/L 4.8   < >  --    CHLORIDE mmol/L 103   < >  --    CO2 mmol/L 27   < >  --    CO2, I-STAT mmol/L  --   --  23   BUN mg/dL 13   < >  --    CREATININE mg/dL 0.62   < >  --    CALCIUM mg/dL 8.2*   < >  --    ALK PHOS U/L 61  --   --    ALT U/L 4*  --   --    AST U/L 16  --   --    GLUCOSE, ISTAT mg/dl  --   --  199*    < > = values in this interval not displayed.     Results from last 7 days   Lab Units 10/19/24  0901   INR  0.94       * I Have Reviewed All Lab Data Listed Above.    Recent Cultures (last 7 days):               Imaging: I have personally reviewed pertinent films in " "PACS  EKG, Pathology, and Other Studies: I have personally reviewed pertinent reports.    ** Please Note: Portions of the record may have been created with voice recognition software. Occasional wrong word or \"sound a like\" substitutions may have occurred due to the inherent limitations of voice recognition software. Read the chart carefully and recognize, using context, where substitutions have occurred. **      "

## 2024-10-25 NOTE — ASSESSMENT & PLAN NOTE
Right fifth digit dry gangrene with surround cellulitis  Xray - Subtle periosteal reaction of the fifth digit proximal phalanx with adjacent soft tissue swelling. Findings raise suspicion for osteomyelitis   LEADS abnormal - vascular consulted  Podiatry following - plan after vascular optimization  Wound care with Betadine paint open to air  Discussed with infectious disease.  Continue with antibiotic therapy.  Patient had revascularization done on October 23, 2024  Patient is a high risk patient for a moderate risk surgery (amputation).  Patient is medically cleared and optimized to proceed with planned surgery by podiatry.  NPO after midnight

## 2024-10-25 NOTE — PROGRESS NOTES
Progress Note - Hospitalist   Name: Sabiha Gacria 68 y.o. female I MRN: [id number]  Unit/Bed#: [facility] 504-01 I Date of Admission: 10/11/2024   Date of Service: 10/25/2024 I Hospital Day: 14    Assessment & Plan  Gangrene of toe of right foot (HCC)  Right fifth digit dry gangrene with surround cellulitis  Xray - Subtle periosteal reaction of the fifth digit proximal phalanx with adjacent soft tissue swelling. Findings raise suspicion for osteomyelitis   LEADS abnormal - vascular consulted  Podiatry following - plan after vascular optimization  Wound care with Betadine paint open to air  Discussed with infectious disease.  Continue with antibiotic therapy.  Patient had revascularization done on October 23, 2024  Patient is a high risk patient for a moderate risk surgery (amputation).  Patient is medically cleared and optimized to proceed with planned surgery by podiatry.  NPO after midnight  Cellulitis of right foot  Continue Ancef  Type 2 diabetes mellitus (HCC)  Lab Results   Component Value Date    HGBA1C 14.8 (H) 09/18/2024       Recent Labs     10/24/24  1735 10/24/24  2102 10/25/24  0649 10/25/24  1018   POCGLU 124 198* 123 151*       Blood Sugar Average: Last 72 hrs:  (P) 173.7726650387090787    Severely uncontrolled as evidenced by HbA1c  On Levemir at home  Possible DKA on arrival.  Stable.  Continue with insulin regimen.  Lantus 25 in am, Humalog 10 TID,   Ct sliding scale insulin  Endocrine input appreciated     Ambulatory dysfunction  Presented with generalized weakness, 2 falls with no head strike  States had been down for prolonged period  CT head - no acute pathology  TSH low with normal free T4, CK normal  PT/OT  Tobacco user  Nicotine patch  Smoking cessation advised  Vitamin D deficiency  Continue supplementation  Lung nodule  CT chest with 3 mm right upper lobe noncalcified nodule  CT chest without contrast in 12 months  Aortoiliac occlusive disease (HCC)  Vascular surgery consulted  Patient is status  post bypass on October 23, 2024    VTE Pharmacologic Prophylaxis: VTE Score: 3 Moderate Risk (Score 3-4) - Pharmacological DVT Prophylaxis Ordered: heparin.    Mobility:   Basic Mobility Inpatient Raw Score: 13  JH-HLM Goal: 4: Move to chair/commode  JH-HLM Achieved: 4: Move to chair/commode  JH-HLM Goal achieved. Continue to encourage appropriate mobility.    Patient Centered Rounds: I performed bedside rounds with nursing staff today.   Discussions with Specialists or Other Care Team Provider: Podiatry, vascular surgery     Education and Discussions with Family / Patient: Patient declined call to .     Current Length of Stay: 14 day(s)  Current Patient Status: Inpatient   Certification Statement: The patient will continue to require additional inpatient hospital stay due to Gangrene toe  Discharge Plan:  Pending PT/OT eval after surgery     Code Status: Level 1 - Full Code    Subjective   This is a very pleasant 68-year-old female who was seen and evaluated today at bedside.  Patient denies any acute complaints at this time.    Objective :  Temp:  [98.1 °F (36.7 °C)-100 °F (37.8 °C)] 98.1 °F (36.7 °C)  HR:  [94-99] 99  BP: (108-144)/(48-58) 144/53  Resp:  [17-24] 24  SpO2:  [95 %-97 %] 95 %  O2 Device: None (Room air)    Body mass index is 19.4 kg/m².     Input and Output Summary (last 24 hours):     Intake/Output Summary (Last 24 hours) at 10/25/2024 1357  Last data filed at 10/25/2024 1001  Gross per 24 hour   Intake 579 ml   Output 1600 ml   Net -1021 ml       Physical Exam  Vitals reviewed.   Constitutional:       General: She is not in acute distress.     Appearance: She is not ill-appearing.   HENT:      Head: Normocephalic.   Eyes:      Conjunctiva/sclera: Conjunctivae normal.   Cardiovascular:      Rate and Rhythm: Normal rate and regular rhythm.   Pulmonary:      Effort: Pulmonary effort is normal.   Abdominal:      General: Abdomen is flat.      Palpations: Abdomen is soft.      Tenderness:  There is no abdominal tenderness.   Musculoskeletal:         General: Signs of injury present.   Skin:     General: Skin is warm and dry.      Findings: Lesion present.   Neurological:      Mental Status: She is alert. Mental status is at baseline.           Lines/Drains:  Lines/Drains/Airways       Active Status       Name Placement date Placement time Site Days    Urethral Catheter Coude 18 Fr. 10/24/24  2325  Coude  less than 1                  Urinary Catheter:  Goal for removal: Voiding trial when ambulation improves                 Lab Results: I have reviewed the following results:   Results from last 7 days   Lab Units 10/25/24  0426 10/23/24  0526 10/22/24  0610   WBC Thousand/uL 13.90*   < > 12.67*   HEMOGLOBIN g/dL 7.5*   < > 9.2*   I STAT HEMOGLOBIN   --    < >  --    HEMATOCRIT % 23.6*   < > 29.3*   HEMATOCRIT, ISTAT   --    < >  --    PLATELETS Thousands/uL 286   < > 348   BANDS PCT %  --   --  4   LYMPHO PCT %  --   --  24   MONO PCT %  --   --  3*   EOS PCT %  --   --  0    < > = values in this interval not displayed.     Results from last 7 days   Lab Units 10/25/24  0426   SODIUM mmol/L 134*   POTASSIUM mmol/L 4.8   CHLORIDE mmol/L 103   CO2 mmol/L 27   BUN mg/dL 13   CREATININE mg/dL 0.62   ANION GAP mmol/L 4   CALCIUM mg/dL 8.2*   ALBUMIN g/dL 2.5*   TOTAL BILIRUBIN mg/dL 0.27   ALK PHOS U/L 61   ALT U/L 4*   AST U/L 16   GLUCOSE RANDOM mg/dL 129     Results from last 7 days   Lab Units 10/19/24  0901   INR  0.94     Results from last 7 days   Lab Units 10/25/24  1018 10/25/24  0649 10/24/24  2102 10/24/24  1735 10/24/24  1549 10/24/24  1026 10/24/24  0631 10/23/24  2028 10/23/24  1715 10/23/24  1534 10/23/24  1439 10/23/24  1356   POC GLUCOSE mg/dl 151* 123 198* 124 78 176* 193* 229* 176* 171* 193* 201*               Recent Cultures (last 7 days):         Imaging Results Review: No pertinent imaging studies reviewed.  Other Study Results Review: No additional pertinent studies reviewed.    Last  24 Hours Medication List:     Current Facility-Administered Medications:     acetaminophen (TYLENOL) tablet 975 mg, Q8H JUAN    aspirin (ECOTRIN LOW STRENGTH) EC tablet 81 mg, Daily    atorvastatin (LIPITOR) tablet 80 mg, Daily With Dinner    calcium carbonate (TUMS) chewable tablet 1,000 mg, TID PRN    ceFAZolin (ANCEF) IVPB (premix in dextrose) 2,000 mg 50 mL, Q8H, Last Rate: 2,000 mg (10/25/24 0943)    chlorhexidine (PERIDEX) 0.12 % oral rinse 15 mL, Q12H JUAN    Cholecalciferol (VITAMIN D3) tablet 2,000 Units, Daily    clopidogrel (PLAVIX) tablet 75 mg, Daily    dextromethorphan-guaiFENesin (ROBITUSSIN DM) oral syrup 10 mL, Q6H PRN    docusate sodium (COLACE) capsule 100 mg, BID    guaiFENesin (MUCINEX) 12 hr tablet 600 mg, Q12H JUAN    HYDROmorphone (DILAUDID) injection 0.5 mg, Q4H PRN    insulin glargine (LANTUS) subcutaneous injection 40 Units 0.4 mL, QAM    insulin lispro (HumALOG/ADMELOG) 100 units/mL subcutaneous injection 1-5 Units, TID AC **AND** Fingerstick Glucose (POCT), 4x Daily AC and at bedtime    insulin lispro (HumALOG/ADMELOG) 100 units/mL subcutaneous injection 1-5 Units, HS    insulin lispro (HumALOG/ADMELOG) 100 units/mL subcutaneous injection 10 Units, Daily With Lunch    insulin lispro (HumALOG/ADMELOG) 100 units/mL subcutaneous injection 18 Units, Daily With Breakfast    insulin lispro (HumALOG/ADMELOG) 100 units/mL subcutaneous injection 18 Units, Daily With Dinner    lidocaine (LIDODERM) 5 % patch 1 patch, Daily    metroNIDAZOLE (FLAGYL) tablet 500 mg, Q12H JUAN    nicotine (NICODERM CQ) 21 mg/24 hr TD 24 hr patch 21 mg, Daily    ondansetron (ZOFRAN) injection 4 mg, Q6H PRN    oxyCODONE (ROXICODONE) immediate release tablet 10 mg, Q4H PRN    oxyCODONE (ROXICODONE) IR tablet 5 mg, Q4H PRN    phenol (CHLORASEPTIC) 1.4 % mucosal liquid 1 spray, Q2H PRN    polyethylene glycol (MIRALAX) packet 17 g, Daily PRN    Administrative Statements   Today, Patient Was Seen By: Jose Spaulding MD  I have  spent a total time of 40 minutes in caring for this patient on the day of the visit/encounter including Diagnostic results, Prognosis, Risks and benefits of tx options, Patient and family education, Obtaining or reviewing history  , and Communicating with other healthcare professionals .    **Please Note: This note may have been constructed using a voice recognition system.**

## 2024-10-25 NOTE — ASSESSMENT & PLAN NOTE
Lab Results   Component Value Date    HGBA1C 14.8 (H) 09/18/2024     Recent Labs     10/24/24  1735 10/24/24  2102 10/25/24  0649 10/25/24  1018   POCGLU 124 198* 123 151*   Blood Sugar Average: Last 72 hrs:  (P) [id number]    -HBA1C 14.8 -uncontrolled type II diabetic  -Prescribed insulin Tresiba 10-20 units at home.  Has not been taking it for almost weeks.  -On presentation, reported baseline of visual and auditory deficits, limiting her use of insulin pen as well  -On 10/23 underwent vascular surgery- L ax-bifem with R CFEA and R CFA-AK pop bypass w/PTFE   -On review, fingerstick glucose at goal except for x1 at 78 yesterday evening     -Continue glargine 40 units daily  -Continue lispro 18- 10 -18 prior to meals - holding if skipping meal   -Correctional scale  -Diabetic diet-no juice  - Hypoglycemia protocol  -Goal glucose is 110-180  -Endocrinology will continue to follow and make adjustments

## 2024-10-25 NOTE — PROGRESS NOTES
Progress Note - Vascular Surgery   Name: Sabiha Garcia 68 y.o. female I MRN: [id number]  Unit/Bed#: [facility] 504-01 I Date of Admission: 10/11/2024   Date of Service: 10/25/2024 I Hospital Day: 14   Assessment & Plan  Aortoiliac occlusive disease (HCC)  68-year-old female current smoker with history of uncontrolled T2 DM (HgA1c 14.8), HLD, tobacco use, lung nodule, hx of recurrent falls and hx of L femoral shaft fx s/p repair in 2020 arrived in the ED 10/11 with generalized weakness s/p falls, admitted for management of hyperosmolar hyperglycemia with associated metabolic acidosis with concern for DKA vs starvation ketosis. Patient with noted non-healing R 5th digit gangrenous wound, vascular surgery consulted for abnormal ABIs concern for contributory PAD.     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 Digit pressure of 143 mmHg    10/23 L ax-bifem with R CFEA and R CFA-AK pop bypass w/PTFE    Plan  - PO pain control PRN  - Regular diet  - Monteiro for urinary retention, management per primary  - Continue aspirin. Will ultimately plan for aspirin and Xarelto, however, will start pt on plavix rather than Xarelto in the meantime for possible podiatric intervention  - Encourage ambulation, PT/OT, ISB  - Appreciate medicine and Endocrinology teams for assistance with medical management  - Appreciate Podiatry for toe wounds, ok for intervention from Vascular standpoint    Subjective : No acute events overnight. Pain is better controlled today. Denies N/V, CP, SOB, M/S changes. Has been tolerating PO and reports that she has been eating  better than prior to admission. Was OOB yesterday, however, ambulation was limited due to pain. Would like to try to walk in the halls today.    Objective :  Temp:  [98.1 °F (36.7 °C)-100 °F (37.8 °C)] 98.1 °F (36.7 °C)  HR:  [94-99] 99  BP: ()/(47-58) 144/53  Resp:  [17-24] 24  SpO2:  [95 %-97 %] 95 %     I/O         10/23 0701  10/24 0700 10/24 0701  10/25 0700 10/25 0701  10/26 0700    P.O. 717 0     I.V. (mL/kg) 5000 (85.5)      Blood 700      IV Piggyback 500      Total Intake(mL/kg) 6917 (118.2) 0 (0)     Urine (mL/kg/hr) 1715 (1.2) 700 (0.5)     Stool       Blood 400      Total Output 2115 700     Net +4802 -700            Unmeasured Urine Occurrence  1 x           Lines/Drains/Airways       Active Status       Name Placement date Placement time Site Days    Urethral Catheter Coude 18 Fr. 10/24/24  2325  Coude  less than 1                  Physical Exam  Vitals reviewed.   Constitutional:       General: She is not in acute distress.     Appearance: Normal appearance.   HENT:      Right Ear: External ear normal.      Left Ear: External ear normal.      Nose: Nose normal.      Mouth/Throat:      Mouth: Mucous membranes are moist.   Eyes:      Extraocular Movements: Extraocular movements intact.   Cardiovascular:      Rate and Rhythm: Normal rate.      Comments: R DP/PT signals present. L radial pulse palpable, L ulnar and palmar arch signals present.  Pulmonary:      Effort: Pulmonary effort is normal. No respiratory distress.   Abdominal:      General: There is no distension.      Palpations: Abdomen is soft.      Tenderness: There is no abdominal tenderness.   Genitourinary:     Comments: Monteiro in place with clear yellow urine  Musculoskeletal:         General: Normal range of motion.      Comments: L 5th toe with dressing in place   Skin:     General: Skin is warm.      Coloration: Skin is not pale.      Comments: Bilateral groin, medial thigh, and left infraclavicular incisions c/d/I without  "appreciable hematoma   Neurological:      General: No focal deficit present.      Mental Status: She is alert and oriented to person, place, and time.      Sensory: No sensory deficit.      Motor: No weakness.   Psychiatric:         Mood and Affect: Mood normal.         Thought Content: Thought content normal.       Lab Results: I have reviewed the following results:  CBC with diff:   Lab Results   Component Value Date    WBC 13.90 (H) 10/25/2024    HGB 7.5 (L) 10/25/2024    HCT 23.6 (L) 10/25/2024    MCV 92 10/25/2024     10/25/2024    RBC 2.56 (L) 10/25/2024    MCH 29.3 10/25/2024    MCHC 31.8 10/25/2024    RDW 16.3 (H) 10/25/2024    MPV 8.5 (L) 10/25/2024    NRBC 0 10/17/2024   ,   BMP/CMP:  Lab Results   Component Value Date    SODIUM 134 (L) 10/25/2024    SODIUM 138 09/18/2024    SODIUM 135 04/06/2022    K 4.8 10/25/2024    K 4.6 09/18/2024    K 4.5 04/06/2022     10/25/2024    CL 95 (L) 09/18/2024    CL 98 (L) 04/06/2022    CO2 27 10/25/2024    CO2 23 10/23/2024    CO2 33 (H) 09/18/2024    CO2 28 04/06/2022    BUN 13 10/25/2024    BUN 14 09/18/2024    BUN 13 04/06/2022    CREATININE 0.62 10/25/2024    CREATININE 0.86 04/06/2022    GLUCOSE 199 (H) 10/23/2024    CALCIUM 8.2 (L) 10/25/2024    CALCIUM 9.6 09/18/2024    CALCIUM 9.7 04/06/2022    AST 16 10/25/2024    AST 11 09/18/2024    AST 35 04/06/2022    ALT 4 (L) 10/25/2024    ALT 8 09/18/2024    ALT 23 04/06/2022    ALKPHOS 61 10/25/2024    ALKPHOS 120 09/18/2024    ALKPHOS 154 (H) 04/06/2022    EGFR 92 10/25/2024    EGFR 74 04/06/2022    EGFR 75 06/16/2018   ,   Lipid Panel: No results found for: \"CHOL\",   Coags:   Lab Results   Component Value Date    PT 12.0 06/16/2018    PTT 63 (H) 10/23/2024    INR 0.94 10/19/2024    INR 0.9 06/16/2018   ,   Blood Culture:   Lab Results   Component Value Date    BLOODCX No Growth After 5 Days. 10/11/2024    BLOODCX No Growth After 5 Days. 10/11/2024   ,   Urinalysis:   Lab Results   Component Value Date    " COLORU Light Yellow 10/11/2024    CLARITYU Clear 10/11/2024    SPECGRAV 1.021 10/11/2024    PHUR 5.5 10/11/2024    LEUKOCYTESUR (A) 10/11/2024     Elevated glucose may cause decreased leukocyte values. See urine microscopic for UWBC result    NITRITE Negative 10/11/2024    GLUCOSEU >=1000 (1%) (A) 10/11/2024    KETONESU 60 (2+) (A) 10/11/2024    BILIRUBINUR Negative 10/11/2024    BLOODU Negative 10/11/2024       VTE Prophylaxis: Heparin

## 2024-10-25 NOTE — PROGRESS NOTES
Progress Note - Endocrinology   Name: Sabiha Garcia 68 y.o. female I MRN: [id number]  Unit/Bed#: [facility] 504-01 I Date of Admission: 10/11/2024   Date of Service: 10/25/2024 I Hospital Day: 14    Assessment & Plan  Type 2 diabetes mellitus (HCC)  Lab Results   Component Value Date    HGBA1C 14.8 (H) 09/18/2024     Recent Labs     10/24/24  1735 10/24/24  2102 10/25/24  0649 10/25/24  1018   POCGLU 124 198* 123 151*   Blood Sugar Average: Last 72 hrs:  (P) [id number]    -HBA1C 14.8 -uncontrolled type II diabetic  -Prescribed insulin Tresiba 10-20 units at home.  Has not been taking it for almost weeks.  -On presentation, reported baseline of visual and auditory deficits, limiting her use of insulin pen as well  -On 10/23 underwent vascular surgery- L ax-bifem with R CFEA and R CFA-AK pop bypass w/PTFE   -On review, fingerstick glucose at goal except for x1 at 78 yesterday evening     -Continue glargine 40 units daily  -Continue lispro 18- 10 -18 prior to meals - holding if skipping meal   -Correctional scale  -Diabetic diet-no juice  - Hypoglycemia protocol  -Goal glucose is 110-180  -Endocrinology will continue to follow and make adjustments  Gangrene of toe of right foot (HCC)  Followed by podiatry, infectious disease  On antibiotic therapy  Aortoiliac occlusive disease (HCC)  Continue statin therapy  Vascular surgery following-s/p surgical intervention pop bypass    24 Hour Events : While fasting yesterday, hide above goal sugars and this morning sugars remain above goal.  Subjective : Patient resting in bed, sleeping. Spoke with RN  -patient skipped lunch and mealtime insulin was held.      Objective :  Temp:  [98.1 °F (36.7 °C)-100.5 °F (38.1 °C)] 100.5 °F (38.1 °C)  HR:  [94-99] 99  BP: (108-144)/(48-58) 144/53  Resp:  [17-24] 24  SpO2:  [95 %-97 %] 95 %  O2 Device: None (Room air)    Physical Exam  Vitals reviewed.   Constitutional:       General: She is not in acute distress.     Appearance: She is  underweight. She is not ill-appearing or diaphoretic.      Comments: Sleeping   HENT:      Head: Normocephalic and atraumatic.   Cardiovascular:      Rate and Rhythm: Normal rate and regular rhythm.   Pulmonary:      Effort: Pulmonary effort is normal. No respiratory distress.   Abdominal:      General: There is no distension.   Skin:     General: Skin is dry.      Coloration: Skin is not jaundiced or pale.   Neurological:      Mental Status: Mental status is at baseline.         Lab Results: I have reviewed the following results:CBC/BMP:   .     10/25/24  0426   WBC 13.90*   HGB 7.5*   HCT 23.6*      SODIUM 134*   K 4.8      CO2 27   BUN 13   CREATININE 0.62   GLUC 129   MG 1.9    , LFTs:   .     10/25/24  0426   AST 16   ALT 4*   ALB 2.5*   TBILI 0.27   ALKPHOS 61    , Lipid Profile:       Imaging Results Review: No pertinent imaging studies reviewed.  Other Study Results Review: No additional pertinent studies reviewed.      Herminio Caldera MD  Endocrinology fellow, PGY-4

## 2024-10-25 NOTE — ASSESSMENT & PLAN NOTE
Lab Results   Component Value Date    HGBA1C 14.8 (H) 09/18/2024       Recent Labs     10/24/24  1549 10/24/24  1735 10/24/24  2102 10/25/24  0649   POCGLU 78 124 198* 123     Blood Sugar Average: Last 72 hrs:  (P) 175    With likely diabetic ketoacidosis.  Uncontrolled with hemoglobin A1c of 14.8.  Certainly a risk factor for recurrent infection.  Glucose still poorly controlled  -Tighten diabetic control

## 2024-10-25 NOTE — PROCEDURES
Called to place catheter. Nursing had attempted x 2 to straight cath patient. She was unable to urinate and bladder scan showed > 400ml. Patient has had catheters in and out over the last 20 days. She has very edematous tissue and denies any prior issues with retention. An indwelling 18 [place] coude catheter was placed under sterile technique over a finger. Balloon inflated with 10 ml sterile water. Patient tolerated the procedure well. Recommend leaving catheter in to allow the bladder to relax and edema of her tissue to resolve.

## 2024-10-25 NOTE — CASE MANAGEMENT
Case Management Discharge Planning Note    Patient name Sabiha Garcia  Location [facility] 504/[facility] 504-01 MRN [id number]  : 1955 Date 10/25/2024       Current Admission Date: 10/11/2024  Current Admission Diagnosis:Aortoiliac occlusive disease (HCC)   Patient Active Problem List    Diagnosis Date Noted Date Diagnosed    Moderate protein-calorie malnutrition (HCC) 10/17/2024     Aortoiliac occlusive disease (HCC) 10/16/2024     Other headache syndrome 10/16/2024     Pre-operative cardiovascular examination 10/16/2024     Cellulitis of right foot 10/14/2024     Gangrene of toe of right foot (HCC) 10/13/2024     Ambulatory dysfunction 10/13/2024     Vitamin D deficiency 10/13/2024     Lung nodule 10/13/2024     Abnormal gait 2024     Weight loss, unintentional 2024     Claudication of both lower extremities (HCC) 2024     Disorder of refraction and accommodation 2024     Disorder of gallbladder 2024     Arthralgia of shoulder 2024     Eczema 2024     Generalized anxiety disorder 2022     Depression 2022     Fracture of surgical neck of right humerus 2022     History of gallbladder disease 2022     Numbness of toes 2022     Blood loss anemia 2020     Type 2 diabetes mellitus (HCC) 2020     Tobacco user 2020     Hyperlipidemia 2013       LOS (days): 14  Geometric Mean LOS (GMLOS) (days): 3.8  Days to GMLOS:-10.3     OBJECTIVE:  Risk of Unplanned Readmission Score: 15.37         Current admission status: Inpatient   Preferred Pharmacy:   [id number] - [place] - [address]  [address]  [place] [address]  Phone: [phone] Fax: [phone]    Primary Care Provider: Alexys Blunt MD    Primary Insurance: [facility]  Secondary Insurance: [facility]    DISCHARGE DETAILS:    Discharge planning discussed with:: Leighann Moreira [facility]  [place] of Choice: Yes     CM  contacted family/caregiver?: Yes        Additional Comments: CM received call back from Leighann Moreira  w/ [facility] who has been assisting pt with housing in [place].Pt gave this CM to s/w Leighann to discuss dcp and rehab options. Pt presently has an emergency housing vouchers through the [place] housing authority and has an apartment to d/c home to post rehab. Leighann has been working on getting pt a wheelchair accessible apartment. Pt had requested this CM s/w Leighann about STR options.  Discussed 4 STRs w/ availability for pt post d/c for STR. She will review and s/w pt this afternoon.

## 2024-10-25 NOTE — ASSESSMENT & PLAN NOTE
68-year-old female current smoker with history of uncontrolled T2 DM (HgA1c 14.8), HLD, tobacco use, lung nodule, hx of recurrent falls and hx of L femoral shaft fx s/p repair in 2020 arrived in the ED 10/11 with generalized weakness s/p falls, admitted for management of hyperosmolar hyperglycemia with associated metabolic acidosis with concern for DKA vs starvation ketosis. Patient with noted non-healing R 5th digit gangrenous wound, vascular surgery consulted for abnormal ABIs concern for contributory PAD.     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 Digit pressure of 143 mmHg    10/23 L ax-bifem with R CFEA and R CFA-AK pop bypass w/PTFE    Plan  - PO pain control PRN  - Regular diet  - Mnoteiro for urinary retention, management per primary  - Continue aspirin. Will ultimately plan for aspirin and Xarelto, however, will start pt on plavix rather than Xarelto in the meantime for possible podiatric intervention  - Encourage ambulation, PT/OT, ISB  - Appreciate medicine and Endocrinology teams for assistance with medical management  - Appreciate Podiatry for toe wounds, ok for intervention from Vascular standpoint

## 2024-10-25 NOTE — PLAN OF CARE
Problem: Potential for Falls  Goal: Patient will remain free of falls  Description: INTERVENTIONS:  - Educate patient/family on patient safety including physical limitations  - Instruct patient to call for assistance with activity   - Consult OT/PT to assist with strengthening/mobility   - Keep Call bell within reach  - Keep bed low and locked with side rails adjusted as appropriate  - Keep care items and personal belongings within reach  - Initiate and maintain comfort rounds  - Make Fall Risk Sign visible to staff  - Offer Toileting every  Hours, in advance of need  - Initiate/Maintain alarm  - Obtain necessary fall risk management equipment:   - Apply yellow socks and bracelet for high fall risk patients  - Consider moving patient to room near nurses station  Outcome: Progressing     Problem: CARDIOVASCULAR - ADULT  Goal: Maintains optimal cardiac output and hemodynamic stability  Description: INTERVENTIONS:  - Monitor I/O, vital signs and rhythm  - Monitor for S/S and trends of decreased cardiac output  - Administer and titrate ordered vasoactive medications to optimize hemodynamic stability  - Assess quality of pulses, skin color and temperature  - Assess for signs of decreased coronary artery perfusion  - Instruct patient to report change in severity of symptoms  Outcome: Progressing  Goal: Absence of cardiac dysrhythmias or at baseline rhythm  Description: INTERVENTIONS:  - Continuous cardiac monitoring, vital signs, obtain 12 lead EKG if ordered  - Administer antiarrhythmic and heart rate control medications as ordered  - Monitor electrolytes and administer replacement therapy as ordered  Outcome: Progressing     Problem: METABOLIC, FLUID AND ELECTROLYTES - ADULT  Goal: Electrolytes maintained within normal limits  Description: INTERVENTIONS:  - Monitor labs and assess patient for signs and symptoms of electrolyte imbalances  - Administer electrolyte replacement as ordered  - Monitor response to  electrolyte replacements, including repeat lab results as appropriate  - Instruct patient on fluid and nutrition as appropriate  Outcome: Progressing  Goal: Fluid balance maintained  Description: INTERVENTIONS:  - Monitor labs   - Monitor I/O and WT  - Instruct patient on fluid and nutrition as appropriate  - Assess for signs & symptoms of volume excess or deficit  Outcome: Progressing  Goal: Glucose maintained within target range  Description: INTERVENTIONS:  - Monitor Blood Glucose as ordered  - Assess for signs and symptoms of hyperglycemia and hypoglycemia  - Administer ordered medications to maintain glucose within target range  - Assess nutritional intake and initiate nutrition service referral as needed  Outcome: Progressing     Problem: Prexisting or High Potential for Compromised Skin Integrity  Goal: Skin integrity is maintained or improved  Description: INTERVENTIONS:  - Identify patients at risk for skin breakdown  - Assess and monitor skin integrity  - Assess and monitor nutrition and hydration status  - Monitor labs   - Assess for incontinence   - Turn and reposition patient  - Assist with mobility/ambulation  - Relieve pressure over bony prominences  - Avoid friction and shearing  - Provide appropriate hygiene as needed including keeping skin clean and dry  - Evaluate need for skin moisturizer/barrier cream  - Collaborate with interdisciplinary team   - Patient/family teaching  - Consider wound care consult   Outcome: Progressing     Problem: Nutrition/Hydration-ADULT  Goal: Nutrient/Hydration intake appropriate for improving, restoring or maintaining nutritional needs  Description: Monitor and assess patient's nutrition/hydration status for malnutrition. Collaborate with interdisciplinary team and initiate plan and interventions as ordered.  Monitor patient's weight and dietary intake as ordered or per policy. Utilize nutrition screening tool and intervene as necessary. Determine patient's food  preferences and provide high-protein, high-caloric foods as appropriate.     INTERVENTIONS:  - Monitor oral intake, urinary output, labs, and treatment plans  - Assess nutrition and hydration status and recommend course of action  - Evaluate amount of meals eaten  - Assist patient with eating if necessary   - Allow adequate time for meals  - Recommend/ encourage appropriate diets, oral nutritional supplements, and vitamin/mineral supplements  - Order, calculate, and assess calorie counts as needed  - Recommend, monitor, and adjust tube feedings and TPN/PPN based on assessed needs  - Assess need for intravenous fluids  - Provide specific nutrition/hydration education as appropriate  - Include patient/family/caregiver in decisions related to nutrition  Outcome: Progressing     Problem: MUSCULOSKELETAL - ADULT  Goal: Maintain or return mobility to safest level of function  Description: INTERVENTIONS:  - Assess patient's ability to carry out ADLs; assess patient's baseline for ADL function and identify physical deficits which impact ability to perform ADLs (bathing, care of mouth/teeth, toileting, grooming, dressing, etc.)  - Assess/evaluate cause of self-care deficits   - Assess range of motion  - Assess patient's mobility  - Assess patient's need for assistive devices and provide as appropriate  - Encourage maximum independence but intervene and supervise when necessary  - Involve family in performance of ADLs  - Assess for home care needs following discharge   - Consider OT consult to assist with ADL evaluation and planning for discharge  - Provide patient education as appropriate  Outcome: Progressing  Goal: Maintain proper alignment of affected body part  Description: INTERVENTIONS:  - Support, maintain and protect limb and body alignment  - Provide patient/ family with appropriate education  Outcome: Progressing     Problem: PAIN - ADULT  Goal: Verbalizes/displays adequate comfort level or baseline comfort  level  Description: Interventions:  - Encourage patient to monitor pain and request assistance  - Assess pain using appropriate pain scale  - Administer analgesics based on type and severity of pain and evaluate response  - Implement non-pharmacological measures as appropriate and evaluate response  - Consider cultural and social influences on pain and pain management  - Notify physician/advanced practitioner if interventions unsuccessful or patient reports new pain  Outcome: Progressing     Problem: INFECTION - ADULT  Goal: Absence or prevention of progression during hospitalization  Description: INTERVENTIONS:  - Assess and monitor for signs and symptoms of infection  - Monitor lab/diagnostic results  - Monitor all insertion sites, i.e. indwelling lines, tubes, and drains  - Monitor endotracheal if appropriate and nasal secretions for changes in amount and color  - [place] appropriate cooling/warming therapies per order  - Administer medications as ordered  - Instruct and encourage patient and family to use good hand hygiene technique  - Identify and instruct in appropriate isolation precautions for identified infection/condition  Outcome: Progressing     Problem: SAFETY ADULT  Goal: Patient will remain free of falls  Description: INTERVENTIONS:  - Educate patient/family on patient safety including physical limitations  - Instruct patient to call for assistance with activity   - Consult OT/PT to assist with strengthening/mobility   - Keep Call bell within reach  - Keep bed low and locked with side rails adjusted as appropriate  - Keep care items and personal belongings within reach  - Initiate and maintain comfort rounds  - Make Fall Risk Sign visible to staff  - Offer Toileting every  Hours, in advance of need  - Initiate/Maintain alarm  - Obtain necessary fall risk management equipment  - Apply yellow socks and bracelet for high fall risk patients  - Consider moving patient to room near nurses  station  Outcome: Progressing     Problem: DISCHARGE PLANNING  Goal: Discharge to home or other facility with appropriate resources  Description: INTERVENTIONS:  - Identify barriers to discharge w/patient and caregiver  - Arrange for needed discharge resources and transportation as appropriate  - Identify discharge learning needs (meds, wound care, etc.)  - Arrange for interpretive services to assist at discharge as needed  - Refer to Case Management Department for coordinating discharge planning if the patient needs post-hospital services based on physician/advanced practitioner order or complex needs related to functional status, cognitive ability, or social support system  Outcome: Progressing     Problem: Knowledge Deficit  Goal: Patient/family/caregiver demonstrates understanding of disease process, treatment plan, medications, and discharge instructions  Description: Complete learning assessment and assess knowledge base.  Interventions:  - Provide teaching at level of understanding  - Provide teaching via preferred learning methods  Outcome: Progressing

## 2024-10-25 NOTE — ASSESSMENT & PLAN NOTE
Dry gangrene of right fifth digit but with surrounding cellulitis. Right foot xray positive for periosteal reaction of the 5th digit proximal phalanx with suspicion for osteomyelitis. Not systemically ill. Some malodorous drainage suggesting the possibility of anaerobes. No history of MRSA.  Slight increase in WBC count noted.  Patient's status postL ax-bifem with R CFEA and R CFA-AK pop bypass w/PTFE on 10/23/2024.  Postop bump in the white count noted but the white cell count has modestly decreased..   -Continue intravenous cefazolin and oral Flagyl  -Anticipate ray amputation for eventual surgical cure 10/26/2024 10/27/2024  -Close podiatry and vascular follow-up  -Recheck CBC with differential and BMP to make sure no developing toxicities  -Local wound care  -Possible transition to all oral antibiotics on 10/28/2024 as long as surgical cure of any bone infection.

## 2024-10-26 ENCOUNTER — ANESTHESIA (INPATIENT)
Dept: PERIOP | Facility: HOSPITAL | Age: 69
DRG: 271 | End: 2024-10-26
Payer: COMMERCIAL

## 2024-10-26 ENCOUNTER — APPOINTMENT (INPATIENT)
Dept: RADIOLOGY | Facility: HOSPITAL | Age: 69
DRG: 271 | End: 2024-10-26
Payer: COMMERCIAL

## 2024-10-26 ENCOUNTER — ANESTHESIA EVENT (INPATIENT)
Dept: PERIOP | Facility: HOSPITAL | Age: 69
DRG: 271 | End: 2024-10-26
Payer: COMMERCIAL

## 2024-10-26 LAB
ABO GROUP BLD BPU: NORMAL
ABO GROUP BLD: NORMAL
ALBUMIN SERPL BCG-MCNC: 2.2 G/DL (ref 3.5–5)
ALP SERPL-CCNC: 61 U/L (ref 34–104)
ALT SERPL W P-5'-P-CCNC: 4 U/L (ref 7–52)
ANION GAP SERPL CALCULATED.3IONS-SCNC: 5 MMOL/L (ref 4–13)
AST SERPL W P-5'-P-CCNC: 12 U/L (ref 13–39)
BASOPHILS # BLD AUTO: 0.06 THOUSANDS/ΜL (ref 0–0.1)
BASOPHILS NFR BLD AUTO: 1 % (ref 0–1)
BILIRUB SERPL-MCNC: 0.28 MG/DL (ref 0.2–1)
BLD GP AB SCN SERPL QL: NEGATIVE
BPU ID: NORMAL
BUN SERPL-MCNC: 11 MG/DL (ref 5–25)
CALCIUM ALBUM COR SERPL-MCNC: 9.4 MG/DL (ref 8.3–10.1)
CALCIUM SERPL-MCNC: 8 MG/DL (ref 8.4–10.2)
CHLORIDE SERPL-SCNC: 99 MMOL/L (ref 96–108)
CO2 SERPL-SCNC: 30 MMOL/L (ref 21–32)
CREAT SERPL-MCNC: 0.6 MG/DL (ref 0.6–1.3)
CROSSMATCH: NORMAL
EOSINOPHIL # BLD AUTO: 0.09 THOUSAND/ΜL (ref 0–0.61)
EOSINOPHIL NFR BLD AUTO: 1 % (ref 0–6)
ERYTHROCYTE [DISTWIDTH] IN BLOOD BY AUTOMATED COUNT: 16 % (ref 11.6–15.1)
GFR SERPL CREATININE-BSD FRML MDRD: 93 ML/MIN/1.73SQ M
GLUCOSE SERPL-MCNC: 234 MG/DL (ref 65–140)
GLUCOSE SERPL-MCNC: 287 MG/DL (ref 65–140)
GLUCOSE SERPL-MCNC: 71 MG/DL (ref 65–140)
GLUCOSE SERPL-MCNC: 76 MG/DL (ref 65–140)
GLUCOSE SERPL-MCNC: 82 MG/DL (ref 65–140)
GLUCOSE SERPL-MCNC: 89 MG/DL (ref 65–140)
HCT VFR BLD AUTO: 23.3 % (ref 34.8–46.1)
HGB BLD-MCNC: 7.4 G/DL (ref 11.5–15.4)
IMM GRANULOCYTES # BLD AUTO: 0.05 THOUSAND/UL (ref 0–0.2)
IMM GRANULOCYTES NFR BLD AUTO: 0 % (ref 0–2)
LYMPHOCYTES # BLD AUTO: 2.7 THOUSANDS/ΜL (ref 0.6–4.47)
LYMPHOCYTES NFR BLD AUTO: 23 % (ref 14–44)
MAGNESIUM SERPL-MCNC: 1.9 MG/DL (ref 1.9–2.7)
MCH RBC QN AUTO: 29.7 PG (ref 26.8–34.3)
MCHC RBC AUTO-ENTMCNC: 31.8 G/DL (ref 31.4–37.4)
MCV RBC AUTO: 94 FL (ref 82–98)
MONOCYTES # BLD AUTO: 0.67 THOUSAND/ΜL (ref 0.17–1.22)
MONOCYTES NFR BLD AUTO: 6 % (ref 4–12)
NEUTROPHILS # BLD AUTO: 8.18 THOUSANDS/ΜL (ref 1.85–7.62)
NEUTS SEG NFR BLD AUTO: 69 % (ref 43–75)
NRBC BLD AUTO-RTO: 0 /100 WBCS
PLATELET # BLD AUTO: 258 THOUSANDS/UL (ref 149–390)
PMV BLD AUTO: 8.1 FL (ref 8.9–12.7)
POTASSIUM SERPL-SCNC: 4.3 MMOL/L (ref 3.5–5.3)
PROT SERPL-MCNC: 4.6 G/DL (ref 6.4–8.4)
RBC # BLD AUTO: 2.49 MILLION/UL (ref 3.81–5.12)
RH BLD: POSITIVE
SODIUM SERPL-SCNC: 134 MMOL/L (ref 135–147)
SPECIMEN EXPIRATION DATE: NORMAL
UNIT DISPENSE STATUS: NORMAL
UNIT PRODUCT CODE: NORMAL
UNIT PRODUCT VOLUME: 350 ML
UNIT RH: NORMAL
WBC # BLD AUTO: 11.75 THOUSAND/UL (ref 4.31–10.16)

## 2024-10-26 PROCEDURE — 80053 COMPREHEN METABOLIC PANEL: CPT | Performed by: FAMILY MEDICINE

## 2024-10-26 PROCEDURE — 86901 BLOOD TYPING SEROLOGIC RH(D): CPT

## 2024-10-26 PROCEDURE — 82948 REAGENT STRIP/BLOOD GLUCOSE: CPT

## 2024-10-26 PROCEDURE — NC001 PR NO CHARGE: Performed by: PODIATRIST

## 2024-10-26 PROCEDURE — P9016 RBC LEUKOCYTES REDUCED: HCPCS

## 2024-10-26 PROCEDURE — 0Y6M0ZF DETACHMENT AT RIGHT FOOT, PARTIAL 5TH RAY, OPEN APPROACH: ICD-10-PCS | Performed by: PODIATRIST

## 2024-10-26 PROCEDURE — 86923 COMPATIBILITY TEST ELECTRIC: CPT

## 2024-10-26 PROCEDURE — 85025 COMPLETE CBC W/AUTO DIFF WBC: CPT | Performed by: FAMILY MEDICINE

## 2024-10-26 PROCEDURE — 73630 X-RAY EXAM OF FOOT: CPT

## 2024-10-26 PROCEDURE — 83735 ASSAY OF MAGNESIUM: CPT | Performed by: FAMILY MEDICINE

## 2024-10-26 PROCEDURE — 86900 BLOOD TYPING SEROLOGIC ABO: CPT

## 2024-10-26 PROCEDURE — 28810 AMPUTATION TOE & METATARSAL: CPT | Performed by: PODIATRIST

## 2024-10-26 PROCEDURE — 99024 POSTOP FOLLOW-UP VISIT: CPT | Performed by: SURGERY

## 2024-10-26 PROCEDURE — 99232 SBSQ HOSP IP/OBS MODERATE 35: CPT | Performed by: FAMILY MEDICINE

## 2024-10-26 PROCEDURE — 88305 TISSUE EXAM BY PATHOLOGIST: CPT | Performed by: SPECIALIST

## 2024-10-26 PROCEDURE — 86850 RBC ANTIBODY SCREEN: CPT

## 2024-10-26 PROCEDURE — 88311 DECALCIFY TISSUE: CPT | Performed by: SPECIALIST

## 2024-10-26 RX ORDER — DIPHENHYDRAMINE HYDROCHLORIDE 50 MG/ML
12.5 INJECTION INTRAMUSCULAR; INTRAVENOUS ONCE AS NEEDED
Status: DISCONTINUED | OUTPATIENT
Start: 2024-10-26 | End: 2024-10-26 | Stop reason: HOSPADM

## 2024-10-26 RX ORDER — HYDROMORPHONE HCL IN WATER/PF 6 MG/30 ML
0.2 PATIENT CONTROLLED ANALGESIA SYRINGE INTRAVENOUS
Status: DISCONTINUED | OUTPATIENT
Start: 2024-10-26 | End: 2024-10-26 | Stop reason: HOSPADM

## 2024-10-26 RX ORDER — ONDANSETRON 2 MG/ML
4 INJECTION INTRAMUSCULAR; INTRAVENOUS ONCE AS NEEDED
Status: DISCONTINUED | OUTPATIENT
Start: 2024-10-26 | End: 2024-10-26 | Stop reason: HOSPADM

## 2024-10-26 RX ORDER — DEXAMETHASONE SODIUM PHOSPHATE 10 MG/ML
INJECTION, SOLUTION INTRAMUSCULAR; INTRAVENOUS AS NEEDED
Status: DISCONTINUED | OUTPATIENT
Start: 2024-10-26 | End: 2024-10-26

## 2024-10-26 RX ORDER — EPHEDRINE SULFATE 50 MG/ML
INJECTION INTRAVENOUS AS NEEDED
Status: DISCONTINUED | OUTPATIENT
Start: 2024-10-26 | End: 2024-10-26

## 2024-10-26 RX ORDER — CEFAZOLIN SODIUM 1 G/3ML
INJECTION, POWDER, FOR SOLUTION INTRAMUSCULAR; INTRAVENOUS AS NEEDED
Status: DISCONTINUED | OUTPATIENT
Start: 2024-10-26 | End: 2024-10-26

## 2024-10-26 RX ORDER — SODIUM CHLORIDE, SODIUM LACTATE, POTASSIUM CHLORIDE, CALCIUM CHLORIDE 600; 310; 30; 20 MG/100ML; MG/100ML; MG/100ML; MG/100ML
INJECTION, SOLUTION INTRAVENOUS CONTINUOUS PRN
Status: DISCONTINUED | OUTPATIENT
Start: 2024-10-26 | End: 2024-10-26

## 2024-10-26 RX ORDER — FENTANYL CITRATE 50 UG/ML
INJECTION, SOLUTION INTRAMUSCULAR; INTRAVENOUS AS NEEDED
Status: DISCONTINUED | OUTPATIENT
Start: 2024-10-26 | End: 2024-10-26

## 2024-10-26 RX ORDER — PROPOFOL 10 MG/ML
INJECTION, EMULSION INTRAVENOUS AS NEEDED
Status: DISCONTINUED | OUTPATIENT
Start: 2024-10-26 | End: 2024-10-26

## 2024-10-26 RX ORDER — FENTANYL CITRATE/PF 50 MCG/ML
25 SYRINGE (ML) INJECTION
Status: DISCONTINUED | OUTPATIENT
Start: 2024-10-26 | End: 2024-10-26 | Stop reason: HOSPADM

## 2024-10-26 RX ORDER — MIDAZOLAM HYDROCHLORIDE 2 MG/2ML
INJECTION, SOLUTION INTRAMUSCULAR; INTRAVENOUS CONTINUOUS PRN
Status: DISCONTINUED | OUTPATIENT
Start: 2024-10-26 | End: 2024-10-26

## 2024-10-26 RX ORDER — ONDANSETRON 2 MG/ML
INJECTION INTRAMUSCULAR; INTRAVENOUS AS NEEDED
Status: DISCONTINUED | OUTPATIENT
Start: 2024-10-26 | End: 2024-10-26

## 2024-10-26 RX ORDER — INSULIN GLARGINE 100 [IU]/ML
32 INJECTION, SOLUTION SUBCUTANEOUS EVERY MORNING
Status: DISCONTINUED | OUTPATIENT
Start: 2024-10-27 | End: 2024-10-28

## 2024-10-26 RX ADMIN — DEXAMETHASONE SODIUM PHOSPHATE 10 MG: 10 INJECTION, SOLUTION INTRAMUSCULAR; INTRAVENOUS at 08:18

## 2024-10-26 RX ADMIN — CLOPIDOGREL BISULFATE 75 MG: 75 TABLET ORAL at 10:46

## 2024-10-26 RX ADMIN — INSULIN LISPRO 18 UNITS: 100 INJECTION, SOLUTION INTRAVENOUS; SUBCUTANEOUS at 17:14

## 2024-10-26 RX ADMIN — DOCUSATE SODIUM 100 MG: 100 CAPSULE, LIQUID FILLED ORAL at 10:47

## 2024-10-26 RX ADMIN — ONDANSETRON 4 MG: 2 INJECTION INTRAMUSCULAR; INTRAVENOUS at 08:18

## 2024-10-26 RX ADMIN — ACETAMINOPHEN 975 MG: 325 TABLET, FILM COATED ORAL at 05:28

## 2024-10-26 RX ADMIN — ACETAMINOPHEN 975 MG: 325 TABLET, FILM COATED ORAL at 21:21

## 2024-10-26 RX ADMIN — DOCUSATE SODIUM 100 MG: 100 CAPSULE, LIQUID FILLED ORAL at 17:12

## 2024-10-26 RX ADMIN — CEFAZOLIN SODIUM 2000 MG: 2 SOLUTION INTRAVENOUS at 10:45

## 2024-10-26 RX ADMIN — OXYCODONE HYDROCHLORIDE 5 MG: 5 TABLET ORAL at 05:28

## 2024-10-26 RX ADMIN — CEFAZOLIN 2000 MG: 1 INJECTION, POWDER, FOR SOLUTION INTRAMUSCULAR; INTRAVENOUS at 08:25

## 2024-10-26 RX ADMIN — GUAIFENESIN 600 MG: 600 TABLET, EXTENDED RELEASE ORAL at 10:47

## 2024-10-26 RX ADMIN — CHLORHEXIDINE GLUCONATE 0.12% ORAL RINSE 15 ML: 1.2 LIQUID ORAL at 21:21

## 2024-10-26 RX ADMIN — METRONIDAZOLE 500 MG: 500 TABLET ORAL at 21:21

## 2024-10-26 RX ADMIN — METRONIDAZOLE 500 MG: 500 TABLET ORAL at 10:47

## 2024-10-26 RX ADMIN — INSULIN LISPRO 2 UNITS: 100 INJECTION, SOLUTION INTRAVENOUS; SUBCUTANEOUS at 17:14

## 2024-10-26 RX ADMIN — GUAIFENESIN 600 MG: 600 TABLET, EXTENDED RELEASE ORAL at 21:21

## 2024-10-26 RX ADMIN — ATORVASTATIN CALCIUM 80 MG: 80 TABLET, FILM COATED ORAL at 17:12

## 2024-10-26 RX ADMIN — SODIUM CHLORIDE, SODIUM LACTATE, POTASSIUM CHLORIDE, AND CALCIUM CHLORIDE: .6; .31; .03; .02 INJECTION, SOLUTION INTRAVENOUS at 08:11

## 2024-10-26 RX ADMIN — EPHEDRINE SULFATE 5 MG: 50 INJECTION, SOLUTION INTRAVENOUS at 09:02

## 2024-10-26 RX ADMIN — PROPOFOL 80 MG: 10 INJECTION, EMULSION INTRAVENOUS at 08:18

## 2024-10-26 RX ADMIN — ASPIRIN 81 MG: 81 TABLET, COATED ORAL at 10:45

## 2024-10-26 RX ADMIN — Medication 2000 UNITS: at 10:46

## 2024-10-26 RX ADMIN — FENTANYL CITRATE 25 MCG: 50 INJECTION INTRAMUSCULAR; INTRAVENOUS at 08:31

## 2024-10-26 RX ADMIN — INSULIN LISPRO 2 UNITS: 100 INJECTION, SOLUTION INTRAVENOUS; SUBCUTANEOUS at 21:28

## 2024-10-26 RX ADMIN — CEFAZOLIN SODIUM 2000 MG: 2 SOLUTION INTRAVENOUS at 00:37

## 2024-10-26 RX ADMIN — CEFAZOLIN SODIUM 2000 MG: 2 SOLUTION INTRAVENOUS at 17:12

## 2024-10-26 RX ADMIN — INSULIN GLARGINE 40 UNITS: 100 INJECTION, SOLUTION SUBCUTANEOUS at 10:48

## 2024-10-26 NOTE — PROGRESS NOTES
[facility] Podiatry - Pre Operative Note  Patient: Sabiha Garcia 68 y.o. female   MRN: [id number]  PCP: Alexys Blunt MD  Unit/Bed#: [facility] 504-01 Encounter: [id number]  Date Of Visit: 10/26/24    ASSESSMENT:    Sabiha Garcia is a 68 y.o. female with:    [place] 4 DFU Right fifth digit  Cellulitis right foot  Uncontrolled type 2 diabetes mellitus   A1c of 14.8% (9/18/24)  PAD  S/p bypass 10/23/24  Smoking history  Onychomycosis    PLAN:    Patient to go to OR today, 10/26/24, for  right partial 5th ray resection with possible heel cord lengthening and VAC therapy as needed with Dr. Dhillon.  1 unit of blood was ordered following consenting the patient for receiving blood products. Explained the risks and benefits of receiving blood and she understands  Informed consent obtained. Please refer to details below.   H&P, vitals, and current labs reviewed. No acute changes noted.  Anticipate updated weight bearing status post-procedure.  PT/OT to be consulted for evaluation.     Current Antibiotics: Ancef/flagyl  Pharmacologic VTE Prophylaxis held: Plavix  Mechanical VTE Prophylaxis: sequential compression device    Informed Consent Statement:  I was very clear at the beginning of the discussion about alternatives to this surgery including benign neglect, bracing, and second surgical opinions.     I spent time to discuss with the patient the surgical procedure(s) as right partial 5th ray resection with possible heel cord lengthening and VAC therapy, pre-op testing, and post-op course required to properly heal the surgery. I discussed risks as infection, scar, swelling, chronic pain, painful or prominent hardware, possible need to remove hardware, poor healing of incision or bone that could require more surgery, incomplete correction of deformities or recurrence of deformities, change in shape of foot, toe, or walking and function, numbness, neuritis/RSD, blood clots in the leg or lung, and even death from anesthesia  "complications. No guarantees were given and the possibility of recurrent deformity or incomplete correction were discussed before patient signed the consent form. We also discussed the need for possible anticoagulation. The offloading device necessary after the surgery will be Post-operative shoegear.       SUBJECTIVE:     The patient was seen, evaluated, and assessed at bedside today. The patient was awake, alert, and in no acute distress. Patient confirmed NPO status. All questions and concerns regarding the surgical procedure addressed. Patient understands risks vs benefits of procedure and remains amenable with plan to go to OR today. Patient denies N/V/F/chills/SOB/CP.      OBJECTIVE:     Vitals:   /51   Pulse 89   Temp 98.8 °F (37.1 °C)   Resp 16   Ht 5' 9\" (1.753 m)   Wt 56.7 kg (125 lb)   SpO2 96%   BMI 18.46 kg/m²     Temp (24hrs), Av.3 °F (37.4 °C), Min:98.1 °F (36.7 °C), Max:100.5 °F (38.1 °C)      Physical Exam:  General appearance: Alert, cooperative and no acute distress.  Extremity: Dressings C/D/I and left intact to the OR. NVS at baseline B/l. MSK function at baseline B/l. No calf tenderness noted B/l.    Additional Data:     Labs:    Results from last 7 days   Lab Units 10/26/24  0432   WBC Thousand/uL 11.75*   HEMOGLOBIN g/dL 7.4*   HEMATOCRIT % 23.3*   PLATELETS Thousands/uL 258   SEGS PCT % 69   LYMPHO PCT % 23   MONO PCT % 6   EOS PCT % 1     Results from last 7 days   Lab Units 10/26/24  0432 10/24/24  0541 10/23/24  1418   POTASSIUM mmol/L 4.3   < >  --    CHLORIDE mmol/L 99   < >  --    CO2 mmol/L 30   < >  --    CO2, I-STAT mmol/L  --   --  23   BUN mg/dL 11   < >  --    CREATININE mg/dL 0.60   < >  --    CALCIUM mg/dL 8.0*   < >  --    ALK PHOS U/L 61   < >  --    ALT U/L 4*   < >  --    AST U/L 12*   < >  --    GLUCOSE, ISTAT mg/dl  --   --  199*    < > = values in this interval not displayed.     Results from last 7 days   Lab Units 10/19/24  0901   INR  0.94       * I " "Have Reviewed All Lab Data Listed Above.    Recent Cultures (last 7 days):               Imaging: I have personally reviewed pertinent films in PACS  EKG, Pathology, and Other Studies: I have personally reviewed pertinent reports.      ** Please Note: Portions of the record may have been created with voice recognition software. Occasional wrong word or \"sound a like\" substitutions may have occurred due to the inherent limitations of voice recognition software. Read the chart carefully and recognize, using context, where substitutions have occurred. **      "

## 2024-10-26 NOTE — OP NOTE
OPERATIVE REPORT - Podiatry  PATIENT NAME: Sabiha Garcia    :  1955  MRN: [id number]  Pt Location: [facility] OR ROOM 04    SURGERY DATE: 10/26/2024    Surgeons and Role:     * Bienvenido Dhillon DPM - Primary     * Moises Baugh DPM - Assisting    Pre-op Diagnosis:  Gangrene (HCC) [I96]    Post-Op Diagnosis Codes:     * Gangrene (HCC) [I96]    Procedure(s) (LRB):  Partial 5th ray amputation of the right foot with removal of nonviable bone/soft tissue (Right)    Specimen(s):  ID Type Source Tests Collected by Time Destination   1 : Right 5th toe and metatarsal Tissue Toe, Right TISSUE EXAM Bienvenido Dhillon DPM 10/26/2024 0842        Estimated Blood Loss:   Minimal    Drains:  Urethral Catheter Coude 18 Fr. (Active)   Amt returned on insertion(mL) 700 mL 10/24/24 2300   Reasons to continue Urinary Catheter  Acute urinary retention/obstruction failing urinary retention protocol 10/25/24 2015   Goal for Removal Will consult urology 10/25/24 2015   Site Assessment Clean;Skin intact 10/25/24 2015   [place] Care Done 10/25/24 2100   Collection Container Standard drainage bag 10/25/24 2015   Securement Method Securing device (Describe) 10/25/24 2015   Output (mL) 700 mL 10/26/24 0528   Number of days: 2        Anesthesia Type:   Choice with 20 ml of 1% Lidocaine and 0.5% Bupivacaine in a 1:1 mixture    Hemostasis:  Direct compression, electrocautery    Materials:  None    Injectables:  None    Operative Findings:  1) Partial 5th ray amputation of right foot with removal of purulent, odorous tissue and bone  2) Partial ray amputation site was noted to be hard, healthy and of clean quality  3) Direct closure using Prolene following ensuring soft tissue margins looked healthy, without any sinuses  4) 5th digit and bone sent for tissue exam    Complications:   None    Procedure and Technique:     Under mild sedation, the patient was brought into the operating room and kept on the stretcher in the supine position. IV  sedation was achieved by anesthesia team and a universal timeout was performed where all parties are in agreement of correct patient, correct procedure and correct site. A 5th ray local block was performed consisting of 20 ml of local anesthetic to the right foot. The foot was then prepped and draped in the usual aseptic manner.     Attention was directed to the 5 th ray of the right foot where a dorsolateral based racquette type of incision was made. Utilizing a sterile 15 blade, this incision was carried deep straight to bone. Soft tissue structures were then reflected off the 5th metatarsal. A sagittal saw was then utilized to make a transverse cut in the 5th metatarsal. The severed ray was then passed off to the back table.  It was noted that all tissue margins were bleeding and viable. No deep sinus tracts or areas of purulence were visualized. The remaining bone on the proximal aspect of the cut was noted to be of hard and viable quality. Any remaining tendinous structures were identified and severed proximally to remove any nidus of infection.     The wound was then cleansed with copious amount of sterile saline. Skin edges were reapproximated and closure was obtained utilizing prolene. The foot was then cleansed and dried. The incision site was dressed with betadine soaked adaptic, gauze. This was then covered with a Brandy and a lightly wrapped ACE wrap.     The patient tolerated the procedure and anesthesia well without immediate complications and transferred to PACU with vital signs stable.     As with many limb salvage procedures, we contemplate the possibility of performing further stages to this procedure. Procedures may include debridements, delayed closure, plastic surgery techniques, or more proximal amputations. This procedure may be considered part of a multi-staged limb salvage treatment plan.     Dr. Dhillon was present during the entire procedure and participated in all key aspects.    SIGNATURE:  "Moises Baugh, DPREAGAN  DATE: October 26, 2024  TIME: 9:34 AM      Portions of the record may have been created with voice recognition software. Occasional wrong word or \"sound a like\" substitutions may have occurred due to the inherent limitations of voice recognition software. Read the chart carefully and recognize, using context, where substitutions have occurred.          "

## 2024-10-26 NOTE — PROGRESS NOTES
Progress Note - Hospitalist   Name: Sabiha Garcia 68 y.o. female I MRN: [id number]  Unit/Bed#: [facility] 504-01 I Date of Admission: 10/11/2024   Date of Service: 10/26/2024 I Hospital Day: 15    Assessment & Plan  Gangrene of toe of right foot (HCC)  Right fifth digit dry gangrene with surround cellulitis  Xray - Subtle periosteal reaction of the fifth digit proximal phalanx with adjacent soft tissue swelling. Findings raise suspicion for osteomyelitis   LEADS abnormal - vascular consulted  Podiatry following - plan after vascular optimization  Discussed with infectious disease.  Continue with antibiotic therapy.  Patient had revascularization done on October 23, 2024  Pt is S/P amputation 10/26  Cellulitis of right foot  Continue Ancef  Type 2 diabetes mellitus (HCC)  Lab Results   Component Value Date    HGBA1C 14.8 (H) 09/18/2024       Recent Labs     10/25/24  2121 10/26/24  0606 10/26/24  0931 10/26/24  1109   POCGLU 88 76 82 89       Blood Sugar Average: Last 72 hrs:  (P) 141.6088685542778414    Severely uncontrolled as evidenced by HbA1c  Continue with insulin regimen.  Lantus 25 in am, Humalog 10 TID,   Ct sliding scale insulin  Endocrine optimizing regimen     Ambulatory dysfunction  Presented with generalized weakness, 2 falls with no head strike  States had been down for prolonged period  CT head - no acute pathology  TSH low with normal free T4, CK normal  PT/OT  Tobacco user  Nicotine patch  Smoking cessation advised  Vitamin D deficiency  Continue supplementation  Lung nodule  CT chest with 3 mm right upper lobe noncalcified nodule  CT chest without contrast in 12 months  Aortoiliac occlusive disease (HCC)  Vascular surgery consulted  Patient is status post bypass on October 23, 2024    VTE Pharmacologic Prophylaxis: VTE Score: 3 Moderate Risk (Score 3-4) - Pharmacological DVT Prophylaxis Contraindicated. Sequential Compression Devices Ordered.    Mobility:   Basic Mobility Inpatient Raw Score: 13  JH-HLM  Goal: 4: Move to chair/commode  JH-HLM Achieved: 2: Bed activities/Dependent transfer  JH-HLM Goal NOT achieved. Continue with multidisciplinary rounding and encourage appropriate mobility to improve upon -HLM goals.    Patient Centered Rounds: I performed bedside rounds with nursing staff today.   Discussions with Specialists or Other Care Team Provider: Podiatry    Education and Discussions with Family / Patient: Patient declined call to .     Current Length of Stay: 15 day(s)  Current Patient Status: Inpatient   Certification Statement: The patient will continue to require additional inpatient hospital stay due to S/P amputation, cellulitis, ensuring surgical cure  Discharge Plan: Anticipate discharge in 24-48 hrs to Pending Pt/OT evaluation     Code Status: Level 1 - Full Code    Subjective   This is a very pleasant 68 y.o. female who was seen today at bedside. Patient has no new complaints. Patient is not in any acute distress.       Objective :  Temp:  [98.1 °F (36.7 °C)-100.5 °F (38.1 °C)] 98.5 °F (36.9 °C)  HR:  [83-99] 86  BP: ()/(51-65) 139/64  Resp:  [15-21] 16  SpO2:  [94 %-100 %] 99 %  O2 Device: Nasal cannula  Nasal Cannula O2 Flow Rate (L/min):  [2 L/min] 2 L/min    Body mass index is 18.46 kg/m².     Input and Output Summary (last 24 hours):     Intake/Output Summary (Last 24 hours) at 10/26/2024 1411  Last data filed at 10/26/2024 1205  Gross per 24 hour   Intake 2000 ml   Output 2650 ml   Net -650 ml       Physical Exam  Vitals reviewed.   Constitutional:       General: She is not in acute distress.     Appearance: She is not ill-appearing.   HENT:      Head: Normocephalic.   Eyes:      Conjunctiva/sclera: Conjunctivae normal.   Cardiovascular:      Rate and Rhythm: Normal rate and regular rhythm.   Pulmonary:      Effort: Pulmonary effort is normal.   Abdominal:      General: Abdomen is flat.      Palpations: Abdomen is soft.      Tenderness: There is no abdominal tenderness.    Musculoskeletal:         General: Signs of injury present.   Skin:     General: Skin is warm and dry.      Findings: Lesion present.   Neurological:      Mental Status: She is alert. Mental status is at baseline.           Lines/Drains:  Lines/Drains/Airways       Active Status       Name Placement date Placement time Site Days    Urethral Catheter Coude 18 Fr. 10/24/24  2325  Coude  1                  Urinary Catheter:  Goal for removal: Voiding trial when ambulation improves                 Lab Results: I have reviewed the following results:   Results from last 7 days   Lab Units 10/26/24  0432 10/23/24  0526 10/22/24  0610   WBC Thousand/uL 11.75*   < > 12.67*   HEMOGLOBIN g/dL 7.4*   < > 9.2*   I STAT HEMOGLOBIN   --    < >  --    HEMATOCRIT % 23.3*   < > 29.3*   HEMATOCRIT, ISTAT   --    < >  --    PLATELETS Thousands/uL 258   < > 348   BANDS PCT %  --   --  4   SEGS PCT % 69  --   --    LYMPHO PCT % 23  --  24   MONO PCT % 6  --  3*   EOS PCT % 1  --  0    < > = values in this interval not displayed.     Results from last 7 days   Lab Units 10/26/24  0432   SODIUM mmol/L 134*   POTASSIUM mmol/L 4.3   CHLORIDE mmol/L 99   CO2 mmol/L 30   BUN mg/dL 11   CREATININE mg/dL 0.60   ANION GAP mmol/L 5   CALCIUM mg/dL 8.0*   ALBUMIN g/dL 2.2*   TOTAL BILIRUBIN mg/dL 0.28   ALK PHOS U/L 61   ALT U/L 4*   AST U/L 12*   GLUCOSE RANDOM mg/dL 71         Results from last 7 days   Lab Units 10/26/24  1109 10/26/24  0931 10/26/24  0606 10/25/24  2121 10/25/24  2035 10/25/24  1548 10/25/24  1018 10/25/24  0649 10/24/24  2102 10/24/24  1735 10/24/24  1549 10/24/24  1026   POC GLUCOSE mg/dl 89 82 76 88 69 79 151* 123 198* 124 78 176*               Recent Cultures (last 7 days):         Imaging Results Review: No pertinent imaging studies reviewed.  Other Study Results Review: No additional pertinent studies reviewed.    Last 24 Hours Medication List:     Current Facility-Administered Medications:     acetaminophen (TYLENOL)  tablet 975 mg, Q8H JUAN    aspirin (ECOTRIN LOW STRENGTH) EC tablet 81 mg, Daily    atorvastatin (LIPITOR) tablet 80 mg, Daily With Dinner    calcium carbonate (TUMS) chewable tablet 1,000 mg, TID PRN    ceFAZolin (ANCEF) IVPB (premix in dextrose) 2,000 mg 50 mL, Q8H, Last Rate: 2,000 mg (10/26/24 1045)    chlorhexidine (PERIDEX) 0.12 % oral rinse 15 mL, Q12H JUAN    Cholecalciferol (VITAMIN D3) tablet 2,000 Units, Daily    clopidogrel (PLAVIX) tablet 75 mg, Daily    dextromethorphan-guaiFENesin (ROBITUSSIN DM) oral syrup 10 mL, Q6H PRN    docusate sodium (COLACE) capsule 100 mg, BID    guaiFENesin (MUCINEX) 12 hr tablet 600 mg, Q12H JUAN    HYDROmorphone (DILAUDID) injection 0.5 mg, Q4H PRN    [START ON 10/27/2024] insulin glargine (LANTUS) subcutaneous injection 32 Units 0.32 mL, QAM    insulin lispro (HumALOG/ADMELOG) 100 units/mL subcutaneous injection 1-5 Units, TID AC **AND** Fingerstick Glucose (POCT), 4x Daily AC and at bedtime    insulin lispro (HumALOG/ADMELOG) 100 units/mL subcutaneous injection 1-5 Units, HS    insulin lispro (HumALOG/ADMELOG) 100 units/mL subcutaneous injection 10 Units, Daily With Lunch    insulin lispro (HumALOG/ADMELOG) 100 units/mL subcutaneous injection 18 Units, Daily With Breakfast    insulin lispro (HumALOG/ADMELOG) 100 units/mL subcutaneous injection 18 Units, Daily With Dinner    lidocaine (LIDODERM) 5 % patch 1 patch, Daily    metroNIDAZOLE (FLAGYL) tablet 500 mg, Q12H JUAN    nicotine (NICODERM CQ) 21 mg/24 hr TD 24 hr patch 21 mg, Daily    ondansetron (ZOFRAN) injection 4 mg, Q6H PRN    oxyCODONE (ROXICODONE) immediate release tablet 10 mg, Q4H PRN    oxyCODONE (ROXICODONE) IR tablet 5 mg, Q4H PRN    phenol (CHLORASEPTIC) 1.4 % mucosal liquid 1 spray, Q2H PRN    polyethylene glycol (MIRALAX) packet 17 g, Daily PRN    Administrative Statements   Today, Patient Was Seen By: Jose Spaulding MD  I have spent a total time of 40 minutes in caring for this patient on the day of the  visit/encounter including Diagnostic results, Risks and benefits of tx options, Risk factor reductions, Obtaining or reviewing history  , and Communicating with other healthcare professionals .    **Please Note: This note may have been constructed using a voice recognition system.**

## 2024-10-26 NOTE — ANESTHESIA POSTPROCEDURE EVALUATION
Post-Op Assessment Note    CV Status:  Stable  Pain Score: 0    Pain management: adequate       Mental Status:  Sleepy   Hydration Status:  Stable   PONV Controlled:  None   Airway Patency:  Patent     Post Op Vitals Reviewed: Yes    No anethesia notable event occurred.    Staff: CRNA           Last Filed PACU Vitals:  Vitals Value Taken Time   Temp 98.1    Pulse 87 10/26/24 0928   /65 10/26/24 0928   Resp 18 10/26/24 0928   SpO2 100 % 10/26/24 0928   Vitals shown include unfiled device data.    Modified Nahid:  No data recorded

## 2024-10-26 NOTE — ASSESSMENT & PLAN NOTE
Right fifth digit dry gangrene with surround cellulitis  Xray - Subtle periosteal reaction of the fifth digit proximal phalanx with adjacent soft tissue swelling. Findings raise suspicion for osteomyelitis   LEADS abnormal - vascular consulted  Podiatry following - plan after vascular optimization  Discussed with infectious disease.  Continue with antibiotic therapy.  Patient had revascularization done on October 23, 2024  Pt is S/P amputation 10/26

## 2024-10-26 NOTE — QUICK NOTE
Chart reviewed, patient not personally seen by me.   BG reviewed, noted to be low throughout the day yesterday.  Has not received any mealtime insulin in the past 48 hours.   For now recommend decreasing Lantus to 32 units qAM  Continue HumaLog 18-10-18 and SSI Algorithm 1  Will continue monitoring BG and adjust as needed.

## 2024-10-26 NOTE — PROGRESS NOTES
Progress Note - Vascular Surgery   Name: Sabiha Garcia 68 y.o. female I MRN: [id number]  Unit/Bed#: [facility] 504-01 I Date of Admission: 10/11/2024   Date of Service: 10/26/2024 I Hospital Day: 15   Assessment & Plan  Aortoiliac occlusive disease (HCC)  68-year-old female current smoker with history of uncontrolled T2 DM (HgA1c 14.8), HLD, tobacco use, lung nodule, hx of recurrent falls and hx of L femoral shaft fx s/p repair in 2020 arrived in the ED 10/11 with generalized weakness s/p falls, admitted for management of hyperosmolar hyperglycemia with associated metabolic acidosis with concern for DKA vs starvation ketosis. Patient with noted non-healing R 5th digit gangrenous wound, vascular surgery consulted for abnormal ABIs concern for contributory PAD. Imaging showing occlusion of infrarenal abdominal aorta; prox R CHARISSA severe stenosis, R SFA occlusion with right and left SALO 0.34/-/- and SALO 0.34/ 56/ 44 respectively.      10/23 L ax-bifem with R CFEA and R CFA-AK pop bypass w/PTFE    Plan  - Bilateral lower extremities well perfused, optimized from vascular standpoint   - Pain control prn   - Diet as tolerated   - Monteiro for urinary retention, management per primary  - Continue aspirin and statin therapy   - Start Xarelto 2.5mg BID 24hrs after podiatric intervention    - Encourage ambulation, PT/OT, ISB  - Appreciate medicine and Endocrinology teams for assistance with medical management  - Appreciate Podiatry for toe wounds  - Remainder of care per primary team     Subjective : No acute events overnight. NPO for planned podiatric procedure. No active complaints this AM.    Objective :  Temp:  [98.1 °F (36.7 °C)-100.5 °F (38.1 °C)] 98.5 °F (36.9 °C)  HR:  [83-99] 86  BP: ()/(51-65) 139/64  Resp:  [15-21] 16  SpO2:  [94 %-100 %] 99 %  O2 Device: Nasal cannula  Nasal Cannula O2 Flow Rate (L/min):  [2 L/min] 2 L/min     I/O         10/23 0701  10/24 0700 10/24 0701  10/25 0700 10/25 0701  10/26 0700    P.O. 717 0      I.V. (mL/kg) 5000 (85.5)      Blood 700      IV Piggyback 500      Total Intake(mL/kg) 6917 (118.2) 0 (0)     Urine (mL/kg/hr) 1715 (1.2) 700 (0.5)     Stool       Blood 400      Total Output 2115 700     Net +4802 -700            Unmeasured Urine Occurrence  1 x           Lines/Drains/Airways       Active Status       Name Placement date Placement time Site Days    Urethral Catheter Coude 18 Fr. 10/24/24  2325  Coude  1                  Physical Exam  Vitals reviewed.   Constitutional:       General: She is not in acute distress.     Appearance: Normal appearance.   HENT:      Right Ear: External ear normal.      Left Ear: External ear normal.      Nose: Nose normal.      Mouth/Throat:      Mouth: Mucous membranes are moist.   Eyes:      Extraocular Movements: Extraocular movements intact.   Cardiovascular:      Rate and Rhythm: Normal rate.      Comments: R DP/PT signals present. L radial pulse palpable, L ulnar and palmar arch signals present.  Pulmonary:      Effort: Pulmonary effort is normal. No respiratory distress.   Abdominal:      General: There is no distension.      Palpations: Abdomen is soft.      Tenderness: There is no abdominal tenderness.   Genitourinary:     Comments:  Musculoskeletal:         General: Normal range of motion.      Comments: L 5th toe with dressing in place   Skin:     General: Skin is warm.      Coloration: Skin is not pale.      Comments: Bilateral groin, medial thigh, and left infraclavicular incisions c/d/I without appreciable hematoma   Neurological:      General: No focal deficit present.      Mental Status: She is alert and oriented to person, place, and time.      Sensory: No sensory deficit.      Motor: No weakness.   Psychiatric:         Mood and Affect: Mood normal.         Thought Content: Thought content normal.       Lab Results: I have reviewed the following results:  CBC with diff:   Lab Results   Component Value Date    WBC 11.75 (H) 10/26/2024    HGB 7.4 (L)  "10/26/2024    HCT 23.3 (L) 10/26/2024    MCV 94 10/26/2024     10/26/2024    RBC 2.49 (L) 10/26/2024    MCH 29.7 10/26/2024    MCHC 31.8 10/26/2024    RDW 16.0 (H) 10/26/2024    MPV 8.1 (L) 10/26/2024    NRBC 0 10/26/2024   ,   BMP/CMP:  Lab Results   Component Value Date    SODIUM 134 (L) 10/26/2024    SODIUM 138 09/18/2024    SODIUM 135 04/06/2022    K 4.3 10/26/2024    K 4.6 09/18/2024    K 4.5 04/06/2022    CL 99 10/26/2024    CL 95 (L) 09/18/2024    CL 98 (L) 04/06/2022    CO2 30 10/26/2024    CO2 23 10/23/2024    CO2 33 (H) 09/18/2024    CO2 28 04/06/2022    BUN 11 10/26/2024    BUN 14 09/18/2024    BUN 13 04/06/2022    CREATININE 0.60 10/26/2024    CREATININE 0.86 04/06/2022    GLUCOSE 199 (H) 10/23/2024    CALCIUM 8.0 (L) 10/26/2024    CALCIUM 9.6 09/18/2024    CALCIUM 9.7 04/06/2022    AST 12 (L) 10/26/2024    AST 11 09/18/2024    AST 35 04/06/2022    ALT 4 (L) 10/26/2024    ALT 8 09/18/2024    ALT 23 04/06/2022    ALKPHOS 61 10/26/2024    ALKPHOS 120 09/18/2024    ALKPHOS 154 (H) 04/06/2022    EGFR 93 10/26/2024    EGFR 74 04/06/2022    EGFR 75 06/16/2018   ,   Lipid Panel: No results found for: \"CHOL\",   Coags:   Lab Results   Component Value Date    PT 12.0 06/16/2018    PTT 63 (H) 10/23/2024    INR 0.94 10/19/2024    INR 0.9 06/16/2018   ,   Blood Culture:   Lab Results   Component Value Date    BLOODCX No Growth After 5 Days. 10/11/2024    BLOODCX No Growth After 5 Days. 10/11/2024   ,   Urinalysis:   Lab Results   Component Value Date    COLORU Light Yellow 10/11/2024    CLARITYU Clear 10/11/2024    SPECGRAV 1.021 10/11/2024    PHUR 5.5 10/11/2024    LEUKOCYTESUR (A) 10/11/2024     Elevated glucose may cause decreased leukocyte values. See urine microscopic for UWBC result    NITRITE Negative 10/11/2024    GLUCOSEU >=1000 (1%) (A) 10/11/2024    KETONESU 60 (2+) (A) 10/11/2024    BILIRUBINUR Negative 10/11/2024    BLOODU Negative 10/11/2024       VTE Prophylaxis: Heparin  "

## 2024-10-26 NOTE — ASSESSMENT & PLAN NOTE
Lab Results   Component Value Date    HGBA1C 14.8 (H) 09/18/2024       Recent Labs     10/25/24  2121 10/26/24  0606 10/26/24  0931 10/26/24  1109   POCGLU 88 76 82 89       Blood Sugar Average: Last 72 hrs:  (P) 141.6413413921364828    Severely uncontrolled as evidenced by HbA1c  Continue with insulin regimen.  Lantus 25 in am, Humalog 10 TID,   Ct sliding scale insulin  Endocrine optimizing regimen

## 2024-10-26 NOTE — ANESTHESIA PREPROCEDURE EVALUATION
Procedure:  RAY RESECTION FOOT (Right: Foot)  RECESSION GASTROC vs possible Achilles tendon lengthening (Right: Leg Lower)    Relevant Problems   CARDIO   (+) Aortoiliac occlusive disease (HCC)   (+) Hyperlipidemia      ENDO   (+) Type 2 diabetes mellitus (HCC)      HEMATOLOGY   (+) Blood loss anemia      NEURO/PSYCH   (+) Claudication of both lower extremities (HCC)   (+) Depression   (+) Generalized anxiety disorder   (+) Other headache syndrome      Behavioral Health   (+) Tobacco user      Orthopedic/Musculoskeletal   (+) Gangrene of toe of right foot (HCC)      Other   (+) Moderate protein-calorie malnutrition (HCC)      TTE 10/16/24  LVEF 60%, G1DD, RV nml    Lab Results   Component Value Date    WBC 11.75 (H) 10/26/2024    HGB 7.4 (L) 10/26/2024    HCT 23.3 (L) 10/26/2024    MCV 94 10/26/2024     10/26/2024     Lab Results   Component Value Date    K 4.3 10/26/2024    CO2 30 10/26/2024    CL 99 10/26/2024    BUN 11 10/26/2024    CREATININE 0.60 10/26/2024     Lab Results   Component Value Date    INR 0.94 10/19/2024    INR 1.08 10/11/2024    INR 0.92 05/26/2020    PROTIME 12.9 10/19/2024    PROTIME 14.3 10/11/2024    PROTIME 12.4 05/26/2020     Lab Results   Component Value Date    PTT 63 (H) 10/23/2024       Lab Results   Component Value Date    GLUCOSE 199 (H) 10/23/2024    GLUCOSE 189 (H) 10/23/2024    GLUCOSE 153 (H) 10/23/2024       Lab Results   Component Value Date    HGBA1C 14.8 (H) 09/18/2024       Type and Screen:  O    Physical Exam    Airway    Mallampati score: II  TM Distance: >3 FB  Neck ROM: full     Dental        Cardiovascular      Pulmonary      Other Findings  post-pubertal.      Anesthesia Plan  ASA Score- 3     Anesthesia Type- general with ASA Monitors.         Additional Monitors:     Airway Plan: LMA.    Comment: Awaiting 1U pRBC for periprocedural transfusion.       Plan Factors-Exercise tolerance (METS): >4 METS.    Chart reviewed.   Existing labs reviewed. Patient summary  reviewed.                  Induction- intravenous.    Postoperative Plan- Plan for postoperative opioid use. Planned trial extubation    Perioperative Resuscitation Plan - Level 1 - Full Code.       Informed Consent- Anesthetic plan and risks discussed with patient.

## 2024-10-26 NOTE — ASSESSMENT & PLAN NOTE
68-year-old female current smoker with history of uncontrolled T2 DM (HgA1c 14.8), HLD, tobacco use, lung nodule, hx of recurrent falls and hx of L femoral shaft fx s/p repair in 2020 arrived in the ED 10/11 with generalized weakness s/p falls, admitted for management of hyperosmolar hyperglycemia with associated metabolic acidosis with concern for DKA vs starvation ketosis. Patient with noted non-healing R 5th digit gangrenous wound, vascular surgery consulted for abnormal ABIs concern for contributory PAD. Imaging showing occlusion of infrarenal abdominal aorta; prox R CHARISSA severe stenosis, R SFA occlusion with right and left SALO 0.34/-/- and SALO 0.34/ 56/ 44 respectively.      10/23 L ax-bifem with R CFEA and R CFA-AK pop bypass w/PTFE    Plan  - Bilateral lower extremities well perfused, optimized from vascular standpoint   - Pain control prn   - Diet as tolerated   - Monteiro for urinary retention, management per primary  - Continue aspirin and statin therapy   - Start Xarelto 2.5mg BID 24hrs after podiatric intervention    - Encourage ambulation, PT/OT, ISB  - Appreciate medicine and Endocrinology teams for assistance with medical management  - Appreciate Podiatry for toe wounds  - Remainder of care per primary team

## 2024-10-27 LAB
ABO GROUP BLD BPU: NORMAL
ALBUMIN SERPL BCG-MCNC: 2.3 G/DL (ref 3.5–5)
ALP SERPL-CCNC: 69 U/L (ref 34–104)
ALT SERPL W P-5'-P-CCNC: 3 U/L (ref 7–52)
ANION GAP SERPL CALCULATED.3IONS-SCNC: 8 MMOL/L (ref 4–13)
AST SERPL W P-5'-P-CCNC: 11 U/L (ref 13–39)
BASOPHILS # BLD AUTO: 0.06 THOUSANDS/ΜL (ref 0–0.1)
BASOPHILS NFR BLD AUTO: 1 % (ref 0–1)
BILIRUB SERPL-MCNC: 0.24 MG/DL (ref 0.2–1)
BPU ID: NORMAL
BUN SERPL-MCNC: 16 MG/DL (ref 5–25)
CALCIUM ALBUM COR SERPL-MCNC: 9.1 MG/DL (ref 8.3–10.1)
CALCIUM SERPL-MCNC: 7.7 MG/DL (ref 8.4–10.2)
CHLORIDE SERPL-SCNC: 98 MMOL/L (ref 96–108)
CO2 SERPL-SCNC: 28 MMOL/L (ref 21–32)
CREAT SERPL-MCNC: 0.49 MG/DL (ref 0.6–1.3)
CROSSMATCH: NORMAL
EOSINOPHIL # BLD AUTO: 0.09 THOUSAND/ΜL (ref 0–0.61)
EOSINOPHIL NFR BLD AUTO: 1 % (ref 0–6)
ERYTHROCYTE [DISTWIDTH] IN BLOOD BY AUTOMATED COUNT: 15.4 % (ref 11.6–15.1)
GFR SERPL CREATININE-BSD FRML MDRD: 100 ML/MIN/1.73SQ M
GLUCOSE SERPL-MCNC: 124 MG/DL (ref 65–140)
GLUCOSE SERPL-MCNC: 140 MG/DL (ref 65–140)
GLUCOSE SERPL-MCNC: 179 MG/DL (ref 65–140)
GLUCOSE SERPL-MCNC: 231 MG/DL (ref 65–140)
GLUCOSE SERPL-MCNC: 242 MG/DL (ref 65–140)
GLUCOSE SERPL-MCNC: 64 MG/DL (ref 65–140)
GLUCOSE SERPL-MCNC: 68 MG/DL (ref 65–140)
HCT VFR BLD AUTO: 25.6 % (ref 34.8–46.1)
HGB BLD-MCNC: 8.4 G/DL (ref 11.5–15.4)
IMM GRANULOCYTES # BLD AUTO: 0.07 THOUSAND/UL (ref 0–0.2)
IMM GRANULOCYTES NFR BLD AUTO: 1 % (ref 0–2)
LYMPHOCYTES # BLD AUTO: 2.65 THOUSANDS/ΜL (ref 0.6–4.47)
LYMPHOCYTES NFR BLD AUTO: 24 % (ref 14–44)
MAGNESIUM SERPL-MCNC: 1.8 MG/DL (ref 1.9–2.7)
MCH RBC QN AUTO: 29.4 PG (ref 26.8–34.3)
MCHC RBC AUTO-ENTMCNC: 32.8 G/DL (ref 31.4–37.4)
MCV RBC AUTO: 90 FL (ref 82–98)
MONOCYTES # BLD AUTO: 0.64 THOUSAND/ΜL (ref 0.17–1.22)
MONOCYTES NFR BLD AUTO: 6 % (ref 4–12)
NEUTROPHILS # BLD AUTO: 7.73 THOUSANDS/ΜL (ref 1.85–7.62)
NEUTS SEG NFR BLD AUTO: 67 % (ref 43–75)
NRBC BLD AUTO-RTO: 0 /100 WBCS
PLATELET # BLD AUTO: 300 THOUSANDS/UL (ref 149–390)
PMV BLD AUTO: 8.6 FL (ref 8.9–12.7)
POTASSIUM SERPL-SCNC: 4.2 MMOL/L (ref 3.5–5.3)
PROT SERPL-MCNC: 4.6 G/DL (ref 6.4–8.4)
RBC # BLD AUTO: 2.86 MILLION/UL (ref 3.81–5.12)
SODIUM SERPL-SCNC: 134 MMOL/L (ref 135–147)
UNIT DISPENSE STATUS: NORMAL
UNIT PRODUCT CODE: NORMAL
UNIT PRODUCT VOLUME: 350 ML
UNIT RH: NORMAL
WBC # BLD AUTO: 11.24 THOUSAND/UL (ref 4.31–10.16)

## 2024-10-27 PROCEDURE — 99024 POSTOP FOLLOW-UP VISIT: CPT | Performed by: SURGERY

## 2024-10-27 PROCEDURE — 97535 SELF CARE MNGMENT TRAINING: CPT

## 2024-10-27 PROCEDURE — 97168 OT RE-EVAL EST PLAN CARE: CPT

## 2024-10-27 PROCEDURE — 85025 COMPLETE CBC W/AUTO DIFF WBC: CPT | Performed by: FAMILY MEDICINE

## 2024-10-27 PROCEDURE — 99232 SBSQ HOSP IP/OBS MODERATE 35: CPT | Performed by: FAMILY MEDICINE

## 2024-10-27 PROCEDURE — 80053 COMPREHEN METABOLIC PANEL: CPT | Performed by: FAMILY MEDICINE

## 2024-10-27 PROCEDURE — 82948 REAGENT STRIP/BLOOD GLUCOSE: CPT

## 2024-10-27 PROCEDURE — 83735 ASSAY OF MAGNESIUM: CPT | Performed by: FAMILY MEDICINE

## 2024-10-27 RX ORDER — POLYETHYLENE GLYCOL 3350 17 G/17G
17 POWDER, FOR SOLUTION ORAL DAILY
Status: DISCONTINUED | OUTPATIENT
Start: 2024-10-27 | End: 2024-10-29 | Stop reason: HOSPADM

## 2024-10-27 RX ORDER — BISACODYL 10 MG
10 SUPPOSITORY, RECTAL RECTAL DAILY PRN
Status: DISCONTINUED | OUTPATIENT
Start: 2024-10-27 | End: 2024-10-29 | Stop reason: HOSPADM

## 2024-10-27 RX ORDER — LANOLIN ALCOHOL/MO/W.PET/CERES
400 CREAM (GRAM) TOPICAL 2 TIMES DAILY
Status: COMPLETED | OUTPATIENT
Start: 2024-10-27 | End: 2024-10-27

## 2024-10-27 RX ADMIN — METRONIDAZOLE 500 MG: 500 TABLET ORAL at 08:36

## 2024-10-27 RX ADMIN — POLYETHYLENE GLYCOL 3350 17 G: 17 POWDER, FOR SOLUTION ORAL at 08:36

## 2024-10-27 RX ADMIN — INSULIN LISPRO 10 UNITS: 100 INJECTION, SOLUTION INTRAVENOUS; SUBCUTANEOUS at 11:40

## 2024-10-27 RX ADMIN — ACETAMINOPHEN 975 MG: 325 TABLET, FILM COATED ORAL at 15:16

## 2024-10-27 RX ADMIN — INSULIN GLARGINE 32 UNITS: 100 INJECTION, SOLUTION SUBCUTANEOUS at 08:36

## 2024-10-27 RX ADMIN — CEFAZOLIN SODIUM 2000 MG: 2 SOLUTION INTRAVENOUS at 08:35

## 2024-10-27 RX ADMIN — OXYCODONE HYDROCHLORIDE 10 MG: 10 TABLET ORAL at 03:31

## 2024-10-27 RX ADMIN — MAGNESIUM OXIDE TAB 400 MG (241.3 MG ELEMENTAL MG) 400 MG: 400 (241.3 MG) TAB at 17:04

## 2024-10-27 RX ADMIN — ACETAMINOPHEN 975 MG: 325 TABLET, FILM COATED ORAL at 21:16

## 2024-10-27 RX ADMIN — INSULIN LISPRO 18 UNITS: 100 INJECTION, SOLUTION INTRAVENOUS; SUBCUTANEOUS at 07:26

## 2024-10-27 RX ADMIN — DOCUSATE SODIUM 100 MG: 100 CAPSULE, LIQUID FILLED ORAL at 08:36

## 2024-10-27 RX ADMIN — RIVAROXABAN 2.5 MG: 2.5 TABLET, FILM COATED ORAL at 20:07

## 2024-10-27 RX ADMIN — ATORVASTATIN CALCIUM 80 MG: 80 TABLET, FILM COATED ORAL at 17:04

## 2024-10-27 RX ADMIN — CHLORHEXIDINE GLUCONATE 0.12% ORAL RINSE 15 ML: 1.2 LIQUID ORAL at 08:36

## 2024-10-27 RX ADMIN — CHLORHEXIDINE GLUCONATE 0.12% ORAL RINSE 15 ML: 1.2 LIQUID ORAL at 20:07

## 2024-10-27 RX ADMIN — GUAIFENESIN 600 MG: 600 TABLET, EXTENDED RELEASE ORAL at 20:07

## 2024-10-27 RX ADMIN — INSULIN LISPRO 1 UNITS: 100 INJECTION, SOLUTION INTRAVENOUS; SUBCUTANEOUS at 11:40

## 2024-10-27 RX ADMIN — LIDOCAINE 1 PATCH: 50 PATCH TOPICAL at 08:37

## 2024-10-27 RX ADMIN — GUAIFENESIN 600 MG: 600 TABLET, EXTENDED RELEASE ORAL at 08:36

## 2024-10-27 RX ADMIN — INSULIN LISPRO 2 UNITS: 100 INJECTION, SOLUTION INTRAVENOUS; SUBCUTANEOUS at 07:25

## 2024-10-27 RX ADMIN — MAGNESIUM OXIDE TAB 400 MG (241.3 MG ELEMENTAL MG) 400 MG: 400 (241.3 MG) TAB at 08:50

## 2024-10-27 RX ADMIN — OXYCODONE HYDROCHLORIDE 10 MG: 10 TABLET ORAL at 11:39

## 2024-10-27 RX ADMIN — NICOTINE 21 MG: 21 PATCH, EXTENDED RELEASE TRANSDERMAL at 08:36

## 2024-10-27 RX ADMIN — Medication 2000 UNITS: at 08:36

## 2024-10-27 RX ADMIN — CEFAZOLIN SODIUM 2000 MG: 2 SOLUTION INTRAVENOUS at 17:04

## 2024-10-27 RX ADMIN — ACETAMINOPHEN 975 MG: 325 TABLET, FILM COATED ORAL at 05:45

## 2024-10-27 RX ADMIN — RIVAROXABAN 2.5 MG: 2.5 TABLET, FILM COATED ORAL at 11:37

## 2024-10-27 RX ADMIN — ASPIRIN 81 MG: 81 TABLET, COATED ORAL at 08:36

## 2024-10-27 RX ADMIN — CEFAZOLIN SODIUM 2000 MG: 2 SOLUTION INTRAVENOUS at 00:52

## 2024-10-27 RX ADMIN — METRONIDAZOLE 500 MG: 500 TABLET ORAL at 20:07

## 2024-10-27 RX ADMIN — DOCUSATE SODIUM 100 MG: 100 CAPSULE, LIQUID FILLED ORAL at 17:04

## 2024-10-27 NOTE — PLAN OF CARE
"  Problem: OCCUPATIONAL THERAPY ADULT  Goal: Performs self-care activities at highest level of function for planned discharge setting.  See evaluation for individualized goals.  Description: Treatment Interventions: ADL retraining, Functional transfer training, UE strengthening/ROM, Endurance training, Cognitive reorientation, Patient/family training, Equipment evaluation/education, Compensatory technique education, Activityengagement, Energy conservation          See flowsheet documentation for full assessment, interventions and recommendations.   Note: Limitation: Decreased ADL status, Decreased UE strength, Decreased Safe judgement during ADL, Decreased endurance, Decreased high-level ADLs  Prognosis: Fair  Assessment: Pt is a 67 yo female seen for OT re-eval s/p Partial 5th ray amputation of the right foot with removal of nonviable bone/soft tissue 10/26/24. Per podiatry order, Pt to maintain PWB to heel only in sx shoe. \"Pt has a past medical history of Allergic (585929), Depression (476658), Diabetes (HCC), Diabetes mellitus (HCC) (010122), and Smokes (1974). Pt with active OT orders in which OT consulted to assess pt's functional status and occupational performance to determine safe d/c needs. Pt seen with PT to increase safety, decrease fall risk, and maximize functional/occupational performance 2* medical complexity which is a regression from pt's functional baseline. Pt lives alone in a 1 story apartment with 2 DAVEY. PTA, pt was independent in ADLs, has some assistance with IADLs, and uses rollator vs SPC for functional mobility. (-) driving.\" Pt is currently demonstrating the following occupational deficits: Min A UB self care, Max A LB self care and toileting, Mod A bed mobility, Min A STS and functional mobility w/ RW. These deficits that are impacting pt's baseline areas of occupation are a result of the following impairments: endurance, activity tolerance, functional mobility, forward functional reach, " balance, functional standing tolerance, unsupportive home environment, decreased I w/ ADLS/IADLS, strength, visual deficits, and decreased safety awareness. The following Occupational Performance Areas to address include: grooming, bathing/shower, toilet hygiene, dressing, medication management, health maintenance, functional mobility, and clothing management.  Recommend  level II Moderate Resource Intensity  upon D/C. The patient's raw score on the -PAC Daily Activity Inpatient Short Form is 16. A raw score of less than 19 suggests the patient may benefit from discharge to post-acute rehabilitation services. Please refer to the recommendation of the Occupational Therapist for safe discharge planning. Pt to continue to benefit from acute immediate OT services to address the following goals 2-3x/week to  w/in 10-14 days:     Rehab Resource Intensity Level, OT: II (Moderate Resource Intensity)

## 2024-10-27 NOTE — PROGRESS NOTES
Progress Note - Hospitalist   Name: Sabiha Garcia 68 y.o. female I MRN: [id number]  Unit/Bed#: [facility] 504-01 I Date of Admission: 10/11/2024   Date of Service: 10/27/2024 I Hospital Day: 16    Assessment & Plan  Gangrene of toe of right foot (HCC)  Right fifth digit dry gangrene with surround cellulitis  Xray - Subtle periosteal reaction of the fifth digit proximal phalanx with adjacent soft tissue swelling. Findings raise suspicion for osteomyelitis   LEADS abnormal - vascular consulted  Podiatry following - plan after vascular optimization  Discussed with infectious disease.  Continue with antibiotic therapy.  Patient had revascularization done on October 23, 2024  Pt is S/P amputation 10/26  Restarted Eliquis 1027  Cellulitis of right foot  Continue Ancef  Type 2 diabetes mellitus (HCC)  Lab Results   Component Value Date    HGBA1C 14.8 (H) 09/18/2024       Recent Labs     10/26/24  1623 10/26/24  2036 10/27/24  0606 10/27/24  1102   POCGLU 234* 287* 231* 179*       Blood Sugar Average: Last 72 hrs:  (P) [id number]    Severely uncontrolled as evidenced by HbA1c  Continue with insulin regimen.  Lantus 25 in am, Humalog 10 TID,   Endocrine optimizing regimen     Ambulatory dysfunction  Presented with generalized weakness, 2 falls with no head strike  States had been down for prolonged period  CT head - no acute pathology  TSH low with normal free T4, CK normal  PT/OT  Tobacco user  Nicotine patch  Smoking cessation advised  Vitamin D deficiency  Continue supplementation  Lung nodule  CT chest with 3 mm right upper lobe noncalcified nodule  CT chest without contrast in 12 months  Aortoiliac occlusive disease (HCC)  Vascular surgery consulted  Patient is status post bypass on October 23, 2024    VTE Pharmacologic Prophylaxis: VTE Score: 3 Moderate Risk (Score 3-4) - Pharmacological DVT Prophylaxis Ordered: rivaroxaban (Xarelto).    Mobility:   Basic Mobility Inpatient Raw Score: 11  -[facility] Goal: 4: Move to  chair/commode  JH-HLM Achieved: 4: Move to chair/commode  JH-HLM Goal achieved. Continue to encourage appropriate mobility.    Patient Centered Rounds: I performed bedside rounds with nursing staff today.   Discussions with Specialists or Other Care Team Provider: Vascular surgery, podiatry    Education and Discussions with Family / Patient: Patient declined call to .     Current Length of Stay: 16 day(s)  Current Patient Status: Inpatient   Certification Statement: The patient will continue to require additional inpatient hospital stay due to pending safe discharge planning  Discharge Plan: Anticipate discharge in 24-48 hrs to pending PT/OT evaluation    Code Status: Level 1 - Full Code    Subjective   Is a very pleasant 68-year-old female who was seen and evaluated today at bedside.  Patient denies any acute complaints at this time.    Objective :  Temp:  [97.8 °F (36.6 °C)-99.3 °F (37.4 °C)] 98.7 °F (37.1 °C)  HR:  [84-85] 84  BP: (137-140)/(61-62) 137/61  Resp:  [18-21] 18  SpO2:  [96 %-97 %] 96 %    Body mass index is 18.61 kg/m².     Input and Output Summary (last 24 hours):     Intake/Output Summary (Last 24 hours) at 10/27/2024 1219  Last data filed at 10/27/2024 0549  Gross per 24 hour   Intake 1800 ml   Output 2125 ml   Net -325 ml       Physical Exam  Vitals reviewed.   Constitutional:       General: She is not in acute distress.     Appearance: She is not ill-appearing.   HENT:      Head: Normocephalic.   Eyes:      Conjunctiva/sclera: Conjunctivae normal.   Cardiovascular:      Rate and Rhythm: Normal rate and regular rhythm.   Pulmonary:      Effort: Pulmonary effort is normal.   Abdominal:      General: Abdomen is flat.      Palpations: Abdomen is soft.      Tenderness: There is no abdominal tenderness.   Musculoskeletal:         General: Signs of injury present.   Skin:     General: Skin is warm and dry.      Findings: Lesion present.   Neurological:      Mental Status: She is alert.  Mental status is at baseline.           Lines/Drains:  Lines/Drains/Airways       Active Status       Name Placement date Placement time Site Days    Urethral Catheter Coude 18 Fr. 10/24/24  2325  Coude  2                  Urinary Catheter:  Goal for removal: Voiding trial when ambulation improves                 Lab Results: I have reviewed the following results:   Results from last 7 days   Lab Units 10/27/24  0446 10/23/24  0526 10/22/24  0610   WBC Thousand/uL 11.24*   < > 12.67*   HEMOGLOBIN g/dL 8.4*   < > 9.2*   I STAT HEMOGLOBIN   --    < >  --    HEMATOCRIT % 25.6*   < > 29.3*   HEMATOCRIT, ISTAT   --    < >  --    PLATELETS Thousands/uL 300   < > 348   BANDS PCT %  --   --  4   SEGS PCT % 67   < >  --    LYMPHO PCT % 24   < > 24   MONO PCT % 6   < > 3*   EOS PCT % 1   < > 0    < > = values in this interval not displayed.     Results from last 7 days   Lab Units 10/27/24  0446   SODIUM mmol/L 134*   POTASSIUM mmol/L 4.2   CHLORIDE mmol/L 98   CO2 mmol/L 28   BUN mg/dL 16   CREATININE mg/dL 0.49*   ANION GAP mmol/L 8   CALCIUM mg/dL 7.7*   ALBUMIN g/dL 2.3*   TOTAL BILIRUBIN mg/dL 0.24   ALK PHOS U/L 69   ALT U/L 3*   AST U/L 11*   GLUCOSE RANDOM mg/dL 242*         Results from last 7 days   Lab Units 10/27/24  1102 10/27/24  0606 10/26/24  2036 10/26/24  1623 10/26/24  1109 10/26/24  0931 10/26/24  0606 10/25/24  2121 10/25/24  2035 10/25/24  1548 10/25/24  1018 10/25/24  0649   POC GLUCOSE mg/dl 179* 231* 287* 234* 89 82 76 88 69 79 151* 123               Recent Cultures (last 7 days):         Imaging Results Review: No pertinent imaging studies reviewed.  Other Study Results Review: No additional pertinent studies reviewed.    Last 24 Hours Medication List:     Current Facility-Administered Medications:     acetaminophen (TYLENOL) tablet 975 mg, Q8H JUAN    aspirin (ECOTRIN LOW STRENGTH) EC tablet 81 mg, Daily    atorvastatin (LIPITOR) tablet 80 mg, Daily With Dinner    bisacodyl (DULCOLAX) rectal  suppository 10 mg, Daily PRN    calcium carbonate (TUMS) chewable tablet 1,000 mg, TID PRN    ceFAZolin (ANCEF) IVPB (premix in dextrose) 2,000 mg 50 mL, Q8H, Last Rate: 2,000 mg (10/27/24 5335)    chlorhexidine (PERIDEX) 0.12 % oral rinse 15 mL, Q12H JUAN    Cholecalciferol (VITAMIN D3) tablet 2,000 Units, Daily    dextromethorphan-guaiFENesin (ROBITUSSIN DM) oral syrup 10 mL, Q6H PRN    docusate sodium (COLACE) capsule 100 mg, BID    guaiFENesin (MUCINEX) 12 hr tablet 600 mg, Q12H JUAN    HYDROmorphone (DILAUDID) injection 0.5 mg, Q4H PRN    insulin glargine (LANTUS) subcutaneous injection 32 Units 0.32 mL, QAM    insulin lispro (HumALOG/ADMELOG) 100 units/mL subcutaneous injection 1-5 Units, TID AC **AND** Fingerstick Glucose (POCT), 4x Daily AC and at bedtime    insulin lispro (HumALOG/ADMELOG) 100 units/mL subcutaneous injection 1-5 Units, HS    insulin lispro (HumALOG/ADMELOG) 100 units/mL subcutaneous injection 10 Units, Daily With Lunch    insulin lispro (HumALOG/ADMELOG) 100 units/mL subcutaneous injection 18 Units, Daily With Breakfast    insulin lispro (HumALOG/ADMELOG) 100 units/mL subcutaneous injection 18 Units, Daily With Dinner    lidocaine (LIDODERM) 5 % patch 1 patch, Daily    magnesium Oxide (MAG-OX) tablet 400 mg, BID    metroNIDAZOLE (FLAGYL) tablet 500 mg, Q12H JUAN    mineral oil enema 1 enema, Once PRN    nicotine (NICODERM CQ) 21 mg/24 hr TD 24 hr patch 21 mg, Daily    ondansetron (ZOFRAN) injection 4 mg, Q6H PRN    oxyCODONE (ROXICODONE) immediate release tablet 10 mg, Q4H PRN    oxyCODONE (ROXICODONE) IR tablet 5 mg, Q4H PRN    phenol (CHLORASEPTIC) 1.4 % mucosal liquid 1 spray, Q2H PRN    polyethylene glycol (MIRALAX) packet 17 g, Daily    rivaroxaban (XARELTO) tablet 2.5 mg, BID With Meals    Administrative Statements   Today, Patient Was Seen By: Jose Spaulding MD  I have spent a total time of 45 minutes in caring for this patient on the day of the visit/encounter including Diagnostic  results, Prognosis, Importance of tx compliance, Counseling / Coordination of care, and Obtaining or reviewing history  .    **Please Note: This note may have been constructed using a voice recognition system.**

## 2024-10-27 NOTE — ANESTHESIA POSTPROCEDURE EVALUATION
Post-Op Assessment Note    CV Status:  Stable    Pain management: adequate       Mental Status:  Alert and awake   Hydration Status:  Euvolemic   PONV Controlled:  Controlled   Airway Patency:  Patent     Post Op Vitals Reviewed: Yes    No anethesia notable event occurred.    Staff: Anesthesiologist           Last Filed PACU Vitals:  Vitals Value Taken Time   Temp 98.5 °F (36.9 °C) 10/26/24 0945   Pulse 86 10/26/24 1015   /64 10/26/24 1015   Resp 16 10/26/24 1015   SpO2 99 % 10/26/24 1015       Modified Nahid:  Activity: 2 (10/26/2024  9:45 AM)  Respiration: 2 (10/26/2024  9:45 AM)  Circulation: 2 (10/26/2024  9:45 AM)  Consciousness: 2 (10/26/2024  9:45 AM)  Oxygen Saturation: 1 (10/26/2024  9:45 AM)  Modified Nahid Score: 9 (10/26/2024  9:45 AM)

## 2024-10-27 NOTE — PROGRESS NOTES
Progress Note - Vascular Surgery   Name: Sabiha Garcia 68 y.o. female I MRN: [id number]  Unit/Bed#: [facility] 504-01 I Date of Admission: 10/11/2024   Date of Service: 10/27/2024 I Hospital Day: 16   Assessment & Plan  Aortoiliac occlusive disease (HCC)  68-year-old female current smoker with history of uncontrolled T2 DM (HgA1c 14.8), HLD, tobacco use, lung nodule, hx of recurrent falls and hx of L femoral shaft fx s/p repair in 2020 arrived in the ED 10/11 with generalized weakness s/p falls, admitted for management of hyperosmolar hyperglycemia with associated metabolic acidosis with concern for DKA vs starvation ketosis. Patient with noted non-healing R 5th digit gangrenous wound, vascular surgery consulted for abnormal ABIs concern for contributory PAD. Imaging showing occlusion of infrarenal abdominal aorta; prox R CHARISSA severe stenosis, R SFA occlusion with right and left SALO 0.34/-/- and SALO 0.34/ 56/ 44 respectively.      10/23 L ax-bifem with R CFEA and R CFA-AK pop bypass w/PTFE    Plan  - Bilateral lower extremities well perfused, s/p R 5th ray amp    - Podiatry input appreciated   - H/H stable, Xarelto 2.5mg BID started 10/27  - Continue aspirin and statin therapy   - Pain control prn   - Diet as tolerated   - Monteiro for urinary retention, management per primary  - Appreciate medicine and Endocrinology input   - Encourage ambulation, PT/OT  - Remainder of care per primary team     Subjective : No acute events overnight. No active complaints this AM.    Objective :  Temp:  [97.8 °F (36.6 °C)-99.3 °F (37.4 °C)] 98.7 °F (37.1 °C)  HR:  [84-87] 84  BP: (137-151)/(56-65) 137/61  Resp:  [15-21] 18  SpO2:  [96 %-100 %] 96 %  O2 Device: Nasal cannula  Nasal Cannula O2 Flow Rate (L/min):  [2 L/min] 2 L/min     I/O         10/23 0701  10/24 0700 10/24 0701  10/25 0700 10/25 0701  10/26 0700    P.O. 717 0     I.V. (mL/kg) 5000 (85.5)      Blood 700      IV Piggyback 500      Total Intake(mL/kg) 6917 (118.2) 0 (0)     Urine  (mL/kg/hr) 1715 (1.2) 700 (0.5)     Stool       Blood 400      Total Output 2115 700     Net +4802 -700            Unmeasured Urine Occurrence  1 x           Lines/Drains/Airways       Active Status       Name Placement date Placement time Site Days    Urethral Catheter Coude 18 Fr. 10/24/24  2325  Coude  2                  Physical Exam  Vitals reviewed.   Constitutional:       General: She is not in acute distress.     Appearance: Normal appearance.   HENT:      Right Ear: External ear normal.      Left Ear: External ear normal.      Nose: Nose normal.      Mouth/Throat:      Mouth: Mucous membranes are moist.   Eyes:      Extraocular Movements: Extraocular movements intact.   Cardiovascular:      Rate and Rhythm: Normal rate.      Comments: R DP/PT signals present. L radial pulse palpable, L ulnar and palmar arch signals present.  Pulmonary:      Effort: Pulmonary effort is normal. No respiratory distress.   Abdominal:      General: There is no distension.      Palpations: Abdomen is soft.      Tenderness: There is no abdominal tenderness.   Genitourinary:     Comments:  Musculoskeletal:         General: Normal range of motion.      Comments: Right foot dressings in place, C/D/I   Skin:     General: Skin is warm.      Coloration: Skin is not pale.      Comments: Bilateral groin, medial thigh, and left infraclavicular incisions c/d/I without appreciable hematoma   Neurological:      General: No focal deficit present.      Mental Status: She is alert and oriented to person, place, and time.      Sensory: No sensory deficit.      Motor: No weakness.   Psychiatric:         Mood and Affect: Mood normal.         Thought Content: Thought content normal.       Lab Results: I have reviewed the following results:  CBC with diff:   Lab Results   Component Value Date    WBC 11.24 (H) 10/27/2024    HGB 8.4 (L) 10/27/2024    HCT 25.6 (L) 10/27/2024    MCV 90 10/27/2024     10/27/2024    RBC 2.86 (L) 10/27/2024    MCH  "29.4 10/27/2024    MCHC 32.8 10/27/2024    RDW 15.4 (H) 10/27/2024    MPV 8.6 (L) 10/27/2024    NRBC 0 10/27/2024   ,   BMP/CMP:  Lab Results   Component Value Date    SODIUM 134 (L) 10/27/2024    SODIUM 138 09/18/2024    SODIUM 135 04/06/2022    K 4.2 10/27/2024    K 4.6 09/18/2024    K 4.5 04/06/2022    CL 98 10/27/2024    CL 95 (L) 09/18/2024    CL 98 (L) 04/06/2022    CO2 28 10/27/2024    CO2 23 10/23/2024    CO2 33 (H) 09/18/2024    CO2 28 04/06/2022    BUN 16 10/27/2024    BUN 14 09/18/2024    BUN 13 04/06/2022    CREATININE 0.49 (L) 10/27/2024    CREATININE 0.86 04/06/2022    GLUCOSE 199 (H) 10/23/2024    CALCIUM 7.7 (L) 10/27/2024    CALCIUM 9.6 09/18/2024    CALCIUM 9.7 04/06/2022    AST 11 (L) 10/27/2024    AST 11 09/18/2024    AST 35 04/06/2022    ALT 3 (L) 10/27/2024    ALT 8 09/18/2024    ALT 23 04/06/2022    ALKPHOS 69 10/27/2024    ALKPHOS 120 09/18/2024    ALKPHOS 154 (H) 04/06/2022    EGFR 100 10/27/2024    EGFR 74 04/06/2022    EGFR 75 06/16/2018   ,   Lipid Panel: No results found for: \"CHOL\",   Coags:   Lab Results   Component Value Date    PT 12.0 06/16/2018    PTT 63 (H) 10/23/2024    INR 0.94 10/19/2024    INR 0.9 06/16/2018   ,   Blood Culture:   Lab Results   Component Value Date    BLOODCX No Growth After 5 Days. 10/11/2024    BLOODCX No Growth After 5 Days. 10/11/2024   ,   Urinalysis:   Lab Results   Component Value Date    COLORU Light Yellow 10/11/2024    CLARITYU Clear 10/11/2024    SPECGRAV 1.021 10/11/2024    PHUR 5.5 10/11/2024    LEUKOCYTESUR (A) 10/11/2024     Elevated glucose may cause decreased leukocyte values. See urine microscopic for UWBC result    NITRITE Negative 10/11/2024    GLUCOSEU >=1000 (1%) (A) 10/11/2024    KETONESU 60 (2+) (A) 10/11/2024    BILIRUBINUR Negative 10/11/2024    BLOODU Negative 10/11/2024       VTE Prophylaxis: Heparin  "

## 2024-10-27 NOTE — ASSESSMENT & PLAN NOTE
Lab Results   Component Value Date    HGBA1C 14.8 (H) 09/18/2024       Recent Labs     10/26/24  1623 10/26/24  2036 10/27/24  0606 10/27/24  1102   POCGLU 234* 287* 231* 179*       Blood Sugar Average: Last 72 hrs:  (P) [id number]    Severely uncontrolled as evidenced by HbA1c  Continue with insulin regimen.  Lantus 25 in am, Humalog 10 TID,   Endocrine optimizing regimen

## 2024-10-27 NOTE — ASSESSMENT & PLAN NOTE
68-year-old female current smoker with history of uncontrolled T2 DM (HgA1c 14.8), HLD, tobacco use, lung nodule, hx of recurrent falls and hx of L femoral shaft fx s/p repair in 2020 arrived in the ED 10/11 with generalized weakness s/p falls, admitted for management of hyperosmolar hyperglycemia with associated metabolic acidosis with concern for DKA vs starvation ketosis. Patient with noted non-healing R 5th digit gangrenous wound, vascular surgery consulted for abnormal ABIs concern for contributory PAD. Imaging showing occlusion of infrarenal abdominal aorta; prox R CHARISSA severe stenosis, R SFA occlusion with right and left SALO 0.34/-/- and SALO 0.34/ 56/ 44 respectively.      10/23 L ax-bifem with R CFEA and R CFA-AK pop bypass w/PTFE    Plan  - Bilateral lower extremities well perfused, s/p R 5th ray amp    - Podiatry input appreciated   - H/H stable, Xarelto 2.5mg BID started 10/27  - Continue aspirin and statin therapy   - Pain control prn   - Diet as tolerated   - Monteiro for urinary retention, management per primary  - Appreciate medicine and Endocrinology input   - Encourage ambulation, PT/OT  - Remainder of care per primary team

## 2024-10-27 NOTE — ASSESSMENT & PLAN NOTE
Right fifth digit dry gangrene with surround cellulitis  Xray - Subtle periosteal reaction of the fifth digit proximal phalanx with adjacent soft tissue swelling. Findings raise suspicion for osteomyelitis   LEADS abnormal - vascular consulted  Podiatry following - plan after vascular optimization  Discussed with infectious disease.  Continue with antibiotic therapy.  Patient had revascularization done on October 23, 2024  Pt is S/P amputation 10/26  Restarted Eliquis 1025

## 2024-10-27 NOTE — OCCUPATIONAL THERAPY NOTE
Occupational Therapy Re-Evaluation     Sabiha Garcia    10/27/2024    Principal Problem:    Type 2 diabetes mellitus (HCC)  Active Problems:    Tobacco user    Gangrene of toe of right foot (HCC)    Ambulatory dysfunction    Vitamin D deficiency    Lung nodule    Cellulitis of right foot    Aortoiliac occlusive disease (HCC)    Other headache syndrome    Pre-operative cardiovascular examination    Moderate protein-calorie malnutrition (HCC)      @hPMHl@    Past Surgical History:   Procedure Laterality Date    APPENDECTOMY      BYPASS FEMORAL-POPLITEAL Right 10/23/2024    Procedure: R Femoral- AK popliteal bypass w/ PTFE, right common femoral endarterectomy;  Surgeon: Haroon Dukes MD;  Location: [facility];  Service: Vascular    CHOLECYSTECTOMY      FRACTURE SURGERY      MI BYPASS W/VEIN AXILLARY-FEMORAL Left 10/23/2024    Procedure: L AxBiFem;  Surgeon: Haroon Dukes MD;  Location: [facility];  Service: Vascular    MI OPTX FEM SHFT FX W/INSJ IMED IMPLT W/WO SCREW Left 05/27/2020    Procedure: INSERTION NAIL IM FEMUR ANTEGRADE (TROCHANTERIC);  Surgeon: Jose Lyons MD;  Location: [facility];  Service: Orthopedics    TONSILLECTOMY      TUBAL LIGATION          10/27/24 1131   OT Last Visit   OT Visit Date 10/27/24   Note Type   Note type (S)  Re-Evaluation  (s/p Partial 5th ray amputation of the right foot with removal of nonviable bone/soft tissue 10/26)   Pain Assessment   Pain Assessment Tool 0-10   Pain Score No Pain   Restrictions/Precautions   Weight Bearing Precautions Per Order Yes   RLE Weight Bearing Per Order (S)  PWB  (to heel in sx shoe, per podiatry order)   Braces or Orthoses Other (Comment)  (R sx shoe)   Other Precautions Chair Alarm;Bed Alarm;Multiple lines;Telemetry;Fall Risk;Pain;Visual impairment   Home Living   Additional Comments please refer to initial eval for all PLOF and home set up information   Prior Function   Comments please refer to initial eval for all PLOF and home set  up information   Lifestyle   Autonomy PTA, pt was independent in ADLs, has some assistance with IADLs, and uses rollator vs SPC for functional mobility; (-) driving   Reciprocal Relationships Lives alone; neighbors can assist with functional needs prn   Service to Others Retired; reports previously working in the [employer] and doing other side jobs   Intrinsic Gratification Enjoys painting, gardening, and ballet   ADL   Where Assessed Edge of bed   Eating Assistance 5  Supervision/Setup   Grooming Assistance 4  Minimal Assistance   UB Bathing Assistance 4  Minimal Assistance   LB Bathing Assistance 2  Maximal Assistance   UB Dressing Assistance 4  Minimal Assistance   LB Dressing Assistance 2  Maximal Assistance   Toileting Assistance  2  Maximal Assistance   Bed Mobility   Supine to Sit 3  Moderate assistance   Additional items Assist x 1;Increased time required;LE management;Verbal cues;Bedrails;HOB elevated   Sit to Supine 3  Moderate assistance   Additional items Assist x 1;Increased time required;LE management;Verbal cues;Bedrails   Additional Comments Pt laying supine in bed upon OT arrival. Pt returned laying supine in bed w/ HOB elevated, bed alarm activated, and all needs in reach s/p OT session.   Transfers   Sit to Stand 4  Minimal assistance  (initially Mod A that progressed to Min A)   Additional items Assist x 1;Increased time required;Verbal cues;Armrests   Stand to Sit 4  Minimal assistance   Additional items Assist x 1;Increased time required;Verbal cues;Armrests   Toilet transfer 4  Minimal assistance   Additional items Assist x 1;Increased time required;Commode;Verbal cues;Armrests   Additional Comments transfers w/ RW   Functional Mobility   Functional Mobility 4  Minimal assistance   Additional Comments Pt demonstrated ability to take few steps EOB<>BSC w/ RW at Min A level. Pt able to maintain PWB RLE t/o mobility.   Additional items Rolling walker   Balance   Static Sitting Fair +   Dynamic Sitting  "Fair   Static Standing Fair -   Dynamic Standing Poor +   Ambulatory Poor +   Activity Tolerance   Activity Tolerance Patient limited by fatigue;Patient tolerated treatment well   Nurse Made Aware RN cleared Pt for OT session   RUE Assessment   RUE Assessment WFL  (generalized weakness)   LUE Assessment   LUE Assessment WFL  (generalized weakness)   Hand Function   Gross Motor Coordination Functional   Psychosocial   Psychosocial (WDL) WDL   Cognition   Overall Cognitive Status WFL   Arousal/Participation Alert;Cooperative   Attention Within functional limits   Orientation Level Oriented X4   Memory Decreased recall of precautions   Following Commands Follows one step commands with increased time or repetition   Comments Pt pleasant, cooperative, and motivated for therapy today. Pt required VC for safety awareness t/o session.   Assessment   Limitation Decreased ADL status;Decreased UE strength;Decreased Safe judgement during ADL;Decreased endurance;Decreased high-level ADLs   Prognosis Fair   Assessment Pt is a 67 yo female seen for OT re-eval s/p Partial 5th ray amputation of the right foot with removal of nonviable bone/soft tissue 10/26/24. Per podiatry order, Pt to maintain PWB to heel only in sx shoe. \"Pt has a past medical history of Allergic (751637), Depression (364320), Diabetes (HCC), Diabetes mellitus (HCC) (164791), and Smokes (1974). Pt with active OT orders in which OT consulted to assess pt's functional status and occupational performance to determine safe d/c needs. Pt seen with PT to increase safety, decrease fall risk, and maximize functional/occupational performance 2* medical complexity which is a regression from pt's functional baseline. Pt lives alone in a 1 story apartment with 2 DAVEY. PTA, pt was independent in ADLs, has some assistance with IADLs, and uses rollator vs SPC for functional mobility. (-) driving.\" Pt is currently demonstrating the following occupational deficits: Min A UB self " care, Max A LB self care and toileting, Mod A bed mobility, Min A STS and functional mobility w/ RW. These deficits that are impacting pt's baseline areas of occupation are a result of the following impairments: endurance, activity tolerance, functional mobility, forward functional reach, balance, functional standing tolerance, unsupportive home environment, decreased I w/ ADLS/IADLS, strength, visual deficits, and decreased safety awareness. The following Occupational Performance Areas to address include: grooming, bathing/shower, toilet hygiene, dressing, medication management, health maintenance, functional mobility, and clothing management.  Recommend  level II Moderate Resource Intensity  upon D/C. The patient's raw score on the AM-PAC Daily Activity Inpatient Short Form is 16. A raw score of less than 19 suggests the patient may benefit from discharge to post-acute rehabilitation services. Please refer to the recommendation of the Occupational Therapist for safe discharge planning. Pt to continue to benefit from acute immediate OT services to address the following goals 2-3x/week to  w/in 10-14 days:   Goals   Patient Goals To feel better   LTG Time Frame 10-14   Long Term Goal #1 Refer to goals below   Plan   Treatment Interventions ADL retraining;Functional transfer training;UE strengthening/ROM;Endurance training;Cognitive reorientation;Patient/family training;Equipment evaluation/education;Compensatory technique education;Activityengagement;Energy conservation   Goal Expiration Date 11/10/24   OT Frequency 2-3x/wk   Discharge Recommendation   Rehab Resource Intensity Level, OT II (Moderate Resource Intensity)   AM-PAC Daily Activity Inpatient   Lower Body Dressing 2   Bathing 2   Toileting 2   Upper Body Dressing 3   Grooming 3   Eating 4   Daily Activity Raw Score 16   Daily Activity Standardized Score (Calc for Raw Score >=11) 35.96   AM-PAC Applied Cognition Inpatient   Following a  Speech/Presentation 4   Understanding Ordinary Conversation 4   Taking Medications 4   Remembering Where Things Are Placed or Put Away 4   Remembering List of 4-5 Errands 4   Taking Care of Complicated Tasks 4   Applied Cognition Raw Score 24   Applied Cognition Standardized Score 62.21       Additional Treatment note ([id number]): Patient participated in Skilled OT session this date with interventions consisting of ADL re training with the use of correct body mechnaics, Energy Conservation techniques, safety awareness and fall prevention techniques, maintaining weight bearing restrictions,  therapeutic activities to: increase activity tolerance, increase dynamic sit/ stand balance during functional activity , and increase OOB/ sitting tolerance . Patient agreeable to OT treatment session, upon arrival patient was found supine in bed.  In comparison to previous session, patient with improvements in bed mobility, functional transfers, functional mobility, and toileting .Please refer to chart for functional levels. Patient requiring verbal cues for safety, verbal cues for correct technique, verbal cues for pacing thru activity steps, and frequent rest periods. Patient continues to be functioning below baseline level, occupational performance remains limited secondary to factors listed above and increased risk for falls and injury.   From OT standpoint, recommendation at time of d/c would be level II Moderate Resource Intensity.   Patient to benefit from continued Occupational Therapy treatment while in the hospital to address deficits as defined above and maximize level of functional independence with ADLs and functional mobility.         GOALS    1) Pt will improve activity tolerance to G for min 30 min txment sessions for increase engagement in functional tasks    2) Pt will complete UB/LB dressing/self care w/ mod I using adaptive device and DME as needed    3) Pt will complete bathing w/ Mod I w/ use of AE and  DME as needed    4) Pt will complete toileting w/ mod I w/ G hygiene/thoroughness using DME as needed    5) Pt will improve functional transfers to Mod I on/off all surfaces using DME as needed w/ G balance/safety     6) Pt will improve functional mobility during ADL/IADL/leisure tasks to Mod I using DME as needed w/ G balance/safety     7) Pt will participate in simulated IADL management task to increase independence to Mod I w/ G safety and endurance    8) Pt will be attentive 100% of the time during ongoing cognitive assessment w/ G participation to assist w/ safe d/c planning/recommendations    9) Pt will demonstrate G carryover of pt/caregiver education and training as appropriate w/o cues w/ good tolerance to increase safety during functional tasks    10) Pt will demonstrate 100% carryover of energy conservation techniques t/o functional I/ADL/leisure tasks w/o cues s/p skilled education to increase endurance during functional tasks           Mary Lou Segundo MS, OTR/L

## 2024-10-28 LAB
ALBUMIN SERPL BCG-MCNC: 2.2 G/DL (ref 3.5–5)
ALP SERPL-CCNC: 62 U/L (ref 34–104)
ALT SERPL W P-5'-P-CCNC: 3 U/L (ref 7–52)
ANION GAP SERPL CALCULATED.3IONS-SCNC: 6 MMOL/L (ref 4–13)
AST SERPL W P-5'-P-CCNC: 11 U/L (ref 13–39)
BASOPHILS # BLD MANUAL: 0.18 THOUSAND/UL (ref 0–0.1)
BASOPHILS NFR MAR MANUAL: 2 % (ref 0–1)
BILIRUB SERPL-MCNC: 0.26 MG/DL (ref 0.2–1)
BUN SERPL-MCNC: 11 MG/DL (ref 5–25)
CALCIUM ALBUM COR SERPL-MCNC: 9.2 MG/DL (ref 8.3–10.1)
CALCIUM SERPL-MCNC: 7.8 MG/DL (ref 8.4–10.2)
CHLORIDE SERPL-SCNC: 101 MMOL/L (ref 96–108)
CO2 SERPL-SCNC: 29 MMOL/L (ref 21–32)
CREAT SERPL-MCNC: 0.42 MG/DL (ref 0.6–1.3)
EOSINOPHIL # BLD MANUAL: 0.18 THOUSAND/UL (ref 0–0.4)
EOSINOPHIL NFR BLD MANUAL: 2 % (ref 0–6)
ERYTHROCYTE [DISTWIDTH] IN BLOOD BY AUTOMATED COUNT: 15.2 % (ref 11.6–15.1)
GFR SERPL CREATININE-BSD FRML MDRD: 105 ML/MIN/1.73SQ M
GIANT PLATELETS BLD QL SMEAR: PRESENT
GLUCOSE SERPL-MCNC: 112 MG/DL (ref 65–140)
GLUCOSE SERPL-MCNC: 116 MG/DL (ref 65–140)
GLUCOSE SERPL-MCNC: 132 MG/DL (ref 65–140)
GLUCOSE SERPL-MCNC: 140 MG/DL (ref 65–140)
GLUCOSE SERPL-MCNC: 65 MG/DL (ref 65–140)
HCT VFR BLD AUTO: 25.6 % (ref 34.8–46.1)
HGB BLD-MCNC: 8.2 G/DL (ref 11.5–15.4)
LYMPHOCYTES # BLD AUTO: 17 % (ref 14–44)
LYMPHOCYTES # BLD AUTO: 2.46 THOUSAND/UL (ref 0.6–4.47)
MAGNESIUM SERPL-MCNC: 1.8 MG/DL (ref 1.9–2.7)
MCH RBC QN AUTO: 29 PG (ref 26.8–34.3)
MCHC RBC AUTO-ENTMCNC: 32 G/DL (ref 31.4–37.4)
MCV RBC AUTO: 91 FL (ref 82–98)
MONOCYTES # BLD AUTO: 0.64 THOUSAND/UL (ref 0–1.22)
MONOCYTES NFR BLD: 7 % (ref 4–12)
NEUTROPHILS # BLD MANUAL: 5.65 THOUSAND/UL (ref 1.85–7.62)
NEUTS BAND NFR BLD MANUAL: 1 % (ref 0–8)
NEUTS SEG NFR BLD AUTO: 61 % (ref 43–75)
PLATELET # BLD AUTO: 343 THOUSANDS/UL (ref 149–390)
PLATELET BLD QL SMEAR: ADEQUATE
PMV BLD AUTO: 8.2 FL (ref 8.9–12.7)
POLYCHROMASIA BLD QL SMEAR: PRESENT
POTASSIUM SERPL-SCNC: 4.3 MMOL/L (ref 3.5–5.3)
PROT SERPL-MCNC: 4.4 G/DL (ref 6.4–8.4)
RBC # BLD AUTO: 2.83 MILLION/UL (ref 3.81–5.12)
RBC MORPH BLD: PRESENT
SODIUM SERPL-SCNC: 136 MMOL/L (ref 135–147)
TARGETS BLD QL SMEAR: PRESENT
VARIANT LYMPHS # BLD AUTO: 10 %
WBC # BLD AUTO: 9.11 THOUSAND/UL (ref 4.31–10.16)

## 2024-10-28 PROCEDURE — 99232 SBSQ HOSP IP/OBS MODERATE 35: CPT | Performed by: FAMILY MEDICINE

## 2024-10-28 PROCEDURE — 83735 ASSAY OF MAGNESIUM: CPT | Performed by: FAMILY MEDICINE

## 2024-10-28 PROCEDURE — 88305 TISSUE EXAM BY PATHOLOGIST: CPT | Performed by: STUDENT IN AN ORGANIZED HEALTH CARE EDUCATION/TRAINING PROGRAM

## 2024-10-28 PROCEDURE — 82948 REAGENT STRIP/BLOOD GLUCOSE: CPT

## 2024-10-28 PROCEDURE — 99233 SBSQ HOSP IP/OBS HIGH 50: CPT | Performed by: INTERNAL MEDICINE

## 2024-10-28 PROCEDURE — 85027 COMPLETE CBC AUTOMATED: CPT | Performed by: FAMILY MEDICINE

## 2024-10-28 PROCEDURE — 99232 SBSQ HOSP IP/OBS MODERATE 35: CPT | Performed by: INTERNAL MEDICINE

## 2024-10-28 PROCEDURE — NC001 PR NO CHARGE: Performed by: PODIATRIST

## 2024-10-28 PROCEDURE — 97164 PT RE-EVAL EST PLAN CARE: CPT

## 2024-10-28 PROCEDURE — 85007 BL SMEAR W/DIFF WBC COUNT: CPT | Performed by: FAMILY MEDICINE

## 2024-10-28 PROCEDURE — 80053 COMPREHEN METABOLIC PANEL: CPT | Performed by: FAMILY MEDICINE

## 2024-10-28 RX ORDER — INSULIN LISPRO 100 [IU]/ML
16 INJECTION, SOLUTION INTRAVENOUS; SUBCUTANEOUS
Status: DISCONTINUED | OUTPATIENT
Start: 2024-10-28 | End: 2024-10-29 | Stop reason: HOSPADM

## 2024-10-28 RX ORDER — INSULIN LISPRO 100 [IU]/ML
16 INJECTION, SOLUTION INTRAVENOUS; SUBCUTANEOUS
Status: DISCONTINUED | OUTPATIENT
Start: 2024-10-29 | End: 2024-10-29 | Stop reason: HOSPADM

## 2024-10-28 RX ORDER — INSULIN LISPRO 100 [IU]/ML
1-5 INJECTION, SOLUTION INTRAVENOUS; SUBCUTANEOUS
Status: DISCONTINUED | OUTPATIENT
Start: 2024-10-28 | End: 2024-10-29 | Stop reason: HOSPADM

## 2024-10-28 RX ORDER — INSULIN GLARGINE 100 [IU]/ML
20 INJECTION, SOLUTION SUBCUTANEOUS EVERY MORNING
Status: CANCELLED | OUTPATIENT
Start: 2024-10-29

## 2024-10-28 RX ORDER — INSULIN LISPRO 100 [IU]/ML
8 INJECTION, SOLUTION INTRAVENOUS; SUBCUTANEOUS
Status: DISCONTINUED | OUTPATIENT
Start: 2024-10-29 | End: 2024-10-29 | Stop reason: HOSPADM

## 2024-10-28 RX ORDER — INSULIN GLARGINE 100 [IU]/ML
29 INJECTION, SOLUTION SUBCUTANEOUS EVERY MORNING
Status: DISCONTINUED | OUTPATIENT
Start: 2024-10-29 | End: 2024-10-29 | Stop reason: HOSPADM

## 2024-10-28 RX ADMIN — LIDOCAINE 1 PATCH: 50 PATCH TOPICAL at 08:50

## 2024-10-28 RX ADMIN — CEFAZOLIN SODIUM 2000 MG: 2 SOLUTION INTRAVENOUS at 01:41

## 2024-10-28 RX ADMIN — ACETAMINOPHEN 975 MG: 325 TABLET, FILM COATED ORAL at 15:06

## 2024-10-28 RX ADMIN — INSULIN GLARGINE 32 UNITS: 100 INJECTION, SOLUTION SUBCUTANEOUS at 08:43

## 2024-10-28 RX ADMIN — ACETAMINOPHEN 975 MG: 325 TABLET, FILM COATED ORAL at 21:30

## 2024-10-28 RX ADMIN — INSULIN LISPRO 10 UNITS: 100 INJECTION, SOLUTION INTRAVENOUS; SUBCUTANEOUS at 11:31

## 2024-10-28 RX ADMIN — POLYETHYLENE GLYCOL 3350 17 G: 17 POWDER, FOR SOLUTION ORAL at 08:44

## 2024-10-28 RX ADMIN — CHLORHEXIDINE GLUCONATE 0.12% ORAL RINSE 15 ML: 1.2 LIQUID ORAL at 08:45

## 2024-10-28 RX ADMIN — RIVAROXABAN 2.5 MG: 2.5 TABLET, FILM COATED ORAL at 16:35

## 2024-10-28 RX ADMIN — DOCUSATE SODIUM 100 MG: 100 CAPSULE, LIQUID FILLED ORAL at 16:36

## 2024-10-28 RX ADMIN — GUAIFENESIN 600 MG: 600 TABLET, EXTENDED RELEASE ORAL at 21:30

## 2024-10-28 RX ADMIN — INSULIN LISPRO 18 UNITS: 100 INJECTION, SOLUTION INTRAVENOUS; SUBCUTANEOUS at 08:51

## 2024-10-28 RX ADMIN — ACETAMINOPHEN 975 MG: 325 TABLET, FILM COATED ORAL at 05:17

## 2024-10-28 RX ADMIN — OXYCODONE HYDROCHLORIDE 10 MG: 10 TABLET ORAL at 12:11

## 2024-10-28 RX ADMIN — ASPIRIN 81 MG: 81 TABLET, COATED ORAL at 08:43

## 2024-10-28 RX ADMIN — ATORVASTATIN CALCIUM 80 MG: 80 TABLET, FILM COATED ORAL at 16:35

## 2024-10-28 RX ADMIN — Medication 2000 UNITS: at 08:45

## 2024-10-28 RX ADMIN — RIVAROXABAN 2.5 MG: 2.5 TABLET, FILM COATED ORAL at 08:47

## 2024-10-28 RX ADMIN — DOCUSATE SODIUM 100 MG: 100 CAPSULE, LIQUID FILLED ORAL at 08:43

## 2024-10-28 RX ADMIN — GUAIFENESIN 600 MG: 600 TABLET, EXTENDED RELEASE ORAL at 08:43

## 2024-10-28 RX ADMIN — CEFAZOLIN SODIUM 2000 MG: 2 SOLUTION INTRAVENOUS at 08:40

## 2024-10-28 RX ADMIN — METRONIDAZOLE 500 MG: 500 TABLET ORAL at 08:47

## 2024-10-28 RX ADMIN — CHLORHEXIDINE GLUCONATE 0.12% ORAL RINSE 15 ML: 1.2 LIQUID ORAL at 21:30

## 2024-10-28 NOTE — PROGRESS NOTES
Progress Note - Hospitalist   Name: Sabiha Garcia 68 y.o. female I MRN: [id number]  Unit/Bed#: [facility] 504-01 I Date of Admission: 10/11/2024   Date of Service: 10/28/2024 I Hospital Day: 17    Assessment & Plan  Gangrene of toe of right foot (HCC)  Right fifth digit dry gangrene with surround cellulitis  Xray - Subtle periosteal reaction of the fifth digit proximal phalanx with adjacent soft tissue swelling. Findings raise suspicion for osteomyelitis   LEADS abnormal - vascular consulted  Podiatry following - plan after vascular optimization  Discussed with infectious disease. Surgical cure, D/c ABx  Patient had revascularization done on October 23, 2024  Pt is S/P amputation 10/26  Restarted Xarelto 1027  Cellulitis of right foot  Continue Ancef  Type 2 diabetes mellitus (HCC)  Lab Results   Component Value Date    HGBA1C 14.8 (H) 09/18/2024       Recent Labs     10/27/24  1700 10/27/24  2044 10/28/24  0625 10/28/24  1033   POCGLU 124 140 116 132       Blood Sugar Average: Last 72 hrs:  (P) [id number]    Severely uncontrolled as evidenced by HbA1c  Continue with insulin regimen.  Lantus 25 in am, Humalog 10 TID,   Endocrine optimizing regimen     Ambulatory dysfunction  Presented with generalized weakness, 2 falls with no head strike  States had been down for prolonged period  CT head - no acute pathology  TSH low with normal free T4, CK normal  PT/OT  Tobacco user  Nicotine patch  Smoking cessation advised  Vitamin D deficiency  Continue supplementation  Lung nodule  CT chest with 3 mm right upper lobe noncalcified nodule  CT chest without contrast in 12 months  Aortoiliac occlusive disease (HCC)  Vascular surgery consulted  Patient is status post bypass on October 23, 2024    VTE Pharmacologic Prophylaxis: VTE Score: 3 Moderate Risk (Score 3-4) - Pharmacological DVT Prophylaxis Ordered: rivaroxaban (Xarelto).    Mobility:   Basic Mobility Inpatient Raw Score: 11  -[facility] Goal: 4: Move to  chair/commode  JH-HLM Achieved: 2: Bed activities/Dependent transfer  JH-HLM Goal NOT achieved. Continue with multidisciplinary rounding and encourage appropriate mobility to improve upon JH-HLM goals.    Patient Centered Rounds: I performed bedside rounds with nursing staff today.   Discussions with Specialists or Other Care Team Provider: Podiatry, ID    Education and Discussions with Family / Patient: Patient declined call to .     Current Length of Stay: 17 day(s)  Current Patient Status: Inpatient   Certification Statement: The patient will continue to require additional inpatient hospital stay due to Pending placement   Discharge Plan: Anticipate discharge in 24-48 hrs to rehab facility.    Code Status: Level 1 - Full Code    Subjective   This is a very pleasant 68 y.o. female who was seen today at bedside. Patient has no new complaints. Patient is not in any acute distress.       Objective :  Temp:  [98 °F (36.7 °C)-99.1 °F (37.3 °C)] 99.1 °F (37.3 °C)  HR:  [83-88] 84  BP: (134-149)/(61-62) 148/61  Resp:  [17-18] 18  SpO2:  [93 %-97 %] 93 %  O2 Device: Nasal cannula  Nasal Cannula O2 Flow Rate (L/min):  [2 L/min] 2 L/min    Body mass index is 18.33 kg/m².     Input and Output Summary (last 24 hours):     Intake/Output Summary (Last 24 hours) at 10/28/2024 1149  Last data filed at 10/28/2024 1135  Gross per 24 hour   Intake 1080 ml   Output 4450 ml   Net -3370 ml       Physical Exam  Vitals reviewed.   Constitutional:       General: She is not in acute distress.     Appearance: She is not ill-appearing.   HENT:      Head: Normocephalic.   Eyes:      Conjunctiva/sclera: Conjunctivae normal.   Cardiovascular:      Rate and Rhythm: Normal rate and regular rhythm.   Pulmonary:      Effort: Pulmonary effort is normal.   Abdominal:      General: Abdomen is flat.      Palpations: Abdomen is soft.      Tenderness: There is no abdominal tenderness.   Musculoskeletal:         General: Signs of injury  present.   Skin:     General: Skin is warm and dry.      Findings: Lesion present.   Neurological:      Mental Status: She is alert. Mental status is at baseline.           Lines/Drains:  Lines/Drains/Airways       Active Status       Name Placement date Placement time Site Days    Urethral Catheter Coude 18 Fr. 10/24/24  2325  Coude  3                  Urinary Catheter:  Goal for removal: Voiding trial when ambulation improves                 Lab Results: I have reviewed the following results:   Results from last 7 days   Lab Units 10/28/24  0600 10/27/24  0446   WBC Thousand/uL 9.11 11.24*   HEMOGLOBIN g/dL 8.2* 8.4*   HEMATOCRIT % 25.6* 25.6*   PLATELETS Thousands/uL 343 300   BANDS PCT % 1  --    SEGS PCT %  --  67   LYMPHO PCT % 17 24   MONO PCT % 7 6   EOS PCT % 2 1     Results from last 7 days   Lab Units 10/28/24  0600   SODIUM mmol/L 136   POTASSIUM mmol/L 4.3   CHLORIDE mmol/L 101   CO2 mmol/L 29   BUN mg/dL 11   CREATININE mg/dL 0.42*   ANION GAP mmol/L 6   CALCIUM mg/dL 7.8*   ALBUMIN g/dL 2.2*   TOTAL BILIRUBIN mg/dL 0.26   ALK PHOS U/L 62   ALT U/L 3*   AST U/L 11*   GLUCOSE RANDOM mg/dL 112         Results from last 7 days   Lab Units 10/28/24  1033 10/28/24  0625 10/27/24  2044 10/27/24  1700 10/27/24  1629 10/27/24  1608 10/27/24  1102 10/27/24  0606 10/26/24  2036 10/26/24  1623 10/26/24  1109 10/26/24  0931   POC GLUCOSE mg/dl 132 116 140 124 64* 68 179* 231* 287* 234* 89 82               Recent Cultures (last 7 days):         Imaging Results Review: No pertinent imaging studies reviewed.  Other Study Results Review: No additional pertinent studies reviewed.    Last 24 Hours Medication List:     Current Facility-Administered Medications:     acetaminophen (TYLENOL) tablet 975 mg, Q8H JUAN    aspirin (ECOTRIN LOW STRENGTH) EC tablet 81 mg, Daily    atorvastatin (LIPITOR) tablet 80 mg, Daily With Dinner    bisacodyl (DULCOLAX) rectal suppository 10 mg, Daily PRN    calcium carbonate (TUMS) chewable  tablet 1,000 mg, TID PRN    chlorhexidine (PERIDEX) 0.12 % oral rinse 15 mL, Q12H JUAN    Cholecalciferol (VITAMIN D3) tablet 2,000 Units, Daily    dextromethorphan-guaiFENesin (ROBITUSSIN DM) oral syrup 10 mL, Q6H PRN    docusate sodium (COLACE) capsule 100 mg, BID    guaiFENesin (MUCINEX) 12 hr tablet 600 mg, Q12H JUAN    HYDROmorphone (DILAUDID) injection 0.5 mg, Q4H PRN    insulin glargine (LANTUS) subcutaneous injection 32 Units 0.32 mL, QAM    insulin lispro (HumALOG/ADMELOG) 100 units/mL subcutaneous injection 1-5 Units, TID AC **AND** Fingerstick Glucose (POCT), 4x Daily AC and at bedtime    insulin lispro (HumALOG/ADMELOG) 100 units/mL subcutaneous injection 1-5 Units, HS    insulin lispro (HumALOG/ADMELOG) 100 units/mL subcutaneous injection 10 Units, Daily With Lunch    insulin lispro (HumALOG/ADMELOG) 100 units/mL subcutaneous injection 18 Units, Daily With Breakfast    insulin lispro (HumALOG/ADMELOG) 100 units/mL subcutaneous injection 18 Units, Daily With Dinner    lidocaine (LIDODERM) 5 % patch 1 patch, Daily    mineral oil enema 1 enema, Once PRN    nicotine (NICODERM CQ) 21 mg/24 hr TD 24 hr patch 21 mg, Daily    ondansetron (ZOFRAN) injection 4 mg, Q6H PRN    oxyCODONE (ROXICODONE) immediate release tablet 10 mg, Q4H PRN    oxyCODONE (ROXICODONE) IR tablet 5 mg, Q4H PRN    phenol (CHLORASEPTIC) 1.4 % mucosal liquid 1 spray, Q2H PRN    polyethylene glycol (MIRALAX) packet 17 g, Daily    rivaroxaban (XARELTO) tablet 2.5 mg, BID With Meals    Administrative Statements   Today, Patient Was Seen By: Jose Spaulding MD  I have spent a total time of 40 minutes in caring for this patient on the day of the visit/encounter including Diagnostic results, Prognosis, Patient and family education, Counseling / Coordination of care, and Communicating with other healthcare professionals .    **Please Note: This note may have been constructed using a voice recognition system.**

## 2024-10-28 NOTE — PROGRESS NOTES
Progress Note - Infectious Disease   Name: Sabiha Garcia 68 y.o. female I MRN: [id number]  Unit/Bed#: [facility] 504-01 I Date of Admission: 10/11/2024   Date of Service: 10/28/2024 I Hospital Day: 17    Assessment & Plan  Gangrene of toe of right foot (HCC)  Dry gangrene of right fifth digit but with surrounding cellulitis. Right foot xray positive for periosteal reaction of the 5th digit proximal phalanx with suspicion for osteomyelitis. Not systemically ill. Some malodorous drainage suggesting the possibility of anaerobes. No history of MRSA.  Slight increase in WBC count noted.  Patient's status postL ax-bifem with R CFEA and R CFA-AK pop bypass w/PTFE on 10/23/2024.  Patient's status post ray amputation of the fifth toe on 10/26/2024 without apparent surgical cure.  Postop findings reveal resolution of any soft tissue infection.  Leukocytosis has resolved   -Discontinue cefazolin and Flagyl  -No additional antibiotics for now  -Close podiatry and vascular follow-up  -Recheck CBC with differential and BMP to make sure no developing toxicities  -Local wound care  Cellulitis of right foot  Gangrene of the right fifth digit with surrounding ischemic changes and concomitant localized cellulitis.  Stabilized to decreased erythema with the IV antibiotics.  Patient status post revascularization as above.  No remaining soft tissue infection status post ray amputation on 10/25/2024.  -Discontinue antibiotics as above   -Serial exams   -Await podiatry intervention  Type 2 diabetes mellitus (HCC)  Lab Results   Component Value Date    HGBA1C 14.8 (H) 09/18/2024       Recent Labs     10/27/24  1629 10/27/24  1700 10/27/24  2044 10/28/24  0625   POCGLU 64* 124 140 116     Blood Sugar Average: Last 72 hrs:  (P) 129.0379412405956645    With likely diabetic ketoacidosis.  Uncontrolled with hemoglobin A1c of 14.8.  Certainly a risk factor for recurrent infection.  Glucose still poorly controlled  -Tighten diabetic control  Tobacco  user  Risk factor for recurrent infection.   -Nicotine patch   -Smoking cessation advised   Ambulatory dysfunction  Presented with generalized weakness and endorses frequent falls without loss of consciousness. Head CT negative for acute pathology.    -Continue PT/OT  Aortoiliac occlusive disease (HCC)  As seen on CT angiogram.  Patient's status post L ax-bifem with R CFEA and R CFA-AK pop bypass w/PTFE on 10/23/2024  -Close vascular follow-up    I have discussed with the podiatry service the operative findings and the above plan to discontinue the antibiotics.  The podiatry service agrees with the plan.    Antibiotics:  Cefazolin 15  Flagyl 15  Postop day 5/2    Subjective   Patient has no fever, chills, sweats; no nausea, vomiting, diarrhea; no cough, shortness of breath; no increase pain. No new symptoms.  The patient underwent fifth ray amputation on 10/26/2024    Objective :  Temp:  [98 °F (36.7 °C)-98.5 °F (36.9 °C)] 98.2 °F (36.8 °C)  HR:  [83-88] 88  BP: (134-149)/(61-62) 149/61  Resp:  [17-18] 18  SpO2:  [94 %-97 %] 94 %  O2 Device: Nasal cannula  Nasal Cannula O2 Flow Rate (L/min):  [2 L/min] 2 L/min    General:  No acute distress  Psychiatric:  Awake and alert  Pulmonary:  Normal respiratory excursion without accessory muscle use  Abdomen:  Soft, nontender  Extremities: Right foot dressed status post fifth ray amputation  Skin:  No rashes      Lab Results: I have reviewed the following results:  Results from last 7 days   Lab Units 10/28/24  0600 10/27/24  0446 10/26/24  0432   WBC Thousand/uL 9.11 11.24* 11.75*   HEMOGLOBIN g/dL 8.2* 8.4* 7.4*   PLATELETS Thousands/uL 343 300 258     Results from last 7 days   Lab Units 10/28/24  0600 10/27/24  0446 10/26/24  0432 10/24/24  0541 10/23/24  1418   SODIUM mmol/L 136 134* 134*   < >  --    POTASSIUM mmol/L 4.3 4.2 4.3   < >  --    CHLORIDE mmol/L 101 98 99   < >  --    CO2 mmol/L 29 28 30   < >  --    CO2, I-STAT mmol/L  --   --   --   --  23   BUN mg/dL 11  16 11   < >  --    CREATININE mg/dL 0.42* 0.49* 0.60   < >  --    EGFR ml/min/1.73sq m 105 100 93   < >  --    GLUCOSE, ISTAT mg/dl  --   --   --   --  199*   CALCIUM mg/dL 7.8* 7.7* 8.0*   < >  --    AST U/L 11* 11* 12*   < >  --    ALT U/L 3* 3* 4*   < >  --    ALK PHOS U/L 62 69 61   < >  --    ALBUMIN g/dL 2.2* 2.3* 2.2*   < >  --     < > = values in this interval not displayed.                         Imaging Results Review: I personally reviewed the following image studies in PACS and associated radiology reports: Right foot. My interpretation of the radiology images/reports is: Evidence of right fifth metatarsal resection with toe amputation down to the proximal metatarsal..

## 2024-10-28 NOTE — PLAN OF CARE
Problem: PHYSICAL THERAPY ADULT  Goal: Performs mobility at highest level of function for planned discharge setting.  See evaluation for individualized goals.  Description: Treatment/Interventions: Functional transfer training, LE strengthening/ROM, Therapeutic exercise, Endurance training, Cognitive reorientation, Patient/family training, Bed mobility, Gait training  Equipment Recommended: Walker       See flowsheet documentation for full assessment, interventions and recommendations.  Outcome: Progressing  Note: Prognosis: Good  Problem List: Decreased endurance, Decreased strength, Impaired balance, Decreased mobility, Pain, Decreased skin integrity  Assessment: PT completed re-evaluation of 68 y.o. female admitted to [facility] on 10/11/2024 with Type 2 diabetes mellitus (HCC).  Patient previously able to complete transfers with ModAx2 and ambulate 2 small steps towards HOB with RW and ModAx2.  Patient is s/p partial 5th ray amputation of the R foot with removal of nonviable bone/soft tissue 10/26. Patient is currently PWB RLE with surgical shoe. Patient presents at time of PT re-evaluation functioning below baseline and currently w/ overall mobility deficits due to: impaired balance, decreased endurance, gait dysfunction, decreased activity tolerance and fall risk. Patient is currently is requiring supervision for bed mobility skills;  min assist x1 for functional transfers and  min assist x1 for ambulation with RW. Patient performed supine to sit to edge of bed requiring HOB elevated and increased time due to L UE pain. Patient required frequent verbal cues during functional mobility to maintain PWB RLE with sx shoe. Patient ambulated 40'x2 with RW, requiring occasional verbal cues for RW management and improving stride/step length. Patient noted with increase in 'wooziness' during ambulation, resolved ~1 min of seated rest break. Pt left supine in bed with alarm and all needs met. This patient is  functioning below their baseline and is recommended for level II. Patient will benefit from continued skilled PT this admission to achieve maximal function and safety. The patients AM-PAC Basic Mobility Inpatient Short From Raw Score is 15 . Based on AM-PAC scoring and patient presentation, PT currently recommending Level II (Moderate Resource Intensity). Please also refer to the recommendation of the Physical Therapist for safe discharge planning.  Barriers to Discharge: Decreased caregiver support     Rehab Resource Intensity Level, PT: II (Moderate Resource Intensity)    See flowsheet documentation for full assessment.

## 2024-10-28 NOTE — PLAN OF CARE
Problem: Potential for Falls  Goal: Patient will remain free of falls  Description: INTERVENTIONS:  - Educate patient/family on patient safety including physical limitations  - Instruct patient to call for assistance with activity   - Consult OT/PT to assist with strengthening/mobility   - Keep Call bell within reach  - Keep bed low and locked with side rails adjusted as appropriate  - Keep care items and personal belongings within reach  - Initiate and maintain comfort rounds  - Make Fall Risk Sign visible to staff  - Offer Toileting every 2 Hours, in advance of need  - Initiate/Maintain bed alarm  - Apply yellow socks and bracelet for high fall risk patients  - Consider moving patient to room near nurses station  Outcome: Progressing     Problem: CARDIOVASCULAR - ADULT  Goal: Maintains optimal cardiac output and hemodynamic stability  Description: INTERVENTIONS:  - Monitor I/O, vital signs and rhythm  - Monitor for S/S and trends of decreased cardiac output  - Administer and titrate ordered vasoactive medications to optimize hemodynamic stability  - Assess quality of pulses, skin color and temperature  - Assess for signs of decreased coronary artery perfusion  - Instruct patient to report change in severity of symptoms  Outcome: Progressing  Goal: Absence of cardiac dysrhythmias or at baseline rhythm  Description: INTERVENTIONS:  - Continuous cardiac monitoring, vital signs, obtain 12 lead EKG if ordered  - Administer antiarrhythmic and heart rate control medications as ordered  - Monitor electrolytes and administer replacement therapy as ordered  Outcome: Progressing     Problem: METABOLIC, FLUID AND ELECTROLYTES - ADULT  Goal: Electrolytes maintained within normal limits  Description: INTERVENTIONS:  - Monitor labs and assess patient for signs and symptoms of electrolyte imbalances  - Administer electrolyte replacement as ordered  - Monitor response to electrolyte replacements, including repeat lab results as  appropriate  - Instruct patient on fluid and nutrition as appropriate  Outcome: Progressing  Goal: Fluid balance maintained  Description: INTERVENTIONS:  - Monitor labs   - Monitor I/O and WT  - Instruct patient on fluid and nutrition as appropriate  - Assess for signs & symptoms of volume excess or deficit  Outcome: Progressing  Goal: Glucose maintained within target range  Description: INTERVENTIONS:  - Monitor Blood Glucose as ordered  - Assess for signs and symptoms of hyperglycemia and hypoglycemia  - Administer ordered medications to maintain glucose within target range  - Assess nutritional intake and initiate nutrition service referral as needed  Outcome: Progressing     Problem: Prexisting or High Potential for Compromised Skin Integrity  Goal: Skin integrity is maintained or improved  Description: INTERVENTIONS:  - Identify patients at risk for skin breakdown  - Assess and monitor skin integrity  - Assess and monitor nutrition and hydration status  - Monitor labs   - Assess for incontinence   - Turn and reposition patient  - Assist with mobility/ambulation  - Relieve pressure over bony prominences  - Avoid friction and shearing  - Provide appropriate hygiene as needed including keeping skin clean and dry  - Evaluate need for skin moisturizer/barrier cream  - Collaborate with interdisciplinary team   - Patient/family teaching  - Consider wound care consult   Outcome: Progressing     Problem: Nutrition/Hydration-ADULT  Goal: Nutrient/Hydration intake appropriate for improving, restoring or maintaining nutritional needs  Description: Monitor and assess patient's nutrition/hydration status for malnutrition. Collaborate with interdisciplinary team and initiate plan and interventions as ordered.  Monitor patient's weight and dietary intake as ordered or per policy. Utilize nutrition screening tool and intervene as necessary. Determine patient's food preferences and provide high-protein, high-caloric foods as  appropriate.     INTERVENTIONS:  - Monitor oral intake, urinary output, labs, and treatment plans  - Assess nutrition and hydration status and recommend course of action  - Evaluate amount of meals eaten  - Assist patient with eating if necessary   - Allow adequate time for meals  - Recommend/ encourage appropriate diets, oral nutritional supplements, and vitamin/mineral supplements  - Order, calculate, and assess calorie counts as needed  - Recommend, monitor, and adjust tube feedings and TPN/PPN based on assessed needs  - Assess need for intravenous fluids  - Provide specific nutrition/hydration education as appropriate  - Include patient/family/caregiver in decisions related to nutrition  Outcome: Progressing     Problem: MUSCULOSKELETAL - ADULT  Goal: Maintain or return mobility to safest level of function  Description: INTERVENTIONS:  - Assess patient's ability to carry out ADLs; assess patient's baseline for ADL function and identify physical deficits which impact ability to perform ADLs (bathing, care of mouth/teeth, toileting, grooming, dressing, etc.)  - Assess/evaluate cause of self-care deficits   - Assess range of motion  - Assess patient's mobility  - Assess patient's need for assistive devices and provide as appropriate  - Encourage maximum independence but intervene and supervise when necessary  - Involve family in performance of ADLs  - Assess for home care needs following discharge   - Consider OT consult to assist with ADL evaluation and planning for discharge  - Provide patient education as appropriate  Outcome: Progressing  Goal: Maintain proper alignment of affected body part  Description: INTERVENTIONS:  - Support, maintain and protect limb and body alignment  - Provide patient/ family with appropriate education  Outcome: Progressing     Problem: PAIN - ADULT  Goal: Verbalizes/displays adequate comfort level or baseline comfort level  Description: Interventions:  - Encourage patient to monitor  pain and request assistance  - Assess pain using appropriate pain scale  - Administer analgesics based on type and severity of pain and evaluate response  - Implement non-pharmacological measures as appropriate and evaluate response  - Consider cultural and social influences on pain and pain management  - Notify physician/advanced practitioner if interventions unsuccessful or patient reports new pain  Outcome: Progressing     Problem: INFECTION - ADULT  Goal: Absence or prevention of progression during hospitalization  Description: INTERVENTIONS:  - Assess and monitor for signs and symptoms of infection  - Monitor lab/diagnostic results  - Monitor all insertion sites, i.e. indwelling lines, tubes, and drains  - Monitor endotracheal if appropriate and nasal secretions for changes in amount and color  - Maury appropriate cooling/warming therapies per order  - Administer medications as ordered  - Instruct and encourage patient and family to use good hand hygiene technique  - Identify and instruct in appropriate isolation precautions for identified infection/condition  Outcome: Progressing     Problem: SAFETY ADULT  Goal: Patient will remain free of falls  Description: INTERVENTIONS:  - Educate patient/family on patient safety including physical limitations  - Instruct patient to call for assistance with activity   - Consult OT/PT to assist with strengthening/mobility   - Keep Call bell within reach  - Keep bed low and locked with side rails adjusted as appropriate  - Keep care items and personal belongings within reach  - Initiate and maintain comfort rounds  - Make Fall Risk Sign visible to staff  - Offer Toileting every 2 Hours, in advance of need  - Initiate/Maintain bed alarm  - Apply yellow socks and bracelet for high fall risk patients  - Consider moving patient to room near nurses station  Outcome: Progressing     Problem: DISCHARGE PLANNING  Goal: Discharge to home or other facility with appropriate  resources  Description: INTERVENTIONS:  - Identify barriers to discharge w/patient and caregiver  - Arrange for needed discharge resources and transportation as appropriate  - Identify discharge learning needs (meds, wound care, etc.)  - Arrange for interpretive services to assist at discharge as needed  - Refer to Case Management Department for coordinating discharge planning if the patient needs post-hospital services based on physician/advanced practitioner order or complex needs related to functional status, cognitive ability, or social support system  Outcome: Progressing     Problem: Knowledge Deficit  Goal: Patient/family/caregiver demonstrates understanding of disease process, treatment plan, medications, and discharge instructions  Description: Complete learning assessment and assess knowledge base.  Interventions:  - Provide teaching at level of understanding  - Provide teaching via preferred learning methods  Outcome: Progressing

## 2024-10-28 NOTE — CASE MANAGEMENT
[facility] received request for authorization from Care Manager.  Authorization request submitted for: [facility]  Facility Name: [facility] NPI: [id number]  Facility MD: Dr. Selby NPI: [id number]   Authorization initiated by contacting insurance: [employer]   Via: [employer]   Clinicals submitted via Portal attachment   Pending Reference #: [id number]    Care Manager notified: Belkys Guillen     Updates to authorization status will be noted in chart. Please reach out to CM for updates on any clinical information.

## 2024-10-28 NOTE — ASSESSMENT & PLAN NOTE
Lab Results   Component Value Date    HGBA1C 14.8 (H) 09/18/2024       Recent Labs     10/27/24  1629 10/27/24  1700 10/27/24  2044 10/28/24  0625   POCGLU 64* 124 140 116     Blood Sugar Average: Last 72 hrs:  (P) 129.8114549539012611    With likely diabetic ketoacidosis.  Uncontrolled with hemoglobin A1c of 14.8.  Certainly a risk factor for recurrent infection.  Glucose still poorly controlled  -Tighten diabetic control

## 2024-10-28 NOTE — ASSESSMENT & PLAN NOTE
Lab Results   Component Value Date    HGBA1C 14.8 (H) 09/18/2024     Recent Labs     10/27/24  2044 10/28/24  0625 10/28/24  1033 10/28/24  1526   POCGLU 140 116 132 65   Blood Sugar Average: Last 72 hrs:  (P) 126.4394518230634853    HBA1C 14.8 -uncontrolled type II diabetic  Prescribed insulin Tresiba 10-20 units at home.  Has not been taking it for almost weeks.  On presentation, reported baseline of visual and auditory deficits, limiting her use of insulin pen as well  On 10/23 underwent vascular surgery- L ax-bifem with R CFEA and R CFA-AK pop bypass w/PTFE   On 10/26 underwent S/p Right partial fifth ray amputation of right toe gangrene   On review, fingerstick glucose at goal in the last 24 hours, except for x 1 hypoglycemia episode at 64 yesterday at 4:29 PM.  Patient reports that she skipped lunchtime, after receiving mealtime and correctional insulin.  Of note, today also did not eat her whole meal, reports ate half of lunch and at 3:26 PM sugar was at 65.  Patient was drinking juice  at the time of rounds to raise sugar.    Decrease glargine to 29 units daily    Decrease lispro to 16-8-16 prior to meals - holding insulin if skipping meal   Correctional scale  Diabetic diet-no juice  Hypoglycemia protocol  Goal glucose is 110-180  Endocrinology will continue to follow and make adjustments -  patient may need lower regimen once she starts rehab in anticipation of increased physical activity

## 2024-10-28 NOTE — ASSESSMENT & PLAN NOTE
Right fifth digit dry gangrene with surround cellulitis  Xray - Subtle periosteal reaction of the fifth digit proximal phalanx with adjacent soft tissue swelling. Findings raise suspicion for osteomyelitis   LEADS abnormal - vascular consulted  Podiatry following - plan after vascular optimization  Discussed with infectious disease. Surgical cure, D/c ABx  Patient had revascularization done on October 23, 2024  Pt is S/P amputation 10/26  Restarted Xarelto 1023

## 2024-10-28 NOTE — ASSESSMENT & PLAN NOTE
Gangrene of the right fifth digit with surrounding ischemic changes and concomitant localized cellulitis.  Stabilized to decreased erythema with the IV antibiotics.  Patient status post revascularization as above.  No remaining soft tissue infection status post ray amputation on 10/25/2024.  -Discontinue antibiotics as above   -Serial exams   -Await podiatry intervention

## 2024-10-28 NOTE — PROGRESS NOTES
Progress Note - Endocrinology   Name: Sabiha Garcia 68 y.o. female I MRN: [id number]  Unit/Bed#: [facility] 504-01 I Date of Admission: 10/11/2024   Date of Service: 10/28/2024 I Hospital Day: 17    Assessment & Plan  Type 2 diabetes mellitus (HCC)  Lab Results   Component Value Date    HGBA1C 14.8 (H) 09/18/2024     Recent Labs     10/27/24  2044 10/28/24  0625 10/28/24  1033 10/28/24  1526   POCGLU 140 116 132 65   Blood Sugar Average: Last 72 hrs:  (P) 126.7531413972239277    HBA1C 14.8 -uncontrolled type II diabetic  Prescribed insulin Tresiba 10-20 units at home.  Has not been taking it for almost weeks.  On presentation, reported baseline of visual and auditory deficits, limiting her use of insulin pen as well  On 10/23 underwent vascular surgery- L ax-bifem with R CFEA and R CFA-AK pop bypass w/PTFE   On 10/26 underwent S/p Right partial fifth ray amputation of right toe gangrene   On review, fingerstick glucose at goal in the last 24 hours, except for x 1 hypoglycemia episode at 64 yesterday at 4:29 PM.  Patient reports that she skipped lunchtime, after receiving mealtime and correctional insulin.  Of note, today also did not eat her whole meal, reports ate half of lunch and at 3:26 PM sugar was at 65.  Patient was drinking juice  at the time of rounds to raise sugar.    Decrease glargine to 29 units daily    Decrease lispro to 16-8-16 prior to meals - holding insulin if skipping meal   Correctional scale  Diabetic diet-no juice  Hypoglycemia protocol  Goal glucose is 110-180  Endocrinology will continue to follow and make adjustments -  patient may need lower regimen once she starts rehab in anticipation of increased physical activity    24 Hour Events : Yesterday, at 4:29 PM at hypoglycemic episode, fingerstick glucose 64.  Patient received mealtime insulin plus correctional insulin, total of 11 units.  Patient does report that she did not eat much of her lunch.  Reports feeling tired, slight headache, and an  overall sense of unwellness during the hypoglycemic episode.  Patient does not recall if she was given any food or dextrose for correction.  Today's RN did not have details if it was corrected.  However, of note at 5 PM fingerstick glucose was 124, therefore, likely did receive food for correction.  Subjective : Patient resting in bed, and reports her pain is improved from prior.  Reports today she has eaten all her meals and then reports that she also had dinner yesterday.  Reports her appetite today is good, however, on rounds did report eating around half of her lunch.  Otherwise, denies other concerns at this time.    Objective :  Temp:  [98 °F (36.7 °C)-99.1 °F (37.3 °C)] 98.7 °F (37.1 °C)  HR:  [83-89] 89  BP: (128-149)/(54-62) 128/54  Resp:  [17-18] 18  SpO2:  [93 %-97 %] 93 %  O2 Device: Nasal cannula  Nasal Cannula O2 Flow Rate (L/min):  [2 L/min] 2 L/min    Physical Exam  Vitals reviewed.   Constitutional:       General: She is not in acute distress.     Appearance: Normal appearance. She is underweight. She is not ill-appearing or diaphoretic.      Comments: Sleeping   HENT:      Head: Normocephalic and atraumatic.   Eyes:      General: No scleral icterus.     Conjunctiva/sclera: Conjunctivae normal.   Cardiovascular:      Rate and Rhythm: Normal rate and regular rhythm.      Pulses: Normal pulses.      Heart sounds: Normal heart sounds. No murmur heard.     No gallop.   Pulmonary:      Effort: Pulmonary effort is normal. No respiratory distress.      Breath sounds: Normal breath sounds.   Abdominal:      General: Bowel sounds are normal. There is no distension.      Palpations: Abdomen is soft.   Musculoskeletal:         General: Normal range of motion.      Cervical back: Normal range of motion and neck supple.      Right lower leg: No edema.      Left lower leg: No edema.   Skin:     General: Skin is warm and dry.      Coloration: Skin is not jaundiced or pale.   Neurological:      Mental Status: She  is alert and oriented to person, place, and time. Mental status is at baseline.   Psychiatric:         Mood and Affect: Mood normal.         Behavior: Behavior normal.         Lab Results: I have reviewed the following results:CBC/BMP:   .     10/28/24  0600   WBC 9.11   HGB 8.2*   HCT 25.6*      BANDSPCT 1   SODIUM 136   K 4.3      CO2 29   BUN 11   CREATININE 0.42*   GLUC 112   MG 1.8*    , LFTs:   .     10/28/24  0600   AST 11*   ALT 3*   ALB 2.2*   TBILI 0.26   ALKPHOS 62    , Lipid Profile:       Imaging Results Review: No pertinent imaging studies reviewed.  Other Study Results Review: No additional pertinent studies reviewed.      Herminio Caldera MD  Endocrinology fellow, PGY-4

## 2024-10-28 NOTE — PROGRESS NOTES
Patient:    MRN:  [id number]    [place] Request ID:  [id number]    Level of care reserved:  Skilled Nursing Facility    Partner Reserved:  [facility] [place] [address] [phone]    Clinical needs requested:    Geography searched:  20 miles around [phone]    Start of Service:    Request sent:  9:52am EDT on 10/24/2024 by Gibson Kaur    Partner reserved:  10:25am EDT on 10/28/2024 by Belkys Guillen    Choice list shared:  10:21am EDT on 10/28/2024 by Belkys Guillen

## 2024-10-28 NOTE — PHYSICAL THERAPY NOTE
Physical Therapy Re-Evaluation     Patient's Name: Sabiha Garcia    Admitting Diagnosis  Cough [R05.9]  Cachexia (HCC) [R64]  DKA (diabetic ketoacidosis) (HCC) [E11.10]  Dry gangrene (HCC) [I96]  Fall, initial encounter [W19.XXXA]  Type 2 diabetes mellitus with hyperglycemia, with long-term current use of insulin (HCC) [E11.65, Z79.4]  Unspecified multiple injuries, initial encounter [T07.XXXA]    Problem List  Patient Active Problem List   Diagnosis    Type 2 diabetes mellitus (HCC)    Tobacco user    Blood loss anemia    Generalized anxiety disorder    Disorder of refraction and accommodation    Disorder of gallbladder    Arthralgia of shoulder    Eczema    Depression    Fracture of surgical neck of right humerus    Hyperlipidemia    History of gallbladder disease    Numbness of toes    Abnormal gait    Weight loss, unintentional    Claudication of both lower extremities (HCC)    Gangrene of toe of right foot (HCC)    Ambulatory dysfunction    Vitamin D deficiency    Lung nodule    Cellulitis of right foot    Aortoiliac occlusive disease (HCC)    Other headache syndrome    Pre-operative cardiovascular examination    Moderate protein-calorie malnutrition (HCC)       Past Medical History  Past Medical History:   Diagnosis Date    Allergic [id number]    Depression [id number]    Diabetes (HCC)     Diabetes mellitus (HCC) 613601    Smokes 1974    1/2 ppd smoker       Past Surgical History  Past Surgical History:   Procedure Laterality Date    APPENDECTOMY      BYPASS FEMORAL-POPLITEAL Right 10/23/2024    Procedure: R Femoral- AK popliteal bypass w/ PTFE, right common femoral endarterectomy;  Surgeon: Haroon Dukes MD;  [facility]: [facility];  Service: Vascular    CHOLECYSTECTOMY      FRACTURE SURGERY      HI AMPUTATION METATARSAL W/TOE SINGLE Right 10/26/2024    Procedure: Partial 5th ray amputation of the right foot with removal of nonviable bone/soft tissue;  Surgeon: Bienvenido Dhillon DPM;  Location: [facility];   Service: Podiatry    ME BYPASS W/VEIN AXILLARY-FEMORAL Left 10/23/2024    Procedure: L AxBiFem;  Surgeon: Haroon Dukes MD;  [facility]: [facility];  Service: Vascular    ME OPTX FEM SHFT FX W/INSJ IMED IMPLT W/WO SCREW Left 05/27/2020    Procedure: INSERTION NAIL IM FEMUR ANTEGRADE (TROCHANTERIC);  Surgeon: Jose Lyons MD;  Location: [facility];  Service: Orthopedics    TONSILLECTOMY      TUBAL LIGATION            10/28/24 0925   PT Last Visit   PT Visit Date 10/28/24   Note Type   Note type (S)  Re-Evaluation  (Patient is s/p partial 5th ray amputation of the R foot with removal of nonviable bone/soft tissue 10/26. Patient is currently PWB RLE with surgical shoe.)   Additional Comments Patient is s/p partial 5th ray amputation of the R foot with removal of nonviable bone/soft tissue 10/26. Patient is currently PWB RLE with surgical shoe.   Education   Education Provided Yes   End of Consult   Patient Position at End of Consult Supine;All needs within reach;Bed/Chair alarm activated   Pain Assessment   Pain Assessment Tool 0-10   Pain Score 4   Pain Location/Orientation Orientation: Left;Location: Shoulder   Pain Onset/Description Onset: Ongoing   Effect of Pain on Daily Activities limits functional mobility   Patient's Stated Pain Goal No pain   Hospital Pain Intervention(s) Repositioned;Ambulation/increased activity;Emotional support;Rest   Restrictions/Precautions   Weight Bearing Precautions Per Order Yes   RLE Weight Bearing Per Order (S)  PWB  (sx shoe)   LLE Weight Bearing Per Order WBAT   Braces or Orthoses   (sx shoe)   Other Precautions Chair Alarm;Bed Alarm;Fall Risk;Pain   Home Living   Type of Home Apartment  (1  with 2 DAVEY)   Home Layout One level;Performs ADLs on one level;Able to live on main level with bedroom/bathroom;Stairs to enter with rails   Bathroom Shower/Tub Tub/shower unit   Bathroom Toilet Raised   Bathroom Equipment Grab bars in shower;Toilet raiser;Shower chair   Bathroom  "Accessibility Accessible   Home Equipment Cane;Walker;Wheelchair-manual  (rollator)   Prior Function   Level of [place] Independent with ADLs;Independent with functional mobility;Independent with IADLS;Needs assistance with IADLS   Lives With Alone   Receives Help From Neighbor   IADLs Independent with meal prep;Independent with medication management;Family/Friend/Other provides transportation   Falls in the last 6 months 1 to 4   Vocational Retired   Comments refer to IE   General   Family/Caregiver Present No   Cognition   Overall Cognitive Status WFL   Arousal/Participation Responsive   Attention Within functional limits   Orientation Level Oriented X4   Memory Decreased recall of precautions   Following Commands Follows one step commands with increased time or repetition   Comments patient pleasant and cooperative, fair insight of deficits, cues for WBS   Subjective   Subjective \"Im surprised I feel much better\"   RLE Assessment   RLE Assessment   (4-/5 grossly)   LLE Assessment   LLE Assessment   (4-/5 grossly)   Bed Mobility   Supine to Sit 5  Supervision   Additional items HOB elevated;Bedrails;Increased time required;Verbal cues   Sit to Supine 5  Supervision   Additional items HOB elevated;Bedrails;Increased time required;Verbal cues   Additional Comments patient found and left supine in bed with alarm and all needs met   Transfers   Sit to Stand 4  Minimal assistance   Additional items Assist x 1;Increased time required;Verbal cues   Stand to Sit 4  Minimal assistance   Additional items Assist x 1;Increased time required;Verbal cues   Additional Comments x2 STS with RW, vc for WBS   Ambulation/Elevation   Gait pattern Forward Flexion;Decreased foot clearance;Short stride;Step to;Excessively slow   Gait Assistance 4  Minimal assist   Additional items Assist x 1;Verbal cues;Tactile cues   Assistive Device Rolling walker   Distance 40'x2   Ambulation/Elevation Additional Comments Patient required " frequent verbal cues during functional mobility to maintain PWB RLE with sx shoe. Patient ambulated 40'x2 with RW, requiring occasional verbal cues for RW management and improving stride/step length. Patient noted with increase in 'wooziness' during ambulation, resolved ~1 min of seated rest break.   Balance   Static Sitting Fair +   Dynamic Sitting Fair   Static Standing Fair -   Dynamic Standing Poor +   Ambulatory Poor +   Endurance Deficit   Endurance Deficit Yes   Endurance Deficit Description generalized LE weakness, pain, fatigue, WBS   Activity Tolerance   Activity Tolerance Patient tolerated treatment well   Nurse Made Aware RN Cleared and updated   Assessment   Prognosis Good   Problem List Decreased endurance;Decreased strength;Impaired balance;Decreased mobility;Pain;Decreased skin integrity   Assessment PT completed re-evaluation of 68 y.o. female admitted to [facility] on 10/11/2024 with Type 2 diabetes mellitus (HCC).  Patient previously able to complete transfers with ModAx2 and ambulate 2 small steps towards HOB with RW and ModAx2.  Patient is s/p partial 5th ray amputation of the R foot with removal of nonviable bone/soft tissue 10/26. Patient is currently PWB RLE with surgical shoe. Patient presents at time of PT re-evaluation functioning below baseline and currently w/ overall mobility deficits due to: impaired balance, decreased endurance, gait dysfunction, decreased activity tolerance and fall risk. Patient is currently is requiring supervision for bed mobility skills;  min assist x1 for functional transfers and  min assist x1 for ambulation with RW. Patient performed supine to sit to edge of bed requiring HOB elevated and increased time due to L UE pain. Patient required frequent verbal cues during functional mobility to maintain PWB RLE with sx shoe. Patient ambulated 40'x2 with RW, requiring occasional verbal cues for RW management and improving stride/step length. Patient noted  with increase in 'wooziness' during ambulation, resolved ~1 min of seated rest break. Pt left supine in bed with alarm and all needs met.       This patient is functioning below their baseline and is recommended for level II. Patient will benefit from continued skilled PT this admission to achieve maximal function and safety. The patients AM-PAC Basic Mobility Inpatient Short From Raw Score is 15 . Based on AM-PAC scoring and patient presentation, PT currently recommending Level II (Moderate Resource Intensity). Please also refer to the recommendation of the Physical Therapist for safe discharge planning.   Barriers to Discharge Decreased caregiver support   Goals   Patient Goals to walk more   STG Expiration Date 11/11/24   Short Term Goal #1 1) Perform bed mobility mod-I to participate in frequent repositioning and improve skin integrity; 2) Perform functional transfers mod-I to promote I with toileting and OOB mobility; 3) Ambulate 200 feet mod-I with least restrictive device to participate in household and community level mobility; 4) Improve b/l LE strength by 1/2 grade in order to improve efficiency of tranfers; 5) Improve balance by 1 grade to reduce risk for falls; 6) Improve overall activity tolerance to 60 minutes in order to increase patient's ability to engage in mobility tasks   Plan   Treatment/Interventions ADL retraining;Functional transfer training;LE strengthening/ROM;Elevations;Therapeutic exercise;Endurance training;Patient/family training;Bed mobility;Gait training;Spoke to nursing;Spoke to case management;OT   PT Frequency 2-3x/wk   Discharge Recommendation   Rehab Resource Intensity Level, PT II (Moderate Resource Intensity)   Equipment Recommended Walker   Walker Package Recommended Wheeled walker   Change/add to Walker Package? No   AM-PAC Basic Mobility Inpatient   Turning in Flat Bed Without Bedrails 3   Lying on Back to Sitting on Edge of Flat Bed Without Bedrails 3   Moving Bed to Chair  3   Standing Up From Chair Using Arms 3   Walk in Room 2   Climb 3-5 Stairs With Railing 1   Basic Mobility Inpatient Raw Score 15   Basic Mobility Standardized Score 36.97   [facility] Highest Level Of Mobility   -[facility] Goal 4: Move to chair/commode   -HL Achieved 7: Walk 25 feet or more   End of Consult   Patient Position at End of Consult Bed/Chair alarm activated;All needs within reach;Supine         Radha Emery, PT, DPT

## 2024-10-28 NOTE — PROGRESS NOTES
"[facility] Podiatry - Progress Note  Patient: Sabiha Garcia 68 y.o. female   MRN: [id number]  PCP: Alexys Blunt MD  Unit/Bed#: [facility] 504-01 Encounter: [id number]  Date Of Visit: 10/28/24    ASSESSMENT:    Sabiha Garcia is a 68 y.o. female with:    [place] 4 DFU Right fifth digit  S/p Right partial fifth ray amputation (DOS: 10/26/24)  Cellulitis right foot  Uncontrolled type 2 diabetes mellitus   -A1c of 14.8% (24)  PAD  -S/p bypass 10/23/24  Smoking history  Onychomycosis      PLAN:    Patient seen at bedside and dressings changed today.  Patient is POD 2 s/p right partial fifth ray amputation.  Incision site is stable with intact sutures and well aligned skin edges with no acute clinical signs of infection.  Follow-up intraoperative pathology results  Continue IV antibiotics per primary team  Continue local wound care, appreciate nursing assistance with dressing changes.  Elevation on green foam wedges or pillows when non-ambulatory.  Rest of care per primary team.    Weight bearing status: Nonweightbearing to the right lower extremity      SUBJECTIVE:     The patient was seen, evaluated, and assessed at bedside today. The patient was awake, alert, and in no acute distress. No acute events overnight. The patient reports no pain to the right foot. Patient denies N/V/F/chills/SOB/CP.      OBJECTIVE:     Vitals:   /61 (BP Location: Right arm)   Pulse 88   Temp 98.2 °F (36.8 °C) (Oral)   Resp 18   Ht 5' 9\" (1.753 m)   Wt 56.3 kg (124 lb 1.9 oz)   SpO2 94%   BMI 18.33 kg/m²     Temp (24hrs), Av.2 °F (36.8 °C), Min:98 °F (36.7 °C), Max:98.5 °F (36.9 °C)      Physical Exam:     General:  Alert, cooperative, and in no distress.  Lower extremity exam:  Cardiovascular status at baseline.  Neurological status at baseline.  Musculoskeletal status at baseline. No calf tenderness noted.     Right lower extremity: S/p right partial fifth ray amputation.  Incision is stable with intact sutures and well " "aligned skin edges with normal capillary refill.  There is a small amount of localized skin maceration to the distal aspect of the skin incision.  There are no signs of active infection: no purulence, no malodor, no ascending erythema, no crepitus, no fluctuance.    Clinical Images 10/28/24:      Additional Data:     Labs:    Results from last 7 days   Lab Units 10/28/24  0600 10/27/24  0446   WBC Thousand/uL 9.11 11.24*   HEMOGLOBIN g/dL 8.2* 8.4*   HEMATOCRIT % 25.6* 25.6*   PLATELETS Thousands/uL 343 300   SEGS PCT %  --  67   LYMPHO PCT %  --  24   MONO PCT %  --  6   EOS PCT %  --  1     Results from last 7 days   Lab Units 10/28/24  0600 10/24/24  0541 10/23/24  1418   POTASSIUM mmol/L 4.3   < >  --    CHLORIDE mmol/L 101   < >  --    CO2 mmol/L 29   < >  --    CO2, I-STAT mmol/L  --   --  23   BUN mg/dL 11   < >  --    CREATININE mg/dL 0.42*   < >  --    CALCIUM mg/dL 7.8*   < >  --    ALK PHOS U/L 62   < >  --    ALT U/L 3*   < >  --    AST U/L 11*   < >  --    GLUCOSE, ISTAT mg/dl  --   --  199*    < > = values in this interval not displayed.           * I Have Reviewed All Lab Data Listed Above.    Recent Cultures (last 7 days):               Imaging: I have personally reviewed pertinent films in PACS  EKG, Pathology, and Other Studies: I have personally reviewed pertinent reports.      ** Please Note: Portions of the record may have been created with voice recognition software. Occasional wrong word or \"sound a like\" substitutions may have occurred due to the inherent limitations of voice recognition software. Read the chart carefully and recognize, using context, where substitutions have occurred. **      "

## 2024-10-28 NOTE — RESTORATIVE TECHNICIAN NOTE
Restorative Technician Note      Patient Name: Sabiha Garcia     [facility] Tech Visit Date: 10/28/24  Note Type: Mobility  Patient Position Upon Consult: Supine  Mobility / Activity Provided: pt bed alarm going off upon arrival; reposititoned pt and fixed bed alarm  Activity Performed: Repositioned  Patient Position at End of Consult: Supine; All needs within reach; Bed/Chair alarm activated

## 2024-10-28 NOTE — ASSESSMENT & PLAN NOTE
Lab Results   Component Value Date    HGBA1C 14.8 (H) 09/18/2024       Recent Labs     10/27/24  1700 10/27/24  2044 10/28/24  0625 10/28/24  1033   POCGLU 124 140 116 132       Blood Sugar Average: Last 72 hrs:  (P) 129.1185062973057033    Severely uncontrolled as evidenced by HbA1c  Continue with insulin regimen.  Lantus 25 in am, Humalog 10 TID,   Endocrine optimizing regimen

## 2024-10-28 NOTE — CASE MANAGEMENT
Case Management Discharge Planning Note    Patient name Sabiha Garcia  Location [facility] 504/[facility] 504-01 MRN [id number]  : 1955 Date 10/28/2024       Current Admission Date: 10/11/2024  Current Admission Diagnosis:Type 2 diabetes mellitus (HCC)   Patient Active Problem List    Diagnosis Date Noted Date Diagnosed    Moderate protein-calorie malnutrition (HCC) 10/17/2024     Aortoiliac occlusive disease (HCC) 10/16/2024     Other headache syndrome 10/16/2024     Pre-operative cardiovascular examination 10/16/2024     Cellulitis of right foot 10/14/2024     Gangrene of toe of right foot (HCC) 10/13/2024     Ambulatory dysfunction 10/13/2024     Vitamin D deficiency 10/13/2024     Lung nodule 10/13/2024     Abnormal gait 2024     Weight loss, unintentional 2024     Claudication of both lower extremities (HCC) 2024     Disorder of refraction and accommodation 2024     Disorder of gallbladder 2024     Arthralgia of shoulder 2024     Eczema 2024     Generalized anxiety disorder 2022     Depression 2022     Fracture of surgical neck of right humerus 2022     History of gallbladder disease 2022     Numbness of toes 2022     Blood loss anemia 2020     Type 2 diabetes mellitus (HCC) 2020     Tobacco user 2020     Hyperlipidemia 2013       LOS (days): 17  Geometric Mean LOS (GMLOS) (days): 3.8  Days to GMLOS:-13.3     OBJECTIVE:  Risk of Unplanned Readmission Score: 16.41         Current admission status: Inpatient   Preferred Pharmacy:   [id number] [place] - [address]  [address]  [place] [address]  Phone: [phone] Fax: [phone]    Primary Care Provider: Alexys Blunt MD    Primary Insurance: [facility]  Secondary Insurance: [facility]    DISCHARGE DETAILS:    Discharge planning discussed with:: patient at bedside, Leighann QUIROZ at [facility].   Chelly  Freedom of Choice: Yes  Comments - Freedom of Choice: Pt is medically cleared for d/c. CM met w/ pt to discuss STR choices. She selected [facility]. Reserved in [facility]. Await response from facility regarding bed availabiltiy for tomorrow. Will need to submit for auth  CM contacted family/caregiver?: Yes  Were Treatment Team discharge recommendations reviewed with patient/caregiver?: Yes     Were patient/caregiver advised of the risks associated with not following Treatment Team discharge recommendations?: Yes    Contacts  Patient Contacts: [facility]  and s/w patient  Relationship to Patient:: Friend  Contact Method: Phone, In Person  Phone Number: [phone]  Reason/Outcome: Discharge Planning              Other Referral/Resources/Interventions Provided:  Interventions: Short Term Rehab         Treatment Team Recommendation: Short Term Rehab  Discharge Destination Plan:: Short Term Rehab         **Addendum 1420  [facility] able to accept tomorrow 10/29/24 if auth is obtained. Tasked to Discharge support [phone] to submit for auth.

## 2024-10-28 NOTE — ASSESSMENT & PLAN NOTE
Dry gangrene of right fifth digit but with surrounding cellulitis. Right foot xray positive for periosteal reaction of the 5th digit proximal phalanx with suspicion for osteomyelitis. Not systemically ill. Some malodorous drainage suggesting the possibility of anaerobes. No history of MRSA.  Slight increase in WBC count noted.  Patient's status postL ax-bifem with R CFEA and R CFA-AK pop bypass w/PTFE on 10/23/2024.  Patient's status post ray amputation of the fifth toe on 10/26/2024 without apparent surgical cure.  Postop findings reveal resolution of any soft tissue infection.  Leukocytosis has resolved   -Discontinue cefazolin and Flagyl  -No additional antibiotics for now  -Close podiatry and vascular follow-up  -Recheck CBC with differential and BMP to make sure no developing toxicities  -Local wound care

## 2024-10-29 VITALS
TEMPERATURE: 98.6 F | HEIGHT: 69 IN | WEIGHT: 121.8 LBS | OXYGEN SATURATION: 91 % | BODY MASS INDEX: 18.04 KG/M2 | DIASTOLIC BLOOD PRESSURE: 58 MMHG | HEART RATE: 85 BPM | RESPIRATION RATE: 18 BRPM | SYSTOLIC BLOOD PRESSURE: 118 MMHG

## 2024-10-29 PROBLEM — L03.115 CELLULITIS OF RIGHT FOOT: Status: RESOLVED | Noted: 2024-10-14 | Resolved: 2024-10-29

## 2024-10-29 LAB
ALBUMIN SERPL BCG-MCNC: 2.4 G/DL (ref 3.5–5)
ALP SERPL-CCNC: 76 U/L (ref 34–104)
ALT SERPL W P-5'-P-CCNC: 4 U/L (ref 7–52)
ANION GAP SERPL CALCULATED.3IONS-SCNC: 6 MMOL/L (ref 4–13)
AST SERPL W P-5'-P-CCNC: 15 U/L (ref 13–39)
BASOPHILS # BLD AUTO: 0.05 THOUSANDS/ΜL (ref 0–0.1)
BASOPHILS NFR BLD AUTO: 1 % (ref 0–1)
BILIRUB SERPL-MCNC: 0.29 MG/DL (ref 0.2–1)
BUN SERPL-MCNC: 10 MG/DL (ref 5–25)
CALCIUM ALBUM COR SERPL-MCNC: 9.4 MG/DL (ref 8.3–10.1)
CALCIUM SERPL-MCNC: 8.1 MG/DL (ref 8.4–10.2)
CHLORIDE SERPL-SCNC: 100 MMOL/L (ref 96–108)
CO2 SERPL-SCNC: 31 MMOL/L (ref 21–32)
CREAT SERPL-MCNC: 0.51 MG/DL (ref 0.6–1.3)
EOSINOPHIL # BLD AUTO: 0.22 THOUSAND/ΜL (ref 0–0.61)
EOSINOPHIL NFR BLD AUTO: 3 % (ref 0–6)
ERYTHROCYTE [DISTWIDTH] IN BLOOD BY AUTOMATED COUNT: 15.1 % (ref 11.6–15.1)
GFR SERPL CREATININE-BSD FRML MDRD: 99 ML/MIN/1.73SQ M
GLUCOSE SERPL-MCNC: 101 MG/DL (ref 65–140)
GLUCOSE SERPL-MCNC: 108 MG/DL (ref 65–140)
GLUCOSE SERPL-MCNC: 178 MG/DL (ref 65–140)
GLUCOSE SERPL-MCNC: 179 MG/DL (ref 65–140)
GLUCOSE SERPL-MCNC: 229 MG/DL (ref 65–140)
HCT VFR BLD AUTO: 29.4 % (ref 34.8–46.1)
HGB BLD-MCNC: 9.4 G/DL (ref 11.5–15.4)
IMM GRANULOCYTES # BLD AUTO: 0.04 THOUSAND/UL (ref 0–0.2)
IMM GRANULOCYTES NFR BLD AUTO: 1 % (ref 0–2)
LYMPHOCYTES # BLD AUTO: 2.75 THOUSANDS/ΜL (ref 0.6–4.47)
LYMPHOCYTES NFR BLD AUTO: 32 % (ref 14–44)
MAGNESIUM SERPL-MCNC: 2 MG/DL (ref 1.9–2.7)
MCH RBC QN AUTO: 29.2 PG (ref 26.8–34.3)
MCHC RBC AUTO-ENTMCNC: 32 G/DL (ref 31.4–37.4)
MCV RBC AUTO: 91 FL (ref 82–98)
MONOCYTES # BLD AUTO: 0.47 THOUSAND/ΜL (ref 0.17–1.22)
MONOCYTES NFR BLD AUTO: 6 % (ref 4–12)
NEUTROPHILS # BLD AUTO: 5.08 THOUSANDS/ΜL (ref 1.85–7.62)
NEUTS SEG NFR BLD AUTO: 57 % (ref 43–75)
NRBC BLD AUTO-RTO: 0 /100 WBCS
PLATELET # BLD AUTO: 436 THOUSANDS/UL (ref 149–390)
PMV BLD AUTO: 8.1 FL (ref 8.9–12.7)
POTASSIUM SERPL-SCNC: 4.6 MMOL/L (ref 3.5–5.3)
PROT SERPL-MCNC: 5.1 G/DL (ref 6.4–8.4)
RBC # BLD AUTO: 3.22 MILLION/UL (ref 3.81–5.12)
SODIUM SERPL-SCNC: 137 MMOL/L (ref 135–147)
WBC # BLD AUTO: 8.61 THOUSAND/UL (ref 4.31–10.16)

## 2024-10-29 PROCEDURE — 99232 SBSQ HOSP IP/OBS MODERATE 35: CPT | Performed by: INTERNAL MEDICINE

## 2024-10-29 PROCEDURE — 85025 COMPLETE CBC W/AUTO DIFF WBC: CPT | Performed by: FAMILY MEDICINE

## 2024-10-29 PROCEDURE — 80053 COMPREHEN METABOLIC PANEL: CPT | Performed by: FAMILY MEDICINE

## 2024-10-29 PROCEDURE — 97535 SELF CARE MNGMENT TRAINING: CPT

## 2024-10-29 PROCEDURE — 83735 ASSAY OF MAGNESIUM: CPT | Performed by: FAMILY MEDICINE

## 2024-10-29 PROCEDURE — 82948 REAGENT STRIP/BLOOD GLUCOSE: CPT

## 2024-10-29 PROCEDURE — 99239 HOSP IP/OBS DSCHRG MGMT >30: CPT | Performed by: INTERNAL MEDICINE

## 2024-10-29 RX ORDER — INSULIN LISPRO 100 [IU]/ML
16 INJECTION, SOLUTION INTRAVENOUS; SUBCUTANEOUS
Start: 2024-10-30

## 2024-10-29 RX ORDER — INSULIN LISPRO 100 [IU]/ML
16 INJECTION, SOLUTION INTRAVENOUS; SUBCUTANEOUS
Start: 2024-10-29

## 2024-10-29 RX ORDER — DOCUSATE SODIUM 100 MG/1
100 CAPSULE, LIQUID FILLED ORAL
Start: 2024-10-29 | End: 2024-11-05

## 2024-10-29 RX ORDER — ASPIRIN 81 MG/1
81 TABLET ORAL DAILY
Start: 2024-10-30

## 2024-10-29 RX ORDER — INSULIN LISPRO 100 [IU]/ML
8 INJECTION, SOLUTION INTRAVENOUS; SUBCUTANEOUS
Start: 2024-10-30

## 2024-10-29 RX ORDER — ATORVASTATIN CALCIUM 80 MG/1
80 TABLET, FILM COATED ORAL
Start: 2024-10-29

## 2024-10-29 RX ORDER — ACETAMINOPHEN 325 MG/1
975 TABLET ORAL EVERY 8 HOURS PRN
Start: 2024-10-29 | End: 2024-11-05

## 2024-10-29 RX ORDER — OXYCODONE HYDROCHLORIDE 5 MG/1
2.5 TABLET ORAL EVERY 8 HOURS PRN
Qty: 5 TABLET | Refills: 0 | Status: SHIPPED | OUTPATIENT
Start: 2024-10-29 | End: 2024-11-01

## 2024-10-29 RX ORDER — POLYETHYLENE GLYCOL 3350 17 G/17G
17 POWDER, FOR SOLUTION ORAL DAILY PRN
Start: 2024-10-29

## 2024-10-29 RX ADMIN — DOCUSATE SODIUM 100 MG: 100 CAPSULE, LIQUID FILLED ORAL at 08:50

## 2024-10-29 RX ADMIN — ASPIRIN 81 MG: 81 TABLET, COATED ORAL at 08:51

## 2024-10-29 RX ADMIN — GUAIFENESIN 600 MG: 600 TABLET, EXTENDED RELEASE ORAL at 08:50

## 2024-10-29 RX ADMIN — Medication 2000 UNITS: at 08:51

## 2024-10-29 RX ADMIN — RIVAROXABAN 2.5 MG: 2.5 TABLET, FILM COATED ORAL at 08:57

## 2024-10-29 RX ADMIN — INSULIN LISPRO 16 UNITS: 100 INJECTION, SOLUTION INTRAVENOUS; SUBCUTANEOUS at 08:58

## 2024-10-29 RX ADMIN — INSULIN LISPRO 8 UNITS: 100 INJECTION, SOLUTION INTRAVENOUS; SUBCUTANEOUS at 12:02

## 2024-10-29 RX ADMIN — ACETAMINOPHEN 975 MG: 325 TABLET, FILM COATED ORAL at 05:35

## 2024-10-29 RX ADMIN — OXYCODONE HYDROCHLORIDE 10 MG: 10 TABLET ORAL at 12:35

## 2024-10-29 RX ADMIN — LIDOCAINE 1 PATCH: 50 PATCH TOPICAL at 08:51

## 2024-10-29 RX ADMIN — POLYETHYLENE GLYCOL 3350 17 G: 17 POWDER, FOR SOLUTION ORAL at 08:50

## 2024-10-29 RX ADMIN — INSULIN GLARGINE 29 UNITS: 100 INJECTION, SOLUTION SUBCUTANEOUS at 09:00

## 2024-10-29 RX ADMIN — CHLORHEXIDINE GLUCONATE 0.12% ORAL RINSE 15 ML: 1.2 LIQUID ORAL at 08:52

## 2024-10-29 RX ADMIN — INSULIN LISPRO 1 UNITS: 100 INJECTION, SOLUTION INTRAVENOUS; SUBCUTANEOUS at 12:01

## 2024-10-29 NOTE — ASSESSMENT & PLAN NOTE
Presented with generalized weakness and endorses frequent falls without loss of consciousness. Head CT negative for acute pathology.    -Continue PT/OT  -Awaiting rehab placement

## 2024-10-29 NOTE — CASE MANAGEMENT
Per Availity, SNF auth still pending. Pending Reference #: [id number]     Escalation email sent to [employer] Escalation Team requesting expedite of determination.     CM notified: Belkys Guillen

## 2024-10-29 NOTE — PROGRESS NOTES
Progress Note - Infectious Disease   Name: Sabiha Garcia 68 y.o. female I MRN: [id number]  Unit/Bed#: [facility] 504-01 I Date of Admission: 10/11/2024   Date of Service: 10/29/2024 I Hospital Day: 18    Assessment & Plan  Gangrene of toe of right foot (HCC)  Dry gangrene of right fifth digit but with surrounding cellulitis. Right foot xray positive for periosteal reaction of the 5th digit proximal phalanx with suspicion for osteomyelitis. Not systemically ill. Some malodorous drainage suggesting the possibility of anaerobes. No history of MRSA.  Slight increase in WBC count noted.  Patient's status postL ax-bifem with R CFEA and R CFA-AK pop bypass w/PTFE on 10/23/2024.  Patient's status post ray amputation of the fifth toe on 10/26/2024 without apparent surgical cure.  Postop findings reveal resolution of any soft tissue infection.  Leukocytosis has resolved  -No additional antibiotics for now  -Close podiatry and vascular follow-up  -Local wound care  Cellulitis of right foot  Gangrene of the right fifth digit with surrounding ischemic changes and concomitant localized cellulitis.  Stabilized to decreased erythema with the IV antibiotics.  Patient status post revascularization as above.  No remaining soft tissue infection status post ray amputation on 10/25/2024.  -No additional antibiotics   -Serial exams   -Await podiatry intervention  Type 2 diabetes mellitus (HCC)  Lab Results   Component Value Date    HGBA1C 14.8 (H) 09/18/2024       Recent Labs     10/28/24  1526 10/28/24  2126 10/29/24  0655 10/29/24  0856   POCGLU 65 140 108 229*     Blood Sugar Average: Last 72 hrs:  (P) 139.4455573193581737    With likely diabetic ketoacidosis.  Uncontrolled with hemoglobin A1c of 14.8.  Certainly a risk factor for recurrent infection.  Glucose still poorly controlled  -Tighten diabetic control  Tobacco user  Risk factor for recurrent infection.   -Nicotine patch   -Smoking cessation advised   Ambulatory dysfunction  Presented  with generalized weakness and endorses frequent falls without loss of consciousness. Head CT negative for acute pathology.    -Continue PT/OT  -Awaiting rehab placement  Aortoiliac occlusive disease (HCC)  As seen on CT angiogram.  Patient's status post L ax-bifem with R CFEA and R CFA-AK pop bypass w/PTFE on 10/23/2024  -Close vascular follow-up    I have discussed with the primary service the above plan to monitor off all antibiotics.  Primary service agrees with the plan.    Antibiotics:  Off antibiotics 2  Postop day 6/3  Status post greater than 2 weeks of cefazolin and Flagyl    Subjective   Patient has no fever, chills, sweats; no nausea, vomiting, diarrhea; no cough, shortness of breath; no pain. No new symptoms.    Objective :  Temp:  [98.6 °F (37 °C)-99.1 °F (37.3 °C)] 98.6 °F (37 °C)  HR:  [84-89] 85  BP: (118-148)/(54-61) 118/58  Resp:  [18] 18  SpO2:  [93 %-96 %] 96 %  O2 Device: None (Room air)  Nasal Cannula O2 Flow Rate (L/min):  [2 L/min] 2 L/min    General:  No acute distress  Psychiatric:  Awake and alert  Pulmonary:  Normal respiratory excursion without accessory muscle use  Abdomen:  Soft, nontender  Extremities: Right foot dressed with a dry dressing in place.  Skin:  No rashes      Lab Results: I have reviewed the following results:  Results from last 7 days   Lab Units 10/29/24  0434 10/28/24  0600 10/27/24  0446   WBC Thousand/uL 8.61 9.11 11.24*   HEMOGLOBIN g/dL 9.4* 8.2* 8.4*   PLATELETS Thousands/uL 436* 343 300     Results from last 7 days   Lab Units 10/29/24  0434 10/28/24  0600 10/27/24  0446 10/24/24  0541 10/23/24  1418   SODIUM mmol/L 137 136 134*   < >  --    POTASSIUM mmol/L 4.6 4.3 4.2   < >  --    CHLORIDE mmol/L 100 101 98   < >  --    CO2 mmol/L 31 29 28   < >  --    CO2, I-STAT mmol/L  --   --   --   --  23   BUN mg/dL 10 11 16   < >  --    CREATININE mg/dL 0.51* 0.42* 0.49*   < >  --    EGFR ml/min/1.73sq m 99 105 100   < >  --    GLUCOSE, ISTAT mg/dl  --   --   --   --   199*   CALCIUM mg/dL 8.1* 7.8* 7.7*   < >  --    AST U/L 15 11* 11*   < >  --    ALT U/L 4* 3* 3*   < >  --    ALK PHOS U/L 76 62 69   < >  --    ALBUMIN g/dL 2.4* 2.2* 2.3*   < >  --     < > = values in this interval not displayed.

## 2024-10-29 NOTE — ASSESSMENT & PLAN NOTE
Right fifth digit dry gangrene with surround cellulitis  Xray - Subtle periosteal reaction of the fifth digit proximal phalanx with adjacent soft tissue swelling. Findings raise suspicion for osteomyelitis   LEADS abnormal - vascular consulted  Podiatry following - plan after vascular optimization  Discussed with infectious disease. Surgical cure, D/c ABx  Patient had revascularization done on October 23, 2024  Pt is S/P amputation 10/26  Restarted Xarelto 10/27  -s/p L ax-bifem and R fem-AK pop bypass 10/23/24  -s/p R 5th ray amputation w/ podiatry  -will cont ASA/PAD dose Xarelto 2.5mg BID

## 2024-10-29 NOTE — ASSESSMENT & PLAN NOTE
Presented with generalized weakness, 2 falls with no head strike  States had been down for prolonged period  CT head - no acute pathology  PT/OT >STR

## 2024-10-29 NOTE — ASSESSMENT & PLAN NOTE
Gangrene of the right fifth digit with surrounding ischemic changes and concomitant localized cellulitis.  Stabilized to decreased erythema with the IV antibiotics.  Patient status post revascularization as above.  No remaining soft tissue infection status post ray amputation on 10/25/2024.  -No additional antibiotics   -Serial exams   -Await podiatry intervention

## 2024-10-29 NOTE — CASE MANAGEMENT
Case Management Discharge Planning Note    Patient name Sabiha Garcia  Location [facility] 504/[facility] 504-01 MRN [id number]  : 1955 Date 10/29/2024       Current Admission Date: 10/11/2024  Current Admission Diagnosis:Type 2 diabetes mellitus (HCC)   Patient Active Problem List    Diagnosis Date Noted Date Diagnosed    Moderate protein-calorie malnutrition (HCC) 10/17/2024     Aortoiliac occlusive disease (HCC) 10/16/2024     Other headache syndrome 10/16/2024     Pre-operative cardiovascular examination 10/16/2024     Gangrene of toe of right foot (HCC) 10/13/2024     Ambulatory dysfunction 10/13/2024     Vitamin D deficiency 10/13/2024     Lung nodule 10/13/2024     Abnormal gait 2024     Weight loss, unintentional 2024     Claudication of both lower extremities (HCC) 2024     Disorder of refraction and accommodation 2024     Disorder of gallbladder 2024     Arthralgia of shoulder 2024     Eczema 2024     Generalized anxiety disorder 2022     Depression 2022     Fracture of surgical neck of right humerus 2022     History of gallbladder disease 2022     Numbness of toes 2022     Blood loss anemia 2020     Type 2 diabetes mellitus (HCC) 2020     Tobacco user 2020     Hyperlipidemia 2013       LOS (days): 18  Geometric Mean LOS (GMLOS) (days): 3.8  Days to GMLOS:-14.4     OBJECTIVE:  Risk of Unplanned Readmission Score: 17.77         Current admission status: Inpatient   Preferred Pharmacy:   [id number] [place] - [address]  [address]  [place] [address]  Phone: [phone] Fax: [phone]    Primary Care Provider: Alexys Blunt MD    Primary Insurance: [employer] [facility]  Secondary Insurance: [facility]    DISCHARGE DETAILS:                                                                                                               Facility Insurance Auth Number:  [id number]

## 2024-10-29 NOTE — ASSESSMENT & PLAN NOTE
Lab Results   Component Value Date    HGBA1C 14.8 (H) 09/18/2024       Recent Labs     10/28/24  1526 10/28/24  2126 10/29/24  0655 10/29/24  0856   POCGLU 65 140 108 229*     Blood Sugar Average: Last 72 hrs:  (P) 139.5282083658746140    With likely diabetic ketoacidosis.  Uncontrolled with hemoglobin A1c of 14.8.  Certainly a risk factor for recurrent infection.  Glucose still poorly controlled  -Tighten diabetic control

## 2024-10-29 NOTE — OCCUPATIONAL THERAPY NOTE
Occupational Therapy Progress Note     Patient Name: Sabiha Garcia  Today's Date: 10/29/2024  Problem List  Principal Problem:    Type 2 diabetes mellitus (HCC)  Active Problems:    Tobacco user    Gangrene of toe of right foot (HCC)    Ambulatory dysfunction    Vitamin D deficiency    Lung nodule    Aortoiliac occlusive disease (HCC)    Other headache syndrome    Pre-operative cardiovascular examination    Moderate protein-calorie malnutrition (HCC)           10/29/24 1100   OT Last Visit   OT Visit Date 10/29/24   Note Type   Note Type Treatment   Pain Assessment   Pain Assessment Tool 0-10   Pain Score No Pain   Restrictions/Precautions   Weight Bearing Precautions Per Order Yes   RLE Weight Bearing Per Order PWB  (sx shoe)   LLE Weight Bearing Per Order WBAT   Other Precautions Chair Alarm;Bed Alarm;Fall Risk   ADL   Where Assessed Edge of bed   Grooming Assistance 5  Supervision/Setup   Grooming Deficit Teeth care   UB Bathing Assistance 4  Minimal Assistance   UB Bathing Deficit Setup;Increased time to complete;Chest;Right arm;Left arm;Abdomen   UB Bathing Comments min a for wahsing back   LB Bathing Assistance 3  Moderate Assistance   LB Bathing Deficit Setup;Increased time to complete;Perineal area;Buttocks;Right upper leg;Left upper leg;Right lower leg including foot;Left lower leg including foot   LB Bathing Comments mod a for lower LE and feet   UB Dressing Assistance 5  Supervision/Setup   UB Dressing Deficit Thread RUE;Thread LUE   LB Dressing Assistance 4  Minimal Assistance   LB Dressing Deficit Don/doff R sock;Don/doff L sock   Bed Mobility   Supine to Sit 5  Supervision   Sit to Supine 5  Supervision   Additional Comments found and left in bed per pts request, all needs in reach and alarm on.   Transfers   Sit to Stand 4  Minimal assistance   Additional items Assist x 1;Increased time required;Verbal cues   Stand to Sit 4  Minimal assistance   Additional items Assist x 1;Increased time  required;Verbal cues   Additional Comments rw   Cognition   Overall Cognitive Status WFL   Arousal/Participation Alert;Responsive;Cooperative   Attention Within functional limits   Orientation Level Oriented X4   Memory Within functional limits   Following Commands Follows all commands and directions without difficulty   Comments pt cooperative w G safety awareness and insight to condition t/o session.   Activity Tolerance   Activity Tolerance Patient tolerated treatment well   Medical Staff Made Aware RN   Assessment   Assessment Pt seen on this date for OT session focusing on ADL retraining, body mechanics, transfer retraining, increasing activity tolerance/endurance and EOB sitting to increase ability to participate in ADL/functional tasks. Pt was found in bed and was left in bed w/ all needs within reach, bed alarm on. Pt completed bed mob w S, STS w min ax1 c rw. Sat eob  for adls- UB w min A bathing, S dressing, LB w mod A bathing, min A dressing. See above for further detail. Pt w/ improvements in activity tolerance, transfer ability, adl task completion however is still limited 2* decreased ADL/High-level ADL status, decreased activity tolerance/endurance,  decreased self-care trans, decreased safety awareness.   The patient's raw score on the AM-PAC Daily Activity Inpatient Short Form is 20. A raw score of greater than or equal to 19 suggests the patient may benefit from discharge to home. Please refer to the recommendation of the Occupational Therapist for safe discharge planning.   Recommending pt D/C to level 2  when medically stable. Pt will continue to benefit from acute OT services to meet goals.   Plan   Treatment Interventions ADL retraining;Functional transfer training;Endurance training;Energy conservation;Activityengagement   Goal Expiration Date 11/10/24   OT Treatment Day 3   OT Frequency 2-3x/wk   Discharge Recommendation   Rehab Resource Intensity Level, OT II (Moderate Resource Intensity)    AM-PAC Daily Activity Inpatient   Lower Body Dressing 2   Bathing 3   Toileting 3   Upper Body Dressing 4   Grooming 4   Eating 4   Daily Activity Raw Score 20   Daily Activity Standardized Score (Calc for Raw Score >=11) 42.03   AM-PAC Applied Cognition Inpatient   Following a Speech/Presentation 4   Understanding Ordinary Conversation 4   Taking Medications 4   Remembering Where Things Are Placed or Put Away 4   Remembering List of 4-5 Errands 4   Taking Care of Complicated Tasks 4   Applied Cognition Raw Score 24   Applied Cognition Standardized Score 62.21   Modified Veena Scale   Modified Veena Scale 4   End of Consult   Education Provided Yes   Patient Position at End of Consult All needs within reach;Supine;Bed/Chair alarm activated   Nurse Communication Nurse aware of consult       DAFNE Cervantes, OTR/L

## 2024-10-29 NOTE — ASSESSMENT & PLAN NOTE
Dry gangrene of right fifth digit but with surrounding cellulitis. Right foot xray positive for periosteal reaction of the 5th digit proximal phalanx with suspicion for osteomyelitis. Not systemically ill. Some malodorous drainage suggesting the possibility of anaerobes. No history of MRSA.  Slight increase in WBC count noted.  Patient's status postL ax-bifem with R CFEA and R CFA-AK pop bypass w/PTFE on 10/23/2024.  Patient's status post ray amputation of the fifth toe on 10/26/2024 without apparent surgical cure.  Postop findings reveal resolution of any soft tissue infection.  Leukocytosis has resolved  -No additional antibiotics for now  -Close podiatry and vascular follow-up  -Local wound care

## 2024-10-29 NOTE — DISCHARGE SUMMARY
Discharge Summary - Hospitalist   Name: Sabiha Garcia 68 y.o. female I MRN: [id number]  Unit/Bed#: [facility] 504-01 I Date of Admission: 10/11/2024   Date of Service: 10/29/2024 I Hospital Day: 18     Assessment & Plan  Gangrene of toe of right foot (HCC)  Right fifth digit dry gangrene with surround cellulitis  Xray - Subtle periosteal reaction of the fifth digit proximal phalanx with adjacent soft tissue swelling. Findings raise suspicion for osteomyelitis   LEADS abnormal - vascular consulted  Podiatry following - plan after vascular optimization  Discussed with infectious disease. Surgical cure, D/c ABx  Patient had revascularization done on October 23, 2024  Pt is S/P amputation 10/26  Restarted Xarelto 10/27  -s/p L ax-bifem and R fem-AK pop bypass 10/23/24  -s/p R 5th ray amputation w/ podiatry  -will cont ASA/PAD dose Xarelto 2.5mg BID    Cellulitis of right foot (Resolved: 10/29/2024)  Completed Course of abx per ID  Type 2 diabetes mellitus (HCC)  Lab Results   Component Value Date    HGBA1C 14.8 (H) 09/18/2024       Recent Labs     10/29/24  0655 10/29/24  0856 10/29/24  1053 10/29/24  1204   POCGLU 108 229* 179* 178*       Blood Sugar Average: Last 72 hrs:  (P) 143.1898064504462154    Endocrinology managing  Long acting insulin 29U AM and Meal time humalog 16-8-16    Ambulatory dysfunction  Presented with generalized weakness, 2 falls with no head strike  States had been down for prolonged period  CT head - no acute pathology  PT/OT >STR  Tobacco user  Nicotine patch  Smoking cessation advised  Vitamin D deficiency  Continue supplementation  Lung nodule  CT chest with 3 mm right upper lobe noncalcified nodule  CT chest without contrast in 12 months  Aortoiliac occlusive disease (HCC)  Vascular surgery consulted  Patient is status post bypass on October 23, 2024     Medical Problems       Resolved Problems  Date Reviewed: 9/1/2020   None       Discharging Physician / Practitioner: Jevon Rios MD  PCP: Alexys  Marcela Blunt MD  Admission Date:   Admission Orders (From admission, onward)       Ordered        10/11/24 0914  Inpatient Admission  Once                          Discharge Date: 10/29/24    Complications:  None        Hospital Course:   Sabiha Garcia is a 68 y.o. female patient who originally presented to the hospital on 10/11/2024 due to Pt is a 69 yo F w/ DM, tobacco abuse, anxiety, eczema, hx L femur fx '20, MDD, HLD, PAD, malnutrition, presents with DKA/starvation ketosis, found to have R 5th toe gangrene. Endocrinology managed her insulin regimen as above.  She is s/p L ax-bifem and R fem-AK pop bypass 10/23/24 and s/p R 5th ray amputation. Recommended for  mg qd  , PAD dose Xarelto 2.5 mg BID and high intensity Atorvastatin. Currently denies any pain. Stable for DC to STR        Please see above list of diagnoses and related plan for additional information.     Condition at Discharge: good    Discharge Day Visit / Exam:   * Please refer to separate progress note for these details *    Discussion with Family: VM left     Discharge instructions/Information to patient and family:   See after visit summary for information provided to patient and family.      Provisions for Follow-Up Care:  See after visit summary for information related to follow-up care and any pertinent home health orders.      Mobility at time of Discharge:   Basic Mobility Inpatient Raw Score: 17  JH-HLM Goal: 5: Stand one or more mins  JH-HLM Achieved: 2: Bed activities/Dependent transfer  HLM Goal NOT achieved. Continue to encourage mobility in post discharge setting.     Disposition:   Other: SNF    Planned Readmission: no    Discharge Medications:  See after visit summary for reconciled discharge medications provided to patient and/or family.      Administrative Statements   Discharge Statement:  I have spent a total time of 31 minutes in caring for this patient on the day of the visit/encounter. .    **Please Note: This note  may have been constructed using a voice recognition system**

## 2024-10-29 NOTE — ASSESSMENT & PLAN NOTE
Lab Results   Component Value Date    HGBA1C 14.8 (H) 09/18/2024       Recent Labs     10/29/24  0655 10/29/24  0856 10/29/24  1053 10/29/24  1204   POCGLU 108 229* 179* 178*       Blood Sugar Average: Last 72 hrs:  (P) 143.9147228890988191    Endocrinology managing  Long acting insulin 29U AM and Meal time humalog 16-8-16

## 2024-10-29 NOTE — CASE MANAGEMENT
[facility] has received APPROVED authorization.  Insurance: [employer]   Auth obtained via Insurance Rep: P#: [phone]  Authorization received for: SNF  Facility: [facility]   Authorization #: [id number]   Start of Care: 10/29  Next Review Date: 11/04  Submit next review to #: [phone]    Care Manager notified: Belkys Guillen     Please reach out to  for updates on any clinical information.

## 2024-10-29 NOTE — CASE MANAGEMENT
Case Management Discharge Planning Note    Patient name Sabiha Garcia  Location [facility] 504/[facility] 504-01 MRN [id number]  : 1955 Date 10/29/2024       Current Admission Date: 10/11/2024  Current Admission Diagnosis:Type 2 diabetes mellitus (HCC)   Patient Active Problem List    Diagnosis Date Noted Date Diagnosed    Moderate protein-calorie malnutrition (HCC) 10/17/2024     Aortoiliac occlusive disease (HCC) 10/16/2024     Other headache syndrome 10/16/2024     Pre-operative cardiovascular examination 10/16/2024     Cellulitis of right foot 10/14/2024     Gangrene of toe of right foot (HCC) 10/13/2024     Ambulatory dysfunction 10/13/2024     Vitamin D deficiency 10/13/2024     Lung nodule 10/13/2024     Abnormal gait 2024     Weight loss, unintentional 2024     Claudication of both lower extremities (HCC) 2024     Disorder of refraction and accommodation 2024     Disorder of gallbladder 2024     Arthralgia of shoulder 2024     Eczema 2024     Generalized anxiety disorder 2022     Depression 2022     Fracture of surgical neck of right humerus 2022     History of gallbladder disease 2022     Numbness of toes 2022     Blood loss anemia 2020     Type 2 diabetes mellitus (HCC) 2020     Tobacco user 2020     Hyperlipidemia 2013       LOS (days): 18  Geometric Mean LOS (GMLOS) (days): 3.8  Days to GMLOS:-14.3     OBJECTIVE:  Risk of Unplanned Readmission Score: 15.96         Current admission status: Inpatient   Preferred Pharmacy:   [id number] - [place] - [address]  [address]  [place] [address]  Phone: [phone] Fax: [phone]    Primary Care Provider: Alexys Blunt MD    Primary Insurance: [facility]  Secondary Insurance: [facility]    DISCHARGE DETAILS:    Discharge planning discussed with:: patient, Dr. Rios     Pt is in agreement w/ DCP  Other  Referral/Resources/Interventions Provided:  Interventions: Short Term Rehab         Treatment Team Recommendation: Short Term Rehab  Discharge Destination Plan:: Short Term Rehab  Transport at Discharge : Wheelchair van           ETA of Transport (Date): 10/29/24  ETA of Transport (Time): 1300        Additional Comments: Pt is medically [facility] liaison, Aida Del Cid.  requested [facility] transport for 1300. Await time.        1300 [facility] confirmed w/ [facility]  LM w/ SW from [facility]. Regarding d/lc disposition.  Aida Del Cid aware of time

## 2024-10-30 ENCOUNTER — PATIENT OUTREACH (OUTPATIENT)
Dept: CASE MANAGEMENT | Facility: OTHER | Age: 69
End: 2024-10-30

## 2024-10-30 PROCEDURE — 88305 TISSUE EXAM BY PATHOLOGIST: CPT | Performed by: SPECIALIST

## 2024-10-30 PROCEDURE — 88311 DECALCIFY TISSUE: CPT | Performed by: SPECIALIST

## 2024-10-30 NOTE — PROGRESS NOTES
Outpatient care management referral via HRR report 10/30/24. Discharged to [facility] 10/29/24. Email sent to facility to inform them I will be following the patient during their skilled stay.  This Admin Coordinator will continue to monitor via chart review.

## 2024-10-31 ENCOUNTER — TELEPHONE (OUTPATIENT)
Age: 69
End: 2024-10-31

## 2024-10-31 ENCOUNTER — TELEPHONE (OUTPATIENT)
Dept: VASCULAR SURGERY | Facility: CLINIC | Age: 69
End: 2024-10-31

## 2024-10-31 NOTE — TELEPHONE ENCOUNTER
Vascular Nurse Navigator Post Op Call          Spoke with Alexa at [facility] and she stated patient's nurse is unavailable at this time.  She took contact information and will have patient's nurse return call.  She transferred call to Ninfa to schedule patient's post op appointment.  Call was transferred to Griselda, devenrical, to schedule patient's post op appointment.          Procedure: - Left axillary bifemoral bypass using bifurcated 8mm ringed PTFE graft  - Right common femoral to above knee popliteal artery bypass with 8mm ringed PTFE graft  - Right ileofemoral endarterectomy     Date of Procedure: 10/23/24    Surgeon:   * Haroon Dukes MD - Primary     * Onur Galeas MD - Assisting     * Yovana Yen DPM - Assisting     * Bria Mcfadden MD - Fellow                               Discharge Date: 10/29/24    Discharge Disposition: Other Skilled Nursing Facility at [facility]      Anticoagulation pt was discharged on post op?: Aspirin and Rivaroxaban (Xarelto)    Statin pt was discharged on post op?:  Lipitor (atorvastatin)    NEXT OFFICE VISIT SCHEDULED: not scheduled at time of call

## 2024-10-31 NOTE — TELEPHONE ENCOUNTER
Caller: Eufemia Duarte    Doctor and/or Office: Dr. Dhillon    #: [phone] ext [phone]    Escalation: Surgery Patient Sabiha needs a post op scheduled with Dr. Dhillon. Please return call to Ninfa to schedule. Thank  you

## 2024-11-04 ENCOUNTER — OFFICE VISIT (OUTPATIENT)
Dept: PODIATRY | Facility: CLINIC | Age: 69
End: 2024-11-04

## 2024-11-04 VITALS
WEIGHT: 121 LBS | HEART RATE: 97 BPM | SYSTOLIC BLOOD PRESSURE: 105 MMHG | BODY MASS INDEX: 17.92 KG/M2 | HEIGHT: 69 IN | DIASTOLIC BLOOD PRESSURE: 59 MMHG

## 2024-11-04 DIAGNOSIS — Z98.890 S/P FOOT SURGERY: Primary | ICD-10-CM

## 2024-11-04 PROCEDURE — 99024 POSTOP FOLLOW-UP VISIT: CPT | Performed by: PODIATRIST

## 2024-11-04 RX ORDER — OXYCODONE AND ACETAMINOPHEN 5; 325 MG/1; MG/1
1 TABLET ORAL EVERY 8 HOURS PRN
COMMUNITY

## 2024-11-04 NOTE — TELEPHONE ENCOUNTER
Vascular Nurse Navigator Post Op Call    Procedure: - Left axillary bifemoral bypass using bifurcated 8mm ringed PTFE graft  - Right common femoral to above knee popliteal artery bypass with 8mm ringed PTFE graft  - Right ileofemoral endarterectomy      Date of Procedure: 10/23/24     Surgeon:   * Haroon Dukes MD - Primary     * Onur Galeas MD - Assisting     * Yovana Yen DPM - Assisting     * Bria Mcfadden MD - Fellow                                Discharge Date: 10/29/24     Discharge Disposition: Other Skilled Nursing Facility at [facility]    Leg Weakness?: No    Leg Swelling?: No    Leg Numbness?: No    Chest Pain?: No    Shortness of Breath?: No    Orthopnea?: No    Anticoagulation pt was discharged on post op?: Aspirin and Rivaroxaban (Xarelto)    Statin pt was discharged on post op?:  Lipitor (atorvastatin)    Bleeding?: No    Uncontrolled Pain?: No    Incision Concerns?: No    Fever or Chills?: No      Reviewed discharge instructions and incision care with patient.      NEXT OFFICE VISIT SCHEDULED:  11/19/24 at 2:30 pm with Dr. Dukes at [facility] Confirmed?: Yes      Any further questions/concerns?  Spoke with Lea, nurse at [facility], in regards to above.  She stated that patient is doing good since discharge.  She stated that her incision look good and without any signs of infection.  Reviewed discharge medications - Aspirin and Xarelto.  All questions answered.  No concerns expressed at this time.

## 2024-11-04 NOTE — PROGRESS NOTES
Name: Sabiha Garcia      : 1955      MRN: [id number]  Encounter Provider: Bienvenido Dhillon DPM  Encounter Date: 2024   Encounter department: [facility]    Assessment & Plan     1. S/P foot surgery      Sutures were left in place today.    Weightbearing restrictions will remain the same until seen for follow-up and reevaluation.  Plan for return evaluation 1 to 2 weeks for consideration of suture removal.  Patient should not place moisturizing lotions around the surgical incision.  Patient may  wash the area with antibacterial soap and water.  I do not recommend soaking of the foot or ankle.    Notify the office immediately of any signs of infection including but not limited to: redness around incision, streaking red line up foot or leg, drainage, increased pain, fever, chills, sweats, nausea, vomiting, shortness of breath and/or chest pain.  If you are concerned for any reason, we have a physician on call 24 hours a day 7 days a week that can answer your questions.  Please call [phone]    Final Diagnosis   A. Toe, Right, Right 5th toe and metatarsal:  - Fragments of bone and cartilage with acute inflammation.        Subjective      10/26/24- Partial 5th ray amputation of the right foot with removal of nonviable bone/soft tissue (Right)       Patient is doing well at this point.  She denies significant pain to the area.  She is currently at a facility.    Review of Systems      Constitutional: Negative for fever.   Respiratory: Negative for shortness of breath.    Cardiovascular: Negative for chest pain.  Gastrointestinal: Negative for nausea and vomiting.     Current Outpatient Medications on File Prior to Visit   Medication Sig    acetaminophen (TYLENOL) 325 mg tablet Take 3 tablets (975 mg total) by mouth every 8 (eight) hours as needed for mild pain for up to 7 days    aspirin (ECOTRIN LOW STRENGTH) 81 mg EC tablet Take 1 tablet (81 mg total) by mouth daily     "atorvastatin (LIPITOR) 80 mg tablet Take 1 tablet (80 mg total) by mouth daily with dinner    Cholecalciferol (VITAMIN D3) 1,000 units tablet Take 2 tablets (2,000 Units total) by mouth daily    docusate sodium (COLACE) 100 mg capsule Take 1 capsule (100 mg total) by mouth daily at bedtime for 7 days    insulin degludec (TRESIBA) 100 units/mL injection pen Inject 29 Units under the skin daily in the early morning Do not start before October 30, 2024.    insulin lispro (HumALOG/ADMELOG) 100 units/mL injection Inject 16 Units under the skin daily with breakfast    insulin lispro (HumALOG/ADMELOG) 100 units/mL injection Inject 16 Units under the skin daily with dinner    insulin lispro (HumALOG/ADMELOG) 100 units/mL injection Inject 8 Units under the skin daily with lunch    Insulin Pen Needle (BD Pen Needle Shira U/F) 32G X 4 MM MISC Use daily as directed with insulin pen    oxyCODONE-acetaminophen (PERCOCET) 5-325 mg per tablet Take 1 tablet by mouth every 8 (eight) hours as needed for moderate pain    polyethylene glycol (MIRALAX) 17 g packet Take 17 g by mouth daily as needed (constipation)    rivaroxaban (Xarelto) 2.5 mg tablet Take 1 tablet (2.5 mg total) by mouth 2 (two) times a day       Objective     /59 (BP Location: Right arm, Patient Position: Sitting, Cuff Size: Adult)   Pulse 97   Ht 5' 9\" (1.753 m) Comment: verbal  Wt 54.9 kg (121 lb) Comment: verbal  BMI 17.87 kg/m²     Physical Exam      Incision site appears well healing, without any warmth mild redness.  There is some peeling around the incision site..  There are no signs of necrosis.  There is some expected swelling.  No calf pain or tenderness on palpation.  Neurological status is at baseline.  Vascular status is at baseline.  "

## 2024-11-05 ENCOUNTER — PATIENT OUTREACH (OUTPATIENT)
Dept: CASE MANAGEMENT | Facility: OTHER | Age: 69
End: 2024-11-05

## 2024-11-12 ENCOUNTER — PATIENT OUTREACH (OUTPATIENT)
Dept: CASE MANAGEMENT | Facility: OTHER | Age: 69
End: 2024-11-12

## 2024-11-12 NOTE — PROGRESS NOTES
Chart review completed.  Email sent to facility requesting update on patient.   This care manager assistant will continue to monitor via chart review throughout SNF/STR Surveillance episode.   Update received the patient continues with therapy has a LCD of 11/25/24. The patient does not have a discharge disposition at this time.

## 2024-11-18 ENCOUNTER — HOSPITAL ENCOUNTER (INPATIENT)
Facility: HOSPITAL | Age: 69
LOS: 3 days | Discharge: NURSING, REHAB OR LONG-TERM CARE | DRG: 536 | End: 2024-11-23
Attending: EMERGENCY MEDICINE | Admitting: SURGERY
Payer: COMMERCIAL

## 2024-11-18 ENCOUNTER — APPOINTMENT (EMERGENCY)
Dept: RADIOLOGY | Facility: HOSPITAL | Age: 69
DRG: 536 | End: 2024-11-18
Payer: COMMERCIAL

## 2024-11-18 ENCOUNTER — OFFICE VISIT (OUTPATIENT)
Dept: PODIATRY | Facility: CLINIC | Age: 69
End: 2024-11-18

## 2024-11-18 VITALS — WEIGHT: 121 LBS | BODY MASS INDEX: 17.87 KG/M2

## 2024-11-18 DIAGNOSIS — I74.09 AORTOILIAC OCCLUSIVE DISEASE (HCC): ICD-10-CM

## 2024-11-18 DIAGNOSIS — Z98.890 S/P FOOT SURGERY: ICD-10-CM

## 2024-11-18 DIAGNOSIS — S32.591A CLOSED FRACTURE OF RIGHT INFERIOR PUBIC RAMUS, INITIAL ENCOUNTER (HCC): Primary | ICD-10-CM

## 2024-11-18 DIAGNOSIS — I96 GANGRENE OF TOE OF RIGHT FOOT (HCC): ICD-10-CM

## 2024-11-18 DIAGNOSIS — E11.40 POORLY CONTROLLED TYPE 2 DIABETES MELLITUS WITH NEUROPATHY (HCC): Primary | ICD-10-CM

## 2024-11-18 DIAGNOSIS — E11.65 POORLY CONTROLLED TYPE 2 DIABETES MELLITUS WITH NEUROPATHY (HCC): Primary | ICD-10-CM

## 2024-11-18 PROCEDURE — 99024 POSTOP FOLLOW-UP VISIT: CPT | Performed by: PODIATRIST

## 2024-11-18 PROCEDURE — 72131 CT LUMBAR SPINE W/O DYE: CPT

## 2024-11-18 PROCEDURE — 99285 EMERGENCY DEPT VISIT HI MDM: CPT | Performed by: EMERGENCY MEDICINE

## 2024-11-18 PROCEDURE — 99284 EMERGENCY DEPT VISIT MOD MDM: CPT

## 2024-11-18 PROCEDURE — 73502 X-RAY EXAM HIP UNI 2-3 VIEWS: CPT

## 2024-11-18 RX ORDER — OXYCODONE HYDROCHLORIDE 5 MG/1
5 TABLET ORAL ONCE
Refills: 0 | Status: COMPLETED | OUTPATIENT
Start: 2024-11-18 | End: 2024-11-18

## 2024-11-18 RX ADMIN — OXYCODONE HYDROCHLORIDE 5 MG: 5 TABLET ORAL at 23:45

## 2024-11-18 NOTE — PROGRESS NOTES
Name: Sabiha Garcia      : 1955      MRN: [id number]  Encounter Provider: Bienvenido Dhillno DPM  Encounter Date: 2024   Encounter department: [facility]    Assessment & Plan     1. Poorly controlled type 2 diabetes mellitus with neuropathy (HCC)  -     Diabetic Shoe Inserts  -     Diabetic Shoe  2. S/P foot surgery  -     Diabetic Shoe Inserts  -     Diabetic Shoe        The patient is doing well at this point.  Removed sutures today.    There is some fibrotic tissue formation noted at the level of the distal portion of the incision I do not see any signs of infection noted currently.  However would recommend dressing changes  with an alginate and dry sterile dressing may wash the area with soap and water.    Will give her a prescription for diabetic shoes custom insoles.       We removed surgical dressings today and replaced with new dressings.  Continue the same weightbearing restrictions that were given to you after surgery.  Patient should not place moisturizing lotions around the surgical incision.  Patient may wash the area with antibacterial soap and water.  I do not recommend soaking of the foot or ankle.    Notify the office immediately of any signs of infection including but not limited to: redness around incision, streaking red line up foot or leg, drainage, increased pain, fever, chills, sweats, nausea, vomiting, shortness of breath and/or chest pain.  If you are concerned for any reason, we have a physician on call 24 hours a day 7 days a week that can answer your questions.  Please call [phone]      Subjective      HPI  10/26/24- Partial 5th ray amputation of the right foot with removal of nonviable bone/soft tissue (Right)         Review of Systems    Constitutional: Negative for fever.   Respiratory: Negative for shortness of breath.    Cardiovascular: Negative for chest pain.  Gastrointestinal: Negative for nausea and  vomiting.     Current Outpatient Medications on File Prior to Visit   Medication Sig    aspirin (ECOTRIN LOW STRENGTH) 81 mg EC tablet Take 1 tablet (81 mg total) by mouth daily    atorvastatin (LIPITOR) 80 mg tablet Take 1 tablet (80 mg total) by mouth daily with dinner    Cholecalciferol (VITAMIN D3) 1,000 units tablet Take 2 tablets (2,000 Units total) by mouth daily    insulin degludec (TRESIBA) 100 units/mL injection pen Inject 29 Units under the skin daily in the early morning Do not start before October 30, 2024.    insulin lispro (HumALOG/ADMELOG) 100 units/mL injection Inject 16 Units under the skin daily with breakfast    insulin lispro (HumALOG/ADMELOG) 100 units/mL injection Inject 16 Units under the skin daily with dinner    insulin lispro (HumALOG/ADMELOG) 100 units/mL injection Inject 8 Units under the skin daily with lunch    Insulin Pen Needle (BD Pen Needle Shira U/F) 32G X 4 MM MISC Use daily as directed with insulin pen    oxyCODONE-acetaminophen (PERCOCET) 5-325 mg per tablet Take 1 tablet by mouth every 8 (eight) hours as needed for moderate pain    polyethylene glycol (MIRALAX) 17 g packet Take 17 g by mouth daily as needed (constipation)    rivaroxaban (Xarelto) 2.5 mg tablet Take 1 tablet (2.5 mg total) by mouth 2 (two) times a day    docusate sodium (COLACE) 100 mg capsule Take 1 capsule (100 mg total) by mouth daily at bedtime for 7 days       Objective     Wt 54.9 kg (121 lb)   BMI 17.87 kg/m²     Physical Exam    Incision itself appears stable there is some gapping distally of the incision with some fibrotic tissue formation.  There are no other areas of acute open ulcerations or lesions noted currently.  There is neurovascular status being at baseline.  No pain on palpation noted to the area.  There is no active drainage noted.  No significant ascending erythema.

## 2024-11-19 ENCOUNTER — APPOINTMENT (EMERGENCY)
Dept: RADIOLOGY | Facility: HOSPITAL | Age: 69
DRG: 536 | End: 2024-11-19
Payer: COMMERCIAL

## 2024-11-19 ENCOUNTER — PATIENT OUTREACH (OUTPATIENT)
Dept: CASE MANAGEMENT | Facility: OTHER | Age: 69
End: 2024-11-19

## 2024-11-19 ENCOUNTER — TELEPHONE (OUTPATIENT)
Dept: VASCULAR SURGERY | Facility: CLINIC | Age: 69
End: 2024-11-19

## 2024-11-19 PROBLEM — S32.591A CLOSED FRACTURE OF RIGHT INFERIOR PUBIC RAMUS (HCC): Status: ACTIVE | Noted: 2024-11-19

## 2024-11-19 PROBLEM — M80.00XA OSTEOPOROSIS WITH CURRENT PATHOLOGICAL FRACTURE: Status: ACTIVE | Noted: 2024-11-19

## 2024-11-19 PROBLEM — Z79.4 TYPE 2 DIABETES MELLITUS, WITH LONG-TERM CURRENT USE OF INSULIN (HCC): Status: ACTIVE | Noted: 2020-05-27

## 2024-11-19 LAB
ANION GAP SERPL CALCULATED.3IONS-SCNC: 7 MMOL/L (ref 4–13)
ANION GAP SERPL CALCULATED.3IONS-SCNC: 8 MMOL/L (ref 4–13)
BACTERIA UR QL AUTO: ABNORMAL /HPF
BASOPHILS # BLD AUTO: 0.05 THOUSANDS/ÂΜL (ref 0–0.1)
BASOPHILS NFR BLD AUTO: 0 % (ref 0–1)
BILIRUB UR QL STRIP: NEGATIVE
BUN SERPL-MCNC: 16 MG/DL (ref 5–25)
BUN SERPL-MCNC: 17 MG/DL (ref 5–25)
CALCIUM SERPL-MCNC: 8.3 MG/DL (ref 8.4–10.2)
CALCIUM SERPL-MCNC: 8.4 MG/DL (ref 8.4–10.2)
CHLORIDE SERPL-SCNC: 95 MMOL/L (ref 96–108)
CHLORIDE SERPL-SCNC: 99 MMOL/L (ref 96–108)
CLARITY UR: CLEAR
CO2 SERPL-SCNC: 25 MMOL/L (ref 21–32)
CO2 SERPL-SCNC: 28 MMOL/L (ref 21–32)
COLOR UR: YELLOW
CREAT SERPL-MCNC: 0.73 MG/DL (ref 0.6–1.3)
CREAT SERPL-MCNC: 0.79 MG/DL (ref 0.6–1.3)
EOSINOPHIL # BLD AUTO: 0.3 THOUSAND/ÂΜL (ref 0–0.61)
EOSINOPHIL NFR BLD AUTO: 2 % (ref 0–6)
ERYTHROCYTE [DISTWIDTH] IN BLOOD BY AUTOMATED COUNT: 15.1 % (ref 11.6–15.1)
GFR SERPL CREATININE-BSD FRML MDRD: 77 ML/MIN/1.73SQ M
GFR SERPL CREATININE-BSD FRML MDRD: 84 ML/MIN/1.73SQ M
GLUCOSE SERPL-MCNC: 149 MG/DL (ref 65–140)
GLUCOSE SERPL-MCNC: 173 MG/DL (ref 65–140)
GLUCOSE SERPL-MCNC: 190 MG/DL (ref 65–140)
GLUCOSE SERPL-MCNC: 216 MG/DL (ref 65–140)
GLUCOSE SERPL-MCNC: 219 MG/DL (ref 65–140)
GLUCOSE SERPL-MCNC: 223 MG/DL (ref 65–140)
GLUCOSE UR STRIP-MCNC: ABNORMAL MG/DL
HCT VFR BLD AUTO: 23.7 % (ref 34.8–46.1)
HGB BLD-MCNC: 7.6 G/DL (ref 11.5–15.4)
HGB UR QL STRIP.AUTO: NEGATIVE
IMM GRANULOCYTES # BLD AUTO: 0.09 THOUSAND/UL (ref 0–0.2)
IMM GRANULOCYTES NFR BLD AUTO: 1 % (ref 0–2)
KETONES UR STRIP-MCNC: NEGATIVE MG/DL
LEUKOCYTE ESTERASE UR QL STRIP: ABNORMAL
LYMPHOCYTES # BLD AUTO: 2.8 THOUSANDS/ÂΜL (ref 0.6–4.47)
LYMPHOCYTES NFR BLD AUTO: 21 % (ref 14–44)
MAGNESIUM SERPL-MCNC: 1.8 MG/DL (ref 1.9–2.7)
MCH RBC QN AUTO: 27.4 PG (ref 26.8–34.3)
MCHC RBC AUTO-ENTMCNC: 32.1 G/DL (ref 31.4–37.4)
MCV RBC AUTO: 86 FL (ref 82–98)
MONOCYTES # BLD AUTO: 0.74 THOUSAND/ÂΜL (ref 0.17–1.22)
MONOCYTES NFR BLD AUTO: 6 % (ref 4–12)
NEUTROPHILS # BLD AUTO: 9.41 THOUSANDS/ÂΜL (ref 1.85–7.62)
NEUTS SEG NFR BLD AUTO: 70 % (ref 43–75)
NITRITE UR QL STRIP: NEGATIVE
NON-SQ EPI CELLS URNS QL MICRO: ABNORMAL /HPF
NRBC BLD AUTO-RTO: 0 /100 WBCS
PH UR STRIP.AUTO: 7 [PH]
PHOSPHATE SERPL-MCNC: 3.1 MG/DL (ref 2.3–4.1)
PLATELET # BLD AUTO: 474 THOUSANDS/UL (ref 149–390)
PMV BLD AUTO: 8.3 FL (ref 8.9–12.7)
POTASSIUM SERPL-SCNC: 4.7 MMOL/L (ref 3.5–5.3)
POTASSIUM SERPL-SCNC: 4.9 MMOL/L (ref 3.5–5.3)
PROT UR STRIP-MCNC: ABNORMAL MG/DL
RBC # BLD AUTO: 2.77 MILLION/UL (ref 3.81–5.12)
RBC #/AREA URNS AUTO: ABNORMAL /HPF
SODIUM SERPL-SCNC: 130 MMOL/L (ref 135–147)
SODIUM SERPL-SCNC: 132 MMOL/L (ref 135–147)
SP GR UR STRIP.AUTO: 1.01 (ref 1–1.03)
T4 FREE SERPL-MCNC: 1.13 NG/DL (ref 0.61–1.12)
TSH SERPL DL<=0.05 MIU/L-ACNC: 2.15 UIU/ML (ref 0.45–4.5)
UROBILINOGEN UR STRIP-ACNC: 2 MG/DL
WBC # BLD AUTO: 13.39 THOUSAND/UL (ref 4.31–10.16)
WBC #/AREA URNS AUTO: ABNORMAL /HPF

## 2024-11-19 PROCEDURE — 99204 OFFICE O/P NEW MOD 45 MIN: CPT | Performed by: ORTHOPAEDIC SURGERY

## 2024-11-19 PROCEDURE — 87150 DNA/RNA AMPLIFIED PROBE: CPT | Performed by: STUDENT IN AN ORGANIZED HEALTH CARE EDUCATION/TRAINING PROGRAM

## 2024-11-19 PROCEDURE — 99214 OFFICE O/P EST MOD 30 MIN: CPT | Performed by: INTERNAL MEDICINE

## 2024-11-19 PROCEDURE — 87077 CULTURE AEROBIC IDENTIFY: CPT | Performed by: STUDENT IN AN ORGANIZED HEALTH CARE EDUCATION/TRAINING PROGRAM

## 2024-11-19 PROCEDURE — 85025 COMPLETE CBC W/AUTO DIFF WBC: CPT

## 2024-11-19 PROCEDURE — 84443 ASSAY THYROID STIM HORMONE: CPT

## 2024-11-19 PROCEDURE — 73700 CT LOWER EXTREMITY W/O DYE: CPT

## 2024-11-19 PROCEDURE — 87186 SC STD MICRODIL/AGAR DIL: CPT | Performed by: STUDENT IN AN ORGANIZED HEALTH CARE EDUCATION/TRAINING PROGRAM

## 2024-11-19 PROCEDURE — 36415 COLL VENOUS BLD VENIPUNCTURE: CPT

## 2024-11-19 PROCEDURE — 84100 ASSAY OF PHOSPHORUS: CPT

## 2024-11-19 PROCEDURE — 99223 1ST HOSP IP/OBS HIGH 75: CPT | Performed by: INTERNAL MEDICINE

## 2024-11-19 PROCEDURE — 87086 URINE CULTURE/COLONY COUNT: CPT | Performed by: STUDENT IN AN ORGANIZED HEALTH CARE EDUCATION/TRAINING PROGRAM

## 2024-11-19 PROCEDURE — 80048 BASIC METABOLIC PNL TOTAL CA: CPT

## 2024-11-19 PROCEDURE — 99222 1ST HOSP IP/OBS MODERATE 55: CPT | Performed by: STUDENT IN AN ORGANIZED HEALTH CARE EDUCATION/TRAINING PROGRAM

## 2024-11-19 PROCEDURE — 83735 ASSAY OF MAGNESIUM: CPT

## 2024-11-19 PROCEDURE — NC001 PR NO CHARGE: Performed by: PODIATRIST

## 2024-11-19 PROCEDURE — 82948 REAGENT STRIP/BLOOD GLUCOSE: CPT

## 2024-11-19 PROCEDURE — 84439 ASSAY OF FREE THYROXINE: CPT

## 2024-11-19 PROCEDURE — 81001 URINALYSIS AUTO W/SCOPE: CPT | Performed by: STUDENT IN AN ORGANIZED HEALTH CARE EDUCATION/TRAINING PROGRAM

## 2024-11-19 RX ORDER — SODIUM CHLORIDE, SODIUM GLUCONATE, SODIUM ACETATE, POTASSIUM CHLORIDE, MAGNESIUM CHLORIDE, SODIUM PHOSPHATE, DIBASIC, AND POTASSIUM PHOSPHATE .53; .5; .37; .037; .03; .012; .00082 G/100ML; G/100ML; G/100ML; G/100ML; G/100ML; G/100ML; G/100ML
500 INJECTION, SOLUTION INTRAVENOUS ONCE
Status: COMPLETED | OUTPATIENT
Start: 2024-11-19 | End: 2024-11-19

## 2024-11-19 RX ORDER — ONDANSETRON 2 MG/ML
4 INJECTION INTRAMUSCULAR; INTRAVENOUS EVERY 6 HOURS PRN
Status: DISCONTINUED | OUTPATIENT
Start: 2024-11-19 | End: 2024-11-23 | Stop reason: HOSPADM

## 2024-11-19 RX ORDER — ACETAMINOPHEN 325 MG/1
650 TABLET ORAL EVERY 6 HOURS SCHEDULED
Status: DISCONTINUED | OUTPATIENT
Start: 2024-11-19 | End: 2024-11-23 | Stop reason: HOSPADM

## 2024-11-19 RX ORDER — SENNOSIDES 8.6 MG
2 TABLET ORAL DAILY
Status: DISCONTINUED | OUTPATIENT
Start: 2024-11-19 | End: 2024-11-23 | Stop reason: HOSPADM

## 2024-11-19 RX ORDER — INSULIN LISPRO 100 [IU]/ML
16 INJECTION, SOLUTION INTRAVENOUS; SUBCUTANEOUS
Status: DISCONTINUED | OUTPATIENT
Start: 2024-11-19 | End: 2024-11-20

## 2024-11-19 RX ORDER — INSULIN LISPRO 100 [IU]/ML
16 INJECTION, SOLUTION INTRAVENOUS; SUBCUTANEOUS
Status: DISCONTINUED | OUTPATIENT
Start: 2024-11-20 | End: 2024-11-20

## 2024-11-19 RX ORDER — OXYCODONE HYDROCHLORIDE 5 MG/1
5 TABLET ORAL EVERY 4 HOURS PRN
Refills: 0 | Status: DISCONTINUED | OUTPATIENT
Start: 2024-11-19 | End: 2024-11-23 | Stop reason: HOSPADM

## 2024-11-19 RX ORDER — MAGNESIUM SULFATE HEPTAHYDRATE 40 MG/ML
2 INJECTION, SOLUTION INTRAVENOUS ONCE
Status: COMPLETED | OUTPATIENT
Start: 2024-11-19 | End: 2024-11-19

## 2024-11-19 RX ORDER — SODIUM CHLORIDE, SODIUM GLUCONATE, SODIUM ACETATE, POTASSIUM CHLORIDE, MAGNESIUM CHLORIDE, SODIUM PHOSPHATE, DIBASIC, AND POTASSIUM PHOSPHATE .53; .5; .37; .037; .03; .012; .00082 G/100ML; G/100ML; G/100ML; G/100ML; G/100ML; G/100ML; G/100ML
100 INJECTION, SOLUTION INTRAVENOUS CONTINUOUS
Status: DISCONTINUED | OUTPATIENT
Start: 2024-11-19 | End: 2024-11-20

## 2024-11-19 RX ORDER — ASPIRIN 81 MG/1
81 TABLET ORAL DAILY
Status: DISCONTINUED | OUTPATIENT
Start: 2024-11-19 | End: 2024-11-23 | Stop reason: HOSPADM

## 2024-11-19 RX ORDER — ENOXAPARIN SODIUM 100 MG/ML
30 INJECTION SUBCUTANEOUS EVERY 12 HOURS
Status: COMPLETED | OUTPATIENT
Start: 2024-11-19 | End: 2024-11-20

## 2024-11-19 RX ORDER — INSULIN LISPRO 100 [IU]/ML
8 INJECTION, SOLUTION INTRAVENOUS; SUBCUTANEOUS
Status: DISCONTINUED | OUTPATIENT
Start: 2024-11-20 | End: 2024-11-22

## 2024-11-19 RX ORDER — HYDROMORPHONE HCL IN WATER/PF 6 MG/30 ML
0.2 PATIENT CONTROLLED ANALGESIA SYRINGE INTRAVENOUS EVERY 4 HOURS PRN
Refills: 0 | Status: DISCONTINUED | OUTPATIENT
Start: 2024-11-19 | End: 2024-11-23 | Stop reason: HOSPADM

## 2024-11-19 RX ORDER — POLYETHYLENE GLYCOL 3350 17 G/17G
17 POWDER, FOR SOLUTION ORAL DAILY
Status: DISCONTINUED | OUTPATIENT
Start: 2024-11-19 | End: 2024-11-23 | Stop reason: HOSPADM

## 2024-11-19 RX ORDER — ATORVASTATIN CALCIUM 80 MG/1
80 TABLET, FILM COATED ORAL
Status: DISCONTINUED | OUTPATIENT
Start: 2024-11-19 | End: 2024-11-23 | Stop reason: HOSPADM

## 2024-11-19 RX ORDER — INSULIN GLARGINE 100 [IU]/ML
29 INJECTION, SOLUTION SUBCUTANEOUS
Status: DISCONTINUED | OUTPATIENT
Start: 2024-11-19 | End: 2024-11-20

## 2024-11-19 RX ORDER — INSULIN LISPRO 100 [IU]/ML
1-5 INJECTION, SOLUTION INTRAVENOUS; SUBCUTANEOUS EVERY 6 HOURS SCHEDULED
Status: DISCONTINUED | OUTPATIENT
Start: 2024-11-19 | End: 2024-11-19

## 2024-11-19 RX ORDER — INSULIN LISPRO 100 [IU]/ML
1-5 INJECTION, SOLUTION INTRAVENOUS; SUBCUTANEOUS
Status: DISCONTINUED | OUTPATIENT
Start: 2024-11-19 | End: 2024-11-22

## 2024-11-19 RX ADMIN — SODIUM CHLORIDE, SODIUM GLUCONATE, SODIUM ACETATE, POTASSIUM CHLORIDE, MAGNESIUM CHLORIDE, SODIUM PHOSPHATE, DIBASIC, AND POTASSIUM PHOSPHATE 100 ML/HR: .53; .5; .37; .037; .03; .012; .00082 INJECTION, SOLUTION INTRAVENOUS at 05:31

## 2024-11-19 RX ADMIN — INSULIN LISPRO 1 UNITS: 100 INJECTION, SOLUTION INTRAVENOUS; SUBCUTANEOUS at 17:16

## 2024-11-19 RX ADMIN — SODIUM CHLORIDE, SODIUM GLUCONATE, SODIUM ACETATE, POTASSIUM CHLORIDE, MAGNESIUM CHLORIDE, SODIUM PHOSPHATE, DIBASIC, AND POTASSIUM PHOSPHATE 100 ML/HR: .53; .5; .37; .037; .03; .012; .00082 INJECTION, SOLUTION INTRAVENOUS at 21:24

## 2024-11-19 RX ADMIN — SODIUM CHLORIDE, SODIUM GLUCONATE, SODIUM ACETATE, POTASSIUM CHLORIDE, MAGNESIUM CHLORIDE, SODIUM PHOSPHATE, DIBASIC, AND POTASSIUM PHOSPHATE 100 ML/HR: .53; .5; .37; .037; .03; .012; .00082 INJECTION, SOLUTION INTRAVENOUS at 10:51

## 2024-11-19 RX ADMIN — ACETAMINOPHEN 650 MG: 325 TABLET, FILM COATED ORAL at 23:34

## 2024-11-19 RX ADMIN — INSULIN GLARGINE 29 UNITS: 100 INJECTION, SOLUTION SUBCUTANEOUS at 21:22

## 2024-11-19 RX ADMIN — Medication 2.5 MG: at 14:30

## 2024-11-19 RX ADMIN — SODIUM CHLORIDE, SODIUM GLUCONATE, SODIUM ACETATE, POTASSIUM CHLORIDE, MAGNESIUM CHLORIDE, SODIUM PHOSPHATE, DIBASIC, AND POTASSIUM PHOSPHATE 500 ML: .53; .5; .37; .037; .03; .012; .00082 INJECTION, SOLUTION INTRAVENOUS at 05:54

## 2024-11-19 RX ADMIN — ACETAMINOPHEN 650 MG: 325 TABLET, FILM COATED ORAL at 05:32

## 2024-11-19 RX ADMIN — ACETAMINOPHEN 650 MG: 325 TABLET, FILM COATED ORAL at 17:08

## 2024-11-19 RX ADMIN — MAGNESIUM SULFATE HEPTAHYDRATE 2 G: 40 INJECTION, SOLUTION INTRAVENOUS at 05:54

## 2024-11-19 RX ADMIN — ASPIRIN 81 MG: 81 TABLET, COATED ORAL at 08:36

## 2024-11-19 RX ADMIN — ENOXAPARIN SODIUM 30 MG: 30 INJECTION SUBCUTANEOUS at 08:36

## 2024-11-19 RX ADMIN — ACETAMINOPHEN 650 MG: 325 TABLET, FILM COATED ORAL at 12:48

## 2024-11-19 RX ADMIN — SENNOSIDES 17.2 MG: 8.6 TABLET, FILM COATED ORAL at 08:36

## 2024-11-19 RX ADMIN — ENOXAPARIN SODIUM 30 MG: 30 INJECTION SUBCUTANEOUS at 20:42

## 2024-11-19 RX ADMIN — ATORVASTATIN CALCIUM 80 MG: 80 TABLET, FILM COATED ORAL at 17:08

## 2024-11-19 RX ADMIN — INSULIN LISPRO 16 UNITS: 100 INJECTION, SOLUTION INTRAVENOUS; SUBCUTANEOUS at 18:49

## 2024-11-19 RX ADMIN — OXYCODONE HYDROCHLORIDE 5 MG: 5 TABLET ORAL at 20:42

## 2024-11-19 RX ADMIN — INSULIN LISPRO 1 UNITS: 100 INJECTION, SOLUTION INTRAVENOUS; SUBCUTANEOUS at 21:21

## 2024-11-19 NOTE — ASSESSMENT & PLAN NOTE
Patient with severe osteoporosis and very high risk of fracture as evidenced by fragility fracture present on admission and DEXA scan from 2022 demonstrating T-score of -3.3 in the lumbar spine, -4.1 at the right total hip, -3.3 in the right femoral neck and -3.2 at the left distal forearm.  Likely age related, suspect tobacco use and low BMI/poor p.o. intake also contributing.   Noted to have magnesium of 1.8, phosphorus of 3.1.  Calcium 8.4 on BMP.  Vitamin D on 10/11/2020 4-22.2.  Additionally was noted to have low TSH and normal free T4 on labs in October 2024  Currently takes vitamin D 6000 units daily, which is a very high dose given her BMI.  Additionally given prior history of fractures, will evaluate for secondary causes of osteoporosis.      Plan:  Emphasized importance of regular calcium and vitamin D intake.  Advised patient to decrease vitamin D3 2000 units daily.  Additionally recommend daily intake of calcium 1200 mg.  Will order updated 25-OH vitamin D, as well as PTH, TSH and free T4, CMP.  Patient will benefit from updated DEXA scan as an outpatient in 6-8 weeks once healed  Additionally will benefit from treatment of osteoporosis as an outpatient.  Risk prevention with PT/OT evaluation while inpatient.  Recommend endocrinology follow-up at discharge

## 2024-11-19 NOTE — CONSULTS
Consultation - Orthopedics   Name: Sabiha Garcia 68 y.o. female I MRN: [id number]  Unit/Bed#: Z5HA I Date of Admission: 11/18/2024   Date of Service: 11/19/2024 I Hospital Day: 0   Inpatient consult to Orthopedic Surgery  Consult performed by: Jason Quiroz MD  Consult ordered by: Cleveland Cardona MD        Physician Requesting Evaluation: Lauryn Ullrich, DO   Reason for Evaluation / Principal Problem: Anterior groin pain status post mechanical fall    Assessment & Plan  Type 2 diabetes mellitus, with long-term current use of insulin (HCC)  Lab Results   Component Value Date    HGBA1C 14.8 (H) 09/18/2024       Recent Labs     11/19/24  0715   POCGLU 173*       Blood Sugar Average: Last 72 hrs:  (P) 173    Closed fracture of right inferior pubic ramus (HCC)  Assessment:  68 y.o. female S/P mechanical fall with right inferior pubic rami fracture    Plan:   Weight bearing as tolerated  Patient not indicated for surgical management.   MOOP initiated for medical evaluation of cause for fragility fracture  Analgesics for pain  DVT ppx  PT/OT  Dispo: Ortho will follow  Patient to follow-up in 4 weeks in outpatient clinic  Remainder of medical care per primary team      History of Present Illness   HPI: Sabiha Garcia is a 68 y.o. year old female walker ambulator status post left femoral CM nail in 2020, status post multiple right foot toe amputations currently in right postop shoe who presents with right groin and hip pain after mechanical fall.  Patient was walking outside home after attending cardiac rehab where she lost balance and fell on her right hip.  Patient unable to bear weight after fall and complained of anterior groin pain. Pain is dull in character, acute in onset, aggravated by RLE movement, severe in intensity. Patient denies head strike, loss of consciousness.  Patient is on ASA and Xarelto.  Social history significant for 30+ years smoking.  Past medical history significant for aortoiliac disease  "status post left ax bifemoral bypass and RCF above-knee pop bypass 10/23/2024.  No other complaints    Review of Systems significant for findings described in the HPI.  I have reviewed the patient's PMH, PSH, Social History, Family History, Meds, and Allergies    Objective :  Temp:  [97.8 °F (36.6 °C)] 97.8 °F (36.6 °C)  HR:  [87-93] 87  BP: (148-150)/(65-67) 150/65  Resp:  [18] 18  SpO2:  [99 %-100 %] 99 %  O2 Device: None (Room air)    Physical ExamOrtho Exam   Physical Exam:   /58   Pulse 77   Temp 98.3 °F (36.8 °C) (Oral)   Resp 18   SpO2 99%   Gen: No acute distress, resting comfortably in bed  HEENT: Eyes clear, moist mucus membranes, hearing intact  Respiratory: No audible wheezing or stridor  Cardiovascular: Well Perfused peripherally, 2+ distal pulse  Abdomen: nondistended, no peritoneal signs  Musculoskeletal: right lower extremity  Skin intact  Tender to palpation over anterior pelvis  Leg lengths equal  Full passive ROM.  AROM deferred 2/2 pain  Sensation intact L3-S1  Positive knee flexion/extension, ankle dorsi/plantar flexion, EHL/FHL. Pt guarding due to pain  2+ DP/PT pulse  Musculature is soft and compressible, no pain with passive stretch      Lab Results: I have reviewed the following results:   Recent Labs     11/19/24  0404   WBC 13.39*   HGB 7.6*   HCT 23.7*   *   BUN 17   CREATININE 0.79     Blood Culture:   Lab Results   Component Value Date    BLOODCX No Growth After 5 Days. 10/11/2024    BLOODCX No Growth After 5 Days. 10/11/2024     Wound Culture: No results found for: \"WOUNDCULT\"    Imaging Results Review: I personally reviewed the following image studies/reports in PACS and discussed pertinent findings with Radiology: CT right lower extremity. My interpretation of the radiology images/reports is: Right inferior pubic rami fracture.  "

## 2024-11-19 NOTE — CASE MANAGEMENT
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) 2024     Moderate protein-calorie malnutrition (HCC) 10/17/2024     Aortoiliac occlusive disease (HCC) 10/16/2024     Other headache syndrome 10/16/2024     Pre-operative cardiovascular examination 10/16/2024     Gangrene of toe of right foot (HCC) 10/13/2024     Ambulatory dysfunction 10/13/2024     Vitamin D deficiency 10/13/2024     Lung nodule 10/13/2024     Abnormal gait 2024     Weight loss, unintentional 2024     Claudication of both lower extremities (HCC) 2024     Disorder of refraction and accommodation 2024     Disorder of gallbladder 2024     Arthralgia of shoulder 2024     Eczema 2024     Generalized anxiety disorder 2022     Depression 2022     Fracture of surgical neck of right humerus 2022     History of gallbladder disease 2022     Numbness of toes 2022     Blood loss anemia 2020     Type 2 diabetes mellitus, with long-term current use of insulin (HCC) 2020     Tobacco user 2020     HLD (hyperlipidemia) 2013       LOS (days): 0  Geometric Mean LOS (GMLOS) (days):   Days to GMLOS:     OBJECTIVE:              Current admission status: Observation       Preferred Pharmacy:   [id number] [place] - [address]  [address]  [place] [address]  Phone: [phone] Fax: [phone]    Primary Care Provider: Alexys Blunt MD    Primary Insurance: [facility]  REP  Secondary Insurance: [facility]    ASSESSMENT:  Active Health Care Proxies    There are no active Health Care Proxies on file.       Advance Directives  Primary Contact: Leighann Moreira (Friend) [phone]    Readmission Root Cause  30 Day Readmission: Yes  During your hospital stay, did someone (provider, nurse, ) explain your care to you in a way you could understand?: Yes  Did you feel medically stable to leave the hospital?: Yes  Were you able to pay for your medication at the pharmacy?: Yes  Did you have reliable transportation to take you to your appointments?: Yes  During previous admission, was a post-acute recommendation made?: Yes  What post-acute resources were offered?: STR  Patient was readmitted due to: fall, new pubic rami fx  Action Plan: ortho plan, podiatry plan, therapy recs    Patient Information  Admitted from:: Home  Mental Status: Alert  During Assessment patient was accompanied by: Not accompanied during assessment  Assessment information provided by:: Patient  Primary Caregiver: Self  Support Systems: Self  County of Residence: [place]  What city do you live in?: [place]  Home entry access options. Select all that apply.: Stairs  Number of steps to enter home.: 2  Do the steps have railings?: No  Type of Current Residence: Apartment  Floor Level: 1  Upon entering residence, is there a bedroom on the main floor (no further steps)?: Yes  Upon entering residence, is there a bathroom on the main floor (no further steps)?: Yes  Living Arrangements: Lives Alone  Is patient a ?: Yes (6 years [employer]/)  Is patient active with [facility] ( Affairs)?: No    Activities of Daily Living Prior to Admission  Functional Status: Independent  Completes ADLs independently?: Yes  Ambulates independently?: Yes  Does patient use assisted devices?: Yes  Assisted Devices (DME) used: Walker, Straight Cane, Rollator  Does patient currently own DME?: Yes  What  DME does the patient currently own?: Rollator, Straight Cane, Walker  Does patient have a history of Outpatient Therapy (PT/OT)?: Yes  Does the patient have a history of Short-Term Rehab?: Yes ([facility])  Does patient have a history of HHC?: Yes  Does patient currently have HHC?: No    Patient Information Continued  Income Source: Pension/FCI  Does patient have prescription coverage?: Yes  Does patient receive dialysis treatments?: No  Does patient have a history of substance abuse?: No  Does patient have a history of Mental Health Diagnosis?: Yes (Generalized Anxiety Disorder and Depression)  Is patient receiving treatment for mental health?: No. Patient declined treatment information.  Has patient received inpatient treatment related to mental health in the last 2 years?: No    Means of Transportation  Means of Transport to Appts:: Family transport    DISCHARGE DETAILS:    Discharge planning discussed with:: patient  Freedom of Choice: Yes     CM contacted family/caregiver?: Yes  Were Treatment Team discharge recommendations reviewed with patient/caregiver?: Yes     Were patient/caregiver advised of the risks associated with not following Treatment Team discharge recommendations?: Yes    Contacts  Patient Contacts: Leighann Moreira (Friend) [phone]  Relationship to Patient:: Family  Contact Method: Phone, In Person  Phone Number: [phone]  Reason/Outcome: Discharge Planning, Continuity of Care, Emergency Contact    Requested Home Health Care         Is the patient interested in HHC at discharge?: No    DME Referral Provided  Referral made for DME?: No    Other Referral/Resources/Interventions Provided:  Interventions: Short Term Rehab    Treatment Team Recommendation: Short Term Rehab  Discharge Destination Plan:: Short Term Rehab      CM met with pt to discuss the role of CM as well as d/c planning.    Pt lives alone in an apartment which has 2STE. Pt has a tub/shower with grab  bars and a raised toilet.  Pt doesn't drive. Pt is independent with ADLs but needs assistance with some iADLs.   Pt has assistance from her neighbors.   Pt's had 5 falls in the last 6 months.   [facility]. Pt was discharged home, and on the same day, this fall occurred.  If pt were recommended for IP rehab, pt doesn't wish to return to that location.   Pt is open to another SNF if possible      CM reviewed d/c planning process including the following: identifying help at home, patient preference for d/c planning needs, Discharge Lounge, [facility] Meds to Bed program, availability of treatment team to discuss questions or concerns patient and/or family may have regarding understanding medications and recognizing signs and symptoms once discharged.  CM also encouraged patient to follow up with all recommended appointments after discharge. Patient advised of importance for patient and family to participate in managing patient’s medical well being.

## 2024-11-19 NOTE — ED ATTENDING ATTESTATION
11/18/2024  IDawit MD, saw and evaluated the patient. I have discussed the patient with the resident/non-physician practitioner and agree with the resident's/non-physician practitioner's findings, Plan of Care, and MDM as documented in the resident's/non-physician practitioner's note, except where noted. All available labs and Radiology studies were reviewed.  I was present for key portions of any procedure(s) performed by the resident/non-physician practitioner and I was immediately available to provide assistance.       At this point I agree with the current assessment done in the Emergency Department.  I have conducted an independent evaluation of this patient a history and physical is as follows:      Final Diagnosis:  1. Closed fracture of right inferior pubic ramus, initial encounter (HCC)      Chief Complaint   Patient presents with    Fall     Pt arrived via EMS. Pt had a mechanical fall on sidewalk onto her R hip/buttocks. -LOC, -HS           A:  -68-year-old female who presents after mechanical fall.      P:  -X-ray right hip to evaluate for fracture.  -CT lumbar spine to evaluate for fracture.      H:    68-year-old female who presents after a fall.  Patient discharged from acute rehab today.  Was being helped into her house, but she slipped and fell onto her right side.  No head strike or loss of conscious.  Currently, complaining of low back and right hip pain.      PMH:  Past Medical History:   Diagnosis Date    Allergic [id number]    Depression [id number]    Diabetes (HCC)     Diabetes mellitus (HCC) 627647    Smokes 1974    1/2 ppd smoker       PSH:  Past Surgical History:   Procedure Laterality Date    APPENDECTOMY      BYPASS FEMORAL-POPLITEAL Right 10/23/2024    Procedure: R Femoral- AK popliteal bypass w/ PTFE, right common femoral endarterectomy;  Surgeon: Haroon Dukes MD;  [facility]: [facility];  Service: Vascular    CHOLECYSTECTOMY      FRACTURE SURGERY      OK AMPUTATION  METATARSAL W/TOE SINGLE Right 10/26/2024    Procedure: Partial 5th ray amputation of the right foot with removal of nonviable bone/soft tissue;  Surgeon: Bienvenido Dhillon DPM;  Location[facility];  Service: Podiatry    AK BYPASS W/VEIN AXILLARY-FEMORAL Left 10/23/2024    Procedure: L AxBiFem;  Surgeon: Haroon Dukes MD;  Location: [facility];  Service: Vascular    AK OPTX FEM SHFT FX W/INSJ IMED IMPLT W/WO SCREW Left 05/27/2020    Procedure: INSERTION NAIL IM FEMUR ANTEGRADE (TROCHANTERIC);  Surgeon: Jose Lyons MD;  Location: [facility];  Service: Orthopedics    TONSILLECTOMY      TUBAL LIGATION           PE:   Vitals:    11/18/24 2228 11/18/24 2347   BP: 148/67 150/65   BP Location: Right arm Right arm   Pulse: 93 87   Resp: 18 18   Temp: 97.8 °F (36.6 °C)    TempSrc: Tympanic    SpO2: 100% 99%         Constitutional: Vital signs are normal.  Chronically ill-appearing.   Neck: Trachea normal. No thyroid mass and no thyromegaly present.  No C-spine tenderness.  Cardiovascular: Normal rate, regular rhythm, normal heart sounds.   No murmur heard.  Pulmonary/Chest: Effort normal and breath sounds normal.   Abdominal: Soft. Normal appearance and bowel sounds are normal. There is no tenderness. There is no rebound, no guarding.   MSK: Significant tenderness over right hip.  L-spine tenderness.  No C-spine or T-spine tenderness.  Surgical sites are clean, dry, intact of right lower extremity.  Neurological: She is alert.   Skin: Skin is warm, dry and intact.   Psychiatric: She has a normal mood and affect. Her speech is normal and behavior is normal. Thought content normal.          - 13 point ROS was performed and all are normal unless stated in the history above.   - Nursing note reviewed. Vitals reviewed.   - Orders placed by myself and/or advanced practitioner / resident.    - Previous chart was reviewed  - No language barrier.   - History obtained from patient.   - There are no limitations to the  history obtained. Reasons ROS could not be obtained: N/A         Medications   aspirin (ECOTRIN LOW STRENGTH) EC tablet 81 mg (has no administration in time range)   atorvastatin (LIPITOR) tablet 80 mg (has no administration in time range)   insulin lispro (HumALOG/ADMELOG) 100 units/mL subcutaneous injection 1-5 Units (has no administration in time range)   nicotine (NICODERM CQ) 7 mg/24hr TD 24 hr patch 1 patch (has no administration in time range)   ondansetron (ZOFRAN) injection 4 mg (has no administration in time range)   senna (SENOKOT) tablet 17.2 mg (has no administration in time range)   polyethylene glycol (MIRALAX) packet 17 g (has no administration in time range)   enoxaparin (LOVENOX) subcutaneous injection 30 mg (has no administration in time range)   acetaminophen (TYLENOL) tablet 650 mg (has no administration in time range)   oxyCODONE (ROXICODONE) split tablet 2.5 mg (has no administration in time range)   oxyCODONE (ROXICODONE) IR tablet 5 mg (has no administration in time range)   HYDROmorphone HCl (DILAUDID) injection 0.2 mg (has no administration in time range)   multi-electrolyte (PLASMALYTE-A/ISOLYTE-S PH 7.4) IV solution (has no administration in time range)   oxyCODONE (ROXICODONE) IR tablet 5 mg (5 mg Oral Given 11/18/24 3625)     CT lower extremity wo contrast right   Final Result      1.  Right inferior pubic ramus fracture   2.  Air within the bladder.  Consider gas-forming infection if there has been no recent catheterization.      The study was marked in EPIC for immediate notification.      Workstation performed: [id number]         CT spine lumbar without contrast   Final Result      Normal computed tomography of the lumbar spine.      Workstation performed: [id number]         XR hip/pelv 2-3 vws right if performed   ED Interpretation   No acute osseous abnormality as interpreted by myself.      Final Result      Right inferior pubic ramus fracture better seen on CT          Computerized Assisted Algorithm (CAA) may have been used to analyze all applicable images.            Workstation performed: [id number]         XR pelvis ap only 1 or 2 vw    (Results Pending)     Orders Placed This Encounter   Procedures    CT spine lumbar without contrast    XR hip/pelv 2-3 vws right if performed    CT lower extremity wo contrast right    XR pelvis ap only 1 or 2 vw    CBC and differential    Magnesium    Phosphorus    Basic metabolic panel    Platelet count    Diet NPO; Sips with meds    Insulin Subcutaneous Notify Physician    Insulin Subcutaneous Instruction    Hypoglycemia Protocol    Fingerstick Glucose (POCT) Every 6 hours (Primarily for patients who are NPO)    Fingerstick Glucose (POCT)    Vital Signs - Med Surg    Notify physician    Nursing Communication Notify provider/AP of positive CAM    Nursing Communication Please show patient trauma team patient satisfaction video upon admission to the floor.    Skin care    Incentive spirometry    Encourage deep breathing and coughing    Strict intake and output    Neuro checks    Neurovascular checks Q 4 hours x 24 hours    Apply SCD or Foot pumps    Level 1-Full Code: all life saving measures are indicated    Inpatient consult to Orthopedic Surgery    Inpatient consult to Case Management    Inpatient consult to Gerontology    OT eval and treat    PT eval and treat    Place in Observation     Labs Reviewed   CBC AND DIFFERENTIAL - Abnormal       Result Value Ref Range Status    WBC 13.39 (*) 4.31 - 10.16 Thousand/uL Final    RBC 2.77 (*) 3.81 - 5.12 Million/uL Final    Hemoglobin 7.6 (*) 11.5 - 15.4 g/dL Final    Hematocrit 23.7 (*) 34.8 - 46.1 % Final    MCV 86  82 - 98 fL Final    MCH 27.4  26.8 - 34.3 pg Final    MCHC 32.1  31.4 - 37.4 g/dL Final    RDW 15.1  11.6 - 15.1 % Final    MPV 8.3 (*) 8.9 - 12.7 fL Final    Platelets 474 (*) 149 - 390 Thousands/uL Final    nRBC 0  /100 WBCs Final    Segmented % 70  43 - 75 % Final    Immature Grans  % 1  0 - 2 % Final    Lymphocytes % 21  14 - 44 % Final    Monocytes % 6  4 - 12 % Final    Eosinophils Relative 2  0 - 6 % Final    Basophils Relative 0  0 - 1 % Final    Absolute Neutrophils 9.41 (*) 1.85 - 7.62 Thousands/µL Final    Absolute Immature Grans 0.09  0.00 - 0.20 Thousand/uL Final    Absolute Lymphocytes 2.80  0.60 - 4.47 Thousands/µL Final    Absolute Monocytes 0.74  0.17 - 1.22 Thousand/µL Final    Eosinophils Absolute 0.30  0.00 - 0.61 Thousand/µL Final    Basophils Absolute 0.05  0.00 - 0.10 Thousands/µL Final   MAGNESIUM - Abnormal    Magnesium 1.8 (*) 1.9 - 2.7 mg/dL Final   BASIC METABOLIC PANEL - Abnormal    Sodium 130 (*) 135 - 147 mmol/L Final    Potassium 4.7  3.5 - 5.3 mmol/L Final    Chloride 95 (*) 96 - 108 mmol/L Final    CO2 28  21 - 32 mmol/L Final    ANION GAP 7  4 - 13 mmol/L Final    BUN 17  5 - 25 mg/dL Final    Creatinine 0.79  0.60 - 1.30 mg/dL Final    Comment: Standardized to IDMS reference method    Glucose 216 (*) 65 - 140 mg/dL Final    Comment: If the patient is fasting, the ADA then defines impaired fasting glucose as > 100 mg/dL and diabetes as > or equal to 123 mg/dL.    Calcium 8.4  8.4 - 10.2 mg/dL Final    eGFR 77  ml/min/1.73sq m Final    Narrative:     National Kidney Disease Foundation guidelines for Chronic Kidney Disease (CKD):     Stage 1 with normal or high GFR (GFR > 90 mL/min/1.73 square meters)    Stage 2 Mild CKD (GFR = 60-89 mL/min/1.73 square meters)    Stage 3A Moderate CKD (GFR = 45-59 mL/min/1.73 square meters)    Stage 3B Moderate CKD (GFR = 30-44 mL/min/1.73 square meters)    Stage 4 Severe CKD (GFR = 15-29 mL/min/1.73 square meters)    Stage 5 End Stage CKD (GFR <15 mL/min/1.73 square meters)  Note: GFR calculation is accurate only with a steady state creatinine   PHOSPHORUS - Normal    Phosphorus 3.1  2.3 - 4.1 mg/dL Final   PLATELET COUNT     Time reflects when diagnosis was documented in both MDM as applicable and the Disposition within this note        Time User Action Codes Description Comment    11/19/2024  3:13 AM Ignacio Vincent Add [S32.591A] Closed fracture of right inferior pubic ramus, initial encounter (HCC)           ED Disposition       ED Disposition   Admit    Condition   Stable    Date/Time   Tue Nov 19, 2024  3:13 AM    Comment   Case was discussed with trauma and the patient's admission status was agreed to be Admission Status: inpatient status to the service of trauma .               Follow-up Information    None       Patient's Medications   Discharge Prescriptions    No medications on file     No discharge procedures on file.  Prior to Admission Medications   Prescriptions Last Dose Informant Patient Reported? Taking?   Cholecalciferol (VITAMIN D3) 1,000 units tablet   No No   Sig: Take 2 tablets (2,000 Units total) by mouth daily   Insulin Pen Needle (BD Pen Needle Shira U/F) 32G X 4 MM MISC   No No   Sig: Use daily as directed with insulin pen   aspirin (ECOTRIN LOW STRENGTH) 81 mg EC tablet   No No   Sig: Take 1 tablet (81 mg total) by mouth daily   atorvastatin (LIPITOR) 80 mg tablet   No No   Sig: Take 1 tablet (80 mg total) by mouth daily with dinner   docusate sodium (COLACE) 100 mg capsule   No No   Sig: Take 1 capsule (100 mg total) by mouth daily at bedtime for 7 days   insulin degludec (TRESIBA) 100 units/mL injection pen   No No   Sig: Inject 29 Units under the skin daily in the early morning Do not start before October 30, 2024.   insulin lispro (HumALOG/ADMELOG) 100 units/mL injection   No No   Sig: Inject 16 Units under the skin daily with breakfast   insulin lispro (HumALOG/ADMELOG) 100 units/mL injection   No No   Sig: Inject 16 Units under the skin daily with dinner   insulin lispro (HumALOG/ADMELOG) 100 units/mL injection   No No   Sig: Inject 8 Units under the skin daily with lunch   oxyCODONE-acetaminophen (PERCOCET) 5-325 mg per tablet   Yes No   Sig: Take 1 tablet by mouth every 8 (eight) hours as needed for moderate  "pain   polyethylene glycol (MIRALAX) 17 g packet   No No   Sig: Take 17 g by mouth daily as needed (constipation)   rivaroxaban (Xarelto) 2.5 mg tablet   No No   Sig: Take 1 tablet (2.5 mg total) by mouth 2 (two) times a day      Facility-Administered Medications: None       Portions of the record may have been created with voice recognition software. Occasional wrong word or \"sound a like\" substitutions may have occurred due to the inherent limitations of voice recognition software. Read the chart carefully and recognize, using context, where substitutions have occurred.       ED Course         Critical Care Time  Procedures      "

## 2024-11-19 NOTE — TELEPHONE ENCOUNTER
Called [facility], got transferred to the  but had to leave a voicemail. Pt is currently in the hospital and called and lmom to reschedule appt

## 2024-11-19 NOTE — ASSESSMENT & PLAN NOTE
Endocrinology consulted for Own the bone  Plan as above for osteoporosis.  Additional management per primary team.

## 2024-11-19 NOTE — ASSESSMENT & PLAN NOTE
Assessment:  68 y.o. female S/P mechanical fall with right inferior pubic rami fracture    Plan:   Weight bearing as tolerated  Patient not indicated for surgical management.   MOOP initiated for medical evaluation of cause for fragility fracture  Analgesics for pain  DVT ppx  PT/OT  Dispo: Ortho will follow  Patient to follow-up in 4 weeks in outpatient clinic  Remainder of medical care per primary team

## 2024-11-19 NOTE — ASSESSMENT & PLAN NOTE
Lab Results   Component Value Date    HGBA1C 14.8 (H) 09/18/2024       Recent Labs     11/19/24  0715   POCGLU 173*       Blood Sugar Average: Last 72 hrs:  (P) 173

## 2024-11-19 NOTE — ASSESSMENT & PLAN NOTE
Lab Results   Component Value Date    HGBA1C 14.8 (H) 09/18/2024     Recent Labs     11/19/24  0715 11/19/24  1214   POCGLU 173* 149*     Blood Sugar Average: Last 72 hrs:  (P) 161  Uncontrolled type 2 diabetes mellitus on insulin therapy.  Most recent A1c September 20 24-14.8%.  Current regimen: Lantus 29 units nightly. Humalog 16 units before breakfast and dinner, 8 units before lunch.  Blood glucose in the hospital ranging from 149-223 mg/dL    Plan:  Continue Lantus 29 units nightly  Continue Humalog 16 units before breakfast and dinner, 8 units before lunch.  Correctional scale insulin algorithm 1 before meals and at bedtime.  Continue Accu-Cheks 4 times daily before meals and at bedtime.  Adjust insulin dose as needed.  Monitor for hypoglycemia, manage per protocol.  Goal blood glucose while in the [id number]-[id number] mg/dL  Discharge recommendations pending clinical course.  Endocrinology will continue following

## 2024-11-19 NOTE — CONSULTS
Consultation - Endocrinology   Name: Sabiha Garcia 68 y.o. female I MRN: [id number]  Unit/Bed#: [facility] 623-01 I Date of Admission: 11/18/2024   Date of Service: 11/19/2024 I Hospital Day: 0   Inpatient consult to Endocrinology  Consult performed by: Alireza Murphy MD  Consult ordered by: Jason Quiroz MD        Physician Requesting Evaluation: Lauryn Ullrich, DO   Reason for Evaluation / Principal Problem: Own the bone    Assessment & Plan  Osteoporosis with current pathological fracture  Patient with severe osteoporosis and very high risk of fracture as evidenced by fragility fracture present on admission and DEXA scan from 2022 demonstrating T-score of -3.3 in the lumbar spine, -4.1 at the right total hip, -3.3 in the right femoral neck and -3.2 at the left distal forearm.  Likely age related, suspect tobacco use and low BMI/poor p.o. intake also contributing.   Noted to have magnesium of 1.8, phosphorus of 3.1.  Calcium 8.4 on BMP.  Vitamin D on 10/11/2020 4-22.2.  Additionally was noted to have low TSH and normal free T4 on labs in October 2024  Currently takes vitamin D 6000 units daily, which is a very high dose given her BMI.  Additionally given prior history of fractures, will evaluate for secondary causes of osteoporosis.      Plan:  Emphasized importance of regular calcium and vitamin D intake.  Advised patient to decrease vitamin D3 2000 units daily.  Additionally recommend daily intake of calcium 1200 mg.  Will order updated 25-OH vitamin D, as well as PTH, TSH and free T4, CMP.  Patient will benefit from updated DEXA scan as an outpatient in 6-8 weeks once healed  Additionally will benefit from treatment of osteoporosis as an outpatient.  Risk prevention with PT/OT evaluation while inpatient.  Recommend endocrinology follow-up at discharge    Type 2 diabetes mellitus, with long-term current use of insulin ([facility])  Lab Results   Component Value Date    HGBA1C 14.8 (H) 09/18/2024     Recent Labs      11/19/24  0715 11/19/24  1214   POCGLU 173* 149*     Blood Sugar Average: Last 72 hrs:  (P) 161  Uncontrolled type 2 diabetes mellitus on insulin therapy.  Most recent A1c September 20 24-14.8%.  Current regimen: Lantus 29 units nightly. Humalog 16 units before breakfast and dinner, 8 units before lunch.  Blood glucose in the hospital ranging from 149-223 mg/dL    Plan:  Continue Lantus 29 units nightly  Continue Humalog 16 units before breakfast and dinner, 8 units before lunch.  Correctional scale insulin algorithm 1 before meals and at bedtime.  Continue Accu-Cheks 4 times daily before meals and at bedtime.  Adjust insulin dose as needed.  Monitor for hypoglycemia, manage per protocol.  Goal blood glucose while in the [id number] mg/dL  Discharge recommendations pending clinical course.  Endocrinology will continue following  Closed fracture of right inferior pubic ramus (HCC)  Endocrinology consulted for Own the bone  Plan as above for osteoporosis.  Additional management per primary team.  Ambulatory dysfunction  Recommend PT/OT eval.      History of Present Illness   Sabiha Garcia is a 68 y.o. female with a past medical history of type 2 diabetes mellitus on insulin, osteoporosis, ambulatory dysfunction, PAD, tobacco use who is seen today in consult for own the bone.  Patient originally presented on 10/11/2024 with DKA/starvation ketosis.  At the time found to have right fifth toe gangrene and aortoiliac occlusive disease.  Underwent surgical intervention with vascular surgery, as well as fifth ray amputation with podiatry during admission.  At the time was discharged to rehab.  She states that she was coming home from rehab yesterday, and was able to get out of the taxi on her own.  However fell on her right side after trying to step up on the curb.  Was found to have right inferior pubic ramus fracture and admitted for further management.  Endocrinology was consulted for on the bone.  She endorses a  history of left femur fracture after a fall in 2020, as well as right upper arm fracture a few years back.  Additionally broke her right arm as a kid after a traumatic fall, as well as multiple broken toes over the years.  She denies history of falls or height loss.  Also denies history of Crohn's, celiac disease, hypothyroidism, RA.  Denies family history of osteoporosis.  States she only took glucocorticoids once and reacted badly to them, and has been avoiding them since.  Denies antiestrogen, lithium or anticonvulsant use, no history of chemo or RT.  She currently takes vitamin D 6000 units daily, but no calcium supplements.  Endorses losing almost 40 pounds prior to her previous admission-states she was very ill and had very poor appetite.  States that she was very physically active when she was young, including dancing ballet for 16 years.  Denies being on a restrictive diet.  Menopause was around age 50, denies HRT.  No prior treatment for osteoporosis.  In terms of diabetes melitis, prior to her most recent admission, had been taking Tresiba 10-20 units daily.  Was started on basal bolus insulin in the hospital and discharged on Lantus 29 units nightly and lispro 16-8-6.  States that while at rehab sometimes had high sugars, sometimes low ranging in  milligrams per deciliter.  At the time of my evaluation she endorses pain, however states that her appetite has been good.  States she ate her entire lunch, denies nausea or vomiting.    Review of Systems   Constitutional:  Positive for appetite change, fatigue and unexpected weight change. Negative for activity change and fever.   HENT:  Negative for congestion, trouble swallowing and voice change.    Eyes:  Negative for visual disturbance.   Respiratory:  Negative for shortness of breath.    Cardiovascular:  Negative for chest pain and palpitations.   Gastrointestinal:  Negative for abdominal pain, constipation, diarrhea, nausea and vomiting.    Endocrine: Negative for cold intolerance, heat intolerance, polydipsia and polyuria.   Genitourinary:  Negative for frequency.   Musculoskeletal:  Negative for arthralgias.   Skin:  Negative for rash.   Neurological:  Negative for dizziness and light-headedness.   Psychiatric/Behavioral:  Negative for confusion.      I have reviewed the patient's PMH, PSH, Social History, Family History, Meds, and Allergies  Historical Information   Past Medical History:   Diagnosis Date    Allergic [id number]    Depression [id number]    Diabetes (HCC)     Diabetes mellitus (HCC) 592751    Smokes 1974    1/2 ppd smoker     Past Surgical History:   Procedure Laterality Date    APPENDECTOMY      BYPASS FEMORAL-POPLITEAL Right 10/23/2024    Procedure: R Femoral- AK popliteal bypass w/ PTFE, right common femoral endarterectomy;  Surgeon: Haroon Dukes MD;  Location: [facility];  Service: Vascular    CHOLECYSTECTOMY      FRACTURE SURGERY      MA AMPUTATION METATARSAL W/TOE SINGLE Right 10/26/2024    Procedure: Partial 5th ray amputation of the right foot with removal of nonviable bone/soft tissue;  Surgeon: Bienvenido Dhillon DPM;  Location: [facility];  Service: Podiatry    MA BYPASS W/VEIN AXILLARY-FEMORAL Left 10/23/2024    Procedure: L AxBiFem;  Surgeon: Haroon Dukes MD;  Location: [facility];  Service: Vascular    MA OPTX FEM SHFT FX W/INSJ IMED IMPLT W/WO SCREW Left 05/27/2020    Procedure: INSERTION NAIL IM FEMUR ANTEGRADE (TROCHANTERIC);  Surgeon: Jose Lyons MD;  Location: [facility];  Service: Orthopedics    TONSILLECTOMY      TUBAL LIGATION       Social History     Tobacco Use    Smoking status: Former     Current packs/day: 0.50     Average packs/day: 0.5 packs/day for 25.0 years (12.5 ttl pk-yrs)     Types: Cigarettes    Smokeless tobacco: Never   Vaping Use    Vaping status: Never Used   Substance and Sexual Activity    Alcohol use: Never    Drug use: Never    Sexual activity: Not Currently     Partners:  Male     Birth control/protection: Post-menopausal     E-Cigarette/Vaping    E-Cigarette Use Never User      E-Cigarette/Vaping Substances    Nicotine No     THC No     CBD No     Flavoring No     Other No     Unknown No      Family History   Problem Relation Age of Onset    Stroke Mother     No Known Problems Father         left her at 18 months old.    Cholelithiasis Sister         Vascular disease    No Known Problems Half-Brother      Social History     Tobacco Use    Smoking status: Former     Current packs/day: 0.50     Average packs/day: 0.5 packs/day for 25.0 years (12.5 ttl pk-yrs)     Types: Cigarettes    Smokeless tobacco: Never   Vaping Use    Vaping status: Never Used   Substance and Sexual Activity    Alcohol use: Never    Drug use: Never    Sexual activity: Not Currently     Partners: Male     Birth control/protection: Post-menopausal       Current Facility-Administered Medications:     acetaminophen (TYLENOL) tablet 650 mg, Q6H JUAN    aspirin (ECOTRIN LOW STRENGTH) EC tablet 81 mg, Daily    atorvastatin (LIPITOR) tablet 80 mg, Daily With Dinner    enoxaparin (LOVENOX) subcutaneous injection 30 mg, Q12H    HYDROmorphone HCl (DILAUDID) injection 0.2 mg, Q4H PRN    insulin glargine (LANTUS) subcutaneous injection 29 Units 0.29 mL, HS    insulin lispro (HumALOG/ADMELOG) 100 units/mL subcutaneous injection 1-5 Units, 4x Daily (AC & HS) **AND** Fingerstick Glucose (POCT), 4x Daily AC and at bedtime    insulin lispro (HumALOG/ADMELOG) 100 units/mL subcutaneous injection 16 Units, Daily With Dinner    [START ON 11/20/2024] insulin lispro (HumALOG/ADMELOG) 100 units/mL subcutaneous injection 16 Units, Daily With Breakfast    [START ON 11/20/2024] insulin lispro (HumALOG/ADMELOG) 100 units/mL subcutaneous injection 8 Units, Daily With Lunch    multi-electrolyte (PLASMALYTE-A/ISOLYTE-S PH 7.4) IV solution, Continuous, Last Rate: 100 mL/hr (11/19/24 1051)    nicotine (NICODERM CQ) 7 mg/24hr TD 24 hr patch 1  patch, Daily    ondansetron (ZOFRAN) injection 4 mg, Q6H PRN    oxyCODONE (ROXICODONE) IR tablet 5 mg, Q4H PRN    oxyCODONE (ROXICODONE) split tablet 2.5 mg, Q4H PRN    polyethylene glycol (MIRALAX) packet 17 g, Daily    senna (SENOKOT) tablet 17.2 mg, Daily  Prior to Admission Medications   Prescriptions Last Dose Informant Patient Reported? Taking?   Cholecalciferol (VITAMIN D3) 1,000 units tablet   No No   Sig: Take 2 tablets (2,000 Units total) by mouth daily   Insulin Pen Needle (BD Pen Needle Shira U/F) 32G X 4 MM MISC   No No   Sig: Use daily as directed with insulin pen   aspirin (ECOTRIN LOW STRENGTH) 81 mg EC tablet   No No   Sig: Take 1 tablet (81 mg total) by mouth daily   atorvastatin (LIPITOR) 80 mg tablet   No No   Sig: Take 1 tablet (80 mg total) by mouth daily with dinner   docusate sodium (COLACE) 100 mg capsule   No No   Sig: Take 1 capsule (100 mg total) by mouth daily at bedtime for 7 days   insulin degludec (TRESIBA) 100 units/mL injection pen   No No   Sig: Inject 29 Units under the skin daily in the early morning Do not start before October 30, 2024.   insulin lispro (HumALOG/ADMELOG) 100 units/mL injection   No No   Sig: Inject 16 Units under the skin daily with breakfast   insulin lispro (HumALOG/ADMELOG) 100 units/mL injection   No No   Sig: Inject 16 Units under the skin daily with dinner   insulin lispro (HumALOG/ADMELOG) 100 units/mL injection   No No   Sig: Inject 8 Units under the skin daily with lunch   oxyCODONE-acetaminophen (PERCOCET) 5-325 mg per tablet   Yes No   Sig: Take 1 tablet by mouth every 8 (eight) hours as needed for moderate pain   polyethylene glycol (MIRALAX) 17 g packet   No No   Sig: Take 17 g by mouth daily as needed (constipation)   rivaroxaban (Xarelto) 2.5 mg tablet   No No   Sig: Take 1 tablet (2.5 mg total) by mouth 2 (two) times a day      Facility-Administered Medications: None     Penicillins and Prednisone    Objective :  Temp:  [97.5 °F (36.4 °C)-98.6 °F  (37 °C)] 98.6 °F (37 °C)  HR:  [73-93] 83  BP: (121-150)/(58-67) 125/60  Resp:  [18] 18  SpO2:  [97 %-100 %] 99 %  O2 Device: None (Room air)    Physical Exam  Vitals and nursing note reviewed.   Constitutional:       General: She is not in acute distress.     Appearance: Normal appearance. She is cachectic. She is ill-appearing. She is not toxic-appearing or diaphoretic.   HENT:      Head: Normocephalic and atraumatic.      Nose: Nose normal.      Mouth/Throat:      Mouth: Mucous membranes are moist.      Pharynx: Oropharynx is clear.   Eyes:      General: No scleral icterus.        Right eye: No discharge.         Left eye: No discharge.      Extraocular Movements: Extraocular movements intact.      Conjunctiva/sclera: Conjunctivae normal.   Cardiovascular:      Rate and Rhythm: Normal rate and regular rhythm.      Pulses: Normal pulses.      Heart sounds: Normal heart sounds. No murmur heard.  Pulmonary:      Effort: Pulmonary effort is normal. No respiratory distress.      Breath sounds: Normal breath sounds. No wheezing, rhonchi or rales.   Abdominal:      General: Abdomen is flat. Bowel sounds are normal. There is no distension.      Palpations: Abdomen is soft.      Tenderness: There is no abdominal tenderness.   Musculoskeletal:      Cervical back: Normal range of motion and neck supple.      Right lower leg: No edema.      Left lower leg: No edema.   Skin:     General: Skin is warm and dry.      Coloration: Skin is not jaundiced or pale.   Neurological:      Mental Status: She is alert and oriented to person, place, and time. Mental status is at baseline.   Psychiatric:         Mood and Affect: Mood normal.         Behavior: Behavior normal.         Thought Content: Thought content normal.         Judgment: Judgment normal.         Lab Results: I have reviewed the following results:CBC/BMP:   .     11/19/24  0404 11/19/24  1316   WBC 13.39*  --    HGB 7.6*  --    HCT 23.7*  --    *  --    SODIUM 130*  "132*   K 4.7 4.9   CL 95* 99   CO2 28 25   BUN 17 16   CREATININE 0.79 0.73   GLUC 216* 219*   MG 1.8*  --    PHOS 3.1  --     , LFTs: No new results in last 24 hours. , Iron: No results found for: \"IRON\", Folate: No results found for: \"FOLATE\", Lipid Profile:   , TSH:   , Lipid Profile:       Imaging Results Review: I reviewed radiology reports from this admission including: XR right hip, CT lumbar spine without contrast, CT lower extremity without contrast..  Other Study Results Review: No additional pertinent studies reviewed.      "

## 2024-11-19 NOTE — CONSULTS
Consultation - Geriatric Medicine   Sabiha Garcia 68 y.o. female MRN: [id number]  Unit/Bed#: [facility] Encounter: [id number]      Assessment & Plan     Ambulatory dysfunction with fall  -reportedly mechanical fall 11/18/24  -(-) head strike (-) loss of consciousness  -injuries as outlined below  -Requires use of walker for ambulation at baseline  -denies history of additional recent or recurrent falls   -remains high risk future falls due to age, now with hx of fall, deconditioning/debility and unfamiliar environment   -encourage good body mechanics and assist with all transfers  -keep personal items and call bell close to prevent reaching  -maintain environment free of fall hazards  -encourage appropriate footwear and adequate lighting at all times when out of bed  -recommend home fall risk assessment and personal fall alert system if returning home  -PT and OT pending     Right inferior pubic rami fracture  -s/p fall as outlined above  -noted on XR hip/pelvis obtained on admission   -continue acute multimodal pain control  -Neurovascular checks per protocol  -Ortho consult pending   -PT/OT pending     Acute pain due to trauma  -recommend pain control per Geriatric pain protocol:  Tylenol 975mg Q8H scheduled  Roxicodone 2.5mg Q4H PRN moderate pain  Roxicodone 5mg Q4H PRN severe pain  Dilaudid 0.2mg Q2H PRN  -consider adjuncts such as lidocaine patch topically to appropriate areas   -encourage addition of non-pharmacologic pain treatment including ice and frequent repositioning  -recommend bowel regimen to prevent constipation due to increased risk with acute pain and opiate pain medications    Hyponatremia  -[Na] 130 on initial presentation   -likely multifactorial including acute pain due to trauma and poor recent oral intake due to GI distress at STR now improving   -encourage well balanced nutritional intake  -monitor electrolytes closely, avoid rapid and drastic fluctuations of no more than 8-12  pts/24H    Osteoporosis  -evidenced by fragility fracture of pelvis sustained in fall from standing height   -confirmed on DEXA 5/2022  -in setting of vitamin D deficiency  -Encourage well-balanced diet rich in calcium and vitamin D with supplementation for any needs unable to be met by diet alone  -Outpatient follow-up with Endo for discussion antiresorptive therapy treatment    Vit D deficiency   -Vitamin D low at 22.2  -Continue daily supplementation vitamin D 2000 IU as recommended by Endocrinology during recent hospitalization    Cognitive screening  -Awake alert and oriented, denies memory or cognitive concerns  -Reportedly independent with ADLs and IADLs at baseline  -no prior cognitive testing on record for review   -[facility] 10/17/[facility] imaging personally reviewed, reveals at least mild diffuse chronic microangiopathic changes  -TSH slightly depressed at 0.425 at last check 10/2024, free T4 WNL0.83  -No recent B12 on record review, recommend checking with routine labs  -At risk age and cardiovascular related cognitive decline, continue secondary risk factor modifications  -Encourage patient remain physically, socially, cognitively active and engaged to maintain cognitive acuity  -Encourage use of sensory assist devices such as corrective lenses at all appropriate times to reduce risk of uncorrected sensory impairment from continuing to isolation, confusion, encephalopathy and more precipitous cognitive decline  -Consider comprehensive genetic assessment with cognitive testing as outpatient to establish baseline    DM-II, uncontrolled with peripheral vascular complications   -A1c 14.8  -Recommend healthy lifestyle modifications, diabetic diet and continuation of home insulin regimen with goal blood sugar during hospitalization 140-180 to reduce risk hypoglycemia  -Continue close outpatient follow-up with PCP and Endocrinology for ongoing management and continued age-appropriate diabetic screenings and  cares    Aortoiliac occlusive disease  -s/p left axillary bifemoral bypass and right common femoral to above-knee popliteal bypass and right fifth ray amputation during recent hospitalization 10/2024  -Continue secondary risk factor modifications  -Smoking cessation strongly encouraged  -Maintained on aspirin, atorvastatin, rivaroxaban chronically as outpatient  -Continue close outpatient follow-up with PCP and vascular surgery for ongoing management    Gangrene of toe of right foot   -S/p amputation right fifth digit 10/26/2024 for dry gangrene  -Continue optimization of cardiovascular risk factors  -Continue close outpatient follow-up with podiatry for ongoing management    Nicotine dependence due to cigarettes  -Complete cessation encouraged, continue daily bedside cessation counseling  -Nicotine patch during hospitalization  -Continue close outpatient follow-up with PCP for ongoing cessation support    Impaired Vision  -recommend use of corrective lenses at all appropriate times  -encourage adequate lighting and encourage use of assistance with ambulation  -keep personal belongings close to person to avoid reaching  -encourage appropriate footwear at all times  -Consider large font for printed materials provided to patient    Impaired mastication  -Requires use of dentures -patient notes has been unable to locate for some time   -ensure meal consistency appropriate for abilities  -Encourage good oral hygiene  -continue aspiration precautions    Deconditioning/debility/frailty   -Clinical frailty scale stage V/VI, moderately frail, progressive  -Multifactorial including age, aortoiliac occlusive disease, uncontrolled DM-II, and multitude of additional chronic medical comorbidities now with fall and acute traumatic injuries superimposed in elderly individual with limited physiologic and metabolic reserves  -Continue optimization chronic conditions and address acute metabolic derangements as arise  -Encourage  well-balanced nutritional intake  -Monitor for and treat any underlying anxiety/mood/depression symptoms as may impact patient response to therapies as well as overall sense of wellbeing and quality of life  -Continue psychosocial supports of patient and caregivers  -Continue to ensure that treatments and interventions align with patient's wishes and goals of care    Delirium precautions  -Patient is high risk of delirium due to age, fall, traumatic injuries, acute pain, hospitalization  -Initiate delirium precautions  -maintain normal sleep/wake cycle  -minimize overnight interruptions, group overnight vitals/labs/nursing checks as medically appropriate  -dim lights, close blinds and turn off tv to minimize stimulation and encourage sleep environment in evenings  -ensure that pain is well controlled -see above  -monitor for fecal and urinary retention which may precipitate delirium  -encourage early mobilization and ambulation with assist as cleared to safely do so  -provide frequent reorientation and redirection as indicated and appropriate  -encourage family and friends at the bedside to help calm patient if anxious as well as provide familiarity and reassurance  -avoid medications which may precipitate or worsen delirium such as tramadol, benzodiazepine, anticholinergics, and benadryl  -encourage hydration and nutrition   -redirect unwanted behaviors as first line    Home medication review     Aspirin 81 Mg daily  Atorvastatin 80 Mg daily  Cholecalciferol 2000 IU daily  Insulin degludec 29 units daily  Insulin lispro 16 units with breakfast, 16 units with dinner, 8 units with lunch  Rivaroxaban 2.5 Mg twice daily    History of Present Illness   Physician Requesting Consult: Lauryn Ullrich, DO  Reason for Consult / Principal Problem: Fall   Hx and PE limited by: N/A  Additional history obtained from: Chart review and patient evaluation    HPI: Sabiha JOSE Jose is a 68 y.o. year old female with DM-II, hyperlipidemia,  aortic occlusive disease, vitamin D deficiency, moderate protein calorie nutrition, depression, and recent hospitalization with DKA, starvation ketosis, and peripheral vascular disease requiring left axillary bifemoral bypass and right common femoral to above-knee popliteal bypass and right fifth ray amputation from which she was just returning home from STR when she sustained a mechanical fall getting out of the Taxi on return home. She noted immediate severe right pelvis pain and inability to get up or bear weight, the  was still present when the incident occurred and was able to call for emergency services. On admission she was found to have right inferior pubic rami fracture and is being seen in consultation by geriatrics for high risk developing delirium during hospitalization. Sabiha is seen and examined at bedside where she is lying resting, she explains that she sustained mechanical fall getting out of taxi returning home from short term rehab. She notes the fall was mechanical when her shoe got caught on the ground and she lost her balance falling to her side, no head strike or loss of consciousness reported. She noted pain in her pelvic bone since that time currently well controlled at rest exacerbated by movement.     Prior to admission and recent hospitalization Sabiha was residing home alone and notes independence with ADLs and iADLs with no memory or cognitive concerns reported. She requires use of rollator walker at baseline, denies recent additional falls. She requires use of glasses and dentures, does not use hearing aid.    Inpatient consult to Gerontology  Consult performed by: Lucretia Butler DO  Consult ordered by: Cleveland Cardona MD      Review of Systems   Constitutional: Negative.  Negative for appetite change, chills and fever.   HENT:  Positive for dental problem (uses partial dentures at baseline but uncertain where they are located).    Eyes:  Positive for visual disturbance  (wears glasses).   Respiratory: Negative.  Negative for cough and shortness of breath.    Cardiovascular: Negative.  Negative for chest pain and palpitations.   Gastrointestinal: Negative.  Negative for abdominal pain.   Genitourinary: Negative.    Musculoskeletal:  Positive for gait problem.        Right pelvic bone pain    Skin: Negative.    Neurological:  Positive for weakness. Negative for dizziness, light-headedness, numbness and headaches.   Hematological: Negative.    Psychiatric/Behavioral: Negative.  Negative for sleep disturbance.    All other systems reviewed and are negative.    Historical Information   Past Medical History:   Diagnosis Date    Allergic [id number]    Depression [id number]    Diabetes (HCC)     Diabetes mellitus (HCC) 894937    Smokes 1974 1/2 ppd smoker     Past Surgical History:   Procedure Laterality Date    APPENDECTOMY      BYPASS FEMORAL-POPLITEAL Right 10/23/2024    Procedure: R Femoral- AK popliteal bypass w/ PTFE, right common femoral endarterectomy;  Surgeon: Haroon Dukes MD;  Location: [facility];  Service: Vascular    CHOLECYSTECTOMY      FRACTURE SURGERY      GA AMPUTATION METATARSAL W/TOE SINGLE Right 10/26/2024    Procedure: Partial 5th ray amputation of the right foot with removal of nonviable bone/soft tissue;  Surgeon: Bienvenido Dhillon DPM;  Location: [facility];  Service: Podiatry    GA BYPASS W/VEIN AXILLARY-FEMORAL Left 10/23/2024    Procedure: L AxBiFem;  Surgeon: Haroon Dukes MD;  Location: [facility];  Service: Vascular    GA OPTX FEM SHFT FX W/INSJ IMED IMPLT W/WO SCREW Left 05/27/2020    Procedure: INSERTION NAIL IM FEMUR ANTEGRADE (TROCHANTERIC);  Surgeon: Jose Lyons MD;  Location: [facility];  Service: Orthopedics    TONSILLECTOMY      TUBAL LIGATION       Social History   Social History     Substance and Sexual Activity   Alcohol Use Never     Social History     Substance and Sexual Activity   Drug Use Never     Social History     Tobacco  Use   Smoking Status Every Day    Current packs/day: 0.50    Average packs/day: 0.5 packs/day for 25.0 years (12.5 ttl pk-yrs)    Types: Cigarettes   Smokeless Tobacco Never     Family History:   Family History   Problem Relation Age of Onset    Stroke Mother     No Known Problems Father         left her at 18 months old.    Cholelithiasis Sister         Vascular disease    No Known Problems Half-Brother      Meds/Allergies   all current active meds have been reviewed    Allergies   Allergen Reactions    Penicillins Hives    Prednisone Other (See Comments)     personality change     Objective     Intake/Output Summary (Last 24 hours) at 11/19/2024 0753  Last data filed at 11/19/2024 0654  Gross per 24 hour   Intake 500 ml   Output --   Net 500 ml     Invasive Devices       Peripheral Intravenous Line  Duration             Peripheral IV 11/19/24 Right Antecubital <1 day              Drain  Duration             Urethral Catheter Coude 18 Fr. 25 days                  Physical Exam  Vitals and nursing note reviewed.   Constitutional:       General: She is not in acute distress.     Comments: Thin frail elderly female    HENT:      Head: Normocephalic.      Comments: Right forehead lesion      Nose: Nose normal.      Mouth/Throat:      Mouth: Mucous membranes are dry.      Comments: Poor dentition missing multiple teeth visible decay remaining teeth   Eyes:      General: No scleral icterus.        Right eye: No discharge.         Left eye: No discharge.      Conjunctiva/sclera: Conjunctivae normal.   Neck:      Comments: Trachea midline, phonation normal  Cardiovascular:      Rate and Rhythm: Normal rate.      Pulses: Normal pulses.   Pulmonary:      Effort: Pulmonary effort is normal. No respiratory distress.      Breath sounds: No wheezing.      Comments: Saturating well on room air   Abdominal:      General: There is no distension.      Palpations: Abdomen is soft.      Tenderness: There is no abdominal tenderness.    Musculoskeletal:      Cervical back: Neck supple.      Comments: Diffuse subcutaneous fat and muscle wasting    Skin:     General: Skin is warm and dry.      Comments: Thin and friable    Neurological:      Mental Status: She is alert.      Comments: Awake and alert, oriented, answers questions appropriately   Psychiatric:         Mood and Affect: Mood normal.         Behavior: Behavior normal.      Comments: Pleasant cooperative        Lab Results:     I have personally reviewed pertinent lab results including the followin/19/24- CBC, BMP    I have personally reviewed the following imaging study reports in PACS:    24-XR hip/pelvis, CT lumbar spine, CT LLE    Therapies:   PT: pending   OT: pending     VTE Prophylaxis: Enoxaparin (Lovenox)    Code Status: Level 1 - Full Code  Advance Directive and Living Will:      Power of :    POLST:      Family and Social Support: friend     Goals of Care: recovery from acute in juries

## 2024-11-19 NOTE — ED PROVIDER NOTES
Time reflects when diagnosis was documented in both MDM as applicable and the Disposition within this note       Time User Action Codes Description Comment    11/19/2024  3:13 AM Ignacio Vincent Add [S32.591A] Closed fracture of right inferior pubic ramus, initial encounter (Formerly [facility])     11/19/2024 11:47 AM Bridget Lopez Add [I96] Gangrene of toe of right foot (Formerly [facility])     11/20/2024  3:29 PM Jacksno Koch Add [I74.09] Aortoiliac occlusive disease (Formerly [facility])           ED Disposition       ED Disposition   Admit    Condition   Stable    Date/Time   Tue Nov 19, 2024  3:13 AM    Comment   Case was discussed with trauma and the patient's admission status was agreed to be Admission Status: inpatient status to the service of trauma .               Assessment & Plan       Medical Decision Making  68-year-old female with past medical history of diabetes, depression, status post right fifth toe amputation and S/P L AxBiFem (Left: Abdomen)   R Femoral- AK popliteal bypass w/ PTFE, right common femoral endarterectomy presents to the ED via EMS status post mechanical fall for evaluation of right hip and buttock pain PTA. Pt wears surgical shoes and was helped walking up the curb of the side walk, slipped, and fell backwards onto her buttocks. Pt denies head injury and LOC.     PE: no midline TTP, R hip TTP, strength right hip 1/5, neurovascularly intact. Left hip and LLE wnl.    Will evaluate with R hip XR CT lumbar + sacral. Oxy for pain.     X-ray negative for right femoral fracture, significant for right inferior pubis ramus fracture  CT lumbar negative for acute pathology  CT right lower extremity significant for inferior pubis ramus fracture    Patient admitted to trauma service for further management.          Amount and/or Complexity of Data Reviewed  Radiology: ordered and independent interpretation performed.    Risk  Prescription drug management.  Decision regarding hospitalization.             Medications   aspirin  (ECOTRIN LOW STRENGTH) EC tablet 81 mg (81 mg Oral Given 11/19/24 0836)   atorvastatin (LIPITOR) tablet 80 mg (has no administration in time range)   nicotine (NICODERM CQ) 7 mg/24hr TD 24 hr patch 1 patch (1 patch Transdermal Not Given 11/19/24 0836)   ondansetron (ZOFRAN) injection 4 mg (has no administration in time range)   senna (SENOKOT) tablet 17.2 mg (17.2 mg Oral Given 11/19/24 0836)   polyethylene glycol (MIRALAX) packet 17 g (17 g Oral Not Given 11/19/24 0807)   enoxaparin (LOVENOX) subcutaneous injection 30 mg (30 mg Subcutaneous Given 11/19/24 0836)   acetaminophen (TYLENOL) tablet 650 mg (650 mg Oral Given 11/19/24 0532)   oxyCODONE (ROXICODONE) split tablet 2.5 mg (has no administration in time range)   oxyCODONE (ROXICODONE) IR tablet 5 mg (has no administration in time range)   HYDROmorphone HCl (DILAUDID) injection 0.2 mg (has no administration in time range)   oxyCODONE (ROXICODONE) IR tablet 5 mg (5 mg Oral Given 11/18/24 2345)   magnesium sulfate 2 g/50 mL IVPB (premix) 2 g (2 g Intravenous New Bag 11/19/24 0554)   multi-electrolyte (ISOLYTE-S PH 7.4) bolus 500 mL (0 mL Intravenous Stopped 11/19/24 0654)       ED Risk Strat Scores                                               History of Present Illness       Chief Complaint   Patient presents with    Fall     Pt arrived via EMS. Pt had a mechanical fall on sidewalk onto her R hip/buttocks. -LOC, -HS       Past Medical History:   Diagnosis Date    Allergic [id number]    Aorto-iliac disease (HCC)     Depression [id number]    Diabetes mellitus (HCC) 042760    Osteopenia     Osteoporosis     PAD (peripheral artery disease) (HCC)     Pulmonary nodule     RUL    Smokes 1974 1/2 ppd smoker    Vitamin D deficiency       Past Surgical History:   Procedure Laterality Date    APPENDECTOMY      BYPASS FEMORAL-POPLITEAL Right 10/23/2024    Procedure: R Femoral- AK popliteal bypass w/ PTFE, right common femoral endarterectomy;  Surgeon: Haroon Dukes MD;   Location: [facility];  Service: Vascular    CHOLECYSTECTOMY      FRACTURE SURGERY Left     L hip ORIF    JEJUNOSTOMY      & removal    WI AMPUTATION METATARSAL W/TOE SINGLE Right 10/26/2024    Procedure: Partial 5th ray amputation of the right foot with removal of nonviable bone/soft tissue;  Surgeon: Bienvenido Dhillon DPM;  Location: [facility];  Service: Podiatry    WI BYPASS W/VEIN AXILLARY-FEMORAL Left 10/23/2024    Procedure: L AxBiFem;  Surgeon: Haroon Dukes MD;  Location: [facility];  Service: Vascular    WI OPTX FEM SHFT FX W/INSJ IMED IMPLT W/WO SCREW Left 05/27/2020    Procedure: INSERTION NAIL IM FEMUR ANTEGRADE (TROCHANTERIC);  Surgeon: Jose Lyons MD;  Location: [facility];  Service: Orthopedics    TONSILLECTOMY      TUBAL LIGATION        Family History   Problem Relation Age of Onset    Stroke Mother     No Known Problems Father         left her at 18 months old.    Cholelithiasis Sister         Vascular disease    No Known Problems Half-Brother       Social History     Tobacco Use    Smoking status: Former     Current packs/day: 0.50     Average packs/day: 0.5 packs/day for 25.0 years (12.5 ttl pk-yrs)     Types: Cigarettes    Smokeless tobacco: Never   Vaping Use    Vaping status: Never Used   Substance Use Topics    Alcohol use: Never    Drug use: Never      E-Cigarette/Vaping    E-Cigarette Use Never User       E-Cigarette/Vaping Substances    Nicotine No     THC No     CBD No     Flavoring No     Other No     Unknown No       I have reviewed and agree with the history as documented.       Fall  Associated symptoms: no abdominal pain, no back pain, no chest pain, no headaches, no nausea, no seizures and no vomiting        Review of Systems   Constitutional:  Negative for appetite change, chills, diaphoresis, fever and unexpected weight change.   HENT:  Negative for dental problem, ear pain, facial swelling, sore throat and trouble swallowing.    Eyes:  Negative for pain and visual  disturbance.   Respiratory:  Negative for cough, chest tightness and shortness of breath.    Cardiovascular:  Negative for chest pain, palpitations and leg swelling.   Gastrointestinal:  Negative for abdominal distention, abdominal pain, constipation, diarrhea, nausea and vomiting.   Endocrine: Negative for polyuria.   Genitourinary:  Negative for difficulty urinating, dysuria and hematuria.   Musculoskeletal:  Negative for arthralgias and back pain.        Right-sided hip pain  Difficulty ambulating   Skin:  Negative for color change and rash.   Neurological:  Negative for dizziness, seizures, syncope, light-headedness and headaches.   Psychiatric/Behavioral:  Negative for confusion.    All other systems reviewed and are negative.          Objective       ED Triage Vitals [11/18/24 2228]   Temperature Pulse Blood Pressure Respirations SpO2 Patient Position - Orthostatic VS   97.8 °F (36.6 °C) 93 148/67 18 100 % Lying      Temp Source Heart Rate Source BP Location FiO2 (%) Pain Score    Tympanic Monitor Right arm -- 1      Vitals      Date and Time Temp Pulse SpO2 Resp BP Pain Score FACES Pain Rating User   11/20/24 1441 98.1 °F (36.7 °C) 83 97 % 17 119/59 -- -- [facility]   11/20/24 1315 -- -- -- -- -- 8 --    11/20/24 1048 97.8 °F (36.6 °C) 77 95 % 16 122/61 -- -- [facility]   11/20/24 0849 -- -- -- -- -- 8 --    11/20/24 0830 -- -- -- -- -- No Pain --    11/20/24 0707 97.8 °F (36.6 °C) 74 98 % 18 132/66 -- -- DII   11/20/24 0519 -- -- -- -- -- 6 -- LBR   11/20/24 0237 -- -- -- 18 -- -- -- LBR   11/20/24 0237 97.7 °F (36.5 °C) 73 99 % -- 138/66 -- -- DII   11/20/24 0233 -- -- -- -- -- 8 -- LBR   11/20/24 0052 -- -- 96 % -- -- -- -- LBR   11/19/24 2222 97.9 °F (36.6 °C) 75 99 % 16 106/59 -- -- DII   11/19/24 2042 -- -- -- -- -- 8 -- NA   11/19/24 1950 98 °F (36.7 °C) 80 97 % 16 100/61 -- -- DII   11/19/24 1948 98 °F (36.7 °C) 78 98 % 15 93/60 -- -- DII   11/19/24 1548 97.8 °F (36.6 °C) 81 98 % 21 126/61 -- -- DII    11/19/24 1430 -- -- -- -- -- 6 -- TLR   11/19/24 1248 -- -- -- -- -- 1 -- TLR   11/19/24 1216 98.6 °F (37 °C) 83 99 % 18 125/60 -- -- DII   11/19/24 1101 -- -- -- -- -- No Pain -- TLR   11/19/24 0948 97.5 °F (36.4 °C) 73 97 % 18 121/58 -- -- DII   11/19/24 0800 98.3 °F (36.8 °C) 77 99 % 18 124/58 4 -- JK   11/18/24 2347 -- 87 99 % 18 150/65 -- -- BL   11/18/24 2345 -- -- -- -- -- 9 -- BL   11/18/24 2228 97.8 °F (36.6 °C) 93 100 % 18 148/67 1 -- BL            Physical Exam  Vitals and nursing note reviewed.   Constitutional:       General: She is not in acute distress.     Appearance: Normal appearance. She is well-developed. She is not ill-appearing.   HENT:      Head: Normocephalic and atraumatic.      Right Ear: External ear normal.      Left Ear: External ear normal.      Nose: Nose normal.      Mouth/Throat:      Mouth: Mucous membranes are moist.   Eyes:      General: No scleral icterus.        Right eye: No discharge.      Extraocular Movements: Extraocular movements intact.      Conjunctiva/sclera: Conjunctivae normal.   Cardiovascular:      Rate and Rhythm: Normal rate and regular rhythm.      Pulses: Normal pulses.      Heart sounds: No murmur heard.  Pulmonary:      Effort: Pulmonary effort is normal. No respiratory distress.      Breath sounds: Normal breath sounds. No wheezing.   Chest:      Chest wall: No tenderness.   Abdominal:      General: Abdomen is flat. There is no distension.      Palpations: Abdomen is soft.      Tenderness: There is no abdominal tenderness.   Musculoskeletal:         General: Tenderness present. No swelling, deformity or signs of injury. Normal range of motion.      Cervical back: Normal range of motion and neck supple. No rigidity or tenderness.      Right lower leg: No edema.      Left lower leg: No edema.      Comments: Right hip tender to palpation  Right leg raise 1 out of 5  Right lower extremity neurovascularly intact   Skin:     General: Skin is warm and dry.       "Capillary Refill: Capillary refill takes less than 2 seconds.   Neurological:      General: No focal deficit present.      Mental Status: She is alert and oriented to person, place, and time.      Motor: No weakness.   Psychiatric:         Mood and Affect: Mood normal.         Results Reviewed       Procedure Component Value Units Date/Time    TSH, 3rd generation [[id number]]  (Normal) Collected: 11/19/24 0404    Lab Status: Final result Specimen: Blood from Arm, Right Updated: 11/19/24 2155     TSH 3RD GENERATON 2.147 uIU/mL     T4, free [[id number]]  (Abnormal) Collected: 11/19/24 0404    Lab Status: Final result Specimen: Blood from Arm, Right Updated: 11/19/24 2155     Free T4 1.13 ng/dL     Narrative:        \"Therapeutic range for patients medicated with thyroid disorders: 0.61-1.24 ng/dL.\"    UA w Reflex to Microscopic w Reflex to Culture [[id number]]  (Abnormal) Collected: 11/19/24 1425    Lab Status: Final result Specimen: Urine, Straight Cath Updated: 11/19/24 1455     Color, UA Yellow     Clarity, UA Clear     Specific Gravity, UA 1.015     pH, UA 7.0     Leukocytes, UA Moderate     Nitrite, UA Negative     Protein, UA Trace mg/dl      Glucose, UA 30 (3/100%) mg/dl      Ketones, UA Negative mg/dl      Urobilinogen, UA 2.0 mg/dl      Bilirubin, UA Negative     Occult Blood, UA Negative    Basic metabolic panel [[id number]]  (Abnormal) Collected: 11/19/24 1316    Lab Status: Final result Specimen: Blood from Arm, Right Updated: 11/19/24 1346     Sodium 132 mmol/L      Potassium 4.9 mmol/L      Chloride 99 mmol/L      CO2 25 mmol/L      ANION GAP 8 mmol/L      BUN 16 mg/dL      Creatinine 0.73 mg/dL      Glucose 219 mg/dL      Calcium 8.3 mg/dL      eGFR 84 ml/min/1.73sq m     Narrative:      National Kidney Disease Foundation guidelines for Chronic Kidney Disease (CKD):     Stage 1 with normal or high GFR (GFR > 90 mL/min/1.73 square meters)    Stage 2 Mild CKD (GFR = 60-89 mL/min/1.73 square meters)    Stage 3A " Moderate CKD (GFR = 45-59 mL/min/1.73 square meters)    Stage 3B Moderate CKD (GFR = 30-44 mL/min/1.73 square meters)    Stage 4 Severe CKD (GFR = 15-29 mL/min/1.73 square meters)    Stage 5 End Stage CKD (GFR <15 mL/min/1.73 square meters)  Note: GFR calculation is accurate only with a steady state creatinine    Fingerstick Glucose (POCT) [[id number]]  (Abnormal) Collected: 11/19/24 0715    Lab Status: Final result Specimen: Blood Updated: 11/19/24 0717     POC Glucose 173 mg/dl     Magnesium [[id number]]  (Abnormal) Collected: 11/19/24 0404    Lab Status: Final result Specimen: Blood from Arm, Right Updated: 11/19/24 0434     Magnesium 1.8 mg/dL     Phosphorus [[id number]]  (Normal) Collected: 11/19/24 0404    Lab Status: Final result Specimen: Blood from Arm, Right Updated: 11/19/24 0434     Phosphorus 3.1 mg/dL     Basic metabolic panel [[id number]]  (Abnormal) Collected: 11/19/24 0404    Lab Status: Final result Specimen: Blood from Arm, Right Updated: 11/19/24 0434     Sodium 130 mmol/L      Potassium 4.7 mmol/L      Chloride 95 mmol/L      CO2 28 mmol/L      ANION GAP 7 mmol/L      BUN 17 mg/dL      Creatinine 0.79 mg/dL      Glucose 216 mg/dL      Calcium 8.4 mg/dL      eGFR 77 ml/min/1.73sq m     Narrative:      National Kidney Disease Foundation guidelines for Chronic Kidney Disease (CKD):     Stage 1 with normal or high GFR (GFR > 90 mL/min/1.73 square meters)    Stage 2 Mild CKD (GFR = 60-89 mL/min/1.73 square meters)    Stage 3A Moderate CKD (GFR = 45-59 mL/min/1.73 square meters)    Stage 3B Moderate CKD (GFR = 30-44 mL/min/1.73 square meters)    Stage 4 Severe CKD (GFR = 15-29 mL/min/1.73 square meters)    Stage 5 End Stage CKD (GFR <15 mL/min/1.73 square meters)  Note: GFR calculation is accurate only with a steady state creatinine    CBC and differential [[id number]]  (Abnormal) Collected: 11/19/24 0404    Lab Status: Final result Specimen: Blood from Arm, Right Updated: 11/19/24 0412     WBC 13.39  Thousand/uL      RBC 2.77 Million/uL      Hemoglobin 7.6 g/dL      Hematocrit 23.7 %      MCV 86 fL      MCH 27.4 pg      MCHC 32.1 g/dL      RDW 15.1 %      MPV 8.3 fL      Platelets 474 Thousands/uL      nRBC 0 /100 WBCs      Segmented % 70 %      Immature Grans % 1 %      Lymphocytes % 21 %      Monocytes % 6 %      Eosinophils Relative 2 %      Basophils Relative 0 %      Absolute Neutrophils 9.41 Thousands/µL      Absolute Immature Grans 0.09 Thousand/uL      Absolute Lymphocytes 2.80 Thousands/µL      Absolute Monocytes 0.74 Thousand/µL      Eosinophils Absolute 0.30 Thousand/µL      Basophils Absolute 0.05 Thousands/µL             CT lower extremity wo contrast right   Final Interpretation by Kenton Palacios MD (11/19 0241)      1.  Right inferior pubic ramus fracture   2.  Air within the bladder.  Consider gas-forming infection if there has been no recent catheterization.      The study was marked in EPIC for immediate notification.      Workstation performed: [id number]         CT spine lumbar without contrast   Final Interpretation by Ryanne Vaughn MD (11/19 0102)      Normal computed tomography of the lumbar spine.      Workstation performed: [id number]         XR hip/pelv 2-3 vws right if performed   ED Interpretation by Dawit Diaz MD (11/19 0129)   No acute osseous abnormality as interpreted by myself.      Final Interpretation by Kenton Palacios MD (11/19 0244)      Right inferior pubic ramus fracture better seen on CT         Computerized Assisted Algorithm (CAA) may have been used to analyze all applicable images.            Workstation performed: [id number]             Procedures    ED Medication and Procedure Management   Prior to Admission Medications   Prescriptions Last Dose Informant Patient Reported? Taking?   Cholecalciferol (VITAMIN D3) 1,000 units tablet   No No   Sig: Take 2 tablets (2,000 Units total) by mouth daily   Insulin Pen Needle (BD Pen Needle Shira U/F) 32G X 4 MM MISC   No No    Sig: Use daily as directed with insulin pen   aspirin (ECOTRIN LOW STRENGTH) 81 mg EC tablet   No No   Sig: Take 1 tablet (81 mg total) by mouth daily   atorvastatin (LIPITOR) 80 mg tablet   No No   Sig: Take 1 tablet (80 mg total) by mouth daily with dinner   docusate sodium (COLACE) 100 mg capsule   No No   Sig: Take 1 capsule (100 mg total) by mouth daily at bedtime for 7 days   insulin degludec (TRESIBA) 100 units/mL injection pen   No No   Sig: Inject 29 Units under the skin daily in the early morning Do not start before October 30, 2024.   insulin lispro (HumALOG/ADMELOG) 100 units/mL injection   No No   Sig: Inject 16 Units under the skin daily with breakfast   insulin lispro (HumALOG/ADMELOG) 100 units/mL injection   No No   Sig: Inject 16 Units under the skin daily with dinner   insulin lispro (HumALOG/ADMELOG) 100 units/mL injection   No No   Sig: Inject 8 Units under the skin daily with lunch   oxyCODONE-acetaminophen (PERCOCET) 5-325 mg per tablet   Yes No   Sig: Take 1 tablet by mouth every 8 (eight) hours as needed for moderate pain   polyethylene glycol (MIRALAX) 17 g packet   No No   Sig: Take 17 g by mouth daily as needed (constipation)   rivaroxaban (Xarelto) 2.5 mg tablet   No No   Sig: Take 1 tablet (2.5 mg total) by mouth 2 (two) times a day      Facility-Administered Medications: None     Current Discharge Medication List        CONTINUE these medications which have NOT CHANGED    Details   aspirin (ECOTRIN LOW STRENGTH) 81 mg EC tablet Take 1 tablet (81 mg total) by mouth daily    Associated Diagnoses: PAD (peripheral artery disease) (HCC)      atorvastatin (LIPITOR) 80 mg tablet Take 1 tablet (80 mg total) by mouth daily with dinner    Associated Diagnoses: PAD (peripheral artery disease) (HCC)      Cholecalciferol (VITAMIN D3) 1,000 units tablet Take 2 tablets (2,000 Units total) by mouth daily    Associated Diagnoses: Vitamin D deficiency      docusate sodium (COLACE) 100 mg capsule Take  1 capsule (100 mg total) by mouth daily at bedtime for 7 days    Associated Diagnoses: Constipated      insulin degludec (TRESIBA) 100 units/mL injection pen Inject 29 Units under the skin daily in the early morning Do not start before October 30, 2024.    Comments: May substitute with any brand insulin degludec pen  Associated Diagnoses: Type 2 diabetes mellitus with hyperglycemia, without long-term current use of insulin ([facility])      !! insulin lispro (HumALOG/ADMELOG) 100 units/mL injection Inject 16 Units under the skin daily with breakfast    Associated Diagnoses: Type 2 diabetes mellitus with hyperglycemia, without long-term current use of insulin ([facility])      !! insulin lispro (HumALOG/ADMELOG) 100 units/mL injection Inject 16 Units under the skin daily with dinner    Associated Diagnoses: Type 2 diabetes mellitus with hyperglycemia, without long-term current use of insulin ([facility])      !! insulin lispro (HumALOG/ADMELOG) 100 units/mL injection Inject 8 Units under the skin daily with lunch    Associated Diagnoses: Type 2 diabetes mellitus with hyperglycemia, without long-term current use of insulin ([facility])      Insulin Pen Needle (BD Pen Needle Shira U/F) 32G X 4 MM MISC Use daily as directed with insulin pen  Qty: 100 each, Refills: 3    Comments: May substitute with any brand insulin pen needle  Associated Diagnoses: Type 2 diabetes mellitus with hyperglycemia, with long-term current use of insulin ([facility])      oxyCODONE-acetaminophen (PERCOCET) 5-325 mg per tablet Take 1 tablet by mouth every 8 (eight) hours as needed for moderate pain      polyethylene glycol (MIRALAX) 17 g packet Take 17 g by mouth daily as needed (constipation)    Associated Diagnoses: Constipated      rivaroxaban (Xarelto) 2.5 mg tablet Take 1 tablet (2.5 mg total) by mouth 2 (two) times a day  Qty: 60 tablet, Refills: 0    Comments: Cost price prior to fill  Associated Diagnoses: PAD (peripheral artery disease) ([facility]); Aortoiliac occlusive  disease (HCC); Claudication of both lower extremities (HCC)       !! - Potential duplicate medications found. Please discuss with provider.        No discharge procedures on file.  ED SEPSIS DOCUMENTATION   Time reflects when diagnosis was documented in both MDM as applicable and the Disposition within this note       Time User Action Codes Description Comment    11/19/2024  3:13 AM Ignacio Vincent Add [S32.591A] Closed fracture of right inferior pubic ramus, initial encounter (Formerly [facility])     11/19/2024 11:47 AM Bridget Lopez Add [I96] Gangrene of toe of right foot (Formerly [facility])     11/20/2024  3:29 PM Jackson Koch Add [I74.09] Aortoiliac occlusive disease (Formerly [facility])                  Torsten Willett DO  11/20/24 1620

## 2024-11-19 NOTE — ASSESSMENT & PLAN NOTE
-Previous history of falls with a prior left femur fracture in 2020 after a fall requiring surgical intervention  -Also recently admitted to the [facility] service in October 2020 for with recent generalized weakness and multiple falls without head strike, does use a walker to ambulate at baseline

## 2024-11-19 NOTE — ASSESSMENT & PLAN NOTE
-Recent admission with vascular invention including left-sided axillary bifemoral bypass as well as right common femoral to above-knee popliteal artery bypass on 10/23 with vascular surgery  -Also had right sided fifth ray amputation with podiatry on 10/26  -Discharged on ASA81 daily and 2.5 mg Xarelto twice daily

## 2024-11-19 NOTE — H&P
H&P - Trauma   Name: Sabiha Garcia 68 y.o. female I MRN: [id number]  Unit/Bed#: [facility] I Date of Admission: 11/18/2024   Date of Service: 11/19/2024 I Hospital Day: 0     Assessment & Plan  Closed fracture of right inferior pubic ramus (HCC)  -Status post fall outside of her home yesterday evening after returning home from rehab, fell onto her right hip and tailbone, unable to bear weight after the fall, no head strike, takes ASA and 2.5 mg Xarelto twice daily  -Imaging including CT RLE and XR hip/pelvis significant for right inferior pubic ramus fracture  -Orthopedics consult  Type 2 diabetes mellitus, with long-term current use of insulin (HCC)    Lab Results   Component Value Date    HGBA1C 14.8 (H) 09/18/2024   -History of poorly controlled diabetes, on long-term insulin therapy  -Continue SSI while inpatient  -Goal blood sugars less than 180  HLD (hyperlipidemia)  -Continue home atorvastatin  Ambulatory dysfunction  -Previous history of falls with a prior left femur fracture in 2020 after a fall requiring surgical intervention  -Also recently admitted to the [facility] service in October 2020 for with recent generalized weakness and multiple falls without head strike, does use a walker to ambulate at baseline  Aortoiliac occlusive disease (HCC)  -Recent admission with vascular invention including left-sided axillary bifemoral bypass as well as right common femoral to above-knee popliteal artery bypass on 10/23 with vascular surgery  -Also had right sided fifth ray amputation with podiatry on 10/26  -Discharged on ASA81 daily and 2.5 mg Xarelto twice daily    Trauma Alert: Evaluation; trauma team notified at 330am via text   Model of Arrival: Ambulance    Trauma Team: Attending Ullrich and Sarath Corrigan  Consultants:     Orthopedics: routine consult; Epic consult order placed and consultant notified (via phone/text @ time 415am);     History of Present Illness   Chief Complaint: R hip pain  Mechanism:Fall     Sabiha GARCIA  Jose is a 68 y.o. female w PMH of poorly controlled DM, smoker, HLD, PAD, depression with anxiety, recent admission to [facility] on the [facility] service initially presenting with DKA and starvation ketosis, significant vascular disease.  Patient ultimately underwent surgical intervention with vascular surgery team including left-sided axillary bifemoral bypass and right-sided common femoral to above-knee popliteal bypass on 10/23, subsequently with the podiatry team she underwent right sided fifth ray amputation on 10/26.  She was discharged on ASA81 daily as well as 2.5 mg Xarelto twice daily, discharged to SNF for rehab.    Patient was returning home from SNF yesterday evening via taxi when she fell outside her home before making inside, patient states she slipped on the cement outside and fell onto her right hip and tailbone, immediate pain in the area, she denies head strike and denies loss of consciousness.  She was unable to get up on her own or bear weight,  was able to call an ambulance for her who took her to the hospital.  Patient typically ambulates with a walker but was not using walker at this time, she lives at home alone and was returning home from SNF.  She does have history of amatory dysfunction in the past even as recently as October admission, she had left femur fracture in 2020 after a fall that required surgical intervention.    Review of Systems   Constitutional:  Negative for appetite change, chills, fatigue and fever.   Respiratory:  Negative for shortness of breath.    Cardiovascular:  Negative for chest pain.   Gastrointestinal:  Negative for abdominal distention, abdominal pain, nausea and vomiting.   Genitourinary:  Negative for difficulty urinating.   Musculoskeletal:  Negative for back pain and neck pain.   Skin:  Negative for color change.   Neurological:  Negative for dizziness, light-headedness and headaches.   Psychiatric/Behavioral:  Negative for agitation and confusion.    All  other systems reviewed and are negative.    I have reviewed the patient's PMH, PSH, Social History, Family History, Meds, and Allergies  Immunization History   Administered Date(s) Administered    Tdap 08/28/2013     Last Tetanus: unknown    1. Before the illness or injury that brought you to the Emergency, did you need someone to help you on a regular basis? 1=Yes   2. Since the illness or injury that brought you to the Emergency, have you needed more help than usual to take care of yourself? 1=Yes   3. Have you been hospitalized for one or more nights during the past 6 months (excluding a stay in the Emergency Department)? 1=Yes   4. In general, do you see well? 0=Yes   5. In general, do you have serious problems with your memory? 0=No   6. Do you take more than three different medications everyday? 1=Yes   TOTAL   4     Did you order a geriatric consult if the score was 2 or greater?: yes       Objective :  Temp:  [97.8 °F (36.6 °C)] 97.8 °F (36.6 °C)  HR:  [87-93] 87  BP: (148-150)/(65-67) 150/65  Resp:  [18] 18  SpO2:  [99 %-100 %] 99 %  O2 Device: None (Room air)    Initial Vitals:   Temperature: 97.8 °F (36.6 °C) (11/18/24 2228)  Pulse: 93 (11/18/24 2228)  Respirations: 18 (11/18/24 2228)  Blood Pressure: 148/67 (11/18/24 2228)    Primary Survey:   Airway:        Status: patent;        Pre-hospital Interventions: none        Hospital Interventions: none  Breathing:        Pre-hospital Interventions: none       Effort: normal       Right breath sounds: normal       Left breath sounds: normal  Circulation:        Rhythm: regular       Rate: regular   Right Pulses Left Pulses    R radial: 2+  R femoral: 2+       L radial: 2+  L femoral: 2+         Disability:        GCS: Eye: 4; Verbal: 5 Motor: 6 Total: 15       Right Pupil: round;  reactive         Left Pupil:  round;  reactive      R Motor Strength L Motor Strength    R : 5/5  R dorsiflex: 5/5  R plantarflex: 5/5 L : 5/5  L dorsiflex: 5/5  L  "plantarflex: 5/5        Sensory:  No sensory deficit  Exposure:       Completed: Yes      Secondary Survey:  Physical Exam  Vitals reviewed.   Constitutional:       General: She is not in acute distress.     Appearance: She is not ill-appearing or toxic-appearing.   HENT:      Head: Normocephalic and atraumatic.      Nose: Nose normal.      Mouth/Throat:      Mouth: Mucous membranes are moist.   Eyes:      Extraocular Movements: Extraocular movements intact.   Cardiovascular:      Rate and Rhythm: Normal rate and regular rhythm.      Pulses: Normal pulses.   Pulmonary:      Effort: Pulmonary effort is normal. No respiratory distress.   Abdominal:      General: There is no distension.      Palpations: Abdomen is soft.      Tenderness: There is no abdominal tenderness.   Musculoskeletal:         General: Tenderness and signs of injury present.      Cervical back: Normal range of motion.      Comments: Right hip pain and tenderness   Skin:     General: Skin is warm.      Capillary Refill: Capillary refill takes less than 2 seconds.      Coloration: Skin is not jaundiced or pale.   Neurological:      Mental Status: She is alert and oriented to person, place, and time.   Psychiatric:         Mood and Affect: Mood normal.             Lab Results: I have reviewed the following results:  No results for input(s): \"WBC\", \"HGB\", \"HCT\", \"PLT\", \"BANDSPCT\", \"SODIUM\", \"K\", \"CL\", \"CO2\", \"BUN\", \"CREATININE\", \"GLUC\", \"CAIONIZED\", \"MG\", \"PHOS\", \"AST\", \"ALT\", \"ALB\", \"TBILI\", \"DBILI\", \"ALKPHOS\", \"PTT\", \"INR\", \"HSTNI0\", \"HSTNI2\", \"BNP\", \"LACTICACID\" in the last 72 hours.    Imaging Results: I have personally reviewed pertinent images saved in PACS. CT scan findings (and other pertinent positive findings on images) were discussed with radiology. My interpretation of the images/reports are as follows:  Chest Xray(s): N/A   FAST exam(s): N/A   CT Scan(s): positive for acute findings: Right inferior pubic ramus fracture   Additional Xray(s): " positive for acute findings: Right inferior pubic ramus fracture     Other Studies: Other Study Results Review: No additional pertinent studies reviewed.

## 2024-11-19 NOTE — ASSESSMENT & PLAN NOTE
Lab Results   Component Value Date    HGBA1C 14.8 (H) 09/18/2024   -History of poorly controlled diabetes, on long-term insulin therapy  -Continue SSI while inpatient  -Goal blood sugars less than 180

## 2024-11-19 NOTE — PLAN OF CARE
Problem: Prexisting or High Potential for Compromised Skin Integrity  Goal: Skin integrity is maintained or improved  Description: INTERVENTIONS:  - Identify patients at risk for skin breakdown  - Assess and monitor skin integrity  - Assess and monitor nutrition and hydration status  - Monitor labs   - Assess for incontinence   - Turn and reposition patient  - Assist with mobility/ambulation  - Relieve pressure over bony prominences  - Avoid friction and shearing  - Provide appropriate hygiene as needed including keeping skin clean and dry  - Evaluate need for skin moisturizer/barrier cream  - Collaborate with interdisciplinary team   - Patient/family teaching  - Consider wound care consult   Outcome: Progressing     Problem: PAIN - ADULT  Goal: Verbalizes/displays adequate comfort level or baseline comfort level  Description: Interventions:  - Encourage patient to monitor pain and request assistance  - Assess pain using appropriate pain scale  - Administer analgesics based on type and severity of pain and evaluate response  - Implement non-pharmacological measures as appropriate and evaluate response  - Consider cultural and social influences on pain and pain management  - Notify physician/advanced practitioner if interventions unsuccessful or patient reports new pain  Outcome: Progressing     Problem: SAFETY ADULT  Goal: Patient will remain free of falls  Description: INTERVENTIONS:  - Educate patient/family on patient safety including physical limitations  - Instruct patient to call for assistance with activity   - Consult OT/PT to assist with strengthening/mobility   - Keep Call bell within reach  - Keep bed low and locked with side rails adjusted as appropriate  - Keep care items and personal belongings within reach  - Initiate and maintain comfort rounds  - Make Fall Risk Sign visible to staff  - Offer Toileting every 2 Hours, in advance of need  - Initiate/Maintain bed/chair alarm  - Obtain necessary fall  risk management equipment:   - Apply yellow socks and bracelet for high fall risk patients  - Consider moving patient to room near nurses station  Outcome: Progressing  Goal: Maintain or return to baseline ADL function  Description: INTERVENTIONS:  -  Assess patient's ability to carry out ADLs; assess patient's baseline for ADL function and identify physical deficits which impact ability to perform ADLs (bathing, care of mouth/teeth, toileting, grooming, dressing, etc.)  - Assess/evaluate cause of self-care deficits   - Assess range of motion  - Assess patient's mobility; develop plan if impaired  - Assess patient's need for assistive devices and provide as appropriate  - Encourage maximum independence but intervene and supervise when necessary  - Involve family in performance of ADLs  - Assess for home care needs following discharge   - Consider OT consult to assist with ADL evaluation and planning for discharge  - Provide patient education as appropriate  Outcome: Progressing  Goal: Maintains/Returns to pre admission functional level  Description: INTERVENTIONS:  - Perform AM-PAC 6 Click Basic Mobility/ Daily Activity assessment daily.  - Set and communicate daily mobility goal to care team and patient/family/caregiver.   - Collaborate with rehabilitation services on mobility goals if consulted  - Perform Range of Motion 3 times a day.  - Reposition patient every 2 hours.  - Dangle patient 3 times a day  - Stand patient 3 times a day  - Ambulate patient 3 times a day  - Out of bed to chair 3 times a day   - Out of bed for meals 3 times a day  - Out of bed for toileting  - Record patient progress and toleration of activity level   Outcome: Progressing     Problem: DISCHARGE PLANNING  Goal: Discharge to home or other facility with appropriate resources  Description: INTERVENTIONS:  - Identify barriers to discharge w/patient and caregiver  - Arrange for needed discharge resources and transportation as appropriate  -  Identify discharge learning needs (meds, wound care, etc.)  - Arrange for interpretive services to assist at discharge as needed  - Refer to Case Management Department for coordinating discharge planning if the patient needs post-hospital services based on physician/advanced practitioner order or complex needs related to functional status, cognitive ability, or social support system  Outcome: Progressing     Problem: Knowledge Deficit  Goal: Patient/family/caregiver demonstrates understanding of disease process, treatment plan, medications, and discharge instructions  Description: Complete learning assessment and assess knowledge base.  Interventions:  - Provide teaching at level of understanding  - Provide teaching via preferred learning methods  Outcome: Progressing     Problem: SKIN/TISSUE INTEGRITY - ADULT  Goal: Skin Integrity remains intact(Skin Breakdown Prevention)  Description: Assess:  -Perform [place] assessment every shift   -Clean and moisturize skin every shift   -Inspect skin when repositioning, toileting, and assisting with ADLS  -Assess under medical devices   -Assess extremities for adequate circulation and sensation     Bed Management:  -Have minimal linens on bed & keep smooth, unwrinkled  -Change linens as needed when moist or perspiring  -Avoid sitting or lying in one position for more than 2 hours while in bed  -Keep HOB at 30 degrees     Toileting:  -Offer bedside commode  -Assess for incontinence every 2 hours  -Use incontinent care products after each incontinent episode     Activity:  -Mobilize patient 3 times a day  -Encourage activity and walks on unit  -Encourage or provide ROM exercises   -Turn and reposition patient every 2 Hours  -Use appropriate equipment to lift or move patient in bed  -Instruct/ Assist with weight shifting every 20 minutes  when out of bed in chair  -Consider limitation of chair time 2 hour intervals    Skin Care:  -Avoid use of baby powder, tape, friction and  shearing, hot water or constrictive clothing  -Relieve pressure over bony prominences   -Do not massage red bony areas    Next Steps:  -Teach patient strategies to minimize risks    -Consider consults to  interdisciplinary teams   Outcome: Progressing  Goal: Incision(s), wounds(s) or drain site(s) healing without S/S of infection  Description: INTERVENTIONS  - Assess and document dressing, incision, wound bed, drain sites and surrounding tissue  - Provide patient and family education  - Perform skin care/dressing changes every shift   Outcome: Progressing  Goal: Pressure injury heals and does not worsen  Description: Interventions:  - Implement low air loss mattress or specialty surface (Criteria met)  - Apply silicone foam dressing  - Instruct/assist with weight shifting every 20 minutes when in chair   - Limit chair time to 2 hour intervals  - Use special pressure reducing interventions when in chair   - Apply fecal or urinary incontinence containment device   - Perform passive or active ROM  - Turn and reposition patient & offload bony prominences every 2 hours   - Utilize friction reducing device or surface for transfers   - Consider consults to  interdisciplinary teams   - Use incontinent care products after each incontinent episode   - Consider nutrition services referral as needed  Outcome: Progressing     Problem: MUSCULOSKELETAL - ADULT  Goal: Maintain or return mobility to safest level of function  Description: INTERVENTIONS:  - Assess patient's ability to carry out ADLs; assess patient's baseline for ADL function and identify physical deficits which impact ability to perform ADLs (bathing, care of mouth/teeth, toileting, grooming, dressing, etc.)  - Assess/evaluate cause of self-care deficits   - Assess range of motion  - Assess patient's mobility  - Assess patient's need for assistive devices and provide as appropriate  - Encourage maximum independence but intervene and supervise when necessary  -  Involve family in performance of ADLs  - Assess for home care needs following discharge   - Consider OT consult to assist with ADL evaluation and planning for discharge  - Provide patient education as appropriate  Outcome: Progressing  Goal: Maintain proper alignment of affected body part  Description: INTERVENTIONS:  - Support, maintain and protect limb and body alignment  - Provide patient/ family with appropriate education  Outcome: Progressing

## 2024-11-19 NOTE — PROGRESS NOTES
ADT alert received indicating the patient admitted 11/18/24 to [facility]. This Admin Coordinator will continue to monitor via chart review.

## 2024-11-19 NOTE — CONSULTS
Podiatry - Consultation    Patient Information:   Sabiha Garcia 68 y.o. female MRN: [id number]  Unit/Bed#: [facility] 623-01 Encounter: [id number]  PCP: Alexys Blunt MD  Date of Admission:  11/18/2024  Date of Consultation: 11/19/24  Requesting Physician: Lauryn Ullrich, DO      ASSESSMENT:    Sabiha Garcia is a 68 y.o. female with:    S/p Right partial fifth ray amputation (DOS: 10/26/24)  Cellulitis right foot  Uncontrolled type 2 diabetes mellitus   -A1c of 14.8% (9/18/24)  PAD  -S/p bypass 10/23/24  Smoking history  Onychomycosis    PLAN:    Patient was seen and evaluated at bedside with all questions and concerns addressed.  Reviewed recent progress notes podiatry clinics.  Patient's right foot partial fifth ray amputation is healing and the suture was removed yesterday 11/18/2024.  Patient was given Maxorb DSD dressing with compression of Ace bandage.  Dressing was changed. The current dressing consists of Maxorb, 4 x 4, Brandy, and ACE with light compression.   The right foot partial amputation surgery incision  is mostly healed with a thin wound which shows no signs of infection . Please refer to physical examination for details.  Left lateral foot shows callus which is peeling off.  The callus was removed with a suture removal kit with no complication.  Patients' lab and vital signs were reviewed. Patient is afebrile with mild leukocytosis. Patient does not  meet the SIRS criteria. Will keep monitoring.  Continue local wound care, appreciate nursing assistance with dressing changes. Wound care order is in.  Suggest to consult PT and OT teams for weightbearing status and safety.  Will ask the patient to switch into bilateral surgical shoe for better balance.  Elevation and offloading on green foam wedges or pillows when non-ambulatory.  Rest of care per primary team.  Will discuss this plan with my attending and update as needed.     Weightbearing status: Weightbearing as tolerated to bilateral feet in  surgical shoes    SUBJECTIVE:    History of Present Illness:    Sabiha Garcia is a 68 y.o. female who is originally admitted 11/18/2024 due to fall. Patient has a past medical history of type 2 diabetes mellitus, ambulatory dysfunction, aortic iliac occlusive disease, closed fracture of right inferior pubic ramus, tobacco use.    We are consulted for right foot incision care.  Patient received partial fifth ray amputation because of osteomyelitis on 10/26/24.  Patient has been through normal recovery from the surgery and had an office visit to attending yesterday 11/18/2024 to remove the last few stitches.  After that, she sustained a fall to cause a fracture to her pelvis.  Patient denies any injury or pain to bilateral feet.  Patient has no complaints about her surgery or incision site.  Patient denies any systemic signs of infection, including headache, dizziness, nausea, vomiting, chill or fever.     Review of Systems:    Constitutional: Negative.    HENT: Negative.    Eyes: Negative.    Respiratory: Negative.    Cardiovascular: Negative.    Gastrointestinal: Negative.    Musculoskeletal: S/p right partial fifth ray amputation  Skin: Small incisional wound on the right partial fifth ray amputation surgical site  Neurological: Negative  Psych: Negative.     Past Medical and Surgical History:     Past Medical History:   Diagnosis Date    Allergic [id number]    Depression 449700    Diabetes (HCC)     Diabetes mellitus (HCC) 712616    Smokes 1974    1/2 ppd smoker       Past Surgical History:   Procedure Laterality Date    APPENDECTOMY      BYPASS FEMORAL-POPLITEAL Right 10/23/2024    Procedure: R Femoral- AK popliteal bypass w/ PTFE, right common femoral endarterectomy;  Surgeon: Haroon Dukes MD;  Location: [facility];  Service: Vascular    CHOLECYSTECTOMY      FRACTURE SURGERY      VA AMPUTATION METATARSAL W/TOE SINGLE Right 10/26/2024    Procedure: Partial 5th ray amputation of the right foot with removal of  nonviable bone/soft tissue;  Surgeon: Bienvenido Dhillon DPM;  Location: [facility];  Service: Podiatry    MI BYPASS W/VEIN AXILLARY-FEMORAL Left 10/23/2024    Procedure: L AxBiFem;  Surgeon: Haroon Dukes MD;  Location: [facility];  Service: Vascular    MI OPTX FEM SHFT FX W/INSJ IMED IMPLT W/WO SCREW Left 05/27/2020    Procedure: INSERTION NAIL IM FEMUR ANTEGRADE (TROCHANTERIC);  Surgeon: Jose Lyons MD;  Location: [facility];  Service: Orthopedics    TONSILLECTOMY      TUBAL LIGATION         Meds/Allergies:      Medications Prior to Admission:     aspirin (ECOTRIN LOW STRENGTH) 81 mg EC tablet    atorvastatin (LIPITOR) 80 mg tablet    Cholecalciferol (VITAMIN D3) 1,000 units tablet    docusate sodium (COLACE) 100 mg capsule    insulin degludec (TRESIBA) 100 units/mL injection pen    insulin lispro (HumALOG/ADMELOG) 100 units/mL injection    insulin lispro (HumALOG/ADMELOG) 100 units/mL injection    insulin lispro (HumALOG/ADMELOG) 100 units/mL injection    Insulin Pen Needle (BD Pen Needle Shira U/F) 32G X 4 MM MISC    oxyCODONE-acetaminophen (PERCOCET) 5-325 mg per tablet    polyethylene glycol (MIRALAX) 17 g packet    rivaroxaban (Xarelto) 2.5 mg tablet    Allergies   Allergen Reactions    Penicillins Hives    Prednisone Other (See Comments)     personality change       Social History:     Marital Status: Single    Substance Use History:   Social History     Substance and Sexual Activity   Alcohol Use Never     Social History     Tobacco Use   Smoking Status Former    Current packs/day: 0.50    Average packs/day: 0.5 packs/day for 25.0 years (12.5 ttl pk-yrs)    Types: Cigarettes   Smokeless Tobacco Never     Social History     Substance and Sexual Activity   Drug Use Never       Family History:    Family History   Problem Relation Age of Onset    Stroke Mother     No Known Problems Father         left her at 18 months old.    Cholelithiasis Sister         Vascular disease    No Known Problems  "Half-Brother          OBJECTIVE:    Vitals:   Blood Pressure: 125/60 (11/19/24 1216)  Pulse: 83 (11/19/24 1216)  Temperature: 98.6 °F (37 °C) (11/19/24 1216)  Temp Source: Oral (11/19/24 0800)  Respirations: 18 (11/19/24 1216)  Height: 5' 9\" (175.3 cm) (11/19/24 0948)  Weight - Scale: 55.4 kg (122 lb 2.2 oz) (11/19/24 0948)  SpO2: 99 % (11/19/24 1216)    Physical Exam:    General Appearance: Alert, cooperative, no distress.  HEENT: Head normocephalic, atraumatic, without obvious abnormality.  Heart: Normal rate and rhythm.  Lungs: Non-labored breathing. No respiratory distress.  Abdomen: Without distension.  Psychiatric: AAOx3  Lower Extremity:    Vascular:   DP: Right: non-palpable Left: non-palpable  PT: Right: non-palpable Left: non-palpable  CRT < 3 seconds at the digits. +0/4 edema noted at bilateral lower extremities.   Pedal hair is absent.   Skin temperature is warm bilaterally.    Musculoskeletal:  MMT is 5/5 in all muscle compartments bilaterally.   ROM at the 1st MPJ and ankle joint are limited bilaterally with the leg extended.   No Pain on palpation of right foot partial fifth ray amputation incision site.     Dermatological:  Lower extremity wound(s) as noted below:    Wound #: 1  Location: Right partial fifth metatarsal amputation site  Length 3.5cm: Width 0.3cm: Depth 0.2cm:   Deepest Tissue Noted in Base: Subcutaneous  Probe to Bone: No  Peripheral Skin Description: Attached  Granulation: 0% Fibrotic Tissue: 100% Necrotic Tissue: 0%   Drainage Amount: moderate, serous  Signs of Infection: No. negative probe to bone, negative crepitus, fluctuance, negative purulence, negative periwound skin erythema, edema, negative proximal tracking erythema    Neurological:  Gross sensation is diminished.   Light touch is diminished.   Protective sensation is diminished.    Clinical Images 11/19/24:      Additional data:     Lab Results: I have personally reviewed pertinent labs including:    Results from last 7 " "days   Lab Units 11/19/24  0404   WBC Thousand/uL 13.39*   HEMOGLOBIN g/dL 7.6*   HEMATOCRIT % 23.7*   PLATELETS Thousands/uL 474*   SEGS PCT % 70   LYMPHO PCT % 21   MONO PCT % 6   EOS PCT % 2     Results from last 7 days   Lab Units 11/19/24  0404   POTASSIUM mmol/L 4.7   CHLORIDE mmol/L 95*   CO2 mmol/L 28   BUN mg/dL 17   CREATININE mg/dL 0.79   CALCIUM mg/dL 8.4           Cultures: I have personally reviewed pertinent cultures including:              Imaging: I have personally reviewed pertinent reports in PACS.  EKG, Pathology, and Other Studies: I have personally reviewed pertinent reports.    Time Spent for Care: 30 minutes.  More than 50% of total time spent on counseling and coordination of care as described above.      ** Please Note: Portions of the record may have been created with voice recognition software. Occasional wrong word or \"sound a like\" substitutions may have occurred due to the inherent limitations of voice recognition software. Read the chart carefully and recognize, using context, where substitutions have occurred. **   "

## 2024-11-20 PROBLEM — E46 PROTEIN-CALORIE MALNUTRITION (HCC): Status: ACTIVE | Noted: 2024-10-17

## 2024-11-20 PROBLEM — I73.9 PAD (PERIPHERAL ARTERY DISEASE) (HCC): Status: ACTIVE | Noted: 2024-11-20

## 2024-11-20 PROBLEM — D64.9 ANEMIA: Status: ACTIVE | Noted: 2020-05-29

## 2024-11-20 LAB
25(OH)D3 SERPL-MCNC: 17.8 NG/ML (ref 30–100)
ALBUMIN SERPL BCG-MCNC: 2.8 G/DL (ref 3.5–5)
ALP SERPL-CCNC: 71 U/L (ref 34–104)
ALT SERPL W P-5'-P-CCNC: 13 U/L (ref 7–52)
ANION GAP SERPL CALCULATED.3IONS-SCNC: 7 MMOL/L (ref 4–13)
AST SERPL W P-5'-P-CCNC: 12 U/L (ref 13–39)
BILIRUB SERPL-MCNC: 0.21 MG/DL (ref 0.2–1)
BUN SERPL-MCNC: 15 MG/DL (ref 5–25)
CALCIUM ALBUM COR SERPL-MCNC: 8.9 MG/DL (ref 8.3–10.1)
CALCIUM SERPL-MCNC: 7.9 MG/DL (ref 8.4–10.2)
CHLORIDE SERPL-SCNC: 101 MMOL/L (ref 96–108)
CO2 SERPL-SCNC: 27 MMOL/L (ref 21–32)
CREAT SERPL-MCNC: 0.62 MG/DL (ref 0.6–1.3)
GFR SERPL CREATININE-BSD FRML MDRD: 92 ML/MIN/1.73SQ M
GLUCOSE SERPL-MCNC: 159 MG/DL (ref 65–140)
GLUCOSE SERPL-MCNC: 180 MG/DL (ref 65–140)
GLUCOSE SERPL-MCNC: 76 MG/DL (ref 65–140)
GLUCOSE SERPL-MCNC: 95 MG/DL (ref 65–140)
GLUCOSE SERPL-MCNC: 97 MG/DL (ref 65–140)
GLUCOSE SERPL-MCNC: 99 MG/DL (ref 65–140)
HCT VFR BLD AUTO: 24.9 % (ref 34.8–46.1)
HGB BLD-MCNC: 7.8 G/DL (ref 11.5–15.4)
POTASSIUM SERPL-SCNC: 4.6 MMOL/L (ref 3.5–5.3)
PROT SERPL-MCNC: 5.5 G/DL (ref 6.4–8.4)
PTH-INTACT SERPL-MCNC: 48.7 PG/ML (ref 12–88)
SODIUM SERPL-SCNC: 135 MMOL/L (ref 135–147)

## 2024-11-20 PROCEDURE — 85018 HEMOGLOBIN: CPT | Performed by: PHYSICIAN ASSISTANT

## 2024-11-20 PROCEDURE — 83970 ASSAY OF PARATHORMONE: CPT

## 2024-11-20 PROCEDURE — 80053 COMPREHEN METABOLIC PANEL: CPT

## 2024-11-20 PROCEDURE — 82948 REAGENT STRIP/BLOOD GLUCOSE: CPT

## 2024-11-20 PROCEDURE — 99232 SBSQ HOSP IP/OBS MODERATE 35: CPT | Performed by: INTERNAL MEDICINE

## 2024-11-20 PROCEDURE — 99223 1ST HOSP IP/OBS HIGH 75: CPT | Performed by: SURGERY

## 2024-11-20 PROCEDURE — 85014 HEMATOCRIT: CPT | Performed by: PHYSICIAN ASSISTANT

## 2024-11-20 PROCEDURE — 99232 SBSQ HOSP IP/OBS MODERATE 35: CPT | Performed by: SURGERY

## 2024-11-20 PROCEDURE — 99233 SBSQ HOSP IP/OBS HIGH 50: CPT | Performed by: INTERNAL MEDICINE

## 2024-11-20 PROCEDURE — 82306 VITAMIN D 25 HYDROXY: CPT

## 2024-11-20 RX ORDER — INSULIN GLARGINE 100 [IU]/ML
24 INJECTION, SOLUTION SUBCUTANEOUS
Status: DISCONTINUED | OUTPATIENT
Start: 2024-11-20 | End: 2024-11-22

## 2024-11-20 RX ORDER — INSULIN LISPRO 100 [IU]/ML
10 INJECTION, SOLUTION INTRAVENOUS; SUBCUTANEOUS
Status: DISCONTINUED | OUTPATIENT
Start: 2024-11-21 | End: 2024-11-22

## 2024-11-20 RX ORDER — ERGOCALCIFEROL 1.25 MG/1
50000 CAPSULE, LIQUID FILLED ORAL WEEKLY
Status: DISCONTINUED | OUTPATIENT
Start: 2024-11-20 | End: 2024-11-23 | Stop reason: HOSPADM

## 2024-11-20 RX ORDER — INSULIN LISPRO 100 [IU]/ML
10 INJECTION, SOLUTION INTRAVENOUS; SUBCUTANEOUS
Status: DISCONTINUED | OUTPATIENT
Start: 2024-11-20 | End: 2024-11-22

## 2024-11-20 RX ADMIN — OXYCODONE HYDROCHLORIDE 5 MG: 5 TABLET ORAL at 08:49

## 2024-11-20 RX ADMIN — ENOXAPARIN SODIUM 30 MG: 30 INJECTION SUBCUTANEOUS at 08:48

## 2024-11-20 RX ADMIN — INSULIN LISPRO 10 UNITS: 100 INJECTION, SOLUTION INTRAVENOUS; SUBCUTANEOUS at 17:44

## 2024-11-20 RX ADMIN — INSULIN LISPRO 8 UNITS: 100 INJECTION, SOLUTION INTRAVENOUS; SUBCUTANEOUS at 12:41

## 2024-11-20 RX ADMIN — OXYCODONE HYDROCHLORIDE 5 MG: 5 TABLET ORAL at 02:33

## 2024-11-20 RX ADMIN — SODIUM CHLORIDE, SODIUM GLUCONATE, SODIUM ACETATE, POTASSIUM CHLORIDE, MAGNESIUM CHLORIDE, SODIUM PHOSPHATE, DIBASIC, AND POTASSIUM PHOSPHATE 100 ML/HR: .53; .5; .37; .037; .03; .012; .00082 INJECTION, SOLUTION INTRAVENOUS at 06:40

## 2024-11-20 RX ADMIN — OXYCODONE HYDROCHLORIDE 5 MG: 5 TABLET ORAL at 21:08

## 2024-11-20 RX ADMIN — ERGOCALCIFEROL 50000 UNITS: 1.25 CAPSULE ORAL at 17:44

## 2024-11-20 RX ADMIN — ACETAMINOPHEN 650 MG: 325 TABLET, FILM COATED ORAL at 05:19

## 2024-11-20 RX ADMIN — ENOXAPARIN SODIUM 30 MG: 30 INJECTION SUBCUTANEOUS at 21:10

## 2024-11-20 RX ADMIN — HYDROMORPHONE HYDROCHLORIDE 0.2 MG: 0.2 INJECTION, SOLUTION INTRAMUSCULAR; INTRAVENOUS; SUBCUTANEOUS at 22:20

## 2024-11-20 RX ADMIN — INSULIN GLARGINE 24 UNITS: 100 INJECTION, SOLUTION SUBCUTANEOUS at 21:11

## 2024-11-20 RX ADMIN — OXYCODONE HYDROCHLORIDE 5 MG: 5 TABLET ORAL at 13:15

## 2024-11-20 RX ADMIN — ATORVASTATIN CALCIUM 80 MG: 80 TABLET, FILM COATED ORAL at 17:44

## 2024-11-20 RX ADMIN — INSULIN LISPRO 1 UNITS: 100 INJECTION, SOLUTION INTRAVENOUS; SUBCUTANEOUS at 21:11

## 2024-11-20 RX ADMIN — ASPIRIN 81 MG: 81 TABLET, COATED ORAL at 08:48

## 2024-11-20 RX ADMIN — ACETAMINOPHEN 650 MG: 325 TABLET, FILM COATED ORAL at 13:15

## 2024-11-20 RX ADMIN — INSULIN LISPRO 16 UNITS: 100 INJECTION, SOLUTION INTRAVENOUS; SUBCUTANEOUS at 08:49

## 2024-11-20 RX ADMIN — ACETAMINOPHEN 650 MG: 325 TABLET, FILM COATED ORAL at 17:44

## 2024-11-20 RX ADMIN — SENNOSIDES 17.2 MG: 8.6 TABLET, FILM COATED ORAL at 08:48

## 2024-11-20 NOTE — ASSESSMENT & PLAN NOTE
Malnutrition Findings:   Adult Malnutrition type: Chronic illness  Adult Degree of Malnutrition: Malnutrition of moderate degree  Malnutrition Characteristics: Fat loss, Muscle loss  360 Statement: Moderate malnutrition r/t inadequate intake as evidenced by BMI 18.0, mild fat/muscle wasting of orbital/temple areas. treated with high calorie diet and Ensure Compact tid  BMI Findings:  Adult BMI Classifications: Underweight < 18.5 (18.04)    --encouraged healthy diet w/ focus on good nutrition, protein  --dietary supplements

## 2024-11-20 NOTE — MALNUTRITION/BMI
This medical record reflects one or more clinical indicators suggestive of malnutrition and/or morbid obesity.    Malnutrition Findings:   Adult Malnutrition type: Chronic illness  Adult Degree of Malnutrition: Malnutrition of moderate degree  Malnutrition Characteristics: Fat loss, Muscle loss                360 Statement: Moderate malnutrition r/t inadequate intake as evidenced by BMI 18.0, mild fat/muscle wasting of orbital/temple areas. treated with high calorie diet and Ensure Compact tid    BMI Findings:  Adult BMI Classifications: Underweight < 18.5        Body mass index is 18.04 kg/m².     See Nutrition note dated 11/20/24 for additional details.  Completed nutrition assessment is viewable in the nutrition documentation.

## 2024-11-20 NOTE — DISCHARGE INSTR - AVS FIRST PAGE
Incisional care (L chest, B groin, R thigh):  Wash incisions daily with soap and water.  Pat dry thoroughly.  Allow skin glue and Steri-Strips to slough  Place clean, dry gauze to cover groin incisions to keep area clean and dry and to prevent skin to skin contact      Discharge Instructions - Orthopedics      Weight Bearing Status:                                           - Weightbearing as tolerated on the bilateral lower extremities.    Pain:  - Continue analgesics as directed.    Appt Instructions:   - If you do not have your appointment, please call the Orthopedic Surgery Clinic at [phone] to schedule an appointment as instructed.  - Otherwise, followup as scheduled.  - Contact the office sooner if you experience any increased numbness/tingling in the extremities.    Miscellaneous:  - Activity as tolerated with assistance.  - Continue PT and OT evaluation and treatment as indicated.        Podiatry Discharge Instructions  Please change right foot dressing daily and as needed.  Please apply alginate gauze and Brandy dressing, and ACE with light compression.   Elevation and offloading on green foam wedges or pillows when non-ambulatory.  Follow-up as scheduled with the podiatry clinic.  Weightbearing status: Weightbearing as tolerated to bilateral feet in surgical shoes

## 2024-11-20 NOTE — ASSESSMENT & PLAN NOTE
-Status post fall outside of her home yesterday evening after returning home from rehab, fell onto her right hip and tailbone, unable to bear weight after the fall, no head strike, takes ASA and 2.5 mg Xarelto twice daily  -Imaging including CT RLE and XR hip/pelvis significant for right inferior pubic ramus fracture  -Orthopedics consult

## 2024-11-20 NOTE — ASSESSMENT & PLAN NOTE
Lab Results   Component Value Date    HGBA1C 14.8 (H) 09/18/2024     Recent Labs     11/19/24  1214 11/19/24  1713 11/19/24 2039 11/20/24  0709   POCGLU 149* 223* 190* 99     Blood Sugar Average: Last 72 hrs:  (P) 166.8  Uncontrolled type 2 diabetes mellitus on insulin therapy.  Most recent A1c September 20 24-14.8%.  Current regimen: Lantus 29 units nightly. Humalog 16 units before breakfast and dinner, 8 units before lunch.  Noted to have fasting and postprandial blood glucose in the 90s today. Will decrease basal and mealtime insulin at this time.     Plan:  Recommend increasing Lantus to 24units nightly.  Decrease Humalog 10 units before breakfast and dinner, continue 8 units before lunch.  Correctional scale insulin algorithm 1 before meals and at bedtime.  Continue Accu-Cheks 4 times daily before meals and at bedtime.  Adjust insulin dose as needed.  Monitor for hypoglycemia, manage per protocol.  Goal blood glucose while in the [id number] mg/dL  Discharge recommendations pending clinical course.  Endocrinology will continue following

## 2024-11-20 NOTE — ASSESSMENT & PLAN NOTE
10/23/2014 L AxBiFem (ringed PTFE), R lleofemoral endarterectomy, R CFA- AK popl bypass w/ PTFE (Roseline) performed for PAD/ AIOD w/ R 5th toe gangrene and cellulitis with finding of focal aortic occlusion and significant aortoiliac and infrainguinal occlusive disease  10/26/204 R partial 5th ray resection (Jacquie)  --stable postop  --incisions healing well.  Staples removed from R medial thigh incision w/ steri-strips applied  --cont ASA  --recommend resumption of Xarelto (PAD dosing 2.5mg BID) for graft patency, if no contraindication from orthopedic standpoint  --outpt f/u as scheduled: Dr. Dukes 12/17/2024 at 11:30, [facility]

## 2024-11-20 NOTE — ASSESSMENT & PLAN NOTE
Lab Results   Component Value Date    HGBA1C 14.8 (H) 09/18/2024       Recent Labs     11/19/24  1713 11/19/24 2039 11/20/24  0709 11/20/24  1126   POCGLU 223* 190* 99 97       Blood Sugar Average: Last 72 hrs:  (P) [id number]    --Insulin regimen  --Endo following

## 2024-11-20 NOTE — PROGRESS NOTES
Progress Note - Endocrinology   Name: Sabiha Garcia 68 y.o. female I MRN: [id number]  Unit/Bed#: [facility] 623-01 I Date of Admission: 11/18/2024   Date of Service: 11/20/2024 I Hospital Day: 0    Assessment & Plan  Osteoporosis with current pathological fracture  Patient with severe osteoporosis and very high risk of fracture as evidenced by fragility fracture present on admission and DEXA scan from 2022 demonstrating T-score of -3.3 in the lumbar spine, -4.1 at the right total hip, -3.3 in the right femoral neck and -3.2 at the left distal forearm.  Likely age related, suspect tobacco use and low BMI/poor p.o. intake also contributing.   Noted to have magnesium of 1.8, phosphorus of 3.1.  Calcium 8.4 on BMP.  Vitamin D on 10/11/2020 4-22.2.  Additionally was noted to have low TSH and normal free T4 on labs in October 2024  Currently takes vitamin D 6000 units daily, which is a very high dose given her BMI.  Additionally given prior history of fractures, will evaluate for secondary causes of osteoporosis.      Plan:  Emphasized importance of regular calcium and vitamin D intake.  Advised patient to decrease vitamin D3 2000 units daily.  Additionally recommend daily intake of calcium 1200 mg.  Will order updated 25-OH vitamin D, as well as PTH, TSH and free T4, CMP.  Patient will benefit from updated DEXA scan as an outpatient in 6-8 weeks once healed  Additionally will benefit from treatment of osteoporosis as an outpatient.  Risk prevention with PT/OT evaluation while inpatient.  Recommend endocrinology follow-up at discharge    Type 2 diabetes mellitus, with long-term current use of insulin ([facility])  Lab Results   Component Value Date    HGBA1C 14.8 (H) 09/18/2024     Recent Labs     11/19/24  1214 11/19/24  1713 11/19/24  2039 11/20/24  0709   POCGLU 149* 223* 190* 99     Blood Sugar Average: Last 72 hrs:  (P) 166.8  Uncontrolled type 2 diabetes mellitus on insulin therapy.  Most recent A1c September 20 24-14.8%.  Current  regimen: Lantus 29 units nightly. Humalog 16 units before breakfast and dinner, 8 units before lunch.  Noted to have fasting and postprandial blood glucose in the 90s today. Will decrease basal and mealtime insulin at this time.     Plan:  Recommend increasing Lantus to 24units nightly.  Decrease Humalog 10 units before breakfast and dinner, continue 8 units before lunch.  Correctional scale insulin algorithm 1 before meals and at bedtime.  Continue Accu-Cheks 4 times daily before meals and at bedtime.  Adjust insulin dose as needed.  Monitor for hypoglycemia, manage per protocol.  Goal blood glucose while in the [id number] mg/dL  Discharge recommendations pending clinical course.  Endocrinology will continue following  Closed fracture of right inferior pubic ramus (HCC)  Endocrinology consulted for Own the bone  Plan as above for osteoporosis.  Additional management per primary team.  Ambulatory dysfunction  Recommend PT/OT eval.  Vitamin D deficiency  Noted to have decreased 25-OH-vit D  Off Vit D3 supplementation while at rehab.   Will start patient on ergocalciferol 92041 units weekly for 12 weeks.     24 Hour Events : Sniffing and overnight events.    Subjective : Patient seen and examined at the bedside, not in acute distress at the time of my evaluation.  Reports feeling well.  Denies nausea, vomiting, signs or symptoms of hypoglycemia.  Reports great appetite.  States that she has been off vitamin D3 while at rehab.  Prior to her most recent admission was taking 6000 units daily.  Patient had no other complaints at this time.    Objective :  Temp:  [97.7 °F (36.5 °C)-98.6 °F (37 °C)] 97.8 °F (36.6 °C)  HR:  [73-83] 77  BP: ()/(59-66) 122/61  Resp:  [15-21] 16  SpO2:  [95 %-99 %] 95 %  O2 Device: None (Room air)    Physical Exam  Vitals and nursing note reviewed.   Constitutional:       General: She is not in acute distress.     Appearance: Normal appearance. She is not ill-appearing,  toxic-appearing or diaphoretic.   HENT:      Head: Normocephalic and atraumatic.      Nose: Nose normal.      Mouth/Throat:      Pharynx: Oropharynx is clear.   Eyes:      General: No scleral icterus.        Right eye: No discharge.         Left eye: No discharge.      Extraocular Movements: Extraocular movements intact.      Conjunctiva/sclera: Conjunctivae normal.   Cardiovascular:      Rate and Rhythm: Normal rate and regular rhythm.      Pulses: Normal pulses.      Heart sounds: Normal heart sounds. No murmur heard.  Pulmonary:      Effort: Pulmonary effort is normal. No respiratory distress.      Breath sounds: Normal breath sounds. No wheezing, rhonchi or rales.   Abdominal:      General: Abdomen is flat. Bowel sounds are normal. There is no distension.      Palpations: Abdomen is soft.      Tenderness: There is no abdominal tenderness. There is no guarding or rebound.   Musculoskeletal:      Cervical back: Normal range of motion and neck supple.      Right lower leg: No edema.      Left lower leg: No edema.   Skin:     General: Skin is warm and dry.      Coloration: Skin is not jaundiced or pale.   Neurological:      Mental Status: She is alert and oriented to person, place, and time. Mental status is at baseline.   Psychiatric:         Mood and Affect: Mood normal.         Behavior: Behavior normal.         Thought Content: Thought content normal.         Judgment: Judgment normal.         Lab Results: I have reviewed the following results:CBC/BMP:   .     11/20/24  0555   SODIUM 135   K 4.6      CO2 27   BUN 15   CREATININE 0.62   GLUC 95    , Creatinine Clearance: Estimated Creatinine Clearance: 76 mL/min (by C-G formula based on SCr of 0.62 mg/dL)., LFTs:   .     11/20/24  0555   AST 12*   ALT 13   ALB 2.8*   TBILI 0.21   ALKPHOS 71    , Vitamins B1, B6, B12, A, and D:   Lab Results   Component Value Date    [id number] 17.8 (L) 11/20/2024   , Lipid Profile:   , TSH:   Results from last 7 days    Lab Units 11/19/24  0404   TSH 3RD GENERATON uIU/mL 2.147       Imaging Results Review: No pertinent imaging studies reviewed.  Other Study Results Review: No additional pertinent studies reviewed.

## 2024-11-20 NOTE — CONSULTS
Consultation - Surgery-General   Name: Sabiha Garcia 68 y.o. female I MRN: [id number]  [facility] I Date of Admission: 11/18/2024   Date of Service: 11/20/2024 I Hospital Day: 0   Inpatient consult to Vascular Surgery  Consult performed by: Jeana Muñoz PA-C  Consult ordered by: Jackson Koch PA-C        Physician Requesting Evaluation: Lauryn Ullrich, DO     Additional consultants:   Orthopedic surgery  Endocrinology  Geriatric medicine  Podiatry    Reason for Evaluation / Principal Problem: AIOD      Assessment & Plan  AIOD/ PAD  10/23/2014 L AxBiFem (ringed PTFE), R lleofemoral endarterectomy, R CFA- AK popl bypass w/ PTFE (Roseline) performed for PAD/ AIOD w/ R 5th toe gangrene and cellulitis with finding of focal aortic occlusion and significant aortoiliac and infrainguinal occlusive disease  10/26/204 R partial 5th ray resection (Jacquie)  --stable postop  --incisions healing well.  Staples removed from R medial thigh incision w/ steri-strips applied  --cont ASA  --recommend resumption of Xarelto (PAD dosing 2.5mg BID) for graft patency, if no contraindication from orthopedic standpoint  --outpt f/u as scheduled: Dr. Dukes 12/17/2024 at 11:30, [place] office    Closed fracture of right inferior pubic ramus (HCC)  --closed fracture/ non-operative care per Ortho  --DVT px: Lovenox 30mg BID  --recommend resumption of Xarelto 2.5mg BID, if no objections from ortho perspective    Type 2 diabetes mellitus, with long-term current use of insulin (HCC)  Lab Results   Component Value Date    HGBA1C 14.8 (H) 09/18/2024       Recent Labs     11/19/24  1713 11/19/24  2039 11/20/24  0709 11/20/24  1126   POCGLU 223* 190* 99 97       Blood Sugar Average: Last 72 hrs:  (P) [id number]    --Insulin regimen  --Endo following  Protein-calorie malnutrition (HCC)  Malnutrition Findings:   Adult Malnutrition type: Chronic illness  Adult Degree of Malnutrition: Malnutrition of moderate  degree  Malnutrition Characteristics: Fat loss, Muscle loss  360 Statement: Moderate malnutrition r/t inadequate intake as evidenced by BMI 18.0, mild fat/muscle wasting of orbital/temple areas. treated with high calorie diet and Ensure Compact tid  BMI Findings:  Adult BMI Classifications: Underweight < 18.5 (18.04)    --encouraged healthy diet w/ focus on good nutrition, protein  --dietary supplements        Anemia  --acute on chronic w/ ? Worsening w/ pelvic fracture  --cont to monitor                _______________________________________________________________          HPI: Sabiha Garcia is a 68 y.o. year old female with depression, diabetes (A1c 14.8, 9/18/2024), tobacco abuse, emphysema, osteoporosis, H/O left hip ORIF 5/2020, H/O jejunostomy and removal, who was recently hospitalized for weakness and multiple falls found to be in DKA with starvation ketosis and exam finding of right fifth toe gangrene and cellulitis.  She was worked up for this tissue loss at which time she was found to have PAD/ AIOD with focal occlusion of the infrarenal abdominal aorta, bilateral common iliac artery stenosis, right SFA occlusion with reconstitution of the AK popliteal.  With these findings, she was recommended surgical revascularization and underwent left AxBiFem bypass with ringed PTFE, right iliofemoral endarterectomy, and right CFA-AK popliteal bypass with PTFE by Dr. Dueks on 10/23/2024.  Subsequently, she then underwent partial fifth ray resection on 10/26/2024 by podiatry, Dr. Dhillon.  She recovered and was eventually discharged to a rehab facility.  Unfortunately, on upon recent discharge from a rehab facility, the patient suffered a fall prompting this hospital admission at which time she was diagnosed with a right pubic rami fracture.  Given her recent stents to vascular surgery and hospitalization, consultation has been placed to vascular surgery for evaluation of surgical wounds and follow-up, which was  originally planned as an outpatient for yesterday, 11/19/2024.    Sabiha notes recovering appropriately up until yesterday's fall.  Incisions have been healing well.  She denies any leg symptoms.  Ambulating w/o difficulty prior to fall.  Denies claudication nor rest pain.    Podiatry note reviewed from 11/19/2024 w/ note of amputation site healing well.   Photo as previously captured in media:      Historical Information   Past Medical History:   Diagnosis Date    Allergic [id number]    Aorto-iliac disease (HCC)     Depression 217022    Diabetes mellitus (HCC) 089614    Osteopenia     Osteoporosis     PAD (peripheral artery disease) (HCC)     Pulmonary nodule     RUL    Smokes 1974 1/2 ppd smoker    Vitamin D deficiency      Past Surgical History:   Procedure Laterality Date    APPENDECTOMY      BYPASS FEMORAL-POPLITEAL Right 10/23/2024    Procedure: R Femoral- AK popliteal bypass w/ PTFE, right common femoral endarterectomy;  Surgeon: Haroon Dukes MD;  Location: [facility];  Service: Vascular    CHOLECYSTECTOMY      FRACTURE SURGERY Left     L hip ORIF    JEJUNOSTOMY      & removal    SC AMPUTATION METATARSAL W/TOE SINGLE Right 10/26/2024    Procedure: Partial 5th ray amputation of the right foot with removal of nonviable bone/soft tissue;  Surgeon: Bienvenido Dhillon DPM;  Location: [facility];  Service: Podiatry    SC BYPASS W/VEIN AXILLARY-FEMORAL Left 10/23/2024    Procedure: L AxBiFem;  Surgeon: Haroon Dukes MD;  Location: [facility];  Service: Vascular    SC OPTX FEM SHFT FX W/INSJ IMED IMPLT W/WO SCREW Left 05/27/2020    Procedure: INSERTION NAIL IM FEMUR ANTEGRADE (TROCHANTERIC);  Surgeon: Jose Lyons MD;  Location: [facility];  Service: Orthopedics    TONSILLECTOMY      TUBAL LIGATION       Social History   Social History     Substance and Sexual Activity   Alcohol Use Never     Social History     Substance and Sexual Activity   Drug Use Never     Social History     Tobacco Use    Smoking Status Former    Current packs/day: 0.50    Average packs/day: 0.5 packs/day for 25.0 years (12.5 ttl pk-yrs)    Types: Cigarettes   Smokeless Tobacco Never     Family History: Family history non-contributory}    Meds/Allergies   Home meds:   Prior to Admission medications    Medication Sig Start Date End Date Taking? Authorizing Provider   aspirin (ECOTRIN LOW STRENGTH) 81 mg EC tablet Take 1 tablet (81 mg total) by mouth daily 10/30/24   Jevon Rios MD   atorvastatin (LIPITOR) 80 mg tablet Take 1 tablet (80 mg total) by mouth daily with dinner 10/29/24   Jevon Rios MD   Cholecalciferol (VITAMIN D3) 1,000 units tablet Take 2 tablets (2,000 Units total) by mouth daily 10/30/24   Jevon Rios MD   docusate sodium (COLACE) 100 mg capsule Take 1 capsule (100 mg total) by mouth daily at bedtime for 7 days 10/29/24 11/5/24  Jveon Rios MD   insulin degludec (TRESIBA) 100 units/mL injection pen Inject 29 Units under the skin daily in the early morning Do not start before October 30, 2024. 10/30/24   Jevon Rios MD   insulin lispro (HumALOG/ADMELOG) 100 units/mL injection Inject 16 Units under the skin daily with breakfast 10/30/24   Jevon Rios MD   insulin lispro (HumALOG/ADMELOG) 100 units/mL injection Inject 16 Units under the skin daily with dinner 10/29/24   Jevon Rios MD   insulin lispro (HumALOG/ADMELOG) 100 units/mL injection Inject 8 Units under the skin daily with lunch 10/30/24   Jevon Rios MD   Insulin Pen Needle (BD Pen Needle Shira U/F) 32G X 4 MM MISC Use daily as directed with insulin pen 9/19/24   Alexys Blunt MD   oxyCODONE-acetaminophen (PERCOCET) 5-325 mg per tablet Take 1 tablet by mouth every 8 (eight) hours as needed for moderate pain    Historical Provider, MD   polyethylene glycol (MIRALAX) 17 g packet Take 17 g by mouth daily as needed (constipation) 10/29/24   Jevon Rios MD   rivaroxaban (Xarelto) 2.5 mg tablet Take 1 tablet (2.5 mg total) by mouth 2  (two) times a day 10/24/24   Jeana Muñoz PA-C     Scheduled Meds:  Current Facility-Administered Medications   Medication Dose Route Frequency Provider Last Rate    acetaminophen  650 mg Oral Q6H [facility] Cleveland Cardona MD      aspirin  81 mg Oral Daily [place] Cardona MD      atorvastatin  80 mg Oral Daily With Dinner Cleveland Cardona MD      enoxaparin  30 mg Subcutaneous Q12H [place] Cardona MD      ergocalciferol  50,000 Units Oral Weekly Alireza Murphy MD      HYDROmorphone  0.2 mg Intravenous Q4H PRN Cleveland Cardona MD      insulin glargine  24 Units Subcutaneous HS Alireza Murphy MD      insulin lispro  1-5 Units Subcutaneous 4x Daily (AC & HS) Alireza Murphy MD      insulin lispro  10 Units Subcutaneous Daily With Dinner Alireza Murphy MD      [START ON 11/21/2024] insulin lispro  10 Units Subcutaneous Daily With Breakfast Alireza Murphy MD      insulin lispro  8 Units Subcutaneous Daily With Lunch Alireza Murphy MD      nicotine  1 patch Transdermal Daily [place] Cardona MD      ondansetron  4 mg Intravenous Q6H PRN Cleveland Cardona MD      oxyCODONE  5 mg Oral Q4H PRN Cleveland Cardona MD      oxyCODONE  2.5 mg Oral Q4H PRN Cleveland Cardona MD      polyethylene glycol  17 g Oral Daily Cleveland Cardona MD      senna  2 tablet Oral Daily Cleveland Cardona MD       Continuous Infusions:   PRN Meds:    HYDROmorphone    ondansetron    oxyCODONE    oxyCODONE    ALLERGIES:   Allergies   Allergen Reactions    Penicillins Hives    Prednisone Other (See Comments)     personality change       Review of Systems:  General: positive for  - as noted in HPI.  + pain associated w/ R pelvic fx  Cardiovascular: no chest pain or dyspnea on exertion  Respiratory: no cough, shortness of breath, or wheezing  Gastrointestinal: no abdominal pain, change in bowel habits, or black or bloody stools  Genitourinary: no dysuria, trouble voiding, or  "hematuria  Musculoskeletal: negative  Neurological: no TIA or stroke symptoms  Hematological and Lymphatic: negative  Dermatological : negative  Psychological: negative  Ophthalmic: negative  ENT: negative      Objective   Vitals:  Blood pressure 119/59, pulse 83, temperature 98.1 °F (36.7 °C), resp. rate 17, height 5' 9\" (1.753 m), weight 55.4 kg (122 lb 2.2 oz), SpO2 97%.  Body mass index is 18.04 kg/m².        I/Os:  I/O         11/18 0701 11/19 0700 11/19 0701 11/20 0700 11/20 0701 11/21 0700    P.O.  900 420    I.V. (mL/kg) 500 1588.3 (28.7) 1530 (27.6)    Total Intake(mL/kg) 500 2488.3 (44.9) 1950 (35.2)    Urine (mL/kg/hr)  711 (0.5)     Total Output  711     Net +500 +1777.3 +1950           Unmeasured Urine Occurrence  1 x 1 x              Physical Exam  General appearance: alert, appears stated age, and cooperative  Head: Normocephalic, without obvious abnormality, atraumatic  Eyes: conjunctivae/corneas clear. PERRL, EOM's intact.  Throat: lips, mucosa, and tongue normal; teeth and gums normal  Neck: no adenopathy, no JVD, and supple, symmetrical, trachea midline  Lungs:  breathing non-labored  Chest wall: no tenderness  Heart:: S1, S2 normal  Abdomen: soft, non-tender; bowel sounds normal; no masses,  no organomegaly  Genitalia: deferred  Rectal: deferred  Extremities: extremities normal, atraumatic, no cyanosis or edema  Nonthreatened. Warm to touch. +M/S  Skin: Skin color, texture, turgor normal. No rashes or lesions  Neurologic: Grossly normal    Wound/Incision: healing well. (-) drainage. (-) erythema    L chest/ axillary incision:       L groin incision:  (skin glue sloughing)      R groin incision: (skin glue sloughing)      R thigh incision: staples intact        Pulse exam:  L AxBiFem BPG: + doppler signal (lateral chest wall just inferior to inframammary crease & midline/ suprapubic)    Radial: Right: 2+               Left: 2+  Ulnar:               Left: 2+  Brachial:              Left " 2+  Femoral: Right: 2+                   Left: 2+  Popliteal: Right: 2+                    Left: 2+  DP: Right: doppler signal          Left: 1+  PT: Right: doppler signal         Left: 1+    Lab Results and Cultures:   COVID:   Last COVID19 Screening Values       None           CBC:   Results from last 7 days   Lab Units 11/19/24  0404   WBC Thousand/uL 13.39*   HEMOGLOBIN g/dL 7.6*   HEMATOCRIT % 23.7*   PLATELETS Thousands/uL 474*     BMP/CMP:  Results from last 7 days   Lab Units 11/20/24  0555 11/19/24  1316 11/19/24  0404   POTASSIUM mmol/L 4.6 4.9 4.7   CHLORIDE mmol/L 101 99 95*   CO2 mmol/L 27 25 28   BUN mg/dL 15 16 17   CREATININE mg/dL 0.62 0.73 0.79   CALCIUM mg/dL 7.9* 8.3* 8.4     Coags:     Lipid panel:     HgbA1c:   Lab Results   Component Value Date    HGBA1C 14.8 (H) 09/18/2024    HGBA1C 15 (A) 09/12/2024    HGBA1C 13.0 (H) 05/26/2020    HGBA1C 12.8 (H) 06/16/2018       Urinalysis:   Lab Results   Component Value Date    COLORU Yellow 11/19/2024    CLARITYU Clear 11/19/2024    SPECGRAV 1.015 11/19/2024    PHUR 7.0 11/19/2024    LEUKOCYTESUR Moderate (A) 11/19/2024    NITRITE Negative 11/19/2024    GLUCOSEU 30 (3/100%) (A) 11/19/2024    KETONESU Negative 11/19/2024    BILIRUBINUR Negative 11/19/2024    BLOODU Negative 11/19/2024   ,   Urine Culture:   Lab Results   Component Value Date    URINECX <10,000 cfu/ml Enterobacter cloacae (A) 11/19/2024           Imaging Studies: Results Review Statement: No pertinent imaging studies reviewed.  EKG, Pathology, and Other Studies:   VTE Prophylaxis: Enoxaparin (Lovenox) 30mg q 12h     Code Status: Level 1 - Full Code  Advance Directive and Living Will:      Power of :    POLST:            Jeana Muñoz PA-C  11/20/2024

## 2024-11-20 NOTE — PHYSICAL THERAPY NOTE
PHYSICAL THERAPY NOTE          Patient Name: Sabiha Garcia  Today's Date: 11/20/2024    PT orders received and chart reviewed. PT attempted to see pt for initial evaluation however pt refused to participate due to increased. Despite max verbal encouragement and education pt continues to refuse mobility. PT will continue to follow and evaluate as appropriate.    Nikko Escalona, PT, DPT

## 2024-11-20 NOTE — ASSESSMENT & PLAN NOTE
-Previous history of falls with a prior left femur fracture in 2020 after a fall requiring surgical intervention  -Also recently admitted to the [facility] service in October 2020 for with recent generalized weakness and multiple falls without head strike, does use a walker to ambulate at baseline

## 2024-11-20 NOTE — PROGRESS NOTES
Progress Note - Trauma   Name: Sabiha Garcia 68 y.o. female I MRN: [id number]  Unit/Bed#: [facility] 623-01 I Date of Admission: 11/18/2024   Date of Service: 11/20/2024 I Hospital Day: 0    Assessment & Plan  Closed fracture of right inferior pubic ramus (HCC)  -Status post fall outside of her home yesterday evening after returning home from rehab, fell onto her right hip and tailbone, unable to bear weight after the fall, no head strike, takes ASA and 2.5 mg Xarelto twice daily  -Imaging including CT RLE and XR hip/pelvis significant for right inferior pubic ramus fracture  -Orthopedics consult  Type 2 diabetes mellitus, with long-term current use of insulin (HCC)    Lab Results   Component Value Date    HGBA1C 14.8 (H) 09/18/2024   -History of poorly controlled diabetes, on long-term insulin therapy  -Continue SSI while inpatient  -Goal blood sugars less than 180  HLD (hyperlipidemia)  -Continue home atorvastatin  Ambulatory dysfunction  -Previous history of falls with a prior left femur fracture in 2020 after a fall requiring surgical intervention  -Also recently admitted to the [facility] service in October 2020 for with recent generalized weakness and multiple falls without head strike, does use a walker to ambulate at baseline  Aortoiliac occlusive disease (HCC)  -Recent admission with vascular invention including left-sided axillary bifemoral bypass as well as right common femoral to above-knee popliteal artery bypass on 10/23 with vascular surgery  -Also had right sided fifth ray amputation with podiatry on 10/26  -Discharged on ASA81 daily and 2.5 mg Xarelto twice daily  Osteoporosis with current pathological fracture      VTE Prophylaxis: Enoxaparin (Lovenox), and Aspirin     Disposition: rehab vs placement    TRAUMA TERTIARY SURVEY  Summary of Diagnosed Injuries: Type 2 DM  Ambulatory dysfunction  Right inferior pubic rami fracture  Osteoporosis  Aortic occlusive disease    Transfer from: home    Mechanism of  "Injury:Fall     Chief Complaint: hip discomfort    24 Hour Events : Was recently discharged home from SNF and walking to her house fell.  Ambulance called and brought to hospital.  Subjective : \" I hope to get better\"    Objective :  Temp:  [97.5 °F (36.4 °C)-98.6 °F (37 °C)] 97.8 °F (36.6 °C)  HR:  [73-83] 74  BP: ()/(58-66) 132/66  Resp:  [15-21] 18  SpO2:  [96 %-99 %] 98 %  O2 Device: None (Room air)    I/O         11/18 0701  11/19 0700 11/19 0701  11/20 0700 11/20 0701  11/21 0700    P.O.  900     I.V. (mL/kg) 500 1588.3 (28.7)     Total Intake(mL/kg) 500 2488.3 (44.9)     Urine (mL/kg/hr)  711 (0.5)     Total Output  711     Net +500 +1777.3            Unmeasured Urine Occurrence  1 x           Lines/Drains/Airways       Active Status       Name Placement date Placement time Site Days    External Urinary Catheter 11/19/24  1210  -- less than 1                  Physical Exam  Vitals reviewed.   Constitutional:       Appearance: Normal appearance.   HENT:      Head: Normocephalic.      Right Ear: External ear normal.      Left Ear: External ear normal.      Nose: Nose normal.   Eyes:      Extraocular Movements: Extraocular movements intact.      Pupils: Pupils are equal, round, and reactive to light.   Cardiovascular:      Rate and Rhythm: Normal rate and regular rhythm.      Pulses: Normal pulses.      Heart sounds: Normal heart sounds.   Pulmonary:      Effort: Pulmonary effort is normal.      Breath sounds: Normal breath sounds.   Abdominal:      Palpations: Abdomen is soft.   Genitourinary:     Comments: voiding  Musculoskeletal:         General: Tenderness present.      Cervical back: Normal range of motion. No tenderness.      Comments: Working with therapy   Skin:     General: Skin is warm and dry.   Neurological:      General: No focal deficit present.      Mental Status: She is alert and oriented to person, place, and time.   Psychiatric:         Mood and Affect: Mood normal.         Behavior: " Behavior normal.          1. Before the illness or injury that brought you to the Emergency, did you need someone to help you on a regular basis? 1=Yes   2. Since the illness or injury that brought you to the Emergency, have you needed more help than usual to take care of yourself? 1=Yes   3. Have you been hospitalized for one or more nights during the past 6 months (excluding a stay in the Emergency Department)? 1=Yes   4. In general, do you see well? 0=Yes   5. In general, do you have serious problems with your memory? 0=No   6. Do you take more than three different medications everyday? 1=Yes   TOTAL   4     Did you order a geriatric consult if the score was 2 or greater?: yes           Lab Results: I have reviewed the following results:  Recent Labs     11/19/24  0404 11/19/24  1316 11/20/24  0555   WBC 13.39*  --   --    HGB 7.6*  --   --    HCT 23.7*  --   --    *  --   --    SODIUM 130*   < > 135   K 4.7   < > 4.6   CL 95*   < > 101   CO2 28   < > 27   BUN 17   < > 15   CREATININE 0.79   < > 0.62   GLUC 216*   < > 95   MG 1.8*  --   --    PHOS 3.1  --   --    AST  --   --  12*   ALT  --   --  13   ALB  --   --  2.8*   TBILI  --   --  0.21   ALKPHOS  --   --  71    < > = values in this interval not displayed.

## 2024-11-20 NOTE — ASSESSMENT & PLAN NOTE
Noted to have decreased 25-OH-vit D  Off Vit D3 supplementation while at rehab.   Will start patient on ergocalciferol 40729 units weekly for 12 weeks.

## 2024-11-20 NOTE — ASSESSMENT & PLAN NOTE
--closed fracture/ non-operative care per Ortho  --DVT px: Lovenox 30mg BID  --recommend resumption of Xarelto 2.5mg BID, if no objections from ortho perspective

## 2024-11-20 NOTE — PROGRESS NOTES
Progress Note - Geriatric Medicine   Sabiha Garcia 68 y.o. female MRN: [id number]  [facility] Encounter: [id number]      Assessment/Plan:    Ambulatory dysfunction with fall  -reportedly mechanical fall 11/18/24  -admitted with below noted injuries   -Requires use of walker for ambulation at baseline  -denies history of additional recent or recurrent falls   -remains high risk future falls due to age, now with hx of fall, deconditioning/debility and unfamiliar environment, cont strict fall precautions   -encourage good body mechanics and assist with all transfers  -keep personal items and call bell close to prevent reaching  -maintain environment free of fall hazards  -encourage appropriate footwear and adequate lighting at all times when out of bed  -recommend home fall risk assessment and personal fall alert system if returning home  -PT and OT pending        Right inferior pubic rami fracture  -s/p fall as outlined above  -noted on XR hip/pelvis obtained on admission   -continue acute multimodal pain control  -Neurovascular checks per protocol  -Ortho consult pending   -PT/OT pending        Acute pain due to trauma  -continue acute multimodal pain control per Geriatric pain protocol:  Tylenol 975mg Q8H scheduled  Roxicodone 2.5mg Q4H PRN moderate pain  Roxicodone 5mg Q4H PRN severe pain  Dilaudid 0.2mg Q2H PRN  -consider adjuncts such as lidocaine patch topically to appropriate areas   -encourage addition of non-pharmacologic pain treatment including ice and frequent repositioning  -recommend bowel regimen to prevent constipation due to increased risk with acute pain and opiate pain medications     Hyponatremia  -[Na] 130 on initial presentation now improved to 135  -likely multifactorial including acute pain due to trauma and poor recent oral intake due to GI distress at STR now improving   -encourage well balanced nutritional intake  -monitor electrolytes closely, avoid rapid and drastic fluctuations  of no more than 8-12 pts/24H     Osteoporosis  -evidenced by fragility fracture of pelvis sustained in fall from standing height   -confirmed on DEXA 5/2022  -in setting of vitamin D deficiency  -Encourage well-balanced diet rich in calcium and vitamin D with supplementation for any needs unable to be met by diet alone  -Outpatient follow-up with Endo for discussion antiresorptive therapy treatment     Vit D deficiency   -Vitamin D low at 22.2  -Continue daily supplementation vitamin D 2000 IU as recommended by Endocrinology during recent hospitalization     Cognitive screening  -Awake alert and oriented, denies memory or cognitive concerns  -Reportedly independent with ADLs and IADLs at baseline  -no prior cognitive testing on record for review   -[facility] 10/17/2024 imaging personally reviewed, reveals at least mild diffuse chronic microangiopathic changes  -TSH slightly depressed at 0.425 at last check 10/2024, free T4 WNL0.83  -No recent B12 on record review, recommend checking with routine labs  -At risk age and cardiovascular related cognitive decline, continue secondary risk factor modifications  -Encourage patient remain physically, socially, cognitively active and engaged to maintain cognitive acuity  -Encourage use of sensory assist devices such as corrective lenses at all appropriate times to reduce risk of uncorrected sensory impairment from continuing to isolation, confusion, encephalopathy and more precipitous cognitive decline  -Consider comprehensive genetic assessment with cognitive testing as outpatient to establish baseline     DM-II, uncontrolled with peripheral vascular complications   -A1c 14.8  -Recommend healthy lifestyle modifications, diabetic diet and continuation of home insulin regimen with goal blood sugar during hospitalization 140-180 to reduce risk hypoglycemia  -pt notes working on improving overall nutritional status and protein intake, dislikes glucerna but intersted in ensure  supplements if allowed on current diet   -Continue close outpatient follow-up with PCP and Endocrinology for ongoing management and continued age-appropriate diabetic screenings and cares     Aortoiliac occlusive disease  -s/p left axillary bifemoral bypass and right common femoral to above-knee popliteal bypass and right fifth ray amputation during recent hospitalization 10/2024  -Continue secondary risk factor modifications  -Smoking cessation strongly encouraged  -Maintained on aspirin, atorvastatin, rivaroxaban chronically as outpatient  -vascular surgery on consult  -Continue close outpatient follow-up with PCP and vascular surgery for ongoing management     Gangrene of toe of right foot   -S/p amputation right fifth digit 10/26/2024 for dry gangrene  -Continue optimization of cardiovascular risk factors  -Continue close outpatient follow-up with Podiatry for ongoing management     Nicotine dependence due to cigarettes  -Complete cessation encouraged, continue daily bedside cessation counseling  -Nicotine patch during hospitalization  -Continue close outpatient follow-up with PCP for ongoing cessation support     Impaired Vision  -recommend use of corrective lenses at all appropriate times  -encourage adequate lighting and encourage use of assistance with ambulation  -keep personal belongings close to person to avoid reaching  -Consider large font for printed materials provided to patient  -annual optho eval to ensure Rx up to date      Impaired mastication  -Requires use of dentures -patient notes has been unable to locate for some time   -ensure meal consistency appropriate for abilities  -Encourage good oral hygiene  -continue aspiration precautions     Frailty syndrome in geriatric patient   -Clinical frailty scale stage V/VI, moderately frail, progressive  -Multifactorial including age, aortoiliac occlusive disease, uncontrolled DM-II, and multitude of additional chronic medical comorbidities now with fall  and acute traumatic injuries superimposed in elderly individual with limited physiologic and metabolic reserves  -Continue optimization chronic conditions and address acute metabolic derangements as arise  -Encourage well-balanced nutritional intake, consider adding ensure supplements   -Monitor for and treat any underlying anxiety/mood/depression symptoms as may impact patient response to therapies as well as overall sense of wellbeing and quality of life  -Continue psychosocial supports of patient and caregivers  -Continue to ensure that treatments and interventions align with patient's wishes and goals of care     High risk developing delirium   -due to age, fall, traumatic injuries, acute pain, hospitalization  -cont delirium precautions  -maintain normal sleep/wake cycle  -minimize overnight interruptions, group overnight vitals/labs/nursing checks as medically appropriate  -dim lights, close blinds and turn off tv to minimize stimulation and encourage sleep environment in evenings  -ensure that pain is well controlled -see above  -monitor for fecal and urinary retention which may precipitate delirium  -encourage early mobilization and ambulation with assist as cleared to safely do so  -provide frequent reorientation and redirection as indicated and appropriate  -avoid medications which may precipitate or worsen delirium such as tramadol, benzodiazepine, anticholinergics, and benadryl  -encourage hydration and nutrition   -redirect unwanted behaviors as first line    Care coordination: rounded with Gita (RN)    Subjective:     Sabiha is seen and examined at bedside where she is sitting resting, she reports that her pain is well controlled on her current regimen, she has been sleeping well and appetite is good. She notes that she has been working on improving her nutritional status and inquires if she can have Ensure shakes added to her meals as she does not like Glucerna as its too sweet. No other acute complaints,  "nursing reports no acute events overnight.     Review of Systems   Constitutional:  Negative for appetite change, chills and fever.   HENT: Negative.     Eyes: Negative.    Respiratory: Negative.  Negative for shortness of breath.    Cardiovascular: Negative.  Negative for chest pain and palpitations.   Gastrointestinal: Negative.  Negative for abdominal pain.   Genitourinary: Negative.    Musculoskeletal:  Positive for gait problem.        Pelvic bone pain    Skin: Negative.    Neurological:  Negative for dizziness, light-headedness and headaches.   Hematological: Negative.    Psychiatric/Behavioral: Negative.  Negative for sleep disturbance.    All other systems reviewed and are negative.    Objective:     Vitals: Blood pressure 122/61, pulse 77, temperature 97.8 °F (36.6 °C), resp. rate 16, height 5' 9\" (1.753 m), weight 55.4 kg (122 lb 2.2 oz), SpO2 95%.,Body mass index is 18.04 kg/m².      Intake/Output Summary (Last 24 hours) at 11/20/2024 1049  Last data filed at 11/20/2024 0521  Gross per 24 hour   Intake 2488.33 ml   Output 711 ml   Net 1777.33 ml     Current Medications: Reviewed    Physical Exam:   Physical Exam  Vitals and nursing note reviewed.   Constitutional:       General: She is not in acute distress.     Comments: Thin frail elderly female    HENT:      Head: Normocephalic.      Nose: Nose normal.      Mouth/Throat:      Mouth: Mucous membranes are dry.      Comments: Poor dentition missing multiple teeth   Eyes:      General: No scleral icterus.        Right eye: No discharge.         Left eye: No discharge.      Conjunctiva/sclera: Conjunctivae normal.   Neck:      Comments: Trachea midline, phonation normal  Cardiovascular:      Rate and Rhythm: Normal rate and regular rhythm.      Pulses: Normal pulses.   Pulmonary:      Effort: Pulmonary effort is normal. No respiratory distress.      Breath sounds: No wheezing.      Comments: Saturating well on room air   Abdominal:      General: There is no " distension.      Palpations: Abdomen is soft.      Tenderness: There is no abdominal tenderness.   Musculoskeletal:      Cervical back: Neck supple.      Comments: Severe diffuse subcutaneous fat and muscle wasting    Skin:     General: Skin is warm and dry.      Comments: Thin and friable    Neurological:      Mental Status: She is alert.      Comments: Awake and alert, oriented, answers questions appropriately   Psychiatric:         Mood and Affect: Mood normal.         Behavior: Behavior normal.      Comments: Polite pleasant cooperative         Invasive Devices       Peripheral Intravenous Line  Duration             Peripheral IV 11/19/24 Right Antecubital 1 day              Drain  Duration             Urethral Catheter Coude 18 Fr. 26 days    External Urinary Catheter <1 day                  Lab Results:     I have personally reviewed pertinent lab results including the following:    Results from last 7 days   Lab Units 11/19/24  0404   WBC Thousand/uL 13.39*   HEMOGLOBIN g/dL 7.6*   HEMATOCRIT % 23.7*   PLATELETS Thousands/uL 474*   SEGS PCT % 70   MONO PCT % 6   EOS PCT % 2     Results from last 7 days   Lab Units 11/20/24  0555 11/19/24  1316 11/19/24  0404   POTASSIUM mmol/L 4.6 4.9 4.7   CHLORIDE mmol/L 101 99 95*   CO2 mmol/L 27 25 28   BUN mg/dL 15 16 17   CREATININE mg/dL 0.62 0.73 0.79   CALCIUM mg/dL 7.9* 8.3* 8.4   ALK PHOS U/L 71  --   --    ALT U/L 13  --   --    AST U/L 12*  --   --      I have personally reviewed the following imaging study reports in PACS:    11/19/24- XR pelvis, CT lumbar spine, CT RLE

## 2024-11-20 NOTE — UTILIZATION REVIEW
Initial Clinical Review    WAS OBSERVATION 11/19 CONVERTED TO INPATIENT ADMISSION 11/20 DUE TO CONTINUED STAY REQUIRED TO CARE FOR PATIENT WITH Fall with Right inferior pubic ramus fracture.    Admission: Date/Time/Statement:   Admission Orders (From admission, onward)       Ordered        11/20/24 1619  INPATIENT ADMISSION  Once            11/19/24 0422  Place in Observation  Once                          Orders Placed This Encounter   Procedures    INPATIENT ADMISSION     Standing Status:   Standing     Number of Occurrences:   1     Level of Care:   Med Surg [16]     Estimated length of stay:   More than 2 Midnights     Certification:   I certify that inpatient services are medically necessary for this patient for a duration of greater than two midnights. See H&P and MD Progress Notes for additional information about the patient's course of treatment.     ED Arrival Information       Expected   -    Arrival   11/18/2024 22:23    Acuity   Urgent              Means of arrival   Ambulance    Escorted by   SHALOM (Lucien)    Service   Trauma    Admission type   Emergency              Arrival complaint   Fall             Chief Complaint   Patient presents with    Fall     Pt arrived via EMS. Pt had a mechanical fall on sidewalk onto her R hip/buttocks. -LOC, -HS       Initial Presentation: 68 y.o. female to ED presents for DKA and starvation ketosis, significant vascular disease. Recent hospitalization 10/11-10/29, S/p surgical intervention with Vascular surgery team including left-sided axillary bifemoral bypass and right-sided common femoral to above-knee popliteal bypass on 10/23. S/p right sided ray amputation on 10/26. Discharged on aspirin 81 mg daily on Xarelto bid. Was discharge to SNF for rehab.   Pt was returning home from SNF yesterday evening via taxi when she fell outside her home before making inside. States she slipped on the cement outside and fell onto her right hip and tailbone, immediate pain  in the area. Denies head strike or LOC/ Pt unable to get up on her own or bear weight,  was able to call an ambulance for her. Pt typically ambulates with a walker but was not using walker at this time, she lives at home alone.   PMH for poorly controlled DM, smoker, HLD, PAD, depression with anxiety.   Admit to Observation Dx; Fall with Right inferior pubic ramus fracture  Plan; Orthopedic consult. Pain control.   CT RLE and XR hip/pelvis significant for right inferior pubic ramus fracture     11/19  Orthopedic cons; S/P mechanical fall with right inferior pubic rami fracture   WBAT. not indicated for surgical management.   MOOP initiated for medical evaluation of cause for fragility fracture  Analgesics for pain    Podiatry cons; Cellulitis right foot.  S/p Right partial fifth ray amputation (DOS: 10/26/24. Right foot partial fifth ray amputation is healing and the suture was removed yesterday 11/18/2024. Patient was given Maxorb DSD dressing with compression of Ace bandage.   Continue local wound care.    Endocrinology cons; Osteoporosis with current pathological fracture.  Emphasized importance of regular calcium and vitamin D intake.  Advised pt to decrease vitamin D3 2000 units daily.  Additionally recommend daily intake of calcium 1200 mg.  Order updated 25-OH vitamin D, as well as PTH, TSH and free T4, CMP.  Outpt f/u for Dexa scan    11/20 Changed to Inpatient status  Progress notes; Non operative management. Pain control.  PT/OT eval and treat.    General-Surgery cons; AIOD/PAD. S/p R Partial 5th Ray resection 10/26/24  Continue aspirin. Recommend resumption of Xarelto.  Outpt f/u as scheduled.  Incisions healing well. Staples removed from R medial thigh incision w/ steri-strips applied     Date: 11/21  Day 3: Has surpassed a 2nd midnight with active treatments and services.  Vascular and Podiatry post surgical f/u. Local wound care. Neurovascular checks.   Aspirin/ Xarelto for graft patency.  Continue acute multimodal pain control.   PT/OT eval and treat.  Pt notes sacral region pain this am per Gerontology.      ED Treatment-Medication Administration from 11/18/2024 0073 to 11/19/2024 0933         Date/Time Order Dose Route Action     11/18/2024 3903 oxyCODONE (ROXICODONE) IR tablet 5 mg 5 mg Oral Given     11/19/2024 0836 aspirin (ECOTRIN LOW STRENGTH) EC tablet 81 mg 81 mg Oral Given     11/19/2024 0836 senna (SENOKOT) tablet 17.2 mg 17.2 mg Oral Given     11/19/2024 0836 enoxaparin (LOVENOX) subcutaneous injection 30 mg 30 mg Subcutaneous Given     11/19/2024 0532 acetaminophen (TYLENOL) tablet 650 mg 650 mg Oral Given     11/19/2024 0531 multi-electrolyte (PLASMALYTE-A/ISOLYTE-S PH 7.4) IV solution 100 mL/hr Intravenous New Bag     11/19/2024 0554 magnesium sulfate 2 g/50 mL IVPB (premix) 2 g 2 g Intravenous New Bag     11/19/2024 0554 multi-electrolyte (ISOLYTE-S PH 7.4) bolus 500 mL 500 mL Intravenous New Bag            Scheduled Medications:  acetaminophen, 650 mg, Oral, Q6H JUAN  aspirin, 81 mg, Oral, Daily  atorvastatin, 80 mg, Oral, Daily With Dinner  ergocalciferol, 50,000 Units, Oral, Weekly  insulin glargine, 24 Units, Subcutaneous, HS  insulin lispro, 1-5 Units, Subcutaneous, 4x Daily (AC & HS)  insulin lispro, 10 Units, Subcutaneous, Daily With Dinner  insulin lispro, 10 Units, Subcutaneous, Daily With Breakfast  insulin lispro, 8 Units, Subcutaneous, Daily With Lunch  nicotine, 1 patch, Transdermal, Daily  polyethylene glycol, 17 g, Oral, Daily  rivaroxaban, 2.5 mg, Oral, BID  senna, 2 tablet, Oral, Daily      Continuous IV Infusions:   multi-electrolyte (PLASMALYTE-A/ISOLYTE-S PH 7.4) IV solution  Rate: 100 mL/hr Dose: 100 mL/hr  Freq: Continuous Route: IV  Indications of Use: IV Hydration  Last Dose: Stopped (11/20/24 1243)  Start: 11/19/24 0430 End: 11/20/24 1129       PRN Meds:  HYDROmorphone, 0.2 mg, Intravenous, Q4H PRN  ondansetron, 4 mg, Intravenous, Q6H PRN  oxyCODONE, 5 mg,  Oral, Q4H PRN 11/19 x1, 11/20 x4, 11/21 x1  oxyCODONE, 2.5 mg, Oral, Q4H PRN      ED Triage Vitals [11/18/24 2228]   Temperature Pulse Respirations Blood Pressure SpO2 Pain Score   97.8 °F (36.6 °C) 93 18 148/67 100 % 1     Weight (last 2 days)       Date/Time Weight    11/19/24 09:48:45 55.4 (122.14)            Vital Signs (last 3 days)       Date/Time Temp Pulse Resp BP MAP (mmHg) SpO2 O2 Device Patient Position - Orthostatic VS Jony Coma Scale Score Pain    11/21/24 0947 -- 92 -- -- -- 96 % -- -- -- --    11/21/24 0847 -- -- -- -- -- -- -- -- 15 7    11/21/24 07:14:06 97.5 °F (36.4 °C) 83 18 136/73 94 97 % -- -- -- --    11/21/24 0400 -- -- -- -- -- -- -- -- 15 --    11/21/24 03:55:06 97.9 °F (36.6 °C) 86 -- 134/74 94 97 % -- -- -- --    11/21/24 00:34:48 98 °F (36.7 °C) 81 -- 136/70 92 97 % -- -- -- --    11/21/24 0000 -- -- -- -- -- -- -- -- 15 --    11/20/24 2220 -- -- -- -- -- -- -- -- -- 9    11/20/24 2108 -- -- -- -- -- -- -- -- -- 8    11/20/24 2000 -- -- -- -- -- -- -- -- 15 --    11/20/24 1844 97.9 °F (36.6 °C) 82 16 119/58 78 97 % -- -- -- --    11/20/24 1600 -- -- -- -- -- -- -- -- 15 --    11/20/24 14:41:56 98.1 °F (36.7 °C) 83 17 119/59 79 97 % -- -- -- --    11/20/24 1315 -- -- -- -- -- -- -- -- -- 8 11/20/24 1211 -- -- -- -- -- -- -- -- 15 --    11/20/24 10:48:09 97.8 °F (36.6 °C) 77 16 122/61 81 95 % -- -- -- --    11/20/24 0849 -- -- -- -- -- -- -- -- -- 8    11/20/24 0830 -- -- -- -- -- -- None (Room air) -- 15 No Pain    11/20/24 07:07:45 97.8 °F (36.6 °C) 74 18 132/66 88 98 % -- -- -- --    11/20/24 0519 -- -- -- -- -- -- -- -- -- 6    11/20/24 0400 -- -- -- -- -- -- -- -- 15 --    11/20/24 02:37:17 97.7 °F (36.5 °C) 73 18 138/66 90 99 % None (Room air) Lying -- --    11/20/24 0233 -- -- -- -- -- -- -- -- -- 8 11/20/24 0052 -- -- -- -- -- 96 % None (Room air) -- 15 --    11/19/24 22:22:42 97.9 °F (36.6 °C) 75 16 106/59 75 99 % -- -- -- --    11/19/24 2042 -- -- -- -- -- -- -- -- -- 8     11/19/24 2004 -- -- -- -- -- -- -- -- 15 --    11/19/24 19:50:06 98 °F (36.7 °C) 80 16 100/61 74 97 % -- -- -- --    11/19/24 19:48:25 98 °F (36.7 °C) 78 15 93/60 71 98 % -- -- -- --    11/19/24 1602 -- -- -- -- -- -- -- -- 15 --    11/19/24 15:48:50 97.8 °F (36.6 °C) 81 21 126/61 83 98 % -- -- -- --    11/19/24 1430 -- -- -- -- -- -- -- -- -- 6    11/19/24 1253 -- -- -- -- -- -- -- -- 15 --    11/19/24 1248 -- -- -- -- -- -- -- -- -- 1    11/19/24 12:16:15 98.6 °F (37 °C) 83 18 125/60 82 99 % -- -- -- --    11/19/24 1150 -- -- -- -- -- -- -- -- 15 --    11/19/24 1101 -- -- -- -- -- -- -- -- -- No Pain    11/19/24 09:48:45 97.5 °F (36.4 °C) 73 18 121/58 79 97 % -- -- -- --    11/19/24 0800 98.3 °F (36.8 °C) 77 18 124/58 -- 99 % None (Room air) -- -- 4    11/19/24 0400 -- -- -- -- -- -- -- -- 15 --    11/18/24 2347 -- 87 18 150/65 -- 99 % None (Room air) Lying -- --    11/18/24 2345 -- -- -- -- -- -- -- -- -- 9    11/18/24 2230 -- -- -- -- -- -- -- -- 15 --    11/18/24 2228 97.8 °F (36.6 °C) 93 18 148/67 -- 100 % None (Room air) Lying -- 1              Pertinent Labs/Diagnostic Test Results:   Radiology:  CT lower extremity wo contrast right   Final Interpretation by Kenton Palacios MD (11/19 0241)      1.  Right inferior pubic ramus fracture   2.  Air within the bladder.  Consider gas-forming infection if there has been no recent catheterization.      The study was marked in EPIC for immediate notification.      Workstation performed: [id number]         CT spine lumbar without contrast   Final Interpretation by Ryanne Vaughn MD (11/19 0102)      Normal computed tomography of the lumbar spine.      Workstation performed: [id number]         XR hip/pelv 2-3 vws right if performed   ED Interpretation by Dawit Diaz MD (11/19 0129)   No acute osseous abnormality as interpreted by myself.      Final Interpretation by Kenton Palacios MD (11/19 0248)      Right inferior pubic ramus fracture better seen on CT          Computerized Assisted Algorithm (CAA) may have been used to analyze all applicable images.            Workstation performed: [id number]           Cardiology:  No orders to display     GI:  No orders to display           Results from last 7 days   Lab Units 11/21/24  0643 11/20/24  1752 11/19/24  0404   WBC Thousand/uL 13.40*  --  13.39*   HEMOGLOBIN g/dL 7.9* 7.8* 7.6*   HEMATOCRIT % 26.3* 24.9* 23.7*   PLATELETS Thousands/uL 545*  --  474*   TOTAL NEUT ABS Thousands/µL 8.69*  --  9.41*         Results from last 7 days   Lab Units 11/20/24  0555 11/19/24  1316 11/19/24  0404   SODIUM mmol/L 135 132* 130*   POTASSIUM mmol/L 4.6 4.9 4.7   CHLORIDE mmol/L 101 99 95*   CO2 mmol/L 27 25 28   ANION GAP mmol/L 7 8 7   BUN mg/dL 15 16 17   CREATININE mg/dL 0.62 0.73 0.79   EGFR ml/min/1.73sq m 92 84 77   CALCIUM mg/dL 7.9* 8.3* 8.4   MAGNESIUM mg/dL  --   --  1.8*   PHOSPHORUS mg/dL  --   --  3.1     Results from last 7 days   Lab Units 11/20/24  0555   AST U/L 12*   ALT U/L 13   ALK PHOS U/L 71   TOTAL PROTEIN g/dL 5.5*   ALBUMIN g/dL 2.8*   TOTAL BILIRUBIN mg/dL 0.21     Results from last 7 days   Lab Units 11/21/24  0715 11/20/24  2138 11/20/24 2023 11/20/24  1628 11/20/24  1126 11/20/24  0709 11/19/24  2039 11/19/24  1713 11/19/24  1214 11/19/24  0715   POC GLUCOSE mg/dl 135 180* 159* 76 97 99 190* 223* 149* 173*     Results from last 7 days   Lab Units 11/20/24  0555 11/19/24  1316 11/19/24  0404   GLUCOSE RANDOM mg/dL 95 219* 216*             Beta- Hydroxybutyrate   Date Value Ref Range Status   10/11/2024 5.20 (H) 0.02 - 0.27 mmol/L Final          Results from last 7 days   Lab Units 11/19/24  0404   TSH 3RD GENERATON uIU/mL 2.147       Results from last 7 days   Lab Units 11/19/24  1425   CLARITY UA  Clear   COLOR UA  Yellow   SPEC GRAV UA  1.015   PH UA  7.0   GLUCOSE UA mg/dl 30 (3/100%)*   KETONES UA mg/dl Negative   BLOOD UA  Negative   PROTEIN UA mg/dl Trace*   NITRITE UA  Negative   BILIRUBIN UA  Negative    UROBILINOGEN UA (BE) mg/dl 2.0*   LEUKOCYTES UA  Moderate*   WBC UA /hpf 10-20*   RBC UA /hpf None Seen   BACTERIA UA /hpf None Seen   EPITHELIAL CELLS WET PREP /hpf Occasional                                 Results from last 7 days   Lab Units 11/19/24  1425   URINE CULTURE  <10,000 cfu/ml Enterobacter cloacae*                   Past Medical History:   Diagnosis Date    Allergic 086787    Aorto-iliac disease (HCC)     Depression 269753    Diabetes mellitus (HCC) 874874    Osteopenia     Osteoporosis     PAD (peripheral artery disease) ([facility])     Pulmonary nodule     RUL    Smokes 1974 1/2 ppd smoker    Vitamin D deficiency      Present on Admission:   Closed fracture of right inferior pubic ramus ([facility])   HLD (hyperlipidemia)   Ambulatory dysfunction   AIOD/ PAD   Osteoporosis with current pathological fracture   Vitamin D deficiency   Protein-calorie malnutrition ([facility])   Anemia      Admitting Diagnosis: Closed fracture of right inferior pubic ramus, initial encounter ([facility]) [S32.591A]  Unspecified multiple injuries, initial encounter [T07.XXXA]  Age/Sex: 68 y.o. female    Network Utilization Review Department  ATTENTION: Please call with any questions or concerns to [phone] and carefully listen to the prompts so that you are directed to the right person. All voicemails are confidential.   For Discharge needs, contact Care Management DC Support Team at [phone] opt. [phone]  Send all requests for admission clinical reviews, approved or denied determinations and any other requests to dedicated fax number below belonging to the campus where the patient is receiving treatment. List of dedicated fax numbers for the Facilities:  FACILITY NAME UR FAX NUMBER   ADMISSION DENIALS (Administrative/Medical Necessity) [phone]   DISCHARGE SUPPORT TEAM (NETWORK) [phone]   PARENT CHILD HEALTH (Maternity/NICU/Pediatrics) [phone]   [facility] [phone]   [facility]  [facility] [phone]   [facility] [phone]   [facility] [phone]   [facility] [phone]   [facility] [phone]   [facility] [phone]   [facility] [phone]   [facility] [phone]   [facility] [phone]   [facility] [phone]   [facility] [phone]

## 2024-11-20 NOTE — OCCUPATIONAL THERAPY NOTE
Occupational Therapy CX        Patient Name: Sabiha Garcia  Today's Date: 11/20/2024 11/20/24 2723   OT Last Visit   OT Visit Date 11/20/24   Note Type   Note type Cancelled Session   Cancel Reasons Refusal   Additional Comments PT ADAMANTLY REFUSING OOB ACTIVITY 2' REPORTED 10/10 PAIN DESPITE RECENTLY RECEIVING PAIN MEDICATION AND NOT FLACCING THAT HIGH OF A PAIN LEVEL. MAX EDUCATION AND ENCOURAGEMENT PROVIDED. PT AGREEABLE TO PARTICIPATE TOMORROW.   Pain Assessment   Pain Assessment Tool FLACC   Pain Rating: FLACC (Rest) - Face 0   Pain Rating: FLACC (Rest) - Legs 0   Pain Rating: FLACC (Rest) - Activity 0   Pain Rating: FLACC (Rest) - Cry 0   Pain Rating: FLACC (Rest) - Consolability 0   Score: FLACC (Rest) 0   Pain Rating: FLACC (Activity) - Face 1   Pain Rating: FLACC (Activity) - Legs 0   Pain Rating: FLACC (Activity) - Activity 0   Pain Rating: FLACC (Activity) - Cry 1   Pain Rating: FLACC (Activity) - Consolability 0   Score: FLACC (Activity) 2

## 2024-11-21 PROBLEM — E44.0 MODERATE PROTEIN-CALORIE MALNUTRITION (HCC): Status: ACTIVE | Noted: 2024-11-21

## 2024-11-21 LAB
ALBUMIN SERPL BCG-MCNC: 3 G/DL (ref 3.5–5)
ALP SERPL-CCNC: 78 U/L (ref 34–104)
ALT SERPL W P-5'-P-CCNC: 12 U/L (ref 7–52)
ANION GAP SERPL CALCULATED.3IONS-SCNC: 9 MMOL/L (ref 4–13)
AST SERPL W P-5'-P-CCNC: 12 U/L (ref 13–39)
BASOPHILS # BLD AUTO: 0.11 THOUSANDS/ÂΜL (ref 0–0.1)
BASOPHILS NFR BLD AUTO: 1 % (ref 0–1)
BILIRUB SERPL-MCNC: 0.21 MG/DL (ref 0.2–1)
BUN SERPL-MCNC: 14 MG/DL (ref 5–25)
CALCIUM ALBUM COR SERPL-MCNC: 9.4 MG/DL (ref 8.3–10.1)
CALCIUM SERPL-MCNC: 8.6 MG/DL (ref 8.4–10.2)
CHLORIDE SERPL-SCNC: 101 MMOL/L (ref 96–108)
CO2 SERPL-SCNC: 25 MMOL/L (ref 21–32)
CREAT SERPL-MCNC: 0.61 MG/DL (ref 0.6–1.3)
EOSINOPHIL # BLD AUTO: 0.8 THOUSAND/ÂΜL (ref 0–0.61)
EOSINOPHIL NFR BLD AUTO: 6 % (ref 0–6)
ERYTHROCYTE [DISTWIDTH] IN BLOOD BY AUTOMATED COUNT: 15 % (ref 11.6–15.1)
GFR SERPL CREATININE-BSD FRML MDRD: 93 ML/MIN/1.73SQ M
GLUCOSE SERPL-MCNC: 100 MG/DL (ref 65–140)
GLUCOSE SERPL-MCNC: 135 MG/DL (ref 65–140)
GLUCOSE SERPL-MCNC: 154 MG/DL (ref 65–140)
GLUCOSE SERPL-MCNC: 170 MG/DL (ref 65–140)
GLUCOSE SERPL-MCNC: 193 MG/DL (ref 65–140)
HCT VFR BLD AUTO: 26.3 % (ref 34.8–46.1)
HGB BLD-MCNC: 7.9 G/DL (ref 11.5–15.4)
IMM GRANULOCYTES # BLD AUTO: 0.12 THOUSAND/UL (ref 0–0.2)
IMM GRANULOCYTES NFR BLD AUTO: 1 % (ref 0–2)
LYMPHOCYTES # BLD AUTO: 3.09 THOUSANDS/ÂΜL (ref 0.6–4.47)
LYMPHOCYTES NFR BLD AUTO: 23 % (ref 14–44)
MAGNESIUM SERPL-MCNC: 2.1 MG/DL (ref 1.9–2.7)
MCH RBC QN AUTO: 27.2 PG (ref 26.8–34.3)
MCHC RBC AUTO-ENTMCNC: 30 G/DL (ref 31.4–37.4)
MCV RBC AUTO: 91 FL (ref 82–98)
MONOCYTES # BLD AUTO: 0.59 THOUSAND/ÂΜL (ref 0.17–1.22)
MONOCYTES NFR BLD AUTO: 4 % (ref 4–12)
NEUTROPHILS # BLD AUTO: 8.69 THOUSANDS/ÂΜL (ref 1.85–7.62)
NEUTS SEG NFR BLD AUTO: 65 % (ref 43–75)
NRBC BLD AUTO-RTO: 0 /100 WBCS
PHOSPHATE SERPL-MCNC: 3.9 MG/DL (ref 2.3–4.1)
PLATELET # BLD AUTO: 545 THOUSANDS/UL (ref 149–390)
PMV BLD AUTO: 9.5 FL (ref 8.9–12.7)
POTASSIUM SERPL-SCNC: 5 MMOL/L (ref 3.5–5.3)
PROT SERPL-MCNC: 6.1 G/DL (ref 6.4–8.4)
RBC # BLD AUTO: 2.9 MILLION/UL (ref 3.81–5.12)
SODIUM SERPL-SCNC: 135 MMOL/L (ref 135–147)
WBC # BLD AUTO: 13.4 THOUSAND/UL (ref 4.31–10.16)

## 2024-11-21 PROCEDURE — 97167 OT EVAL HIGH COMPLEX 60 MIN: CPT

## 2024-11-21 PROCEDURE — 82948 REAGENT STRIP/BLOOD GLUCOSE: CPT

## 2024-11-21 PROCEDURE — 80053 COMPREHEN METABOLIC PANEL: CPT | Performed by: NURSE PRACTITIONER

## 2024-11-21 PROCEDURE — 83735 ASSAY OF MAGNESIUM: CPT | Performed by: NURSE PRACTITIONER

## 2024-11-21 PROCEDURE — 85025 COMPLETE CBC W/AUTO DIFF WBC: CPT | Performed by: NURSE PRACTITIONER

## 2024-11-21 PROCEDURE — 84100 ASSAY OF PHOSPHORUS: CPT | Performed by: NURSE PRACTITIONER

## 2024-11-21 PROCEDURE — 99232 SBSQ HOSP IP/OBS MODERATE 35: CPT | Performed by: SURGERY

## 2024-11-21 PROCEDURE — 97163 PT EVAL HIGH COMPLEX 45 MIN: CPT

## 2024-11-21 PROCEDURE — 99233 SBSQ HOSP IP/OBS HIGH 50: CPT | Performed by: INTERNAL MEDICINE

## 2024-11-21 RX ADMIN — ATORVASTATIN CALCIUM 80 MG: 80 TABLET, FILM COATED ORAL at 17:13

## 2024-11-21 RX ADMIN — INSULIN LISPRO 1 UNITS: 100 INJECTION, SOLUTION INTRAVENOUS; SUBCUTANEOUS at 21:06

## 2024-11-21 RX ADMIN — INSULIN GLARGINE 24 UNITS: 100 INJECTION, SOLUTION SUBCUTANEOUS at 21:06

## 2024-11-21 RX ADMIN — INSULIN LISPRO 1 UNITS: 100 INJECTION, SOLUTION INTRAVENOUS; SUBCUTANEOUS at 17:12

## 2024-11-21 RX ADMIN — INSULIN LISPRO 10 UNITS: 100 INJECTION, SOLUTION INTRAVENOUS; SUBCUTANEOUS at 08:48

## 2024-11-21 RX ADMIN — ACETAMINOPHEN 650 MG: 325 TABLET, FILM COATED ORAL at 17:13

## 2024-11-21 RX ADMIN — RIVAROXABAN 2.5 MG: 2.5 TABLET, FILM COATED ORAL at 21:06

## 2024-11-21 RX ADMIN — POLYETHYLENE GLYCOL 3350 17 G: 17 POWDER, FOR SOLUTION ORAL at 08:47

## 2024-11-21 RX ADMIN — INSULIN LISPRO 8 UNITS: 100 INJECTION, SOLUTION INTRAVENOUS; SUBCUTANEOUS at 13:08

## 2024-11-21 RX ADMIN — RIVAROXABAN 2.5 MG: 2.5 TABLET, FILM COATED ORAL at 08:47

## 2024-11-21 RX ADMIN — ACETAMINOPHEN 650 MG: 325 TABLET, FILM COATED ORAL at 12:04

## 2024-11-21 RX ADMIN — INSULIN LISPRO 10 UNITS: 100 INJECTION, SOLUTION INTRAVENOUS; SUBCUTANEOUS at 17:13

## 2024-11-21 RX ADMIN — SENNOSIDES 17.2 MG: 8.6 TABLET, FILM COATED ORAL at 08:47

## 2024-11-21 RX ADMIN — INSULIN LISPRO 1 UNITS: 100 INJECTION, SOLUTION INTRAVENOUS; SUBCUTANEOUS at 13:08

## 2024-11-21 RX ADMIN — HYDROMORPHONE HYDROCHLORIDE 0.2 MG: 0.2 INJECTION, SOLUTION INTRAMUSCULAR; INTRAVENOUS; SUBCUTANEOUS at 12:04

## 2024-11-21 RX ADMIN — ASPIRIN 81 MG: 81 TABLET, COATED ORAL at 08:47

## 2024-11-21 RX ADMIN — OXYCODONE HYDROCHLORIDE 5 MG: 5 TABLET ORAL at 08:47

## 2024-11-21 NOTE — PROGRESS NOTES
Progress Note - Trauma   Name: Sabiha Garcia 68 y.o. female I MRN: [id number]  Unit/Bed#: [facility] 623-01 I Date of Admission: 11/18/2024   Date of Service: 11/21/2024 I Hospital Day: 1    Assessment & Plan  Closed fracture of right inferior pubic ramus (HCC)  -Status post fall outside of her home yesterday evening after returning home from rehab, fell onto her right hip and tailbone, unable to bear weight after the fall, no head strike, takes ASA and 2.5 mg Xarelto twice daily  -Imaging including CT RLE and XR hip/pelvis significant for right inferior pubic ramus fracture  -Orthopedics consult  Type 2 diabetes mellitus, with long-term current use of insulin (HCC)    Lab Results   Component Value Date    HGBA1C 14.8 (H) 09/18/2024   -History of poorly controlled diabetes, on long-term insulin therapy  -Continue SSI while inpatient  -Goal blood sugars less than 180  HLD (hyperlipidemia)  -Continue home atorvastatin  Ambulatory dysfunction  -Previous history of falls with a prior left femur fracture in 2020 after a fall requiring surgical intervention  -Also recently admitted to the [facility] service in October 2020 for with recent generalized weakness and multiple falls without head strike, does use a walker to ambulate at baseline  AIOD/ PAD  -Recent admission with vascular invention including left-sided axillary bifemoral bypass as well as right common femoral to above-knee popliteal artery bypass on 10/23 with vascular surgery  -Also had right sided fifth ray amputation with podiatry on 10/26  -Discharged on ASA81 daily and 2.5 mg Xarelto twice daily  Osteoporosis with current pathological fracture    Anemia    Vitamin D deficiency    Protein-calorie malnutrition (HCC)  Malnutrition Findings:   Adult Malnutrition type: Chronic illness  Adult Degree of Malnutrition: Malnutrition of moderate degree  Malnutrition Characteristics: Fat loss, Muscle loss                  360 Statement: Moderate malnutrition r/t inadequate intake  "as evidenced by BMI 18.0, mild fat/muscle wasting of orbital/temple areas. treated with high calorie diet and Ensure Compact tid    BMI Findings:  Adult BMI Classifications: Underweight < 18.5        Body mass index is 18.04 kg/m².     Moderate protein-calorie malnutrition (HCC)  Malnutrition Findings:   Adult Malnutrition type: Chronic illness  Adult Degree of Malnutrition: Malnutrition of moderate degree  Malnutrition Characteristics: Fat loss, Muscle loss                  360 Statement: Moderate malnutrition r/t inadequate intake as evidenced by BMI 18.0, mild fat/muscle wasting of orbital/temple areas. treated with high calorie diet and Ensure Compact tid    BMI Findings:  Adult BMI Classifications: Underweight < 18.5        Body mass index is 18.04 kg/m².       VTE Prophylaxis: Enoxaparin (Lovenox)     Disposition: Rehab    TRAUMA TERTIARY SURVEY  Summary of Diagnosed Injuries: S/P Fall  Right inferior pubic ramus fracture    Transfer from: site of injury    Mechanism of Injury:Fall     Chief Complaint: right pelvic pain    24 Hour Events : working with therapy, using incentive spirometer  Subjective : \"Good morning\"    Objective :  Temp:  [97.5 °F (36.4 °C)-98.1 °F (36.7 °C)] 97.5 °F (36.4 °C)  HR:  [77-86] 83  BP: (119-136)/(58-74) 136/73  Resp:  [16-18] 18  SpO2:  [95 %-97 %] 97 %    I/O         11/19 0701  11/20 0700 11/20 0701  11/21 0700 11/21 0701  11/22 0700    P.O. 900 600     I.V. (mL/kg) 1588.3 (28.7) 1530 (27.6)     Total Intake(mL/kg) 2488.3 (44.9) 2130 (38.4)     Urine (mL/kg/hr) 711 (0.5)      Total Output 711      Net +1777.3 +2130            Unmeasured Urine Occurrence 1 x 3 x             Physical Exam  Constitutional:       Appearance: Normal appearance.   HENT:      Head: Normocephalic.      Right Ear: External ear normal.      Left Ear: External ear normal.      Mouth/Throat:      Pharynx: Oropharynx is clear.   Eyes:      Extraocular Movements: Extraocular movements intact.      Pupils: " Pupils are equal, round, and reactive to light.   Cardiovascular:      Rate and Rhythm: Normal rate and regular rhythm.      Pulses: Normal pulses.      Heart sounds: Normal heart sounds.   Pulmonary:      Effort: Pulmonary effort is normal.      Breath sounds: Normal breath sounds.   Abdominal:      Palpations: Abdomen is soft.   Genitourinary:     Comments: Voiding, on a bowel regimen    Musculoskeletal:      Cervical back: Normal range of motion.   Skin:     General: Skin is warm and dry.      Capillary Refill: Capillary refill takes less than 2 seconds.   Neurological:      Mental Status: She is alert and oriented to person, place, and time.   Psychiatric:         Mood and Affect: Mood normal.         Behavior: Behavior normal.          1. Before the illness or injury that brought you to the Emergency, did you need someone to help you on a regular basis? 1=Yes   2. Since the illness or injury that brought you to the Emergency, have you needed more help than usual to take care of yourself? 1=Yes   3. Have you been hospitalized for one or more nights during the past 6 months (excluding a stay in the Emergency Department)? 0=No   4. In general, do you see well? 0=Yes   5. In general, do you have serious problems with your memory? 0=No   6. Do you take more than three different medications everyday? 1=Yes   TOTAL   3     Did you order a geriatric consult if the score was 2 or greater?: yes           Lab Results: I have reviewed the following results:  Recent Labs     11/19/24  0404 11/19/24  1316 11/20/24  0555 11/20/24  1752 11/21/24  0643   WBC 13.39*  --   --   --  13.40*   HGB 7.6*  --   --    < > 7.9*   HCT 23.7*  --   --    < > 26.3*   *  --   --   --  545*   SODIUM 130*   < > 135  --   --    K 4.7   < > 4.6  --   --    CL 95*   < > 101  --   --    CO2 28   < > 27  --   --    BUN 17   < > 15  --   --    CREATININE 0.79   < > 0.62  --   --    GLUC 216*   < > 95  --   --    MG 1.8*  --   --   --   --     PHOS 3.1  --   --   --   --    AST  --   --  12*  --   --    ALT  --   --  13  --   --    ALB  --   --  2.8*  --   --    TBILI  --   --  0.21  --   --    ALKPHOS  --   --  71  --   --     < > = values in this interval not displayed.

## 2024-11-21 NOTE — PLAN OF CARE
Problem: Prexisting or High Potential for Compromised Skin Integrity  Goal: Skin integrity is maintained or improved  Description: INTERVENTIONS:  - Identify patients at risk for skin breakdown  - Assess and monitor skin integrity  - Assess and monitor nutrition and hydration status  - Monitor labs   - Assess for incontinence   - Turn and reposition patient  - Assist with mobility/ambulation  - Relieve pressure over bony prominences  - Avoid friction and shearing  - Provide appropriate hygiene as needed including keeping skin clean and dry  - Evaluate need for skin moisturizer/barrier cream  - Collaborate with interdisciplinary team   - Patient/family teaching  - Consider wound care consult   Outcome: Progressing     Problem: PAIN - ADULT  Goal: Verbalizes/displays adequate comfort level or baseline comfort level  Description: Interventions:  - Encourage patient to monitor pain and request assistance  - Assess pain using appropriate pain scale  - Administer analgesics based on type and severity of pain and evaluate response  - Implement non-pharmacological measures as appropriate and evaluate response  - Consider cultural and social influences on pain and pain management  - Notify physician/advanced practitioner if interventions unsuccessful or patient reports new pain  Outcome: Progressing     Problem: SAFETY ADULT  Goal: Patient will remain free of falls  Description: INTERVENTIONS:  - Educate patient/family on patient safety including physical limitations  - Instruct patient to call for assistance with activity   - Consult OT/PT to assist with strengthening/mobility   - Keep Call bell within reach  - Keep bed low and locked with side rails adjusted as appropriate  - Keep care items and personal belongings within reach  - Initiate and maintain comfort rounds  - Make Fall Risk Sign visible to staff  - Offer Toileting every 2 Hours, in advance of need  - Initiate/Maintain bed alarm  - Obtain necessary fall risk  management equipment: bed alarm nonskid sock  - Apply yellow socks and bracelet for high fall risk patients  - Consider moving patient to room near nurses station  Outcome: Progressing  Goal: Maintain or return to baseline ADL function  Description: INTERVENTIONS:  -  Assess patient's ability to carry out ADLs; assess patient's baseline for ADL function and identify physical deficits which impact ability to perform ADLs (bathing, care of mouth/teeth, toileting, grooming, dressing, etc.)  - Assess/evaluate cause of self-care deficits   - Assess range of motion  - Assess patient's mobility; develop plan if impaired  - Assess patient's need for assistive devices and provide as appropriate  - Encourage maximum independence but intervene and supervise when necessary  - Involve family in performance of ADLs  - Assess for home care needs following discharge   - Consider OT consult to assist with ADL evaluation and planning for discharge  - Provide patient education as appropriate  Outcome: Progressing  Goal: Maintains/Returns to pre admission functional level  Description: INTERVENTIONS:  - Perform AM-PAC 6 Click Basic Mobility/ Daily Activity assessment daily.  - Set and communicate daily mobility goal to care team and patient/family/caregiver.   - Collaborate with rehabilitation services on mobility goals if consulted  - Perform Range of Motion 3 times a day.  - Reposition patient every 2 hours.  - Dangle patient 3 times a day  - Stand patient 3 times a day  - Ambulate patient 3 times a day  - Out of bed to chair 3 times a day   - Out of bed for meals 3 times a day  - Out of bed for toileting  - Record patient progress and toleration of activity level   Outcome: Progressing     Problem: DISCHARGE PLANNING  Goal: Discharge to home or other facility with appropriate resources  Description: INTERVENTIONS:  - Identify barriers to discharge w/patient and caregiver  - Arrange for needed discharge resources and transportation  as appropriate  - Identify discharge learning needs (meds, wound care, etc.)  - Arrange for interpretive services to assist at discharge as needed  - Refer to Case Management Department for coordinating discharge planning if the patient needs post-hospital services based on physician/advanced practitioner order or complex needs related to functional status, cognitive ability, or social support system  Outcome: Progressing     Problem: Knowledge Deficit  Goal: Patient/family/caregiver demonstrates understanding of disease process, treatment plan, medications, and discharge instructions  Description: Complete learning assessment and assess knowledge base.  Interventions:  - Provide teaching at level of understanding  - Provide teaching via preferred learning methods  Outcome: Progressing     Problem: SKIN/TISSUE INTEGRITY - ADULT  Goal: Skin Integrity remains intact(Skin Breakdown Prevention)  Description: Assess:  -Perform [place] assessment every shift  -Clean and moisturize skin every 2 hours and as needed   -Inspect skin when repositioning, toileting, and assisting with ADLS  -Assess extremities for adequate circulation and sensation     Bed Management:  -Have minimal linens on bed & keep smooth, unwrinkled  -Change linens as needed when moist or perspiring  -Avoid sitting or lying in one position for more than 2 hours while in bed    Toileting:  -Offer bedside commode  -Assess for incontinence every 2 hours    Activity:  -Mobilize patient 3 times a day  -Encourage activity and walks on unit  -Encourage or provide ROM exercises   -Turn and reposition patient every 2 Hours  -Use appropriate equipment to lift or move patient in bed  -Instruct/ Assist with weight shifting every 30 min  when out of bed in chair  -Consider limitation of chair time 2 hour intervals    Next Steps:  -Teach patient strategies to minimize risks such as weight shifting    -Consider consults to  interdisciplinary teams such as nutrition and  wound care  Outcome: Progressing  Goal: Incision(s), wounds(s) or drain site(s) healing without S/S of infection  Description: INTERVENTIONS  - Assess and document dressing, incision, wound bed, drain sites and surrounding tissue  - Provide patient and family education  - Perform skin care/dressing changes every shift  Outcome: Progressing  Goal: Pressure injury heals and does not worsen  Description: Interventions:  - Implement low air loss mattress or specialty surface (Criteria met)  - Apply silicone foam dressing  - Instruct/assist with weight shifting every 30 minutes when in chair   - Limit chair time to 2 hour intervals  - Use special pressure reducing interventions such as offloading cushion when in chair   - Apply fecal or urinary incontinence containment device   - Perform passive or active ROM every 2 hours   - Turn and reposition patient & offload bony prominences every 2 hours   - Utilize friction reducing device or surface for transfers   - Consider consults to  interdisciplinary teams such as PT and OT  - Use incontinent care products after each incontinent episode such as moisture barrier cream  - Consider nutrition services referral as needed  Outcome: Progressing     Problem: MUSCULOSKELETAL - ADULT  Goal: Maintain or return mobility to safest level of function  Description: INTERVENTIONS:  - Assess patient's ability to carry out ADLs; assess patient's baseline for ADL function and identify physical deficits which impact ability to perform ADLs (bathing, care of mouth/teeth, toileting, grooming, dressing, etc.)  - Assess/evaluate cause of self-care deficits   - Assess range of motion  - Assess patient's mobility  - Assess patient's need for assistive devices and provide as appropriate  - Encourage maximum independence but intervene and supervise when necessary  - Involve family in performance of ADLs  - Assess for home care needs following discharge   - Consider OT consult to assist with ADL  evaluation and planning for discharge  - Provide patient education as appropriate  Outcome: Progressing  Goal: Maintain proper alignment of affected body part  Description: INTERVENTIONS:  - Support, maintain and protect limb and body alignment  - Provide patient/ family with appropriate education  Outcome: Progressing     Problem: Nutrition/Hydration-ADULT  Goal: Nutrient/Hydration intake appropriate for improving, restoring or maintaining nutritional needs  Description: Monitor and assess patient's nutrition/hydration status for malnutrition. Collaborate with interdisciplinary team and initiate plan and interventions as ordered.  Monitor patient's weight and dietary intake as ordered or per policy. Utilize nutrition screening tool and intervene as necessary. Determine patient's food preferences and provide high-protein, high-caloric foods as appropriate.     INTERVENTIONS:  - Monitor oral intake, urinary output, labs, and treatment plans  - Assess nutrition and hydration status and recommend course of action  - Evaluate amount of meals eaten  - Assist patient with eating if necessary   - Allow adequate time for meals  - Recommend/ encourage appropriate diets, oral nutritional supplements, and vitamin/mineral supplements  - Order, calculate, and assess calorie counts as needed  - Recommend, monitor, and adjust tube feedings and TPN/PPN based on assessed needs  - Assess need for intravenous fluids  - Provide specific nutrition/hydration education as appropriate  - Include patient/family/caregiver in decisions related to nutrition  Outcome: Progressing

## 2024-11-21 NOTE — CASE MANAGEMENT
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) 2024     Protein-calorie malnutrition (HCC) 10/17/2024     AIOD/ PAD 10/16/2024     Other headache syndrome 10/16/2024     Pre-operative cardiovascular examination 10/16/2024     Gangrene of toe of right foot (HCC) 10/13/2024     Ambulatory dysfunction 10/13/2024     Vitamin D deficiency 10/13/2024     Lung nodule 10/13/2024     Abnormal gait 2024     Weight loss, unintentional 2024     Claudication of both lower extremities (HCC) 2024     Disorder of refraction and accommodation 2024     Disorder of gallbladder 2024     Arthralgia of shoulder 2024     Eczema 2024     Generalized anxiety disorder 2022     Depression 2022     Fracture of surgical neck of right humerus 2022     History of gallbladder disease 2022     Numbness of toes 2022     Anemia 2020     Type 2 diabetes mellitus, with long-term current use of insulin (HCC) 2020     Tobacco user 2020     HLD (hyperlipidemia) 2013       LOS (days): 1  Geometric Mean LOS (GMLOS) (days): 2.8  Days to GMLOS:1.8     OBJECTIVE:  Risk of Unplanned Readmission Score: 16.37         Current admission status: Inpatient   Preferred Pharmacy:   [id number] - [place] - [address]  [address]  [place] [address]  Phone: [phone] Fax:  [phone]    Primary Care Provider: Alexys Blunt MD    Primary Insurance: [facility]  Secondary Insurance: [facility]    DISCHARGE DETAILS:       Pt accepted to [facility] [place], and being followed by [facility].  She would like the night to think about her decision.

## 2024-11-21 NOTE — PLAN OF CARE
Problem: PHYSICAL THERAPY ADULT  Goal: Performs mobility at highest level of function for planned discharge setting.  See evaluation for individualized goals.  Description: Treatment/Interventions: Functional transfer training, LE strengthening/ROM, Elevations, Therapeutic exercise, Endurance training, Patient/family training, Equipment eval/education, Bed mobility, Gait training, Spoke to nursing, OT          See flowsheet documentation for full assessment, interventions and recommendations.  Note: Prognosis: Fair  Problem List: Decreased strength, Decreased range of motion, Decreased endurance, Impaired balance, Decreased mobility, Decreased cognition, Impaired judgement, Decreased safety awareness, Pain  Assessment: Pt is 68 y.o. female seen for PT evaluation s/p admit to [facility] on 11/18/2024. Two pt identifiers were used to confirm. Pt presented s/p fall trying to walk into house from taxi. Pt was taking taxi home from rehab facility. Pt was at rehab following recent hospital stay and vascular procedure well as R 5th ray amp on 10/26.   Pt was admitted with a primary dx of: closed fx of R inferior pubic rami fx, and other active problems including type 2 DM, HLD, ambulatory dysfunction, aortoiliac occlusive disease. Per ortho pt is WBAT to RLE. Per podiatry pt is WBAT to b/l LE in b/l sx shoe.  PT now consulted for assessment of mobility and d/c needs.   Pts current co morbidities affecting treatment include:  has a past medical history of Allergic, Aorto-iliac disease (HCC), Depression, Diabetes mellitus (HCC), Osteopenia, Osteoporosis, PAD (peripheral artery disease) ([facility]), Pulmonary nodule, Smokes, and Vitamin D deficiency. . Pts current clinical presentation is Unstable/ Unpredictable (high complexity) due to Ongoing medical management for primary dx, Decreased activity tolerance compared to baseline, Fall risk, Increased assistance needed from caregiver at current time, Cog status,  Continuous pulse oximetry monitoring   . Upon evaluation, pt currently is requiring Mod Ax1 for bed mobility; Mod Ax2 for transfers and Mod Ax2 for ambulation w/  b/l HHA . Pt presents at PT eval functioning below baseline and currently w/ overall mobility deficits 2* to: BLE weakness, decreased ROM, impaired balance, decreased endurance, gait deviations, pain, decreased activity tolerance compared to baseline, fall risk. Pt currently at a fall risk 2* to impairments listed above.  Based on the aforementioned PT evaluation, pt will continue to benefit from skilled Acute PT interventions to address stated impairments; to maximize functional mobility; for ongoing pt/ family training; and DME needs. At conclusion of PT session pt returned back in chair and chair alarm engaged with phone and call bell within reach. Pt denies any further questions at this time. PT is currently recommending Level II resource intensity. PT will continue to follow during hospital stay.        Rehab Resource Intensity Level, PT: II (Moderate Resource Intensity)    See flowsheet documentation for full assessment.

## 2024-11-21 NOTE — PLAN OF CARE
Problem: OCCUPATIONAL THERAPY ADULT  Goal: Performs self-care activities at highest level of function for planned discharge setting.  See evaluation for individualized goals.  Description: Treatment Interventions: ADL retraining, Functional transfer training, Endurance training, Patient/family training, Equipment evaluation/education, Compensatory technique education, Energy conservation, Activityengagement          See flowsheet documentation for full assessment, interventions and recommendations.   Note: Limitation: Decreased ADL status, Decreased endurance, Decreased self-care trans, Decreased high-level ADLs  Prognosis: Fair  Assessment: Pt is a 68 y.o. female admitted to [facility] on [facility]/18/24. Pt s/p fall on sidewalk onto her R hip and tailbone. Pt presents with closed fracture of right inferior pubic ramus. 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 Pt has a past medical history of Allergic, Aorto-iliac disease ([facility]), Depression, Diabetes mellitus ([facility]), Osteopenia, Osteoporosis, PAD (peripheral artery disease) ([facility]), Pulmonary nodule, Smokes, and Vitamin D deficiency. Currently, pt lives with alone in [facility] apartment with 2 DAVEY. At baseline, pt was independent with ADLs and functional mobility w/ RW. Pt required some assistance with IADLs and does not currently drive. Pt currently presents with impairments including, difficulty performing ADLS, difficulty performing IADLS, health management, environment activity tolerance, endurance, standing balance/tolerance, sitting balance/tolerance, safety, judgement, sequencing, task initiation, and task termination. These impairments, as well as pt's fatigue, pain, risk for falls, and home environment limit pt's ability to safely engage in all baseline areas of occupation, including grooming, bathing, dressing, toileting, functional mobility/transfers, community mobility, social  participation, and leisure activities. From OT standpoint, recommend Level II resources. OT will continue to follow to address the below stated goals.     Rehab Resource Intensity Level, OT: II (Moderate Resource Intensity)

## 2024-11-21 NOTE — PHYSICAL THERAPY NOTE
PHYSICAL THERAPY EVALUATION  NAME:  Sabiha Garcia  DATE: 11/21/24    AGE:   68 y.o.  Mrn:   [id number]  ADMIT DX:  Closed fracture of right inferior pubic ramus, initial encounter ([facility]) [S32.591A]  Unspecified multiple injuries, initial encounter [T07.XXXA]    Past Medical History:   Diagnosis Date    Allergic [id number]    Aorto-iliac disease (HCC)     Depression [id number]    Diabetes mellitus (HCC) 530702    Osteopenia     Osteoporosis     PAD (peripheral artery disease) ([facility])     Pulmonary nodule     RUL    Smokes 1974 1/2 ppd smoker    Vitamin D deficiency        Past Surgical History:   Procedure Laterality Date    APPENDECTOMY      BYPASS FEMORAL-POPLITEAL Right 10/23/2024    Procedure: R Femoral- AK popliteal bypass w/ PTFE, right common femoral endarterectomy;  Surgeon: Haroon Dukes MD;  [facility];  Service: Vascular    CHOLECYSTECTOMY      FRACTURE SURGERY Left     L hip ORIF    JEJUNOSTOMY      & removal    OR AMPUTATION METATARSAL W/TOE SINGLE Right 10/26/2024    Procedure: Partial 5th ray amputation of the right foot with removal of nonviable bone/soft tissue;  Surgeon: Bienvenido Dhillon DPM;  [facility];  Service: Podiatry    OR BYPASS W/VEIN AXILLARY-FEMORAL Left 10/23/2024    Procedure: L AxBiFem;  Surgeon: Haroon Dukes MD;  [facility];  Service: Vascular    OR OPTX FEM SHFT FX W/INSJ IMED IMPLT W/WO SCREW Left 05/27/2020    Procedure: INSERTION NAIL IM FEMUR ANTEGRADE (TROCHANTERIC);  Surgeon: Jose Lyons MD;  [facility];  Service: Orthopedics    TONSILLECTOMY      TUBAL LIGATION         Length Of Stay: 1    PHYSICAL THERAPY EVALUATION:       11/21/24 1145   Note Type   Note type Evaluation   Pain Assessment   Pain Assessment Tool 0-10   Pain Score 8   Pain Location/Orientation Orientation: Right;Location: Hip   Pain Onset/Description Onset: Ongoing;Frequency: Constant/Continuous;Descriptor: Aching   Effect of Pain on Daily Activities  increased pain with activity   Hospital Pain Intervention(s) Ambulation/increased activity;Repositioned   Restrictions/Precautions   Weight Bearing Precautions Per Order Yes   RLE Weight Bearing Per Order WBAT   LLE Weight Bearing Per Order WBAT   Braces or Orthoses Other (Comment)  (b/l sx shoe)   Other Precautions Chair Alarm;Bed Alarm;Cognitive;WBS;Multiple lines;Fall Risk;Pain   Home Living   Type of Home Apartment   Home Layout One level;Stairs to enter with rails  (2 DAVEY)   Home Equipment Walker;Cane   Additional Comments Pt reports living alone and states she has very limited support   Prior Function   Level of [place] Independent with functional mobility   Lives With Alone   Receives Help From   (limited social support)   Falls in the last 6 months 1 to 4   Comments Pt reports the use of a RW for ambulation PTA   General   Family/Caregiver Present No   Cognition   Arousal/Participation Alert   Orientation Level Oriented to person;Oriented to place;Oriented to time;Oriented to situation   Memory Within functional limits   Following Commands Follows one step commands with increased time or repetition   RUE Assessment   RUE Assessment WFL   LUE Assessment   LUE Assessment WFL   RLE Assessment   RLE Assessment X   Strength RLE   RLE Overall Strength 3-/5   LLE Assessment   LLE Assessment WFL   Strength LLE   LLE Overall Strength 4/5   Bed Mobility   Supine to Sit 3  Moderate assistance   Additional items Assist x 1;Increased time required;Verbal cues   Transfers   Sit to Stand 3  Moderate assistance   Additional items Assist x 2;Increased time required;Verbal cues   Stand to Sit 3  Moderate assistance   Additional items Assist x 2;Increased time required;Verbal cues   Ambulation/Elevation   Gait pattern Excessively slow;Short stride;Foward flexed;Inconsistent adonay   Gait Assistance 3  Moderate assist   Additional items Assist x 2   Assistive Device Other (Comment)  (b/l HHA)   Distance 3ft  (limited by  pain)   Balance   Static Sitting Fair -   Static Standing Poor   Ambulatory Poor   Endurance Deficit   Endurance Deficit Yes   Endurance Deficit Description fatigue, pain   Activity Tolerance   Activity Tolerance Patient limited by fatigue;Patient limited by pain   Medical Staff Made Aware ELOY Uribe; ELOY Urieb student, Lewis, JUANITO   Nurse Made Aware Pt appropriate to be seen and mobilize per nsg   Assessment   Prognosis Fair   Problem List Decreased strength;Decreased range of motion;Decreased endurance;Impaired balance;Decreased mobility;Decreased cognition;Impaired judgement;Decreased safety awareness;Pain   Assessment Pt is 68 y.o. female seen for PT evaluation s/p admit to [facility] on 11/18/2024. Two pt identifiers were used to confirm. Pt presented s/p fall trying to walk into house from taxi. Pt was taking taxi home from rehab facility. Pt was at rehab following recent hospital stay and vascular procedure well as R 5th ray amp on 10/26.   Pt was admitted with a primary dx of: closed fx of R inferior pubic rami fx, and other active problems including type 2 DM, HLD, ambulatory dysfunction, aortoiliac occlusive disease. Per ortho pt is WBAT to RLE. Per podiatry pt is WBAT to b/l LE in b/l sx shoe.  PT now consulted for assessment of mobility and d/c needs.   Pts current co morbidities affecting treatment include:  has a past medical history of Allergic, Aorto-iliac disease (HCC), Depression, Diabetes mellitus (HCC), Osteopenia, Osteoporosis, PAD (peripheral artery disease) (HCC), Pulmonary nodule, Smokes, and Vitamin D deficiency. . Pts current clinical presentation is Unstable/ Unpredictable (high complexity) due to Ongoing medical management for primary dx, Decreased activity tolerance compared to baseline, Fall risk, Increased assistance needed from caregiver at current time, Cog status, Continuous pulse oximetry monitoring   . Upon evaluation, pt currently is requiring Mod Ax1 for bed mobility;  "Mod Ax2 for transfers and Mod Ax2 for ambulation w/  b/l HHA . Pt presents at PT eval functioning below baseline and currently w/ overall mobility deficits 2* to: BLE weakness, decreased ROM, impaired balance, decreased endurance, gait deviations, pain, decreased activity tolerance compared to baseline, fall risk. Pt currently at a fall risk 2* to impairments listed above.  Based on the aforementioned PT evaluation, pt will continue to benefit from skilled Acute PT interventions to address stated impairments; to maximize functional mobility; for ongoing pt/ family training; and DME needs. At conclusion of PT session pt returned back in chair and chair alarm engaged with phone and call bell within reach. Pt denies any further questions at this time. PT is currently recommending Level II resource intensity. PT will continue to follow during hospital stay.   Goals   Patient Goals \" to have less pain\"   STG Expiration Date 12/05/24   Short Term Goal #1 In 14 days pt will complete: 1) Bed mobility skills with supervision to increase safety and independence as well as decrease caregiver burden. 2) Functional transfers with supervision to promote increased independence, safety, and QOL. 3) Ambulate 100' using least restrictive AD with supervision without LOB and stable vitals so that pt can negotiate previous living environment safely and promote independence with functional mobility and return to PLOF. 4) Stair training up/ down 2 step/s using rail/s with supervision so that pt can enter/negotiate previous living environment safely and decrease fall risk. 5) Improve balance grades by 1/2 grade to increase safety with all mobility and decrease fall risk.  6) Improve BLE strength by 1/2 grade to help increase overall functional mobility and decrease fall risk.   Plan   Treatment/Interventions Functional transfer training;LE strengthening/ROM;Elevations;Therapeutic exercise;Endurance training;Patient/family " training;Equipment eval/education;Bed mobility;Gait training;Spoke to nursing;OT   PT Frequency 3-5x/wk   Discharge Recommendation   Rehab Resource Intensity Level, PT II (Moderate Resource Intensity)   AM-PAC Basic Mobility Inpatient   Turning in Flat Bed Without Bedrails 2   Lying on Back to Sitting on Edge of Flat Bed Without Bedrails 2   Moving Bed to Chair 1   Standing Up From Chair Using Arms 1   Walk in Room 1   Climb 3-5 Stairs With Railing 1   Basic Mobility Inpatient Raw Score 8   Turning Head Towards Sound 4   Follow Simple Instructions 4   Low Function Basic Mobility Raw Score  16   Low Function Basic Mobility Standardized Score  25.72   [facility] Highest Level Of Mobility   -HLM Goal 3: Sit at edge of bed   JH-HLM Achieved 4: Move to chair/commode   Modified [place] Scale   Modified [place] Scale 4   Portions of the documentation may have been created using voice recognition software.Occasional wrong word or sound alike substitutions may have occurred due to the inherent limitations of the voice recognition software. Read the chart carefully and recognize, using context, where substitutions have occurred.    Nikko Escalona, PT, DPT

## 2024-11-21 NOTE — OCCUPATIONAL THERAPY NOTE
Occupational Therapy Evaluation     Patient Name: Sabiha Garcia  Today's Date: 11/21/2024  Problem List  Principal Problem:    Closed fracture of right inferior pubic ramus (HCC)  Active Problems:    Type 2 diabetes mellitus, with long-term current use of insulin (HCC)    Anemia    HLD (hyperlipidemia)    Ambulatory dysfunction    Vitamin D deficiency    AIOD/ PAD    Protein-calorie malnutrition (HCC)    Osteoporosis with current pathological fracture    Moderate protein-calorie malnutrition (HCC)    Past Medical History  Past Medical History:   Diagnosis Date    Allergic [id number]    Aorto-iliac disease (HCC)     Depression 990462    Diabetes mellitus (HCC) 255859    Osteopenia     Osteoporosis     PAD (peripheral artery disease) (HCC)     Pulmonary nodule     RUL    Smokes 1974 1/2 ppd smoker    Vitamin D deficiency      Past Surgical History  Past Surgical History:   Procedure Laterality Date    APPENDECTOMY      BYPASS FEMORAL-POPLITEAL Right 10/23/2024    Procedure: R Femoral- AK popliteal bypass w/ PTFE, right common femoral endarterectomy;  Surgeon: Haroon Dukes MD;  [facility];  Service: Vascular    CHOLECYSTECTOMY      FRACTURE SURGERY Left     L hip ORIF    JEJUNOSTOMY      & removal    RI AMPUTATION METATARSAL W/TOE SINGLE Right 10/26/2024    Procedure: Partial 5th ray amputation of the right foot with removal of nonviable bone/soft tissue;  Surgeon: Bienvenido Dhillon DPM;  [facility];  Service: Podiatry    RI BYPASS W/VEIN AXILLARY-FEMORAL Left 10/23/2024    Procedure: L AxBiFem;  Surgeon: Haroon Dukes MD;  [facility];  Service: Vascular    RI OPTX FEM SHFT FX W/INSJ IMED IMPLT W/WO SCREW Left 05/27/2020    Procedure: INSERTION NAIL IM FEMUR ANTEGRADE (TROCHANTERIC);  Surgeon: Jose Lyons MD;  [facility];  Service: Orthopedics    TONSILLECTOMY      TUBAL LIGATION             11/21/24 1146   OT Last Visit   OT Visit Date 11/21/24   Note  "Type   Note type Evaluation   Pain Assessment   Pain Assessment Tool 0-10   Pain Score 8   Pain Location/Orientation Orientation: Right;Location: Hip   Hospital Pain Intervention(s) Repositioned;Ambulation/increased activity   Restrictions/Precautions   Weight Bearing Precautions Per Order Yes   RLE Weight Bearing Per Order WBAT   LLE Weight Bearing Per Order WBAT   Braces or Orthoses Other (Comment)  (b/l surgical shoe)   Other Precautions Chair Alarm;Bed Alarm;Cognitive;WBS;Multiple lines;Fall Risk;Pain   Home Living   Type of Home Apartment   Home Layout One level;Stairs to enter with rails;Performs ADLs on one level  (1st fl apartment, 2 DAVEY)   Bathroom Shower/Tub Tub/shower unit   Bathroom Toilet Raised   Bathroom Equipment Grab bars in shower   Home Equipment Walker;Cane   Prior Function   Level of [place] Independent with ADLs;Independent with functional mobility;Needs assistance with IADLS   Lives With Alone   Receives Help From Neighbor  (Pt reports having limited support; neighbors can possible assist)   IADLs Independent with meal prep;Independent with medication management;Family/Friend/Other provides transportation   Falls in the last 6 months 1 to 4   Vocational Retired   Lifestyle   [place] Independent with ADLs and functional mobility w/ RW. Pt requires some assistance with IADLs, receieves transportation.   Reciprocal Relationships Pt lives alone, however may have assistance from neighbors for IADLs.   Service to Others Retired   Intrinsic Gratification Enjoys painting and gardening   Subjective   Subjective \"I don't have a lot of trust\"   ADL   Eating Assistance 7  Independent   Grooming Assistance 5  Supervision/Setup   UB Bathing Assistance 3  Moderate Assistance   LB Bathing Assistance 2  Maximal Assistance   UB Dressing Assistance 3  Moderate Assistance   LB Dressing Assistance 2  Maximal Assistance   Toileting Assistance  2  Maximal Assistance   Bed Mobility   Supine to Sit 3  Moderate " assistance   Additional items Assist x 1;Increased time required;Verbal cues   Transfers   Sit to Stand 3  Moderate assistance   Additional items Assist x 2;Increased time required;Verbal cues   Stand to Sit 3  Moderate assistance   Additional items Assist x 2;Increased time required;Verbal cues   Additional Comments Pt was very nervous and verbalized anxiousness throughout bed mobility and transfers. Pt responded well to increased time and verbal cueing.   Functional Mobility   Functional Mobility 3  Moderate assistance   Balance   Static Sitting Fair -   Static Standing Poor   Ambulatory Poor   Activity Tolerance   Activity Tolerance Patient limited by fatigue;Patient limited by pain   Medical Staff Made Aware PT present for co-evaluation   Nurse Made Aware Nursing cleared pt for therapy   RUE Assessment   RUE Assessment WFL   LUE Assessment   LUE Assessment WFL   Hand Function   Gross Motor Coordination Functional   Fine Motor Coordination Functional   Cognition   Overall Cognitive Status WFL   Arousal/Participation Alert   Attention Attends with cues to redirect   Orientation Level Oriented to person;Oriented to place;Oriented to time;Oriented to situation   Memory Within functional limits   Following Commands Follows one step commands with increased time or repetition   Assessment   Limitation Decreased ADL status;Decreased endurance;Decreased self-care trans;Decreased high-level ADLs   Prognosis Fair   Assessment Pt is a 68 y.o. female admitted to [facility] on 11/18/24. Pt s/p fall on sidewalk onto her R hip and tailbone. Pt presents with closed fracture of right inferior pubic ramus. 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 Pt has a past medical history of Allergic, Aorto-iliac disease ([facility]), Depression, Diabetes mellitus ([facility]), Osteopenia, Osteoporosis, PAD (peripheral artery disease) ([facility]), Pulmonary nodule, Smokes, and Vitamin  D deficiency. Currently, pt lives with alone in [facility] apartment with 2 DAVEY. At baseline, pt was independent with ADLs and functional mobility w/ RW. Pt required some assistance with IADLs and does not currently drive. Pt currently presents with impairments including, difficulty performing ADLS, difficulty performing IADLS, health management, environment activity tolerance, endurance, standing balance/tolerance, sitting balance/tolerance, safety, judgement, sequencing, task initiation, and task termination. These impairments, as well as pt's fatigue, pain, risk for falls, and home environment limit pt's ability to safely engage in all baseline areas of occupation, including grooming, bathing, dressing, toileting, functional mobility/transfers, community mobility, social participation, and leisure activities. From OT standpoint, recommend Level II resources. OT will continue to follow to address the below stated goals.   Goals   Patient Goals to get better   Short Term Goal #1 1. Pt will complete UB/LB ADLs with mod I after setup using adaptive device and DME PRN. 2. Pt will complete toileting with mod I 3. Pt will increase activity tolerance to 20 to 25 min for participation in ADLs and leisure activities. 4. Pt will be mod I with bed mobility. 5. Pt will be mod I with functional mob/transfers to and from all surfaces with fair+ to good balance and safety. 6. Will assess DME needs. 7. Assist with safe discharge recommendations. 8. Pt will follow multi-step commands 100% of the time. 9. Pt will have G carryover of energy conservation techniques during completion of ADL tasks.   Plan   Treatment Interventions ADL retraining;Functional transfer training;Endurance training;Patient/family training;Equipment evaluation/education;Compensatory technique education;Energy conservation;Activityengagement   Goal Expiration Date 12/05/24   OT Frequency 2-3x/wk   Discharge Recommendation   Rehab Resource Intensity Level, OT II  (Moderate Resource Intensity)   AM-PAC Daily Activity Inpatient   Lower Body Dressing 2   Bathing 2   Toileting 2   Upper Body Dressing 2   Grooming 2   Eating 4   Daily Activity Raw Score 14   Daily Activity Standardized Score (Calc for Raw Score >=11) 33.39   AM-PAC Applied Cognition Inpatient   Following a Speech/Presentation 3   Understanding Ordinary Conversation 4   Taking Medications 4   Remembering Where Things Are Placed or Put Away 4   Remembering List of 4-5 Errands 3   Taking Care of Complicated Tasks 3   Applied Cognition Raw Score 21   Applied Cognition Standardized Score 44.3   End of Consult   Education Provided Yes   Patient Position at End of Consult Bedside chair;Bed/Chair alarm activated;All needs within reach   Nurse Communication Nurse aware of consult     ALEX Bedoya

## 2024-11-21 NOTE — ASSESSMENT & PLAN NOTE
Malnutrition Findings:   Adult Malnutrition type: Chronic illness  Adult Degree of Malnutrition: Malnutrition of moderate degree  Malnutrition Characteristics: Fat loss, Muscle loss                  360 Statement: Moderate malnutrition r/t inadequate intake as evidenced by BMI 18.0, mild fat/muscle wasting of orbital/temple areas. treated with high calorie diet and Ensure Compact tid    BMI Findings:  Adult BMI Classifications: Underweight < 18.5        Body mass index is 18.04 kg/m².

## 2024-11-21 NOTE — CASE MANAGEMENT
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) 2024     Protein-calorie malnutrition (HCC) 10/17/2024     AIOD/ PAD 10/16/2024     Other headache syndrome 10/16/2024     Pre-operative cardiovascular examination 10/16/2024     Gangrene of toe of right foot (HCC) 10/13/2024     Ambulatory dysfunction 10/13/2024     Vitamin D deficiency 10/13/2024     Lung nodule 10/13/2024     Abnormal gait 2024     Weight loss, unintentional 2024     Claudication of both lower extremities (HCC) 2024     Disorder of refraction and accommodation 2024     Disorder of gallbladder 2024     Arthralgia of shoulder 2024     Eczema 2024     Generalized anxiety disorder 2022     Depression 2022     Fracture of surgical neck of right humerus 2022     History of gallbladder disease 2022     Numbness of toes 2022     Anemia 2020     Type 2 diabetes mellitus, with long-term current use of insulin (HCC) 2020     Tobacco user 2020     HLD (hyperlipidemia) 2013       LOS (days): 1  Geometric Mean LOS (GMLOS) (days): 2.8  Days to GMLOS:1.9     OBJECTIVE:  Risk of Unplanned Readmission Score: 16.37         Current admission status: Inpatient   Preferred Pharmacy:   [id number] - [place] - [address]  [address]  [place] [address]  Phone: [phone] Fax:  [phone]    Primary Care Provider: Alexys Blunt MD    Primary Insurance: [employer]  Secondary Insurance: [facility]    DISCHARGE DETAILS:    CM spoke to pt. Pt evaluated by OT/PT and recommended for IP rehab  She's in agreement with IP rehab but not [facility] St[facility].   CM placed referrals and will follow up

## 2024-11-21 NOTE — PLAN OF CARE
Problem: Prexisting or High Potential for Compromised Skin Integrity  Goal: Skin integrity is maintained or improved  Description: INTERVENTIONS:  - Identify patients at risk for skin breakdown  - Assess and monitor skin integrity  - Assess and monitor nutrition and hydration status  - Monitor labs   - Assess for incontinence   - Turn and reposition patient  - Assist with mobility/ambulation  - Relieve pressure over bony prominences  - Avoid friction and shearing  - Provide appropriate hygiene as needed including keeping skin clean and dry  - Evaluate need for skin moisturizer/barrier cream  - Collaborate with interdisciplinary team   - Patient/family teaching  - Consider wound care consult   Outcome: Progressing     Problem: PAIN - ADULT  Goal: Verbalizes/displays adequate comfort level or baseline comfort level  Description: Interventions:  - Encourage patient to monitor pain and request assistance  - Assess pain using appropriate pain scale  - Administer analgesics based on type and severity of pain and evaluate response  - Implement non-pharmacological measures as appropriate and evaluate response  - Consider cultural and social influences on pain and pain management  - Notify physician/advanced practitioner if interventions unsuccessful or patient reports new pain  Outcome: Progressing     Problem: SAFETY ADULT  Goal: Patient will remain free of falls  Description: INTERVENTIONS:  - Educate patient/family on patient safety including physical limitations  - Instruct patient to call for assistance with activity   - Consult OT/PT to assist with strengthening/mobility   - Keep Call bell within reach  - Keep bed low and locked with side rails adjusted as appropriate  - Keep care items and personal belongings within reach  - Initiate and maintain comfort rounds  - Make Fall Risk Sign visible to staff  - Offer Toileting every 4 Hours, in advance of need  - Initiate/Maintain fall alarm  - Obtain necessary fall risk  management equipment.  - Apply yellow socks and bracelet for high fall risk patients  - Consider moving patient to room near nurses station  Outcome: Progressing  Goal: Maintain or return to baseline ADL function  Description: INTERVENTIONS:  -  Assess patient's ability to carry out ADLs; assess patient's baseline for ADL function and identify physical deficits which impact ability to perform ADLs (bathing, care of mouth/teeth, toileting, grooming, dressing, etc.)  - Assess/evaluate cause of self-care deficits   - Assess range of motion  - Assess patient's mobility; develop plan if impaired  - Assess patient's need for assistive devices and provide as appropriate  - Encourage maximum independence but intervene and supervise when necessary  - Involve family in performance of ADLs  - Assess for home care needs following discharge   - Consider OT consult to assist with ADL evaluation and planning for discharge  - Provide patient education as appropriate  Outcome: Progressing  Goal: Maintains/Returns to pre admission functional level  Description: INTERVENTIONS:  - Perform AM-PAC 6 Click Basic Mobility/ Daily Activity assessment daily.  - Set and communicate daily mobility goal to care team and patient/family/caregiver.   - Collaborate with rehabilitation services on mobility goals if consulted  - Perform Range of Motion 2 times a day.  - Reposition patient every 2 hours.  - Dangle patient 2 times a day  - Stand patient 2 times a day  - Ambulate patient 2 times a day  - Out of bed to chair 2 times a day   - Out of bed for meals 2 times a day  - Out of bed for toileting  - Record patient progress and toleration of activity level   Outcome: Progressing     Problem: DISCHARGE PLANNING  Goal: Discharge to home or other facility with appropriate resources  Description: INTERVENTIONS:  - Identify barriers to discharge w/patient and caregiver  - Arrange for needed discharge resources and transportation as appropriate  -  Identify discharge learning needs (meds, wound care, etc.)  - Arrange for interpretive services to assist at discharge as needed  - Refer to Case Management Department for coordinating discharge planning if the patient needs post-hospital services based on physician/advanced practitioner order or complex needs related to functional status, cognitive ability, or social support system  Outcome: Progressing     Problem: Knowledge Deficit  Goal: Patient/family/caregiver demonstrates understanding of disease process, treatment plan, medications, and discharge instructions  Description: Complete learning assessment and assess knowledge base.  Interventions:  - Provide teaching at level of understanding  - Provide teaching via preferred learning methods  Outcome: Progressing     Problem: SKIN/TISSUE INTEGRITY - ADULT  Goal: Skin Integrity remains intact(Skin Breakdown Prevention)  Description: Assess:  -Perform [place] assessment every day.  -Clean and moisturize skin every shift  -Inspect skin when repositioning, toileting, and assisting with ADLS  -Assess under medical devices such as masimo every shift  -Assess extremities for adequate circulation and sensation     Bed Management:  -Have minimal linens on bed & keep smooth, unwrinkled  -Change linens as needed when moist or perspiring  -Avoid sitting or lying in one position for more than 2 hours while in bed  -Keep HOB at 30 degrees     Toileting:  -Offer bedside commode  -Assess for incontinence every 4 hrs  -Use incontinent care products after each incontinent episode.    Activity:  -Mobilize patient 2 times a day  -Encourage activity and walks on unit  -Encourage or provide ROM exercises   -Turn and reposition patient every 2 Hours  -Use appropriate equipment to lift or move patient in bed  -Instruct/ Assist with weight shifting every 2hr  when out of bed in chair  -Consider limitation of chair time 2 hour intervals    Skin Care:  -Avoid use of baby powder, tape,  friction and shearing, hot water or constrictive clothing  -Relieve pressure over bony prominences using cushion  -Do not massage red bony areas    Next Steps:  -Teach patient strategies to minimize risks.   -Consider consults to  interdisciplinary teams such as p/t, wound care.  Outcome: Progressing  Goal: Incision(s), wounds(s) or drain site(s) healing without S/S of infection  Description: INTERVENTIONS  - Assess and document dressing, incision, wound bed, drain sites and surrounding tissue  - Provide patient and family education  - Perform skin care/dressing changes every shift  Outcome: Progressing  Goal: Pressure injury heals and does not worsen  Description: Interventions:  - Implement low air loss mattress or specialty surface (Criteria met)  - Apply silicone foam dressing  - Instruct/assist with weight shifting every 30 minutes when in chair   - Limit chair time to 2 hour intervals  - Use special pressure reducing interventions such as cushion when in chair   - Apply fecal or urinary incontinence containment device   - Perform passive or active ROM every shift  - Turn and reposition patient & offload bony prominences every 2 hours   - Utilize friction reducing device or surface for transfers   - Consider consults to  interdisciplinary teams such as P/T  - Use incontinent care products after each incontinent episode.  - Consider nutrition services referral as needed  Outcome: Progressing     Problem: MUSCULOSKELETAL - ADULT  Goal: Maintain or return mobility to safest level of function  Description: INTERVENTIONS:  - Assess patient's ability to carry out ADLs; assess patient's baseline for ADL function and identify physical deficits which impact ability to perform ADLs (bathing, care of mouth/teeth, toileting, grooming, dressing, etc.)  - Assess/evaluate cause of self-care deficits   - Assess range of motion  - Assess patient's mobility  - Assess patient's need for assistive devices and provide as  appropriate  - Encourage maximum independence but intervene and supervise when necessary  - Involve family in performance of ADLs  - Assess for home care needs following discharge   - Consider OT consult to assist with ADL evaluation and planning for discharge  - Provide patient education as appropriate  Outcome: Progressing  Goal: Maintain proper alignment of affected body part  Description: INTERVENTIONS:  - Support, maintain and protect limb and body alignment  - Provide patient/ family with appropriate education  Outcome: Progressing     Problem: Nutrition/Hydration-ADULT  Goal: Nutrient/Hydration intake appropriate for improving, restoring or maintaining nutritional needs  Description: Monitor and assess patient's nutrition/hydration status for malnutrition. Collaborate with interdisciplinary team and initiate plan and interventions as ordered.  Monitor patient's weight and dietary intake as ordered or per policy. Utilize nutrition screening tool and intervene as necessary. Determine patient's food preferences and provide high-protein, high-caloric foods as appropriate.     INTERVENTIONS:  - Monitor oral intake, urinary output, labs, and treatment plans  - Assess nutrition and hydration status and recommend course of action  - Evaluate amount of meals eaten  - Assist patient with eating if necessary   - Allow adequate time for meals  - Recommend/ encourage appropriate diets, oral nutritional supplements, and vitamin/mineral supplements  - Order, calculate, and assess calorie counts as needed  - Recommend, monitor, and adjust tube feedings and TPN/PPN based on assessed needs  - Assess need for intravenous fluids  - Provide specific nutrition/hydration education as appropriate  - Include patient/family/caregiver in decisions related to nutrition  Outcome: Progressing

## 2024-11-21 NOTE — ASSESSMENT & PLAN NOTE
-Previous history of falls with a prior left femur fracture in 2020 after a fall requiring surgical intervention  -Also recently admitted to the [facility] service in October 2020 for with recent generalized weakness and multiple falls without head strike, does use a walker to ambulate at baseline

## 2024-11-21 NOTE — PROGRESS NOTES
Progress Note - Geriatric Medicine   Sabiha Garcia 68 y.o. female MRN: [id number]  [facility] Encounter: [id number]      Assessment/Plan:    Ambulatory dysfunction with fall  -reportedly mechanical fall 11/18/24  -admitted with below noted injuries   -utilizes walker for ambulation at baseline, encourage use assist device as directed by therapy   -denies history of additional recent or recurrent falls but remains high risk future falls, cont fall precautions   -encourage good body mechanics and assist with all transfers  -keep personal items and call bell close to prevent reaching  -maintain environment free of fall hazards  -encourage appropriate footwear and adequate lighting at all times when out of bed  -recommend home fall risk assessment and personal fall alert system if returning home  -PT and OT pending, patient has declined mobility with PT thus far, continue to encourage participation, pt notes anxiety regarding mobility due to pain and fear of falling, consider pre-medicating for pain 30min before session and continue verbal support and encouragement     Right inferior pubic rami fracture  -s/p fall as outlined above  -noted on XR hip/pelvis obtained on admission   -continue acute multimodal pain control  -Neurovascular checks per protocol  -Ortho on consult - recommending non-operative management with close o/p f/u   -PT/OT pending - see above       Acute pain due to trauma  -continue acute multimodal pain control per Geriatric pain protocol:  Tylenol 975mg Q8H scheduled  Roxicodone 2.5mg Q4H PRN moderate pain  Roxicodone 5mg Q4H PRN severe pain  Dilaudid 0.2mg Q2H PRN  -consider adjuncts such as lidocaine patch topically to appropriate areas   -encourage addition of non-pharmacologic pain treatment including ice and frequent repositioning  -recommend bowel regimen to prevent constipation due to increased risk with acute pain and opiate pain medications     Hyponatremia  -[Na] 130 on initial  presentation now improved to 135  -likely multifactorial including acute pain due to trauma and poor recent oral intake due to GI distress at STR now improving   -encourage well balanced nutritional intake  -monitor electrolytes closely, avoid rapid and drastic fluctuations of no more than 8-12 pts/24H     Osteoporosis  -evidenced by fragility fracture of pelvis sustained in fall from standing height   -confirmed on DEXA 5/2022  -in setting of vitamin D deficiency  -Encourage well-balanced diet rich in calcium and vitamin D with supplementation for any needs unable to be met by diet alone  -Outpatient follow-up with Endo for discussion antiresorptive therapy treatment     Vit D deficiency   -Vitamin D low at 22.2  -Continue daily supplementation vitamin D 2000 IU as recommended by Endocrinology during recent hospitalization  -close o/p f/u with PCP for ongoing management      Cognitive screening  -Awake alert and oriented, denies memory or cognitive concerns  -Reportedly independent with ADLs and IADLs at baseline  -no prior cognitive testing on record for review   -[facility] 10/17/[facility] imaging personally reviewed, reveals at least mild diffuse chronic microangiopathic changes  -TSH slightly depressed at 0.425 at last check 10/2024, free T4 WNL0.83  -No recent B12 on record review, recommend checking with routine labs  -At risk age and cardiovascular related cognitive decline, continue secondary risk factor modifications  -Encourage patient remain physically, socially, cognitively active and engaged to maintain cognitive acuity  -Encourage use of sensory assist devices such as corrective lenses at all appropriate times to reduce risk of uncorrected sensory impairment from continuing to isolation, confusion, encephalopathy and more precipitous cognitive decline  -Consider comprehensive genetic assessment with cognitive testing as outpatient to establish baseline     DM-II, uncontrolled with peripheral vascular  complications   -A1c 14.8  -Recommend healthy lifestyle modifications, diabetic diet and continuation of home insulin regimen with goal blood sugar during hospitalization 140-180 to reduce risk hypoglycemia  -pt notes working on improving overall nutritional status and protein intake, dislikes glucerna but intersted in ensure supplements if allowed on current diet   -Continue close outpatient follow-up with PCP and Endocrinology for ongoing management and continued age-appropriate diabetic screenings and cares     Aortoiliac occlusive disease  -s/p left axillary bifemoral bypass and right common femoral to above-knee popliteal bypass and right fifth ray amputation during recent hospitalization 10/2024  -Continue secondary risk factor modifications  -Smoking cessation strongly encouraged  -Maintained on aspirin, atorvastatin, rivaroxaban as outpatient  -vascular surgery on consult  -Continue close outpatient follow-up with PCP and vascular surgery for ongoing management     Gangrene of toe of right foot   -S/p amputation right fifth digit 10/26/2024 for dry gangrene  -Continue optimization of cardiovascular risk factors  -Continue close outpatient follow-up with Podiatry for ongoing management     Nicotine dependence due to cigarettes  -Complete cessation encouraged, continue daily bedside cessation counseling  -Nicotine patch during hospitalization  -Continue close outpatient follow-up with PCP for ongoing cessation support     Impaired Vision  -recommend use of corrective lenses at all appropriate times  -encourage adequate lighting and encourage use of assistance with ambulation  -keep personal belongings close to person to avoid reaching  -Consider large font for printed materials provided to patient  -annual optho eval to ensure Rx up to date      Impaired mastication  -Requires use of dentures -patient notes has been unable to locate for some time   -ensure meal consistency appropriate for abilities  -Encourage  good oral hygiene  -continue aspiration precautions     Frailty syndrome in geriatric patient   -Clinical frailty scale stage V/VI, moderately frail, progressive  -Multifactorial including age, aortoiliac occlusive disease, uncontrolled DM-II, and multitude of additional chronic medical comorbidities now with fall and acute traumatic injuries superimposed in elderly individual with limited physiologic and metabolic reserves  -Continue optimization chronic conditions and address acute metabolic derangements as arise  -Encourage well-balanced nutritional intake, consider adding ensure supplements   -Monitor for and treat any underlying anxiety/mood/depression symptoms as may impact patient response to therapies as well as overall sense of wellbeing and quality of life  -Continue psychosocial supports of patient and caregivers  -Continue to ensure that treatments and interventions align with patient's wishes and goals of care     High risk developing delirium   -due to age, fall, traumatic injuries, acute pain, hospitalization  -cont delirium precautions  -maintain normal sleep/wake cycle  -minimize overnight interruptions, group overnight vitals/labs/nursing checks as medically appropriate  -dim lights, close blinds and turn off tv to minimize stimulation and encourage sleep environment in evenings  -ensure that pain is well controlled -see above  -monitor for fecal and urinary retention which may precipitate delirium  -encourage early mobilization and ambulation with assist as cleared to safely do so  -provide frequent reorientation and redirection as indicated and appropriate  -avoid medications which may precipitate or worsen delirium such as tramadol, benzodiazepine, anticholinergics, and benadryl  -encourage hydration and nutrition   -redirect unwanted behaviors as first line    Care coordination: rounded with Nayeli (RN)    Subjective:     Sabiha is seen and examined at bedside where she is lying resting, she  "reports that she slept well last night but notes sacral region pain this morning which is alleviated with repositioning. No other acute complaints.     Review of Systems   Constitutional: Negative.  Negative for chills and fever.   HENT: Negative.     Eyes: Negative.    Respiratory: Negative.  Negative for shortness of breath.    Cardiovascular: Negative.  Negative for chest pain.   Gastrointestinal: Negative.    Genitourinary: Negative.    Musculoskeletal:  Positive for gait problem.        Sacral and pelvic bone pain    Skin: Negative.    Neurological:  Negative for dizziness, light-headedness and headaches.   Hematological: Negative.    Psychiatric/Behavioral: Negative.  Negative for sleep disturbance.    All other systems reviewed and are negative.    Objective:     Vitals: Blood pressure 136/73, pulse 92, temperature 97.5 °F (36.4 °C), resp. rate 18, height 5' 9\" (1.753 m), weight 55.4 kg (122 lb 2.2 oz), SpO2 96%.,Body mass index is 18.04 kg/m².      Intake/Output Summary (Last 24 hours) at 11/21/2024 1051  Last data filed at 11/21/2024 0847  Gross per 24 hour   Intake 2370 ml   Output --   Net 2370 ml     Current Medications: Reviewed    Physical Exam:   Physical Exam  Vitals and nursing note reviewed.   Constitutional:       General: She is not in acute distress.     Comments: Thin frail elderly female    HENT:      Head: Normocephalic.      Nose: Nose normal.      Mouth/Throat:      Mouth: Mucous membranes are dry.   Eyes:      General: No scleral icterus.        Right eye: No discharge.         Left eye: No discharge.      Conjunctiva/sclera: Conjunctivae normal.   Neck:      Comments: Trachea midline, phonation normal  Cardiovascular:      Rate and Rhythm: Normal rate.      Pulses: Normal pulses.   Pulmonary:      Effort: Pulmonary effort is normal. No respiratory distress.      Breath sounds: No wheezing.   Abdominal:      General: There is no distension.      Palpations: Abdomen is soft.      " Tenderness: There is no abdominal tenderness.   Musculoskeletal:      Cervical back: Neck supple.      Comments: Reduced overall muscle mass    Skin:     General: Skin is warm and dry.   Neurological:      Mental Status: She is alert.      Comments: Awake and alert, oriented, answers questions appropriately   Psychiatric:         Mood and Affect: Mood normal.         Behavior: Behavior normal.        Invasive Devices       Peripheral Intravenous Line  Duration             Peripheral IV 11/19/24 Right Antecubital 2 days              Drain  Duration             Urethral Catheter Coude 18 Fr. 27 days                  Lab Results:     I have personally reviewed pertinent lab results including the following:    Results from last 7 days   Lab Units 11/21/24  0643 11/20/24  1752 11/19/24  0404   WBC Thousand/uL 13.40*  --  13.39*   HEMOGLOBIN g/dL 7.9* 7.8* 7.6*   HEMATOCRIT % 26.3* 24.9* 23.7*   PLATELETS Thousands/uL 545*  --  474*   SEGS PCT % 65  --  70   MONO PCT % 4  --  6   EOS PCT % 6  --  2     Results from last 7 days   Lab Units 11/21/24  0643 11/20/24  0555 11/19/24  1316   POTASSIUM mmol/L 5.0 4.6 4.9   CHLORIDE mmol/L 101 101 99   CO2 mmol/L 25 27 25   BUN mg/dL 14 15 16   CREATININE mg/dL 0.61 0.62 0.73   CALCIUM mg/dL 8.6 7.9* 8.3*   ALK PHOS U/L 78 71  --    ALT U/L 12 13  --    AST U/L 12* 12*  --      I have personally reviewed the following imaging study reports in PACS:    No new imaging overnight

## 2024-11-22 LAB
ANION GAP SERPL CALCULATED.3IONS-SCNC: 9 MMOL/L (ref 4–13)
BACTERIA UR CULT: ABNORMAL
BACTERIA UR CULT: ABNORMAL
BASOPHILS # BLD AUTO: 0.11 THOUSANDS/ÂΜL (ref 0–0.1)
BASOPHILS NFR BLD AUTO: 1 % (ref 0–1)
BLAIMP ISLT/SPM QL: NOT DETECTED
BLAKPC ISLT/SPM QL: NOT DETECTED
BLAOXA-48 ISLT/SPM QL: NOT DETECTED
BLAVIM ISLT/SPM QL: NOT DETECTED
BUN SERPL-MCNC: 20 MG/DL (ref 5–25)
CALCIUM SERPL-MCNC: 8.9 MG/DL (ref 8.4–10.2)
CHLORIDE SERPL-SCNC: 99 MMOL/L (ref 96–108)
CO2 SERPL-SCNC: 27 MMOL/L (ref 21–32)
CREAT SERPL-MCNC: 0.73 MG/DL (ref 0.6–1.3)
EOSINOPHIL # BLD AUTO: 0.65 THOUSAND/ÂΜL (ref 0–0.61)
EOSINOPHIL NFR BLD AUTO: 5 % (ref 0–6)
ERYTHROCYTE [DISTWIDTH] IN BLOOD BY AUTOMATED COUNT: 15.2 % (ref 11.6–15.1)
GFR SERPL CREATININE-BSD FRML MDRD: 84 ML/MIN/1.73SQ M
GLUCOSE SERPL-MCNC: 176 MG/DL (ref 65–140)
GLUCOSE SERPL-MCNC: 202 MG/DL (ref 65–140)
GLUCOSE SERPL-MCNC: 208 MG/DL (ref 65–140)
GLUCOSE SERPL-MCNC: 225 MG/DL (ref 65–140)
GLUCOSE SERPL-MCNC: 263 MG/DL (ref 65–140)
GLUCOSE SERPL-MCNC: 291 MG/DL (ref 65–140)
HCT VFR BLD AUTO: 28 % (ref 34.8–46.1)
HGB BLD-MCNC: 8.8 G/DL (ref 11.5–15.4)
IMM GRANULOCYTES # BLD AUTO: 0.1 THOUSAND/UL (ref 0–0.2)
IMM GRANULOCYTES NFR BLD AUTO: 1 % (ref 0–2)
LYMPHOCYTES # BLD AUTO: 3.44 THOUSANDS/ÂΜL (ref 0.6–4.47)
LYMPHOCYTES NFR BLD AUTO: 26 % (ref 14–44)
MCH RBC QN AUTO: 27.5 PG (ref 26.8–34.3)
MCHC RBC AUTO-ENTMCNC: 31.4 G/DL (ref 31.4–37.4)
MCV RBC AUTO: 88 FL (ref 82–98)
MONOCYTES # BLD AUTO: 0.55 THOUSAND/ÂΜL (ref 0.17–1.22)
MONOCYTES NFR BLD AUTO: 4 % (ref 4–12)
NDM CEPHEID: NOT DETECTED
NEUTROPHILS # BLD AUTO: 8.22 THOUSANDS/ÂΜL (ref 1.85–7.62)
NEUTS SEG NFR BLD AUTO: 63 % (ref 43–75)
NRBC BLD AUTO-RTO: 0 /100 WBCS
PLATELET # BLD AUTO: 566 THOUSANDS/UL (ref 149–390)
PMV BLD AUTO: 8.8 FL (ref 8.9–12.7)
POTASSIUM SERPL-SCNC: 4.9 MMOL/L (ref 3.5–5.3)
RBC # BLD AUTO: 3.2 MILLION/UL (ref 3.81–5.12)
SODIUM SERPL-SCNC: 135 MMOL/L (ref 135–147)
WBC # BLD AUTO: 13.07 THOUSAND/UL (ref 4.31–10.16)

## 2024-11-22 PROCEDURE — 85025 COMPLETE CBC W/AUTO DIFF WBC: CPT | Performed by: NURSE PRACTITIONER

## 2024-11-22 PROCEDURE — 80048 BASIC METABOLIC PNL TOTAL CA: CPT | Performed by: NURSE PRACTITIONER

## 2024-11-22 PROCEDURE — 82948 REAGENT STRIP/BLOOD GLUCOSE: CPT

## 2024-11-22 PROCEDURE — 99233 SBSQ HOSP IP/OBS HIGH 50: CPT | Performed by: SURGERY

## 2024-11-22 RX ORDER — INSULIN LISPRO 100 [IU]/ML
10 INJECTION, SOLUTION INTRAVENOUS; SUBCUTANEOUS
Status: DISCONTINUED | OUTPATIENT
Start: 2024-11-23 | End: 2024-11-23 | Stop reason: HOSPADM

## 2024-11-22 RX ORDER — INSULIN LISPRO 100 [IU]/ML
14 INJECTION, SOLUTION INTRAVENOUS; SUBCUTANEOUS
Status: DISCONTINUED | OUTPATIENT
Start: 2024-11-23 | End: 2024-11-23 | Stop reason: HOSPADM

## 2024-11-22 RX ORDER — INSULIN GLARGINE 100 [IU]/ML
30 INJECTION, SOLUTION SUBCUTANEOUS
Status: DISCONTINUED | OUTPATIENT
Start: 2024-11-22 | End: 2024-11-23 | Stop reason: HOSPADM

## 2024-11-22 RX ORDER — INSULIN LISPRO 100 [IU]/ML
1-5 INJECTION, SOLUTION INTRAVENOUS; SUBCUTANEOUS
Status: DISCONTINUED | OUTPATIENT
Start: 2024-11-22 | End: 2024-11-23 | Stop reason: HOSPADM

## 2024-11-22 RX ORDER — BISACODYL 10 MG
10 SUPPOSITORY, RECTAL RECTAL DAILY
Status: DISCONTINUED | OUTPATIENT
Start: 2024-11-22 | End: 2024-11-23 | Stop reason: HOSPADM

## 2024-11-22 RX ORDER — INSULIN LISPRO 100 [IU]/ML
1-6 INJECTION, SOLUTION INTRAVENOUS; SUBCUTANEOUS
Status: DISCONTINUED | OUTPATIENT
Start: 2024-11-22 | End: 2024-11-23 | Stop reason: HOSPADM

## 2024-11-22 RX ADMIN — ASPIRIN 81 MG: 81 TABLET, COATED ORAL at 08:22

## 2024-11-22 RX ADMIN — ATORVASTATIN CALCIUM 80 MG: 80 TABLET, FILM COATED ORAL at 17:36

## 2024-11-22 RX ADMIN — HYDROMORPHONE HYDROCHLORIDE 0.2 MG: 0.2 INJECTION, SOLUTION INTRAMUSCULAR; INTRAVENOUS; SUBCUTANEOUS at 23:07

## 2024-11-22 RX ADMIN — INSULIN LISPRO 1 UNITS: 100 INJECTION, SOLUTION INTRAVENOUS; SUBCUTANEOUS at 21:36

## 2024-11-22 RX ADMIN — OXYCODONE HYDROCHLORIDE 5 MG: 5 TABLET ORAL at 12:56

## 2024-11-22 RX ADMIN — OXYCODONE HYDROCHLORIDE 5 MG: 5 TABLET ORAL at 02:17

## 2024-11-22 RX ADMIN — POLYETHYLENE GLYCOL 3350 17 G: 17 POWDER, FOR SOLUTION ORAL at 08:22

## 2024-11-22 RX ADMIN — ACETAMINOPHEN 650 MG: 325 TABLET, FILM COATED ORAL at 05:11

## 2024-11-22 RX ADMIN — BISACODYL 10 MG: 10 SUPPOSITORY RECTAL at 12:57

## 2024-11-22 RX ADMIN — INSULIN LISPRO 10 UNITS: 100 INJECTION, SOLUTION INTRAVENOUS; SUBCUTANEOUS at 08:23

## 2024-11-22 RX ADMIN — INSULIN LISPRO 1 UNITS: 100 INJECTION, SOLUTION INTRAVENOUS; SUBCUTANEOUS at 08:22

## 2024-11-22 RX ADMIN — RIVAROXABAN 2.5 MG: 2.5 TABLET, FILM COATED ORAL at 08:22

## 2024-11-22 RX ADMIN — ACETAMINOPHEN 650 MG: 325 TABLET, FILM COATED ORAL at 23:07

## 2024-11-22 RX ADMIN — ACETAMINOPHEN 650 MG: 325 TABLET, FILM COATED ORAL at 17:36

## 2024-11-22 RX ADMIN — RIVAROXABAN 2.5 MG: 2.5 TABLET, FILM COATED ORAL at 21:36

## 2024-11-22 RX ADMIN — SENNOSIDES 17.2 MG: 8.6 TABLET, FILM COATED ORAL at 08:22

## 2024-11-22 RX ADMIN — INSULIN LISPRO 10 UNITS: 100 INJECTION, SOLUTION INTRAVENOUS; SUBCUTANEOUS at 17:36

## 2024-11-22 RX ADMIN — INSULIN LISPRO 8 UNITS: 100 INJECTION, SOLUTION INTRAVENOUS; SUBCUTANEOUS at 11:39

## 2024-11-22 RX ADMIN — ACETAMINOPHEN 650 MG: 325 TABLET, FILM COATED ORAL at 12:56

## 2024-11-22 RX ADMIN — INSULIN LISPRO 2 UNITS: 100 INJECTION, SOLUTION INTRAVENOUS; SUBCUTANEOUS at 11:39

## 2024-11-22 RX ADMIN — INSULIN GLARGINE 30 UNITS: 100 INJECTION, SOLUTION SUBCUTANEOUS at 21:40

## 2024-11-22 RX ADMIN — INSULIN LISPRO 2 UNITS: 100 INJECTION, SOLUTION INTRAVENOUS; SUBCUTANEOUS at 17:36

## 2024-11-22 NOTE — QUICK NOTE
Chart thoroughly reviewed.  Patient not seen in person.  Noted to have fasting and postprandial hyperglycemia today.  Will increase Lantus to 30 units nightly.  Increase Humalog to 14 units before breakfast and dinner and 10 units before lunch.  Continue correctional scale insulin.  Continue Accu-Cheks 4 times daily before meals and at bedtime.  Monitor for hypoglycemia, treat per protocol.  Endocrinology will continue following.

## 2024-11-22 NOTE — CASE MANAGEMENT
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) 2024     Protein-calorie malnutrition (HCC) 10/17/2024     AIOD/ PAD 10/16/2024     Other headache syndrome 10/16/2024     Pre-operative cardiovascular examination 10/16/2024     Gangrene of toe of right foot (HCC) 10/13/2024     Ambulatory dysfunction 10/13/2024     Vitamin D deficiency 10/13/2024     Lung nodule 10/13/2024     Abnormal gait 2024     Weight loss, unintentional 2024     Claudication of both lower extremities (HCC) 2024     Disorder of refraction and accommodation 2024     Disorder of gallbladder 2024     Arthralgia of shoulder 2024     Eczema 2024     Generalized anxiety disorder 2022     Depression 2022     Fracture of surgical neck of right humerus 2022     History of gallbladder disease 2022     Numbness of toes 2022     Anemia 2020     Type 2 diabetes mellitus, with long-term current use of insulin (HCC) 2020     Tobacco user 2020     HLD (hyperlipidemia) 2013       LOS (days): 2  Geometric Mean LOS (GMLOS) (days): 2.8  Days to GMLOS:0.8     OBJECTIVE:  Risk of Unplanned Readmission Score: 16.65         Current admission status: Inpatient   Preferred Pharmacy:   [id number] - [place] - [address]  [address]  [place] [address]  Phone: [phone] Fax:  [phone]    Primary Care Provider: Alexys Blunt MD    Primary Insurance: [facility] REP  Secondary Insurance: [facility]    DISCHARGE DETAILS:                                                                                                               Facility Insurance Auth Number: [id number]

## 2024-11-22 NOTE — CASE MANAGEMENT
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) 2024     Protein-calorie malnutrition (HCC) 10/17/2024     AIOD/ PAD 10/16/2024     Other headache syndrome 10/16/2024     Pre-operative cardiovascular examination 10/16/2024     Gangrene of toe of right foot (HCC) 10/13/2024     Ambulatory dysfunction 10/13/2024     Vitamin D deficiency 10/13/2024     Lung nodule 10/13/2024     Abnormal gait 2024     Weight loss, unintentional 2024     Claudication of both lower extremities (HCC) 2024     Disorder of refraction and accommodation 2024     Disorder of gallbladder 2024     Arthralgia of shoulder 2024     Eczema 2024     Generalized anxiety disorder 2022     Depression 2022     Fracture of surgical neck of right humerus 2022     History of gallbladder disease 2022     Numbness of toes 2022     Anemia 2020     Type 2 diabetes mellitus, with long-term current use of insulin (HCC) 2020     Tobacco user 2020     HLD (hyperlipidemia) 2013       LOS (days): 2  Geometric Mean LOS (GMLOS) (days): 2.8  Days to GMLOS:0.8     OBJECTIVE:  Risk of Unplanned Readmission Score: 16.65         Current admission status: Inpatient   Preferred Pharmacy:   [id number] - [place] - [address]  [address]  [place] [address]  Phone: [phone] Fax:  [phone]    Primary Care Provider: Alexys Blunt MD    Primary Insurance: [facility]  Secondary Insurance: [facility]    DISCHARGE DETAILS:    Auth obtained  Will need to wait for open bed at [facility]. Pt can d/c there once bed is available

## 2024-11-22 NOTE — PROGRESS NOTES
Patient:    MRN:  [id number]    Gulshan Request ID:  [id number]    Level of care reserved:  Skilled Nursing Facility    Partner Reserved:  [facility], [place] [address] [phone]    Clinical needs requested:    Geography searched:  7 miles around [id number]    Start of Service:    Request sent:  1:56pm EST on 11/21/2024 by Yosef Munguia    Partner reserved:  4:17pm EST on 11/22/2024 by Yosef Munguia    Choice list shared:

## 2024-11-22 NOTE — PLAN OF CARE
Problem: PAIN - ADULT  Goal: Verbalizes/displays adequate comfort level or baseline comfort level  Description: Interventions:  - Encourage patient to monitor pain and request assistance  - Assess pain using appropriate pain scale  - Administer analgesics based on type and severity of pain and evaluate response  - Implement non-pharmacological measures as appropriate and evaluate response  - Consider cultural and social influences on pain and pain management  - Notify physician/advanced practitioner if interventions unsuccessful or patient reports new pain  Outcome: Progressing     Problem: SAFETY ADULT  Goal: Patient will remain free of falls  Description: INTERVENTIONS:  - Educate patient/family on patient safety including physical limitations  - Instruct patient to call for assistance with activity   - Consult OT/PT to assist with strengthening/mobility   - Keep Call bell within reach  - Keep bed low and locked with side rails adjusted as appropriate  - Keep care items and personal belongings within reach  - Initiate and maintain comfort rounds  - Make Fall Risk Sign visible to staff  Problem: MUSCULOSKELETAL - ADULT  Goal: Maintain or return mobility to safest level of function  Description: INTERVENTIONS:  - Assess patient's ability to carry out ADLs; assess patient's baseline for ADL function and identify physical deficits which impact ability to perform ADLs (bathing, care of mouth/teeth, toileting, grooming, dressing, etc.)  - Assess/evaluate cause of self-care deficits   - Assess range of motion  - Assess patient's mobility  - Assess patient's need for assistive devices and provide as appropriate  - Encourage maximum independence but intervene and supervise when necessary  - Involve family in performance of ADLs  - Assess for home care needs following discharge   - Consider OT consult to assist with ADL evaluation and planning for discharge  - Provide patient education as appropriate  Outcome:  Progressing     Problem: SKIN/TISSUE INTEGRITY - ADULT  Goal: Skin Integrity remains intact(Skin Breakdown Prevention)  Description: Assess:  Problem: Knowledge Deficit  Goal: Patient/family/caregiver demonstrates understanding of disease process, treatment plan, medications, and discharge instructions  Description: Complete learning assessment and assess knowledge base.  Interventions:  - Provide teaching at level of understanding  - Provide teaching via preferred learning methods  Outcome: Progressing     Problem: DISCHARGE PLANNING  Goal: Discharge to home or other facility with appropriate resources  Description: INTERVENTIONS:  - Identify barriers to discharge w/patient and caregiver  - Arrange for needed discharge resources and transportation as appropriate  - Identify discharge learning needs (meds, wound care, etc.)  - Arrange for interpretive services to assist at discharge as needed  - Refer to Case Management Department for coordinating discharge planning if the patient needs post-hospital services based on physician/advanced practitioner order or complex needs related to functional status, cognitive ability, or social support system  Outcome: Progressing     -Inspect skin when repositioning, toileting, and assisting with ADLS  -Assess extremities for adequate circulation and sensation     Bed Management:  -Have minimal linens on bed & keep smooth, unwrinkled  -Change linens as needed when moist or perspiring    Toileting:  -Offer bedside commode    Activity:  -Encourage activity and walks on unit  -Encourage or provide ROM exercises   -Use appropriate equipment to lift or move patient in bed    Skin Care:  -Avoid use of baby powder, tape, friction and shearing, hot water or constrictive clothing  -Do not massage red bony areas    Outcome: Progressing     - Apply yellow socks and bracelet for high fall risk patients  - Consider moving patient to room near nurses station  Outcome: Progressing     Problem:  Nutrition/Hydration-ADULT  Goal: Nutrient/Hydration intake appropriate for improving, restoring or maintaining nutritional needs  Description: Monitor and assess patient's nutrition/hydration status for malnutrition. Collaborate with interdisciplinary team and initiate plan and interventions as ordered.  Monitor patient's weight and dietary intake as ordered or per policy. Utilize nutrition screening tool and intervene as necessary. Determine patient's food preferences and provide high-protein, high-caloric foods as appropriate.     INTERVENTIONS:  - Monitor oral intake, urinary output, labs, and treatment plans  - Assess nutrition and hydration status and recommend course of action  - Evaluate amount of meals eaten  - Assist patient with eating if necessary   - Allow adequate time for meals  - Recommend/ encourage appropriate diets, oral nutritional supplements, and vitamin/mineral supplements  - Order, calculate, and assess calorie counts as needed  - Recommend, monitor, and adjust tube feedings and TPN/PPN based on assessed needs  - Assess need for intravenous fluids  - Provide specific nutrition/hydration education as appropriate  - Include patient/family/caregiver in decisions related to nutrition  Outcome: Progressing

## 2024-11-22 NOTE — PLAN OF CARE
Problem: MUSCULOSKELETAL - ADULT  Goal: Maintain or return mobility to safest level of function  Description: INTERVENTIONS:  - Assess patient's ability to carry out ADLs; assess patient's baseline for ADL function and identify physical deficits which impact ability to perform ADLs (bathing, care of mouth/teeth, toileting, grooming, dressing, etc.)  - Assess/evaluate cause of self-care deficits   - Assess range of motion  - Assess patient's mobility  - Assess patient's need for assistive devices and provide as appropriate  - Encourage maximum independence but intervene and supervise when necessary  - Involve family in performance of ADLs  - Assess for home care needs following discharge   - Consider OT consult to assist with ADL evaluation and planning for discharge  - Provide patient education as appropriate  Outcome: Progressing  Goal: Maintain proper alignment of affected body part  Description: INTERVENTIONS:  - Support, maintain and protect limb and body alignment  - Provide patient/ family with appropriate education  Outcome: Progressing     Problem: SKIN/TISSUE INTEGRITY - ADULT  Goal: Skin Integrity remains intact(Skin Breakdown Prevention)  Description: Assess:  -Perform Don[place] assessment every   -Clean and moisturize skin every   -Inspect skin when repositioning, toileting, and assisting with ADLS  -Assess under medical devices such as  every   -Assess extremities for adequate circulation and sensation     Bed Management:  -Have minimal linens on bed & keep smooth, unwrinkled  -Change linens as needed when moist or perspiring  -Avoid sitting or lying in one position for more than hours while in bed  -Keep HOB at degrees     Toileting:  -Offer bedside commode  -Assess for incontinence every   -Use incontinent care products after each incontinent episode such as     Activity:  -Mobilize patient  times a day  -Encourage activity and walks on unit  -Encourage or provide ROM exercises   -Turn and reposition  patient every  Hours  -Use appropriate equipment to lift or move patient in bed  -Instruct/ Assist with weight shifting every  when out of bed in chair  -Consider limitation of chair time hour intervals    Skin Care:  -Avoid use of baby powder, tape, friction and shearing, hot water or constrictive clothing  -Relieve pressure over bony prominences using   -Do not massage red bony areas    Next Steps:  -Teach patient strategies to minimize risks such as    -Consider consults to  interdisciplinary teams such as   Outcome: Progressing  Goal: Incision(s), wounds(s) or drain site(s) healing without S/S of infection  Description: INTERVENTIONS  - Assess and document dressing, incision, wound bed, drain sites and surrounding tissue  - Provide patient and family education  - Perform skin care/dressing changes every   Outcome: Progressing  Goal: Pressure injury heals and does not worsen  Description: Interventions:  - Implement low air loss mattress or specialty surface (Criteria met)  - Apply silicone foam dressing  - Instruct/assist with weight shifting every  minutes when in chair   - Limit chair time to  hour intervals  - Use special pressure reducing interventions such as  when in chair   - Apply fecal or urinary incontinence containment device   - Perform passive or active ROM every   - Turn and reposition patient & offload bony prominences every  hours   - Utilize friction reducing device or surface for transfers   - Consider consults to  interdisciplinary teams such as   - Use incontinent care products after each incontinent episode such as  - Consider nutrition services referral as needed  Outcome: Progressing     Problem: Nutrition/Hydration-ADULT  Goal: Nutrient/Hydration intake appropriate for improving, restoring or maintaining nutritional needs  Description: Monitor and assess patient's nutrition/hydration status for malnutrition. Collaborate with interdisciplinary team and initiate plan and interventions as  ordered.  Monitor patient's weight and dietary intake as ordered or per policy. Utilize nutrition screening tool and intervene as necessary. Determine patient's food preferences and provide high-protein, high-caloric foods as appropriate.     INTERVENTIONS:  - Monitor oral intake, urinary output, labs, and treatment plans  - Assess nutrition and hydration status and recommend course of action  - Evaluate amount of meals eaten  - Assist patient with eating if necessary   - Allow adequate time for meals  - Recommend/ encourage appropriate diets, oral nutritional supplements, and vitamin/mineral supplements  - Order, calculate, and assess calorie counts as needed  - Recommend, monitor, and adjust tube feedings and TPN/PPN based on assessed needs  - Assess need for intravenous fluids  - Provide specific nutrition/hydration education as appropriate  - Include patient/family/caregiver in decisions related to nutrition  Outcome: Progressing

## 2024-11-22 NOTE — ASSESSMENT & PLAN NOTE
Malnutrition Findings:   Adult Malnutrition type: Chronic illness  Adult Degree of Malnutrition: Malnutrition of moderate degree  Malnutrition Characteristics: Fat loss, Muscle loss                  360 Statement: Moderate malnutrition r/t inadequate intake as evidenced by BMI 18.0, mild fat/muscle wasting of orbital/temple areas. treated with high calorie diet and Ensure Compact tid    BMI Findings:  Adult BMI Classifications: Underweight < 18.5        Body mass index is 18 kg/m².

## 2024-11-22 NOTE — PROGRESS NOTES
Progress Note - Trauma   Name: Sabiha Garcia 68 y.o. female I MRN: [id number]  Unit/Bed#: [facility] 623-01 I Date of Admission: 11/18/2024   Date of Service: 11/22/2024 I Hospital Day: 2    Assessment & Plan  Closed fracture of right inferior pubic ramus (HCC)  -Status post fall outside of her home yesterday evening after returning home from rehab, fell onto her right hip and tailbone, unable to bear weight after the fall, no head strike, takes ASA and 2.5 mg Xarelto twice daily  -Imaging including CT RLE and XR hip/pelvis significant for right inferior pubic ramus fracture  -Orthopedics consult  Type 2 diabetes mellitus, with long-term current use of insulin (HCC)    Lab Results   Component Value Date    HGBA1C 14.8 (H) 09/18/2024   -History of poorly controlled diabetes, on long-term insulin therapy  -Continue SSI while inpatient  -Goal blood sugars less than 180  HLD (hyperlipidemia)  -Continue home atorvastatin  Ambulatory dysfunction  -Previous history of falls with a prior left femur fracture in 2020 after a fall requiring surgical intervention  -Also recently admitted to the [facility] service in October 2020 for with recent generalized weakness and multiple falls without head strike, does use a walker to ambulate at baseline  AIOD/ PAD  -Recent admission with vascular invention including left-sided axillary bifemoral bypass as well as right common femoral to above-knee popliteal artery bypass on 10/23 with vascular surgery  -Also had right sided fifth ray amputation with podiatry on 10/26  -Discharged on ASA81 daily and 2.5 mg Xarelto twice daily  Osteoporosis with current pathological fracture    Anemia    Vitamin D deficiency    Protein-calorie malnutrition (HCC)  Malnutrition Findings:   Adult Malnutrition type: Chronic illness  Adult Degree of Malnutrition: Malnutrition of moderate degree  Malnutrition Characteristics: Fat loss, Muscle loss                  360 Statement: Moderate malnutrition r/t inadequate intake  "as evidenced by BMI 18.0, mild fat/muscle wasting of orbital/temple areas. treated with high calorie diet and Ensure Compact tid    BMI Findings:  Adult BMI Classifications: Underweight < 18.5        Body mass index is 18 kg/m².     Moderate protein-calorie malnutrition (HCC)  Malnutrition Findings:   Adult Malnutrition type: Chronic illness  Adult Degree of Malnutrition: Malnutrition of moderate degree  Malnutrition Characteristics: Fat loss, Muscle loss                  360 Statement: Moderate malnutrition r/t inadequate intake as evidenced by BMI 18.0, mild fat/muscle wasting of orbital/temple areas. treated with high calorie diet and Ensure Compact tid    BMI Findings:  Adult BMI Classifications: Underweight < 18.5        Body mass index is 18 kg/m².       Bowel Regimen: Miralax  VTE Prophylaxis:\" Xaraleto\"     Disposition: rehab    24 Hour Events : Worked with therapy, feeling better  Subjective : \" It is good to see you\"    Objective :  Temp:  [97.6 °F (36.4 °C)-98.1 °F (36.7 °C)] 98.1 °F (36.7 °C)  HR:  [74-87] 83  BP: (103-131)/(57-61) 111/60  Resp:  [16-18] 16  SpO2:  [95 %-100 %] 96 %  O2 Device: None (Room air)    I/O         11/20 0701  11/21 0700 11/21 0701  11/22 0700 11/22 0701  11/23 0700    P.O. 600 936 480    I.V. (mL/kg) 1530 (27.6)      Total Intake(mL/kg) 2130 (38.4) 936 (16.9) 480 (8.7)    Urine (mL/kg/hr)  996 (0.7) 225 (0.6)    Total Output  996 225    Net +2130 -60 +255           Unmeasured Urine Occurrence 3 x 2 x             Physical Exam  Constitutional:       Appearance: Normal appearance.   HENT:      Head: Normocephalic and atraumatic.      Right Ear: External ear normal.      Left Ear: External ear normal.      Nose: Nose normal.      Mouth/Throat:      Pharynx: Oropharynx is clear.   Eyes:      Extraocular Movements: Extraocular movements intact.      Pupils: Pupils are equal, round, and reactive to light.   Cardiovascular:      Rate and Rhythm: Normal rate and regular rhythm.      " Pulses: Normal pulses.      Heart sounds: Normal heart sounds.   Pulmonary:      Effort: Pulmonary effort is normal.      Breath sounds: Normal breath sounds.   Abdominal:      Palpations: Abdomen is soft.   Genitourinary:     Comments: voiding  Musculoskeletal:         General: Normal range of motion.      Cervical back: Normal range of motion.   Skin:     General: Skin is warm and dry.      Capillary Refill: Capillary refill takes less than 2 seconds.   Neurological:      General: No focal deficit present.      Mental Status: She is alert and oriented to person, place, and time.   Psychiatric:         Mood and Affect: Mood normal.               Lab Results: I have reviewed the following results:  Recent Labs     11/21/24  0643 11/22/24  0516   WBC 13.40* 13.07*   HGB 7.9* 8.8*   HCT 26.3* 28.0*   * 566*   SODIUM 135 135   K 5.0 4.9    99   CO2 25 27   BUN 14 20   CREATININE 0.61 0.73   GLUC 100 225*   MG 2.1  --    PHOS 3.9  --    AST 12*  --    ALT 12  --    ALB 3.0*  --    TBILI 0.21  --    ALKPHOS 78  --

## 2024-11-22 NOTE — CASE MANAGEMENT
Pt accepting the bed at [facility]  Cm will submit for auth  Bed may not be available until Monday, depending on facility’s bed situation

## 2024-11-22 NOTE — CASE MANAGEMENT
Bed is available tomorrow at [facility]. Weekend CM will need to set up transportation and coordinate with facility; but there is a bed available on Saturday 11/23

## 2024-11-22 NOTE — CASE MANAGEMENT
[facility] received request for authorization from Care Manager.  Authorization request submitted for: [facility]  Facility Name:  [facility] NPI: [id number]        Facility MD: Dr. Indiana Butler   NPI: [id number]  Authorization initiated by contacting insurance:  [employer]   Via: [employer]   Clinicals submitted via [employer] attachment   Pending Reference #: [id number]     Care Manager notified: alejandra akers     Updates to authorization status will be noted in chart. Please reach out to CM for updates on any clinical information.        [facility] has received APPROVED authorization.  Insurance:   [employer]   Auth obtained via portal.  Authorization received for: [facility]  Facility: [facility]  Authorization #:[id number]   Start of Care:11/23  Next Review Date:11/29  Continued Stay Care Coordinator:  n/a  Submit next review to: fax [phone]     Care Manager notified:alejandra akers     Please reach out to CM for updates on any clinical information.

## 2024-11-23 VITALS
HEIGHT: 69 IN | SYSTOLIC BLOOD PRESSURE: 117 MMHG | RESPIRATION RATE: 18 BRPM | OXYGEN SATURATION: 95 % | DIASTOLIC BLOOD PRESSURE: 59 MMHG | HEART RATE: 92 BPM | WEIGHT: 121.91 LBS | TEMPERATURE: 97.7 F | BODY MASS INDEX: 18.06 KG/M2

## 2024-11-23 LAB
GLUCOSE SERPL-MCNC: 129 MG/DL (ref 65–140)
GLUCOSE SERPL-MCNC: 201 MG/DL (ref 65–140)

## 2024-11-23 PROCEDURE — 82948 REAGENT STRIP/BLOOD GLUCOSE: CPT

## 2024-11-23 PROCEDURE — 99238 HOSP IP/OBS DSCHRG MGMT 30/<: CPT

## 2024-11-23 RX ORDER — OXYCODONE HYDROCHLORIDE 5 MG/1
5 TABLET ORAL EVERY 4 HOURS PRN
Qty: 5 TABLET | Refills: 0 | Status: SHIPPED | OUTPATIENT
Start: 2024-11-23 | End: 2024-12-03

## 2024-11-23 RX ORDER — ACETAMINOPHEN 325 MG/1
650 TABLET ORAL EVERY 6 HOURS SCHEDULED
Start: 2024-11-23

## 2024-11-23 RX ORDER — DOCUSATE SODIUM 100 MG/1
100 CAPSULE, LIQUID FILLED ORAL 2 TIMES DAILY
Status: DISCONTINUED | OUTPATIENT
Start: 2024-11-23 | End: 2024-11-23 | Stop reason: HOSPADM

## 2024-11-23 RX ADMIN — ACETAMINOPHEN 650 MG: 325 TABLET, FILM COATED ORAL at 05:13

## 2024-11-23 RX ADMIN — SENNOSIDES 17.2 MG: 8.6 TABLET, FILM COATED ORAL at 08:13

## 2024-11-23 RX ADMIN — ACETAMINOPHEN 650 MG: 325 TABLET, FILM COATED ORAL at 12:51

## 2024-11-23 RX ADMIN — BISACODYL 10 MG: 10 SUPPOSITORY RECTAL at 08:13

## 2024-11-23 RX ADMIN — ASPIRIN 81 MG: 81 TABLET, COATED ORAL at 08:19

## 2024-11-23 RX ADMIN — OXYCODONE HYDROCHLORIDE 5 MG: 5 TABLET ORAL at 15:59

## 2024-11-23 RX ADMIN — POLYETHYLENE GLYCOL 3350 17 G: 17 POWDER, FOR SOLUTION ORAL at 08:14

## 2024-11-23 RX ADMIN — INSULIN LISPRO 14 UNITS: 100 INJECTION, SOLUTION INTRAVENOUS; SUBCUTANEOUS at 08:13

## 2024-11-23 RX ADMIN — RIVAROXABAN 2.5 MG: 2.5 TABLET, FILM COATED ORAL at 09:49

## 2024-11-23 RX ADMIN — INSULIN LISPRO 2 UNITS: 100 INJECTION, SOLUTION INTRAVENOUS; SUBCUTANEOUS at 12:50

## 2024-11-23 RX ADMIN — OXYCODONE HYDROCHLORIDE 5 MG: 5 TABLET ORAL at 09:49

## 2024-11-23 RX ADMIN — INSULIN LISPRO 10 UNITS: 100 INJECTION, SOLUTION INTRAVENOUS; SUBCUTANEOUS at 12:51

## 2024-11-23 RX ADMIN — DOCUSATE SODIUM 100 MG: 100 CAPSULE, LIQUID FILLED ORAL at 08:13

## 2024-11-23 NOTE — ASSESSMENT & PLAN NOTE
-Status post fall outside of her home yesterday evening after returning home from rehab, fell onto her right hip and tailbone, unable to bear weight after the fall, no head strike, takes ASA and 2.5 mg Xarelto twice daily  -Imaging including CT RLE and XR hip/pelvis significant for right inferior pubic ramus fracture  -Orthopedics consult, appreciate recommendations  -Nonop management at this time  -Weightbearing as tolerated right lower extremity  -Mental pain regimen  -PT/OT evaluation and treatment as indicated  -Outpatient follow-up with orthopedic surgery

## 2024-11-23 NOTE — DISCHARGE SUMMARY
Discharge Summary - Trauma   Name: Sabiha Garcia 68 y.o. female I MRN: [id number]  Unit/Bed#: [facility] 623-01 I Date of Admission: 11/18/2024   Date of Service: 11/23/2024 I Hospital Day: 3    Assessment & Plan  Closed fracture of right inferior pubic ramus (HCC)  -Status post fall outside of her home yesterday evening after returning home from rehab, fell onto her right hip and tailbone, unable to bear weight after the fall, no head strike, takes ASA and 2.5 mg Xarelto twice daily  -Imaging including CT RLE and XR hip/pelvis significant for right inferior pubic ramus fracture  -Orthopedics consult, appreciate recommendations  -Nonop management at this time  -Weightbearing as tolerated right lower extremity  -Mental pain regimen  -PT/OT evaluation and treatment as indicated  -Outpatient follow-up with orthopedic surgery  Type 2 diabetes mellitus, with long-term current use of insulin ([facility])    Lab Results   Component Value Date    HGBA1C 14.8 (H) 09/18/2024   -History of poorly controlled diabetes, on long-term insulin therapy  -Continue SSI while inpatient  -Goal blood sugars less than 180  HLD (hyperlipidemia)  -Continue home atorvastatin  Ambulatory dysfunction  -Previous history of falls with a prior left femur fracture in 2020 after a fall requiring surgical intervention  -Also recently admitted to the [facility] service in October 2020 for with recent generalized weakness and multiple falls without head strike, does use a walker to ambulate at baseline  AIOD/ PAD  -Recent admission with vascular invention including left-sided axillary bifemoral bypass as well as right common femoral to above-knee popliteal artery bypass on 10/23 with vascular surgery  -Also had right sided fifth ray amputation with podiatry on 10/26  -Local wound care   -Discharged on ASA81 daily and 2.5 mg Xarelto twice daily  -Continue above medications  -Outpatient follow up with vascular surgery as scheduled  Protein-calorie malnutrition  "(HCC)  Malnutrition Findings:   Adult Malnutrition type: Chronic illness  Adult Degree of Malnutrition: Malnutrition of moderate degree  Malnutrition Characteristics: Fat loss, Muscle loss                  360 Statement: Moderate malnutrition r/t inadequate intake as evidenced by BMI 18.0, mild fat/muscle wasting of orbital/temple areas. treated with high calorie diet and Ensure Compact tid    BMI Findings:  Adult BMI Classifications: Underweight < 18.5        Body mass index is 18 kg/m².     Moderate protein-calorie malnutrition (HCC)  Malnutrition Findings:   Adult Malnutrition type: Chronic illness  Adult Degree of Malnutrition: Malnutrition of moderate degree  Malnutrition Characteristics: Fat loss, Muscle loss                  360 Statement: Moderate malnutrition r/t inadequate intake as evidenced by BMI 18.0, mild fat/muscle wasting of orbital/temple areas. treated with high calorie diet and Ensure Compact tid    BMI Findings:  Adult BMI Classifications: Underweight < 18.5        Body mass index is 18 kg/m².       Admission Date: 11/18/2024 2223  Discharge Date: 11/23/24  Admitting Diagnosis: Closed fracture of right inferior pubic ramus, initial encounter ([facility]) [S32.591A]  Unspecified multiple injuries, initial encounter [T07.XXXA]  Discharge Diagnosis:   Medical Problems       Resolved Problems  Date Reviewed: 11/20/2024   None         HPI: Documented by Cleveland Cardona MD on 11/19/2024, \"Sabiha Garcia is a 68 y.o. female w PMH of poorly controlled DM, smoker, HLD, PAD, depression with anxiety, recent admission to [facility] on the [facility] service initially presenting with DKA and starvation ketosis, significant vascular disease.  Patient ultimately underwent surgical intervention with vascular surgery team including left-sided axillary bifemoral bypass and right-sided common femoral to above-knee popliteal bypass on 10/23, subsequently with the podiatry team she underwent right sided fifth ray amputation on 10/26.  She " "was discharged on ASA81 daily as well as 2.5 mg Xarelto twice daily, discharged to SNF for rehab.     Patient was returning home from SNF yesterday evening via taxi when she fell outside her home before making inside, patient states she slipped on the cement outside and fell onto her right hip and tailbone, immediate pain in the area, she denies head strike and denies loss of consciousness.  She was unable to get up on her own or bear weight,  was able to call an ambulance for her who took her to the hospital.  Patient typically ambulates with a walker but was not using walker at this time, she lives at home alone and was returning home from SNF.  She does have history of amatory dysfunction in the past even as recently as October admission, she had left femur fracture in 2020 after a fall that required surgical intervention.\"    Procedures Performed: No orders of the defined types were placed in this encounter.      Summary of Hospital Course: Patient was seen and evaluated by the trauma team and admitted with findings of a right inferior pubic ramus fracture after a mechanical fall.  Orthopedic surgery was consulted who manage patient nonoperatively.  She was made weightbearing as tolerated and her pain was well-controlled during hospitalization.  Podiatry was consulted due to her recent history of right foot surgery.  PT/OT evaluated patient recommended discharge to rehab.  Patient was stable for discharge on 11/23/2024.  She will follow-up outpatient with orthopedic surgery, vascular surgery, and podiatry as scheduled.  For further details of hospitalization, please reference hospital medical chart.    On day of discharge, patient is feeling well and her pain is well-controlled at this time.  Encouraged her to get up out of the bed with staff and to move around as much as she can.  Per nursing, she had a bowel movement this morning.  She is motivated for discharge to rehab.    Significant Findings, " "Care, Treatment and Services Provided: Right inferior pubic ramus fracture    Complications: None    Discharge Day Visit / Exam:   /61   Pulse 77   Temp 97.6 °F (36.4 °C)   Resp 16   Ht 5' 9\" (1.753 m)   Wt 55.3 kg (121 lb 14.6 oz)   SpO2 98%   BMI 18.00 kg/m²   Physical Exam  Vitals and nursing note reviewed.   Constitutional:       General: She is not in acute distress.     Appearance: She is well-developed. She is not ill-appearing.   HENT:      Head: Normocephalic and atraumatic.   Eyes:      Conjunctiva/sclera: Conjunctivae normal.   Cardiovascular:      Rate and Rhythm: Normal rate and regular rhythm.      Pulses: Normal pulses.      Heart sounds: Normal heart sounds.   Pulmonary:      Effort: Pulmonary effort is normal. No respiratory distress.      Breath sounds: Normal breath sounds.   Chest:      Chest wall: No tenderness.   Abdominal:      General: Abdomen is flat. There is no distension.      Palpations: Abdomen is soft.      Tenderness: There is no abdominal tenderness. There is no guarding.   Musculoskeletal:         General: Tenderness (Right hip) present. No deformity.      Cervical back: Normal range of motion and neck supple.   Skin:     General: Skin is warm and dry.      Capillary Refill: Capillary refill takes less than 2 seconds.      Comments: Right foot dressing CDI  Vascular surgery dressing CDI   Neurological:      General: No focal deficit present.      Mental Status: She is alert and oriented to person, place, and time. Mental status is at baseline.      Sensory: No sensory deficit.   Psychiatric:         Mood and Affect: Mood normal.       Condition at Discharge: good       Discharge instructions/Information to patient and family:   See After Visit Summary (AVS) for information provided to patient and family.      Provisions for Follow-Up Care:  See after visit summary for information related to follow-up care and any pertinent home health orders.      PCP: Alexys Medrano " MD Jose Raul    Disposition: Short-term rehab at [facility]    Planned Readmission: No     Discharge Medications:  See after visit summary for reconciled discharge medications provided to patient and family.      Discharge Statement:  I have spent a total time of 26 minutes in caring for this patient on the day of the visit/encounter. >30 minutes of time was spent on: Diagnostic results, Prognosis, Instructions for management, Patient and family education, Risk factor reductions, Impressions, Counseling / Coordination of care, and Documenting in the medical record.

## 2024-11-23 NOTE — ASSESSMENT & PLAN NOTE
-Previous history of falls with a prior left femur fracture in 2020 after a fall requiring surgical intervention  -Also recently admitted to the [facility] service in October 2020 for with recent generalized weakness and multiple falls without head strike, does use a walker to ambulate at baseline

## 2024-11-23 NOTE — ASSESSMENT & PLAN NOTE
-Recent admission with vascular invention including left-sided axillary bifemoral bypass as well as right common femoral to above-knee popliteal artery bypass on 10/23 with vascular surgery  -Also had right sided fifth ray amputation with podiatry on 10/26  -Local wound care   -Discharged on ASA81 daily and 2.5 mg Xarelto twice daily  -Continue above medications  -Outpatient follow up with vascular surgery as scheduled

## 2024-11-23 NOTE — CASE MANAGEMENT
Case Management Discharge Planning Note    Patient name Sabiha Garcia  Location [facility] 623/[facility] 623-01 MRN [id number]  : 1955 Date 2024       Current Admission Date: 2024  Current Admission Diagnosis:Closed fracture of right inferior pubic ramus (HCC)   Patient Active Problem List    Diagnosis Date Noted Date Diagnosed    Moderate protein-calorie malnutrition (HCC) 2024     Closed fracture of right inferior pubic ramus (HCC) 2024     Osteoporosis with current pathological fracture 2024     S/P foot surgery 2024     Poorly controlled type 2 diabetes mellitus with neuropathy (HCC) 2024     Protein-calorie malnutrition (HCC) 10/17/2024     AIOD/ PAD 10/16/2024     Other headache syndrome 10/16/2024     Pre-operative cardiovascular examination 10/16/2024     Gangrene of toe of right foot (HCC) 10/13/2024     Ambulatory dysfunction 10/13/2024     Vitamin D deficiency 10/13/2024     Lung nodule 10/13/2024     Abnormal gait 2024     Weight loss, unintentional 2024     Claudication of both lower extremities (HCC) 2024     Disorder of refraction and accommodation 2024     Disorder of gallbladder 2024     Arthralgia of shoulder 2024     Eczema 2024     Generalized anxiety disorder 2022     Depression 2022     Fracture of surgical neck of right humerus 2022     History of gallbladder disease 2022     Numbness of toes 2022     Anemia 2020     Type 2 diabetes mellitus, with long-term current use of insulin (HCC) 2020     Tobacco user 2020     HLD (hyperlipidemia) 2013       LOS (days): 3  Geometric Mean LOS (GMLOS) (days): 2.8  Days to GMLOS:-0.1     OBJECTIVE:  Risk of Unplanned Readmission Score: 17.38         Current admission status: Inpatient   Preferred Pharmacy:   [id number] - [place] - [address]  [address]  [place] [address]  Phone: [phone] Fax:  [phone]    Primary Care Provider: Alexys Blunt MD    Primary Insurance: [facility]  Secondary Insurance: [facility]    DISCHARGE DETAILS:     Transported by (Company and Unit #): [employer]        Accepting Facility Name, City & State : [facility]  Receiving Facility/Agency Phone Number: [phone]  Facility/Agency Fax Number: [phone]

## 2024-11-25 ENCOUNTER — PATIENT OUTREACH (OUTPATIENT)
Dept: CASE MANAGEMENT | Facility: OTHER | Age: 69
End: 2024-11-25

## 2024-11-25 NOTE — PROGRESS NOTES
ADT alert received the patient discharged 11/23/24 to [facility]. Email sent to facility to inform them I will be following the patient during their skilled stay.  This Admin Coordinator will continue to monitor via chart review.

## 2024-12-02 ENCOUNTER — OFFICE VISIT (OUTPATIENT)
Dept: PODIATRY | Facility: CLINIC | Age: 69
End: 2024-12-02
Payer: COMMERCIAL

## 2024-12-02 DIAGNOSIS — I73.9 PERIPHERAL VASCULAR DISEASE, UNSPECIFIED (HCC): ICD-10-CM

## 2024-12-02 DIAGNOSIS — E11.40 POORLY CONTROLLED TYPE 2 DIABETES MELLITUS WITH NEUROPATHY (HCC): ICD-10-CM

## 2024-12-02 DIAGNOSIS — S91.301S OPEN WOUND OF RIGHT FOOT, SEQUELA: Primary | ICD-10-CM

## 2024-12-02 DIAGNOSIS — Z72.0 TOBACCO USER: ICD-10-CM

## 2024-12-02 DIAGNOSIS — E11.65 POORLY CONTROLLED TYPE 2 DIABETES MELLITUS WITH NEUROPATHY (HCC): ICD-10-CM

## 2024-12-02 PROCEDURE — 97597 DBRDMT OPN WND 1ST 20 CM/<: CPT | Performed by: PODIATRIST

## 2024-12-02 PROCEDURE — 99213 OFFICE O/P EST LOW 20 MIN: CPT | Performed by: PODIATRIST

## 2024-12-02 NOTE — PROGRESS NOTES
Podiatry Clinic Visit  Sabiha Garcia 68 y.o. female MRN: [id number]  Encounter: [id number]    Assessment & Plan        Diagnoses and all orders for this visit:    Open wound of right foot, sequela    Poorly controlled type 2 diabetes mellitus with neuropathy (HCC)    Tobacco user    Peripheral vascular disease, unspecified (HCC)           Plan:  Patient was seen and examined with all their questions and concerns addressed.  Of note right foot post-surgical wound is stable with signs of healthy tissue in the wound base. No constitutional signs of infection. Patient is currently in rehab.  Patient is s/p right 5th partial ray amputation DOS 10/26/24.  Reveiwed vascular history. Of note Left Axillary bifemoral bypass on 10/23/24.   Right footwound was debrided. Hemostasis was achieved through pressure and silver nitrate sticks. Continued Alginate DSD.  Patient was re-appointed for 3-4 weeks for repeated wound care    - Dr. Dhillon was available/present for entirety of patient encounter and present for all procedures.    Debridement    Universal Protocol:  Consent: Verbal consent obtained.  Risks and benefits: risks, benefits and alternatives were discussed  Consent given by: patient  Patient understanding: patient states understanding of the procedure being performed  Patient consent: the patient's understanding of the procedure matches consent given  Procedure consent: procedure consent matches procedure scheduled  Patient identity confirmed: verbally with patient    Debridement Details  Performed by: resident  Debridement type: selective  Pain control: none      Post-debridement measurements  Length (cm): 2.5  Width (cm): 0.3  Depth (cm): 0.4  Percent debrided: 100%  Surface Area (cm^2): 0.75  Area Debrided (cm^2): 0.75  Volume (cm^3): 0.3    Devitalized tissue debrided: callus, exudate, fibrin and slough  Instrument(s) utilized: curette  Technique utilized: excisionalBleeding: medium  Hemostasis obtained with: pressure  and silver nitrate  Procedural pain (0-10): insensate  Post-procedural pain: insensate   Response to treatment: procedure was tolerated well          History of Present Illness     HPI: Sabiha Garcia is a 68 y.o. female who presents for follow-up on post-surgical wound of partial 5th metatarsal amputation. Patient states she is still in rehabilitation and has been told that her wound is currently in the process of healing. Patient relays no pain, complaints or signs of constitutional infection. Patient was recently seen in [facility] for pubic fracture and was shortly discharged afterwards. The patient has no further podiatric complaints at this time.      Review of Systems   Constitutional: Negative.    HENT: Negative.    Eyes: Negative.    Respiratory: Negative.    Cardiovascular: Negative.    Gastrointestinal: Negative.    Musculoskeletal: Negative  Skin: Right foot wound lateral aspect  Neurological: Negative.        Historical Information   Past Medical History:   Diagnosis Date    Allergic [id number]    Aorto-iliac disease (HCC)     Depression 410355    Diabetes mellitus (HCC) 635796    Osteopenia     Osteoporosis     PAD (peripheral artery disease) (HCC)     Pulmonary nodule     RUL    Smokes 1974 1/2 ppd smoker    Vitamin D deficiency      Past Surgical History:   Procedure Laterality Date    APPENDECTOMY      BYPASS FEMORAL-POPLITEAL Right 10/23/2024    Procedure: R Femoral- AK popliteal bypass w/ PTFE, right common femoral endarterectomy;  Surgeon: Haroon Dukes MD;  [facility]: [facility];  Service: Vascular    CHOLECYSTECTOMY      FRACTURE SURGERY Left     L hip ORIF    JEJUNOSTOMY      & removal    TX AMPUTATION METATARSAL W/TOE SINGLE Right 10/26/2024    Procedure: Partial 5th ray amputation of the right foot with removal of nonviable bone/soft tissue;  Surgeon: Bienvenido Dhillon DPM;  Location: [facility];  Service: Podiatry    TX BYPASS W/VEIN AXILLARY-FEMORAL Left 10/23/2024     Procedure: L AxBiFem;  Surgeon: Haroon Dukes MD;  Location: [facility];  Service: Vascular    DC OPTX FEM SHFT FX W/INSJ IMED IMPLT W/WO SCREW Left 05/27/2020    Procedure: INSERTION NAIL IM FEMUR ANTEGRADE (TROCHANTERIC);  Surgeon: Jose Lyons MD;  Location: [facility];  Service: Orthopedics    TONSILLECTOMY      TUBAL LIGATION       Social History   Social History     Substance and Sexual Activity   Alcohol Use Never     Social History     Substance and Sexual Activity   Drug Use Never     Social History     Tobacco Use   Smoking Status Former    Current packs/day: 0.50    Average packs/day: 0.5 packs/day for 25.0 years (12.5 ttl pk-yrs)    Types: Cigarettes   Smokeless Tobacco Never     Family History:   Family History   Problem Relation Age of Onset    Stroke Mother     No Known Problems Father         left her at 18 months old.    Cholelithiasis Sister         Vascular disease    No Known Problems Half-Brother        Meds/Allergies   Not in a hospital admission.  Allergies   Allergen Reactions    Penicillins Hives    Prednisone Other (See Comments)     personality change       Objective     Current Vitals:            There were no vitals taken for this visit.      Lower Extremity Exam:    Foot Exam    Musculoskeletal:  MMT is 4+/5 to all compartments of the LE, +2/4 edema B/L, Hallux limitus is present. Equinus is present. No pain with passive ROM of pedal joints.     Vascular:   DP pulses and PT pulses are weak bilaterally., CFT< 3sec to all digits. Pedal hair is Absent. Pulse has regular rate and rhythm. Skin temperature warm to warm B/L.     Dermatological:  No open Lesions. Skin of the LE is normal texture, temperature, turgor. Interdigital maceration is not present.   Wound to right 5th met amputation site without signs of soft tissue infection, fibrous tissue noted above healthy bleeding wound base.       Neurologic:  Gross sensation is intact. Protective sensation is Diminished. Vibratory  sensation is Diminished. Sharp sensation is absent.

## 2024-12-05 ENCOUNTER — PATIENT OUTREACH (OUTPATIENT)
Dept: CASE MANAGEMENT | Facility: OTHER | Age: 69
End: 2024-12-05

## 2024-12-12 ENCOUNTER — PATIENT OUTREACH (OUTPATIENT)
Dept: CASE MANAGEMENT | Facility: OTHER | Age: 69
End: 2024-12-12

## 2024-12-17 ENCOUNTER — TELEPHONE (OUTPATIENT)
Age: 69
End: 2024-12-17

## 2024-12-17 NOTE — TELEPHONE ENCOUNTER
Caller: Sabiha    Doctor:  NP    Reason for call:  Patient's nursing home calling in states pt is refusing to come into appointment; Called office and was told to reschedule;     Call back#: [phone]

## 2024-12-20 ENCOUNTER — PATIENT OUTREACH (OUTPATIENT)
Dept: CASE MANAGEMENT | Facility: OTHER | Age: 69
End: 2024-12-20

## 2024-12-24 ENCOUNTER — PATIENT OUTREACH (OUTPATIENT)
Dept: CASE MANAGEMENT | Facility: OTHER | Age: 69
End: 2024-12-24

## 2024-12-24 NOTE — PROGRESS NOTES
Chart review complete Update obtained from [facility] ([facility]). The patient is currently admitted to the SNF and is receiving skilled therapies No LCD at this time. They are not on the [facility] discharge list. It has been 30 days since SNF/STR Surveillance episode was opened I will no longer be following the patient per protocol. I have removed myself from the care team, updated the Care Coordination note, and closed the episode.

## 2025-01-16 ENCOUNTER — TELEPHONE (OUTPATIENT)
Dept: VASCULAR SURGERY | Facility: CLINIC | Age: 70
End: 2025-01-16

## 2025-01-16 NOTE — TELEPHONE ENCOUNTER
Pt called in to access to cx po appt she is very sick and can not make it in please call her back raji few days to see how she is feeling to r/s.       2-3 WEEK POST OP W/ DSC ; pt refused to come to appt on 12/17

## 2025-01-16 NOTE — TELEPHONE ENCOUNTER
Pt need it to resched post op appt due to being sick , Agatha from [facility] asked for a call back with a new date and time her contact number is [phone]

## 2025-01-16 NOTE — TELEPHONE ENCOUNTER
Agatha called back to make us aware that PT is going into Hospice and will not be rescheduling the post op appt.

## 2025-01-21 ENCOUNTER — TELEPHONE (OUTPATIENT)
Age: 70
End: 2025-01-21

## 2025-01-21 NOTE — TELEPHONE ENCOUNTER
Caller: [facility]    Doctor and/or Office: /Nghia    #: [phone]    Escalation: Surgery Patient missed a post op in Dec. Please return call to Agatha to get it rescheduled. Thank you

## 2025-02-28 LAB

## 2025-05-14 ENCOUNTER — DOCUMENTATION (OUTPATIENT)
Dept: ADMINISTRATIVE | Facility: OTHER | Age: 70
End: 2025-05-14

## 2025-05-14 NOTE — PROGRESS NOTES
05/14/25 10:48 AM    Osteoporosis Management Post Fracture outreach is not required; there is an active DEXA screening order on file.    Thank you.  Taya Aguilar MA  [facility]

## 2025-06-30 ENCOUNTER — VBI (OUTPATIENT)
Dept: ADMINISTRATIVE | Facility: OTHER | Age: 70
End: 2025-06-30

## 2025-06-30 NOTE — TELEPHONE ENCOUNTER
06/30/25 2:13 PM    Patient was called to schedule a diabetic follow up apointment ; there was no answer/ a message could not be left.    Thank you.  Hilda Velásquez MA  [facility]

## (undated) DEVICE — EXOFIN PRECISION PEN HIGH VISCOSITY TOPICAL SKIN ADHESIVE: Brand: EXOFIN PRECISION PEN, 1G

## (undated) DEVICE — 3M™ STERI-DRAPE™ U-DRAPE 1015: Brand: STERI-DRAPE™

## (undated) DEVICE — TRAY FOLEY 16FR URIMETER SURESTEP

## (undated) DEVICE — SUT SILK 4-0 18 IN A183H

## (undated) DEVICE — SUT PROLENE 6-0 BV-1/BV-1 24 IN 8805H

## (undated) DEVICE — BETHLEHEM UNIVERSAL  MIONR EXT: Brand: CARDINAL HEALTH

## (undated) DEVICE — PROPATEN VASCULAR GRAFT TW RR 6MMX50CM 40CM RINGS HEPARIN
Type: IMPLANTABLE DEVICE | Site: ARTERIAL | Status: NON-FUNCTIONAL
Brand: GORE PROPATEN VASCULAR GRAFT

## (undated) DEVICE — CYSTO TUBING SINGLE IRRIGATION

## (undated) DEVICE — GLOVE SRG BIOGEL 8.5

## (undated) DEVICE — LIGACLIP MCA MULTIPLE CLIP APPLIERS, 20 SMALL CLIPS: Brand: LIGACLIP

## (undated) DEVICE — SPONGE LAP 18 X 18 IN

## (undated) DEVICE — HEMOSTATIC MATRIX SURGIFLO 8ML W/THROMBIN

## (undated) DEVICE — NEEDLE 25G X 1 1/2

## (undated) DEVICE — DECANTER: Brand: UNBRANDED

## (undated) DEVICE — GLOVE INDICATOR PI UNDERGLOVE SZ 8.5 BLUE

## (undated) DEVICE — ASTOUND STANDARD SURGICAL GOWN, XL: Brand: CONVERTORS

## (undated) DEVICE — GLOVE SRG BIOGEL 8

## (undated) DEVICE — SUT PROLENE 7-0 BV175-6 24 IN M8737

## (undated) DEVICE — SUT VICRYL 2-0 CT-2 27 IN J269H

## (undated) DEVICE — INTENDED FOR TISSUE SEPARATION, AND OTHER PROCEDURES THAT REQUIRE A SHARP SURGICAL BLADE TO PUNCTURE OR CUT.: Brand: BARD-PARKER ® CARBON RIB-BACK BLADES

## (undated) DEVICE — PREP PAD BNS: Brand: CONVERTORS

## (undated) DEVICE — DRAPE C-ARM X-RAY

## (undated) DEVICE — IODOFORM PACKING STRIP: Brand: CURITY

## (undated) DEVICE — DRESSING MEPILEX AG BORDER POST-OP 4 X 8 IN

## (undated) DEVICE — POSITIONER HANA TABLE PACK

## (undated) DEVICE — SCD SEQUENTIAL COMPRESSION COMFORT SLEEVE MEDIUM KNEE LENGTH: Brand: KENDALL SCD

## (undated) DEVICE — 2.5MM REAMING ROD WITH BALL TIP/950MM-STERILE

## (undated) DEVICE — CHLORAPREP HI-LITE 26ML ORANGE

## (undated) DEVICE — 3M™ IOBAN™ 2 ANTIMICROBIAL INCISE DRAPE 6650EZ: Brand: IOBAN™ 2

## (undated) DEVICE — HEAVY DUTY TABLE COVER: Brand: CONVERTORS

## (undated) DEVICE — ABDOMINAL PAD: Brand: DERMACEA

## (undated) DEVICE — PROXIMATE SKIN STAPLERS (35 WIDE) CONTAINS 35 STAINLESS STEEL STAPLES (FIXED HEAD): Brand: PROXIMATE

## (undated) DEVICE — SUT SILK 0 30 IN A306H

## (undated) DEVICE — ACE WRAP 6 IN UNSTERILE

## (undated) DEVICE — PAD GROUNDING DUAL ADULT

## (undated) DEVICE — DRESSING MEPILEX AG BORDER POST-OP 4 X 6 IN

## (undated) DEVICE — THE SIMPULSE SOLO SYSTEM WITH ULTREX RETRACTABLE SPLASH SHIELD TIP: Brand: SIMPULSE SOLO

## (undated) DEVICE — SUT GORE-TEX CV-6 TT-9 6K02B

## (undated) DEVICE — BLADE SAGITTAL 25.6 X 9.5MM

## (undated) DEVICE — PETRI DISH STERILE

## (undated) DEVICE — PREMIUM DRY TRAY LF: Brand: MEDLINE INDUSTRIES, INC.

## (undated) DEVICE — GLOVE SRG BIOGEL 7.5

## (undated) DEVICE — SUT MONOCRYL 4-0 PS-2 27 IN Y426H

## (undated) DEVICE — DRESSING MEPILEX AG BORDER 4 X 8 IN

## (undated) DEVICE — SURGICEL 4 X 8IN

## (undated) DEVICE — VESSEL CANNULA

## (undated) DEVICE — COBAN 4 IN STERILE

## (undated) DEVICE — GAUZE SPONGES,16 PLY: Brand: CURITY

## (undated) DEVICE — STERILE BETHLEHEM FEM POP PACK: Brand: CARDINAL HEALTH

## (undated) DEVICE — PENCIL ELECTROSURG E-Z CLEAN -0035H

## (undated) DEVICE — SUT SILK 2-0 18 IN A185H

## (undated) DEVICE — SURGICAL GOWN, XL SMARTSLEEVE: Brand: CONVERTORS

## (undated) DEVICE — KERLIX BANDAGE ROLL: Brand: KERLIX

## (undated) DEVICE — PACK UNIVERSAL DRAPES SUB-Q ICD

## (undated) DEVICE — 6617 IOBAN II PATIENT ISOLATION DRAPE 5/BX,4BX/CS: Brand: STERI-DRAPE™ IOBAN™ 2

## (undated) DEVICE — CURITY NON-ADHERENT STRIPS: Brand: CURITY

## (undated) DEVICE — 3000CC GUARDIAN II: Brand: GUARDIAN

## (undated) DEVICE — DRESSING MEPILEX AG BORDER 4 X 4 IN

## (undated) DEVICE — SUT MONOCRYL 2-0 CT-1 27 IN Y339H

## (undated) DEVICE — SUT PROLENE 4-0 RB-1/RB-1 36 IN 8557H

## (undated) DEVICE — SURGICEL FIBRILLAR 1 X 2

## (undated) DEVICE — DRAPE SHEET THREE QUARTER

## (undated) DEVICE — SUT MONOCRYL 3-0 SH 27 IN Y416H

## (undated) DEVICE — 3M™ IOBAN™ 2 ANTIMICROBIAL INCISE DRAPE 6651EZ: Brand: IOBAN™ 2

## (undated) DEVICE — DRESSING MEPILEX BORDER 4 X 8 IN

## (undated) DEVICE — SUT SILK 3-0 18 IN A184H

## (undated) DEVICE — SUT MONOCRYL 4-0 PS-2 18 IN Y496G

## (undated) DEVICE — 40601 PROLONGED POSITIONING SYSTEM: Brand: 40601 PROLONGED POSITIONING SYSTEM

## (undated) DEVICE — POV-IOD SOLUTION 4OZ BT

## (undated) DEVICE — ACE WRAP 4 IN UNSTERILE

## (undated) DEVICE — 1/2 FORCE SURGICAL SPRING CLIP: Brand: STEALTH® SPRING CLIP

## (undated) DEVICE — GLOVE SRG BIOGEL 7

## (undated) DEVICE — DRAPE SURGIKIT SADDLE BAG

## (undated) DEVICE — STOCKINETTE REGULAR

## (undated) DEVICE — SYRINGE 10ML LL

## (undated) DEVICE — DISPOSABLE EQUIPMENT COVER: Brand: SMALL TOWEL DRAPE